# Patient Record
Sex: MALE | Race: WHITE | NOT HISPANIC OR LATINO | Employment: OTHER | URBAN - METROPOLITAN AREA
[De-identification: names, ages, dates, MRNs, and addresses within clinical notes are randomized per-mention and may not be internally consistent; named-entity substitution may affect disease eponyms.]

---

## 2017-01-19 ENCOUNTER — HISTORICAL (OUTPATIENT)
Dept: RADIOLOGY | Facility: HOSPITAL | Age: 72
End: 2017-01-19

## 2017-01-25 ENCOUNTER — HISTORICAL (OUTPATIENT)
Dept: RADIOLOGY | Facility: HOSPITAL | Age: 72
End: 2017-01-25

## 2017-02-06 ENCOUNTER — HISTORICAL (OUTPATIENT)
Dept: RADIOLOGY | Facility: HOSPITAL | Age: 72
End: 2017-02-06

## 2017-04-19 ENCOUNTER — HISTORICAL (OUTPATIENT)
Dept: LAB | Facility: HOSPITAL | Age: 72
End: 2017-04-19

## 2017-04-27 ENCOUNTER — HISTORICAL (OUTPATIENT)
Dept: LAB | Facility: HOSPITAL | Age: 72
End: 2017-04-27

## 2017-04-27 LAB
EST. AVERAGE GLUCOSE BLD GHB EST-MCNC: 134 MG/DL
HBA1C MFR BLD: 6.3 % (ref 4.5–6.2)
TSH SERPL-ACNC: 1.54 MIU/ML (ref 0.36–3.74)

## 2017-08-17 ENCOUNTER — HISTORICAL (OUTPATIENT)
Dept: LAB | Facility: HOSPITAL | Age: 72
End: 2017-08-17

## 2017-08-17 LAB
ALBUMIN SERPL-MCNC: 4.2 GM/DL (ref 3.4–5)
ALBUMIN/GLOB SERPL: 1.3 RATIO (ref 1.1–2)
ALP SERPL-CCNC: 100 UNIT/L (ref 46–116)
ALT SERPL-CCNC: 34 UNIT/L (ref 12–78)
APPEARANCE, UA: NORMAL
AST SERPL-CCNC: 19 UNIT/L (ref 15–37)
BACTERIA SPEC CULT: NORMAL
BILIRUB SERPL-MCNC: 0.7 MG/DL (ref 0.2–1)
BILIRUB UR QL STRIP: NEGATIVE
BILIRUBIN DIRECT+TOT PNL SERPL-MCNC: 0.18 MG/DL (ref 0–0.2)
BILIRUBIN DIRECT+TOT PNL SERPL-MCNC: 0.52 MG/DL (ref 0–0.8)
BUN SERPL-MCNC: 23.5 MG/DL (ref 7–18)
CALCIUM SERPL-MCNC: 9.7 MG/DL (ref 8.5–10.1)
CHLORIDE SERPL-SCNC: 103 MMOL/L (ref 98–107)
CHOLEST SERPL-MCNC: 118 MG/DL (ref 0–200)
CHOLEST/HDLC SERPL: 2.1 {RATIO} (ref 0–5)
CO2 SERPL-SCNC: 32.4 MMOL/L (ref 21–32)
COLOR UR: NORMAL
CREAT SERPL-MCNC: 0.98 MG/DL (ref 0.6–1.3)
EST. AVERAGE GLUCOSE BLD GHB EST-MCNC: 137 MG/DL
GLOBULIN SER-MCNC: 3.2 GM/DL (ref 2.4–3.5)
GLUCOSE (UA): NEGATIVE
GLUCOSE SERPL-MCNC: 102 MG/DL (ref 74–106)
HBA1C MFR BLD: 6.4 % (ref 4.5–6.2)
HDLC SERPL-MCNC: 55 MG/DL (ref 40–60)
HGB UR QL STRIP: NORMAL
KETONES UR QL STRIP: NEGATIVE
LDLC SERPL CALC-MCNC: 54 MG/DL (ref 0–129)
LEUKOCYTE ESTERASE UR QL STRIP: NEGATIVE
NITRITE UR QL STRIP: NEGATIVE
PH UR STRIP: 5.5 [PH] (ref 5–9)
POTASSIUM SERPL-SCNC: 4.6 MMOL/L (ref 3.5–5.1)
PROT SERPL-MCNC: 7.4 GM/DL (ref 6.4–8.2)
PROT UR QL STRIP: NEGATIVE
RBC #/AREA URNS HPF: NORMAL /HPF
SODIUM SERPL-SCNC: 141 MMOL/L (ref 136–145)
SP GR UR STRIP: 1.02 (ref 1–1.03)
SQUAMOUS EPITHELIAL, UA: NORMAL
TRIGL SERPL-MCNC: 44 MG/DL
URATE SERPL-MCNC: 5.1 MG/DL (ref 3.4–7)
UROBILINOGEN UR STRIP-ACNC: 0.2
VLDLC SERPL CALC-MCNC: 9 MG/DL
WBC #/AREA URNS HPF: NORMAL /HPF

## 2017-10-09 ENCOUNTER — HISTORICAL (OUTPATIENT)
Dept: RADIOLOGY | Facility: HOSPITAL | Age: 72
End: 2017-10-09

## 2017-10-09 LAB
ALBUMIN SERPL-MCNC: 4 GM/DL (ref 3.4–5)
ALP SERPL-CCNC: 109 UNIT/L (ref 46–116)
ALT SERPL-CCNC: 38 UNIT/L (ref 12–78)
AST SERPL-CCNC: 18 UNIT/L (ref 15–37)
BILIRUB SERPL-MCNC: 0.5 MG/DL (ref 0.2–1)
BILIRUBIN DIRECT+TOT PNL SERPL-MCNC: 0.13 MG/DL (ref 0–0.2)
BILIRUBIN DIRECT+TOT PNL SERPL-MCNC: 0.4 MG/DL (ref 0–0.8)
CHOLEST SERPL-MCNC: 108 MG/DL (ref 0–200)
CHOLEST/HDLC SERPL: 2 {RATIO} (ref 0–5)
HDLC SERPL-MCNC: 55 MG/DL (ref 40–60)
LDLC SERPL CALC-MCNC: 40 MG/DL (ref 0–129)
PROT SERPL-MCNC: 7.3 GM/DL (ref 6.4–8.2)
TRIGL SERPL-MCNC: 67 MG/DL
VLDLC SERPL CALC-MCNC: 13 MG/DL

## 2017-12-04 ENCOUNTER — HISTORICAL (OUTPATIENT)
Dept: LAB | Facility: HOSPITAL | Age: 72
End: 2017-12-04

## 2017-12-04 LAB
ABS NEUT (OLG): 3.5 X10(3)/MCL (ref 2.1–9.2)
ALBUMIN SERPL-MCNC: 3.8 GM/DL (ref 3.4–5)
ALBUMIN/GLOB SERPL: 1.2 RATIO (ref 1.1–2)
ALP SERPL-CCNC: 106 UNIT/L (ref 46–116)
ALT SERPL-CCNC: 41 UNIT/L (ref 12–78)
APPEARANCE, UA: CLEAR
AST SERPL-CCNC: 19 UNIT/L (ref 15–37)
BACTERIA #/AREA URNS AUTO: NORMAL /HPF
BASOPHILS # BLD AUTO: 0 X10(3)/MCL
BASOPHILS NFR BLD AUTO: 0 % (ref 0–2)
BILIRUB SERPL-MCNC: 0.6 MG/DL (ref 0.2–1)
BILIRUB UR QL STRIP: NEGATIVE
BILIRUBIN DIRECT+TOT PNL SERPL-MCNC: 0.17 MG/DL (ref 0–0.2)
BILIRUBIN DIRECT+TOT PNL SERPL-MCNC: 0.43 MG/DL (ref 0–0.8)
BUN SERPL-MCNC: 15.5 MG/DL (ref 7–18)
CALCIUM SERPL-MCNC: 9.5 MG/DL (ref 8.5–10.1)
CHLORIDE SERPL-SCNC: 101 MMOL/L (ref 98–107)
CO2 SERPL-SCNC: 31 MMOL/L (ref 21–32)
COLOR UR: YELLOW
CREAT SERPL-MCNC: 0.93 MG/DL (ref 0.6–1.3)
CREAT UR-MCNC: 136.5 MG/DL (ref 30–125)
EOSINOPHIL # BLD AUTO: 0.3 X10(3)/MCL
EOSINOPHIL NFR BLD AUTO: 5 %
ERYTHROCYTE [DISTWIDTH] IN BLOOD BY AUTOMATED COUNT: 13.2 % (ref 11.5–17)
GLOBULIN SER-MCNC: 3.2 GM/DL (ref 2.4–3.5)
GLUCOSE (UA): NEGATIVE
GLUCOSE SERPL-MCNC: 115 MG/DL (ref 74–106)
HCT VFR BLD AUTO: 42.2 % (ref 42–52)
HGB BLD-MCNC: 14.2 GM/DL (ref 14–18)
HGB UR QL STRIP: NORMAL
KETONES UR QL STRIP: NEGATIVE
LEUKOCYTE ESTERASE UR QL STRIP: NEGATIVE
LYMPHOCYTES # BLD AUTO: 2 X10(3)/MCL
LYMPHOCYTES NFR BLD AUTO: 31 % (ref 13–40)
MCH RBC QN AUTO: 31.7 PG (ref 27–31)
MCHC RBC AUTO-ENTMCNC: 33.7 GM/DL (ref 33–36)
MCV RBC AUTO: 94.2 FL (ref 80–94)
MONOCYTES # BLD AUTO: 0.7 X10(3)/MCL
MONOCYTES NFR BLD AUTO: 10 % (ref 2–11)
NEUTROPHILS # BLD AUTO: 3.5 X10(3)/MCL (ref 2.1–9.2)
NEUTROPHILS NFR BLD AUTO: 54 % (ref 47–80)
NITRITE UR QL STRIP.AUTO: NEGATIVE
PH UR STRIP: 6 [PH] (ref 5–9)
PLATELET # BLD AUTO: 222 X10(3)/MCL (ref 130–400)
PMV BLD AUTO: 6.6 FL (ref 7.4–10.4)
POTASSIUM SERPL-SCNC: 4.3 MMOL/L (ref 3.5–5.1)
PROT SERPL-MCNC: 7 GM/DL (ref 6.4–8.2)
PROT UR QL STRIP: NEGATIVE
PROT UR STRIP-MCNC: 14.5 MG/DL
PTH-INTACT SERPL-MCNC: 51.9 PG/DL (ref 14–72)
RBC # BLD AUTO: 4.48 X10(6)/MCL (ref 4.7–6.1)
RBC #/AREA URNS HPF: NORMAL /HPF
SODIUM SERPL-SCNC: 138 MMOL/L (ref 136–145)
SP GR UR STRIP: 1.02 (ref 1–1.03)
SQUAMOUS EPITHELIAL, UA: NORMAL
UROBILINOGEN UR STRIP-ACNC: 0.2
WBC # SPEC AUTO: 6.5 X10(3)/MCL (ref 4.5–11.5)
WBC #/AREA URNS AUTO: NORMAL /HPF

## 2018-02-21 ENCOUNTER — HISTORICAL (OUTPATIENT)
Dept: LAB | Facility: HOSPITAL | Age: 73
End: 2018-02-21

## 2018-02-21 LAB
ALBUMIN SERPL-MCNC: 4.1 GM/DL (ref 3.4–5)
ALBUMIN/GLOB SERPL: 1.2 RATIO (ref 1.1–2)
ALP SERPL-CCNC: 119 UNIT/L (ref 46–116)
ALT SERPL-CCNC: 43 UNIT/L (ref 12–78)
AST SERPL-CCNC: 22 UNIT/L (ref 15–37)
BILIRUB SERPL-MCNC: 0.7 MG/DL (ref 0.2–1)
BILIRUBIN DIRECT+TOT PNL SERPL-MCNC: 0.19 MG/DL (ref 0–0.2)
BILIRUBIN DIRECT+TOT PNL SERPL-MCNC: 0.47 MG/DL (ref 0–0.8)
BUN SERPL-MCNC: 21.8 MG/DL (ref 7–18)
CALCIUM SERPL-MCNC: 9.7 MG/DL (ref 8.5–10.1)
CHLORIDE SERPL-SCNC: 104 MMOL/L (ref 98–107)
CHOLEST SERPL-MCNC: 125 MG/DL (ref 0–200)
CHOLEST/HDLC SERPL: 1.9 {RATIO} (ref 0–5)
CO2 SERPL-SCNC: 31 MMOL/L (ref 21–32)
CREAT SERPL-MCNC: 1.01 MG/DL (ref 0.6–1.3)
EST. AVERAGE GLUCOSE BLD GHB EST-MCNC: 134 MG/DL
GLOBULIN SER-MCNC: 3.5 GM/DL (ref 2.4–3.5)
GLUCOSE SERPL-MCNC: 117 MG/DL (ref 74–106)
HBA1C MFR BLD: 6.3 % (ref 4.5–6.2)
HDLC SERPL-MCNC: 65 MG/DL (ref 40–60)
LDLC SERPL CALC-MCNC: 50 MG/DL (ref 0–129)
POTASSIUM SERPL-SCNC: 4.6 MMOL/L (ref 3.5–5.1)
PROT SERPL-MCNC: 7.6 GM/DL (ref 6.4–8.2)
SODIUM SERPL-SCNC: 141 MMOL/L (ref 136–145)
TRIGL SERPL-MCNC: 50 MG/DL
URATE SERPL-MCNC: 4.9 MG/DL (ref 3.4–7)
VLDLC SERPL CALC-MCNC: 10 MG/DL

## 2018-03-19 ENCOUNTER — HISTORICAL (OUTPATIENT)
Dept: LAB | Facility: HOSPITAL | Age: 73
End: 2018-03-19

## 2018-03-19 LAB — ALP SERPL-CCNC: 115 UNIT/L (ref 46–116)

## 2018-06-19 ENCOUNTER — HISTORICAL (OUTPATIENT)
Dept: LAB | Facility: HOSPITAL | Age: 73
End: 2018-06-19

## 2018-06-19 LAB
ABS NEUT (OLG): 3.5 X10(3)/MCL (ref 2.1–9.2)
ALBUMIN SERPL-MCNC: 3.8 GM/DL (ref 3.4–5)
ALBUMIN/GLOB SERPL: 1.1 RATIO (ref 1.1–2)
ALP SERPL-CCNC: 122 UNIT/L (ref 46–116)
ALT SERPL-CCNC: 45 UNIT/L (ref 12–78)
APPEARANCE, UA: CLEAR
AST SERPL-CCNC: 18 UNIT/L (ref 15–37)
BACTERIA SPEC CULT: NORMAL
BASOPHILS NFR BLD AUTO: 1 % (ref 0–2)
BILIRUB SERPL-MCNC: 0.6 MG/DL (ref 0.2–1)
BILIRUB UR QL STRIP: NORMAL
BILIRUBIN DIRECT+TOT PNL SERPL-MCNC: 0.19 MG/DL (ref 0–0.2)
BILIRUBIN DIRECT+TOT PNL SERPL-MCNC: 0.41 MG/DL (ref 0–0.8)
BUN SERPL-MCNC: 18.7 MG/DL (ref 7–18)
CALCIUM SERPL-MCNC: 9.4 MG/DL (ref 8.5–10.1)
CHLORIDE SERPL-SCNC: 102 MMOL/L (ref 98–107)
CO2 SERPL-SCNC: 31.5 MMOL/L (ref 21–32)
COLOR UR: YELLOW
CREAT SERPL-MCNC: 1.03 MG/DL (ref 0.6–1.3)
CREAT UR-MCNC: 399 MG/DL (ref 30–125)
EOSINOPHIL # BLD AUTO: 0.2 X10(3)/MCL
EOSINOPHIL NFR BLD AUTO: 4 %
ERYTHROCYTE [DISTWIDTH] IN BLOOD BY AUTOMATED COUNT: 13.2 % (ref 11.5–17)
GLOBULIN SER-MCNC: 3.4 GM/DL (ref 2.4–3.5)
GLUCOSE (UA): NEGATIVE
GLUCOSE SERPL-MCNC: 122 MG/DL (ref 74–106)
HCT VFR BLD AUTO: 43.5 % (ref 42–52)
HGB BLD-MCNC: 14.6 GM/DL (ref 14–18)
HGB UR QL STRIP: NORMAL
KETONES UR QL STRIP: NEGATIVE
LEUKOCYTE ESTERASE UR QL STRIP: NEGATIVE
LYMPHOCYTES # BLD AUTO: 2.2 X10(3)/MCL
LYMPHOCYTES NFR BLD AUTO: 33 % (ref 13–40)
MCH RBC QN AUTO: 31.3 PG (ref 27–31)
MCHC RBC AUTO-ENTMCNC: 33.6 GM/DL (ref 33–36)
MCV RBC AUTO: 93.4 FL (ref 80–94)
MONOCYTES # BLD AUTO: 0.7 X10(3)/MCL
MONOCYTES NFR BLD AUTO: 10 % (ref 2–11)
NEUTROPHILS # BLD AUTO: 3.5 X10(3)/MCL (ref 2.1–9.2)
NEUTROPHILS NFR BLD AUTO: 52 % (ref 47–80)
NITRITE UR QL STRIP: NEGATIVE
PH UR STRIP: 5.5 [PH] (ref 5–9)
PLATELET # BLD AUTO: 243 X10(3)/MCL (ref 130–400)
PMV BLD AUTO: 6.9 FL (ref 7.4–10.4)
POTASSIUM SERPL-SCNC: 4.3 MMOL/L (ref 3.5–5.1)
PROT SERPL-MCNC: 7.2 GM/DL (ref 6.4–8.2)
PROT UR QL STRIP: NEGATIVE
PROT UR STRIP-MCNC: 33.8 MG/DL
PTH-INTACT SERPL-MCNC: 52.4 PG/ML (ref 18.4–80.1)
RBC # BLD AUTO: 4.66 X10(6)/MCL (ref 4.7–6.1)
RBC #/AREA URNS HPF: NORMAL /[HPF]
SODIUM SERPL-SCNC: 140 MMOL/L (ref 136–145)
SP GR UR STRIP: >=1.03 (ref 1–1.03)
SQUAMOUS EPITHELIAL, UA: NORMAL
UROBILINOGEN UR STRIP-ACNC: 0.2
WBC # SPEC AUTO: 6.7 X10(3)/MCL (ref 4.5–11.5)
WBC #/AREA URNS HPF: NORMAL /[HPF]

## 2018-08-22 ENCOUNTER — HISTORICAL (OUTPATIENT)
Dept: LAB | Facility: HOSPITAL | Age: 73
End: 2018-08-22

## 2018-08-22 LAB
BUN SERPL-MCNC: 19.9 MG/DL (ref 7–18)
CALCIUM SERPL-MCNC: 9.5 MG/DL (ref 8.5–10.1)
CHLORIDE SERPL-SCNC: 103 MMOL/L (ref 98–107)
CHOLEST SERPL-MCNC: 136 MG/DL (ref 0–200)
CHOLEST/HDLC SERPL: 2.2 {RATIO} (ref 0–5)
CO2 SERPL-SCNC: 33.8 MMOL/L (ref 21–32)
CREAT SERPL-MCNC: 1.08 MG/DL (ref 0.6–1.3)
CREAT/UREA NIT SERPL: 18
DEPRECATED CALCIDIOL+CALCIFEROL SERPL-MC: 22.71 NG/ML (ref 30–80)
EST. AVERAGE GLUCOSE BLD GHB EST-MCNC: 143 MG/DL
GLUCOSE SERPL-MCNC: 128 MG/DL (ref 74–106)
HBA1C MFR BLD: 6.6 % (ref 4.5–6.2)
HDLC SERPL-MCNC: 63 MG/DL (ref 40–60)
LDLC SERPL CALC-MCNC: 58 MG/DL (ref 0–129)
POTASSIUM SERPL-SCNC: 5 MMOL/L (ref 3.5–5.1)
SODIUM SERPL-SCNC: 141 MMOL/L (ref 136–145)
TRIGL SERPL-MCNC: 74 MG/DL
TSH SERPL-ACNC: 2.11 MIU/ML (ref 0.36–3.74)
URATE SERPL-MCNC: 4.8 MG/DL (ref 3.4–7)
VLDLC SERPL CALC-MCNC: 15 MG/DL

## 2018-10-08 ENCOUNTER — HISTORICAL (OUTPATIENT)
Dept: LAB | Facility: HOSPITAL | Age: 73
End: 2018-10-08

## 2018-10-08 LAB
ABS NEUT (OLG): 3.23 X10(3)/MCL (ref 2.1–9.2)
ALBUMIN SERPL-MCNC: 3.9 GM/DL (ref 3.4–5)
ALBUMIN/GLOB SERPL: 1.1 RATIO (ref 1.1–2)
ALP SERPL-CCNC: 122 UNIT/L (ref 46–116)
ALT SERPL-CCNC: 45 UNIT/L (ref 12–78)
APPEARANCE, UA: CLEAR
AST SERPL-CCNC: 18 UNIT/L (ref 15–37)
BACTERIA SPEC CULT: ABNORMAL
BASOPHILS # BLD AUTO: 0 X10(3)/MCL (ref 0–0.2)
BASOPHILS NFR BLD AUTO: 0 %
BILIRUB SERPL-MCNC: 0.5 MG/DL (ref 0.2–1)
BILIRUB UR QL STRIP: NEGATIVE
BILIRUBIN DIRECT+TOT PNL SERPL-MCNC: 0.15 MG/DL (ref 0–0.2)
BILIRUBIN DIRECT+TOT PNL SERPL-MCNC: 0.35 MG/DL (ref 0–0.8)
BUN SERPL-MCNC: 14.8 MG/DL (ref 7–18)
CALCIUM SERPL-MCNC: 9.5 MG/DL (ref 8.5–10.1)
CHLORIDE SERPL-SCNC: 103 MMOL/L (ref 98–107)
CO2 SERPL-SCNC: 32.3 MMOL/L (ref 21–32)
COLOR UR: YELLOW
CREAT SERPL-MCNC: 1.11 MG/DL (ref 0.6–1.3)
CREAT UR-MCNC: 110.5 MG/DL (ref 30–125)
EOSINOPHIL # BLD AUTO: 0.4 X10(3)/MCL (ref 0–0.9)
EOSINOPHIL NFR BLD AUTO: 6 %
ERYTHROCYTE [DISTWIDTH] IN BLOOD BY AUTOMATED COUNT: 12.7 % (ref 11.5–17)
GLOBULIN SER-MCNC: 3.4 GM/DL (ref 2.4–3.5)
GLUCOSE (UA): NEGATIVE
GLUCOSE SERPL-MCNC: 124 MG/DL (ref 74–106)
HCT VFR BLD AUTO: 43.4 % (ref 42–52)
HGB BLD-MCNC: 14.3 GM/DL (ref 14–18)
HGB UR QL STRIP: ABNORMAL
IMM GRANULOCYTES # BLD AUTO: 0.01 % (ref 0–0.02)
IMM GRANULOCYTES NFR BLD AUTO: 0.1 % (ref 0–0.43)
KETONES UR QL STRIP: NEGATIVE
LEUKOCYTE ESTERASE UR QL STRIP: NEGATIVE
LYMPHOCYTES # BLD AUTO: 2.6 X10(3)/MCL (ref 0.6–4.6)
LYMPHOCYTES NFR BLD AUTO: 38 %
MCH RBC QN AUTO: 30.4 PG (ref 27–31)
MCHC RBC AUTO-ENTMCNC: 32.9 GM/DL (ref 33–36)
MCV RBC AUTO: 92.3 FL (ref 80–94)
MONOCYTES # BLD AUTO: 0.7 X10(3)/MCL (ref 0.1–1.3)
MONOCYTES NFR BLD AUTO: 10 %
MUCOUS THREADS URNS QL MICRO: SLIGHT
NEUTROPHILS # BLD AUTO: 3.23 X10(3)/MCL (ref 1.4–7.9)
NEUTROPHILS NFR BLD AUTO: 47 %
NITRITE UR QL STRIP: NEGATIVE
PH UR STRIP: 5.5 [PH] (ref 5–9)
PLATELET # BLD AUTO: 256 X10(3)/MCL (ref 130–400)
PMV BLD AUTO: 8.3 FL (ref 9.4–12.4)
POTASSIUM SERPL-SCNC: 4.6 MMOL/L (ref 3.5–5.1)
PROT SERPL-MCNC: 7.3 GM/DL (ref 6.4–8.2)
PROT UR QL STRIP: NEGATIVE
PROT UR STRIP-MCNC: 7.4 MG/DL
PTH-INTACT SERPL-MCNC: 53.6 PG/ML (ref 18.4–80.1)
RBC # BLD AUTO: 4.7 X10(6)/MCL (ref 4.7–6.1)
RBC #/AREA URNS HPF: ABNORMAL /HPF
SODIUM SERPL-SCNC: 141 MMOL/L (ref 136–145)
SP GR UR STRIP: 1.02 (ref 1–1.03)
SQUAMOUS EPITHELIAL, UA: ABNORMAL
UROBILINOGEN UR STRIP-ACNC: 0.2
WBC # SPEC AUTO: 6.9 X10(3)/MCL (ref 4.5–11.5)
WBC #/AREA URNS HPF: ABNORMAL /HPF

## 2019-01-25 ENCOUNTER — HISTORICAL (OUTPATIENT)
Dept: LAB | Facility: HOSPITAL | Age: 74
End: 2019-01-25

## 2019-01-25 LAB
BUN SERPL-MCNC: 18.7 MG/DL (ref 7–18)
CALCIUM SERPL-MCNC: 9.1 MG/DL (ref 8.5–10.1)
CHLORIDE SERPL-SCNC: 99 MMOL/L (ref 98–107)
CO2 SERPL-SCNC: 32.5 MMOL/L (ref 21–32)
CREAT SERPL-MCNC: 1.16 MG/DL (ref 0.6–1.3)
CREAT/UREA NIT SERPL: 16
GLUCOSE SERPL-MCNC: 194 MG/DL (ref 74–106)
POTASSIUM SERPL-SCNC: 4 MMOL/L (ref 3.5–5.1)
SODIUM SERPL-SCNC: 139 MMOL/L (ref 136–145)

## 2019-02-27 ENCOUNTER — HISTORICAL (OUTPATIENT)
Dept: LAB | Facility: HOSPITAL | Age: 74
End: 2019-02-27

## 2019-02-27 LAB
ALBUMIN SERPL-MCNC: 4.3 GM/DL (ref 3.4–5)
ALBUMIN/GLOB SERPL: 1.3 RATIO (ref 1.1–2)
ALP SERPL-CCNC: 125 UNIT/L (ref 46–116)
ALT SERPL-CCNC: 43 UNIT/L (ref 12–78)
APPEARANCE, UA: CLEAR
AST SERPL-CCNC: 19 UNIT/L (ref 15–37)
BACTERIA SPEC CULT: NORMAL
BILIRUB SERPL-MCNC: 0.4 MG/DL (ref 0.2–1)
BILIRUB UR QL STRIP: NEGATIVE
BILIRUBIN DIRECT+TOT PNL SERPL-MCNC: 0.13 MG/DL (ref 0–0.2)
BILIRUBIN DIRECT+TOT PNL SERPL-MCNC: 0.27 MG/DL (ref 0–0.8)
BUN SERPL-MCNC: 17.5 MG/DL (ref 7–18)
CALCIUM SERPL-MCNC: 9.5 MG/DL (ref 8.5–10.1)
CHLORIDE SERPL-SCNC: 102 MMOL/L (ref 98–107)
CO2 SERPL-SCNC: 31.9 MMOL/L (ref 21–32)
COLOR UR: YELLOW
CREAT SERPL-MCNC: 0.94 MG/DL (ref 0.6–1.3)
EST. AVERAGE GLUCOSE BLD GHB EST-MCNC: 160 MG/DL
GLOBULIN SER-MCNC: 3.3 GM/DL (ref 2.4–3.5)
GLUCOSE (UA): NEGATIVE
GLUCOSE SERPL-MCNC: 119 MG/DL (ref 74–106)
HBA1C MFR BLD: 7.2 % (ref 4.5–6.2)
HGB UR QL STRIP: NEGATIVE
KETONES UR QL STRIP: NEGATIVE
LEUKOCYTE ESTERASE UR QL STRIP: NEGATIVE
NITRITE UR QL STRIP: NEGATIVE
PH UR STRIP: 6 [PH] (ref 5–9)
POTASSIUM SERPL-SCNC: 4.6 MMOL/L (ref 3.5–5.1)
PROT SERPL-MCNC: 7.6 GM/DL (ref 6.4–8.2)
PROT UR QL STRIP: NEGATIVE
RBC #/AREA URNS HPF: NORMAL /[HPF]
SODIUM SERPL-SCNC: 142 MMOL/L (ref 136–145)
SP GR UR STRIP: 1.02 (ref 1–1.03)
SQUAMOUS EPITHELIAL, UA: NORMAL
UROBILINOGEN UR STRIP-ACNC: 0.2
WBC #/AREA URNS HPF: NORMAL /[HPF]

## 2019-04-04 ENCOUNTER — HISTORICAL (OUTPATIENT)
Dept: LAB | Facility: HOSPITAL | Age: 74
End: 2019-04-04

## 2019-04-04 LAB
ABS NEUT (OLG): 2.65 X10(3)/MCL (ref 2.1–9.2)
ALBUMIN SERPL-MCNC: 3.9 GM/DL (ref 3.4–5)
ALBUMIN/GLOB SERPL: 1.2 RATIO (ref 1.1–2)
ALP SERPL-CCNC: 115 UNIT/L (ref 46–116)
ALT SERPL-CCNC: 33 UNIT/L (ref 12–78)
APPEARANCE, UA: CLEAR
AST SERPL-CCNC: 23 UNIT/L (ref 15–37)
BACTERIA SPEC CULT: NORMAL
BASOPHILS # BLD AUTO: 0 X10(3)/MCL (ref 0–0.2)
BASOPHILS NFR BLD AUTO: 0 %
BILIRUB SERPL-MCNC: 0.5 MG/DL (ref 0.2–1)
BILIRUB UR QL STRIP: NEGATIVE
BILIRUBIN DIRECT+TOT PNL SERPL-MCNC: 0.15 MG/DL (ref 0–0.2)
BILIRUBIN DIRECT+TOT PNL SERPL-MCNC: 0.35 MG/DL (ref 0–0.8)
BUN SERPL-MCNC: 21 MG/DL (ref 7–18)
CALCIUM SERPL-MCNC: 9.7 MG/DL (ref 8.5–10.1)
CHLORIDE SERPL-SCNC: 101 MMOL/L (ref 98–107)
CO2 SERPL-SCNC: 31.1 MMOL/L (ref 21–32)
COLOR UR: YELLOW
CREAT SERPL-MCNC: 1.09 MG/DL (ref 0.6–1.3)
CREAT UR-MCNC: 238.5 MG/DL (ref 30–125)
EOSINOPHIL # BLD AUTO: 0.2 X10(3)/MCL (ref 0–0.9)
EOSINOPHIL NFR BLD AUTO: 4 %
ERYTHROCYTE [DISTWIDTH] IN BLOOD BY AUTOMATED COUNT: 12.4 % (ref 11.5–17)
GLOBULIN SER-MCNC: 3.2 GM/DL (ref 2.4–3.5)
GLUCOSE (UA): NEGATIVE
GLUCOSE SERPL-MCNC: 144 MG/DL (ref 74–106)
HCT VFR BLD AUTO: 41.1 % (ref 42–52)
HGB BLD-MCNC: 14.1 GM/DL (ref 14–18)
HGB UR QL STRIP: NORMAL
KETONES UR QL STRIP: NEGATIVE
LEUKOCYTE ESTERASE UR QL STRIP: NEGATIVE
LYMPHOCYTES # BLD AUTO: 1.7 X10(3)/MCL (ref 0.6–4.6)
LYMPHOCYTES NFR BLD AUTO: 34 %
MCH RBC QN AUTO: 31.1 PG (ref 27–31)
MCHC RBC AUTO-ENTMCNC: 34.3 GM/DL (ref 33–36)
MCV RBC AUTO: 90.7 FL (ref 80–94)
MONOCYTES # BLD AUTO: 0.6 X10(3)/MCL (ref 0.1–1.3)
MONOCYTES NFR BLD AUTO: 11 %
NEUTROPHILS # BLD AUTO: 2.65 X10(3)/MCL (ref 1.4–7.9)
NEUTROPHILS NFR BLD AUTO: 51 %
NITRITE UR QL STRIP: NEGATIVE
PH UR STRIP: 5 [PH] (ref 5–9)
PLATELET # BLD AUTO: 246 X10(3)/MCL (ref 130–400)
PMV BLD AUTO: 8.4 FL (ref 9.4–12.4)
POTASSIUM SERPL-SCNC: 4.9 MMOL/L (ref 3.5–5.1)
PROT SERPL-MCNC: 7.1 GM/DL (ref 6.4–8.2)
PROT UR QL STRIP: NEGATIVE
PROT UR STRIP-MCNC: 17.6 MG/DL
PROT/CREAT UR-RTO: 0.1 MG/DL
PTH-INTACT SERPL-MCNC: 60.3 PG/ML (ref 18.4–80.1)
RBC # BLD AUTO: 4.53 X10(6)/MCL (ref 4.7–6.1)
RBC #/AREA URNS HPF: NORMAL /HPF
SODIUM SERPL-SCNC: 139 MMOL/L (ref 136–145)
SP GR UR STRIP: 1.02 (ref 1–1.03)
SQUAMOUS EPITHELIAL, UA: NORMAL
UROBILINOGEN UR STRIP-ACNC: 0.2
WBC # SPEC AUTO: 5.2 X10(3)/MCL (ref 4.5–11.5)
WBC #/AREA URNS HPF: NORMAL /[HPF]

## 2019-09-03 ENCOUNTER — HISTORICAL (OUTPATIENT)
Dept: LAB | Facility: HOSPITAL | Age: 74
End: 2019-09-03

## 2019-09-03 LAB
ABS NEUT (OLG): 4.04 X10(3)/MCL (ref 2.1–9.2)
ALBUMIN SERPL-MCNC: 4.1 GM/DL (ref 3.4–5)
ALBUMIN/GLOB SERPL: 1.3 RATIO (ref 1.1–2)
ALP SERPL-CCNC: 111 UNIT/L (ref 50–136)
ALT SERPL-CCNC: 32 UNIT/L (ref 12–78)
AST SERPL-CCNC: 15 UNIT/L (ref 15–37)
BASOPHILS # BLD AUTO: 0 X10(3)/MCL (ref 0–0.2)
BASOPHILS NFR BLD AUTO: 0 %
BILIRUB SERPL-MCNC: 0.6 MG/DL (ref 0.2–1)
BILIRUBIN DIRECT+TOT PNL SERPL-MCNC: 0.2 MG/DL (ref 0–0.5)
BILIRUBIN DIRECT+TOT PNL SERPL-MCNC: 0.4 MG/DL (ref 0–0.8)
BUN SERPL-MCNC: 18 MG/DL (ref 7–18)
CALCIUM SERPL-MCNC: 9.6 MG/DL (ref 8.5–10.1)
CHLORIDE SERPL-SCNC: 104 MMOL/L (ref 98–107)
CHOLEST SERPL-MCNC: 104 MG/DL (ref 0–200)
CHOLEST/HDLC SERPL: 2 {RATIO} (ref 0–5)
CO2 SERPL-SCNC: 32 MMOL/L (ref 21–32)
CREAT SERPL-MCNC: 1.05 MG/DL (ref 0.7–1.3)
EOSINOPHIL # BLD AUTO: 0.2 X10(3)/MCL (ref 0–0.9)
EOSINOPHIL NFR BLD AUTO: 3 %
ERYTHROCYTE [DISTWIDTH] IN BLOOD BY AUTOMATED COUNT: 12.9 % (ref 11.5–17)
EST. AVERAGE GLUCOSE BLD GHB EST-MCNC: 123 MG/DL
GLOBULIN SER-MCNC: 3.1 GM/DL (ref 2.4–3.5)
GLUCOSE SERPL-MCNC: 101 MG/DL (ref 74–106)
HBA1C MFR BLD: 5.9 % (ref 4.2–6.3)
HCT VFR BLD AUTO: 43.2 % (ref 42–52)
HDLC SERPL-MCNC: 53 MG/DL (ref 35–60)
HGB BLD-MCNC: 14.1 GM/DL (ref 14–18)
IMM GRANULOCYTES # BLD AUTO: 0.02 % (ref 0–0.02)
IMM GRANULOCYTES NFR BLD AUTO: 0.3 % (ref 0–0.43)
LDLC SERPL CALC-MCNC: 35 MG/DL (ref 0–129)
LYMPHOCYTES # BLD AUTO: 1.9 X10(3)/MCL (ref 0.6–4.6)
LYMPHOCYTES NFR BLD AUTO: 28 %
MCH RBC QN AUTO: 31.1 PG (ref 27–31)
MCHC RBC AUTO-ENTMCNC: 32.6 GM/DL (ref 33–36)
MCV RBC AUTO: 95.2 FL (ref 80–94)
MONOCYTES # BLD AUTO: 0.7 X10(3)/MCL (ref 0.1–1.3)
MONOCYTES NFR BLD AUTO: 10 %
NEUTROPHILS # BLD AUTO: 4.04 X10(3)/MCL (ref 1.4–7.9)
NEUTROPHILS NFR BLD AUTO: 59 %
PLATELET # BLD AUTO: 264 X10(3)/MCL (ref 130–400)
PMV BLD AUTO: 9.5 FL (ref 9.4–12.4)
POTASSIUM SERPL-SCNC: 5.4 MMOL/L (ref 3.5–5.1)
PROT SERPL-MCNC: 7.2 GM/DL (ref 6.4–8.2)
RBC # BLD AUTO: 4.54 X10(6)/MCL (ref 4.7–6.1)
SODIUM SERPL-SCNC: 142 MMOL/L (ref 136–145)
TRIGL SERPL-MCNC: 79 MG/DL (ref 30–150)
VLDLC SERPL CALC-MCNC: 16 MG/DL
WBC # SPEC AUTO: 6.8 X10(3)/MCL (ref 4.5–11.5)

## 2019-12-06 ENCOUNTER — HISTORICAL (OUTPATIENT)
Dept: LAB | Facility: HOSPITAL | Age: 74
End: 2019-12-06

## 2019-12-06 LAB
ALBUMIN SERPL-MCNC: 4 GM/DL (ref 3.4–5)
ALBUMIN/GLOB SERPL: 1.1 RATIO (ref 1.1–2)
ALP SERPL-CCNC: 100 UNIT/L (ref 46–116)
ALT SERPL-CCNC: 48 UNIT/L (ref 12–78)
APPEARANCE, UA: CLEAR
AST SERPL-CCNC: 22 UNIT/L (ref 15–37)
BACTERIA SPEC CULT: ABNORMAL
BILIRUB SERPL-MCNC: 0.7 MG/DL (ref 0.2–1)
BILIRUB UR QL STRIP: NEGATIVE
BILIRUBIN DIRECT+TOT PNL SERPL-MCNC: 0.23 MG/DL (ref 0–0.2)
BILIRUBIN DIRECT+TOT PNL SERPL-MCNC: 0.47 MG/DL (ref 0–0.8)
BUN SERPL-MCNC: 23.6 MG/DL (ref 7–18)
CALCIUM SERPL-MCNC: 9.6 MG/DL (ref 8.5–10.1)
CHLORIDE SERPL-SCNC: 103 MMOL/L (ref 98–107)
CO2 SERPL-SCNC: 32.5 MMOL/L (ref 21–32)
COLOR UR: YELLOW
CREAT SERPL-MCNC: 1.01 MG/DL (ref 0.6–1.3)
CREAT UR-MCNC: 243.9 MG/DL (ref 30–125)
ERYTHROCYTE [DISTWIDTH] IN BLOOD BY AUTOMATED COUNT: 12.7 % (ref 11.5–17)
GLOBULIN SER-MCNC: 3.8 GM/DL (ref 2.4–3.5)
GLUCOSE (UA): NEGATIVE
GLUCOSE SERPL-MCNC: 120 MG/DL (ref 74–106)
HCT VFR BLD AUTO: 43.2 % (ref 42–52)
HGB BLD-MCNC: 14.1 GM/DL (ref 14–18)
HGB UR QL STRIP: ABNORMAL
KETONES UR QL STRIP: NEGATIVE
LEUKOCYTE ESTERASE UR QL STRIP: NEGATIVE
MCH RBC QN AUTO: 31.3 PG (ref 27–31)
MCHC RBC AUTO-ENTMCNC: 32.6 GM/DL (ref 33–36)
MCV RBC AUTO: 95.8 FL (ref 80–94)
MUCOUS THREADS URNS QL MICRO: ABNORMAL
NITRITE UR QL STRIP: NEGATIVE
PH UR STRIP: 5.5 [PH] (ref 5–9)
PLATELET # BLD AUTO: 244 X10(3)/MCL (ref 130–400)
PMV BLD AUTO: 8.6 FL (ref 9.4–12.4)
POTASSIUM SERPL-SCNC: 4.9 MMOL/L (ref 3.5–5.1)
PROT SERPL-MCNC: 7.8 GM/DL (ref 6.4–8.2)
PROT UR QL STRIP: NEGATIVE
PROT UR STRIP-MCNC: 14.2 MG/DL
PTH-INTACT SERPL-MCNC: 55.5 PG/ML (ref 18.4–80.1)
RBC # BLD AUTO: 4.51 X10(6)/MCL (ref 4.7–6.1)
RBC #/AREA URNS HPF: ABNORMAL /HPF
SODIUM SERPL-SCNC: 140 MMOL/L (ref 136–145)
SP GR UR STRIP: 1.02 (ref 1–1.03)
SQUAMOUS EPITHELIAL, UA: ABNORMAL
UROBILINOGEN UR STRIP-ACNC: 0.2
WBC # SPEC AUTO: 6.8 X10(3)/MCL (ref 4.5–11.5)
WBC #/AREA URNS HPF: ABNORMAL /[HPF]

## 2020-01-15 ENCOUNTER — HISTORICAL (OUTPATIENT)
Dept: LAB | Facility: HOSPITAL | Age: 75
End: 2020-01-15

## 2020-01-15 LAB
ALBUMIN SERPL-MCNC: 3.9 GM/DL (ref 3.4–5)
ALP SERPL-CCNC: 100 UNIT/L (ref 46–116)
ALT SERPL-CCNC: 43 UNIT/L (ref 12–78)
AST SERPL-CCNC: 21 UNIT/L (ref 15–37)
BILIRUB SERPL-MCNC: 0.5 MG/DL (ref 0.2–1)
BILIRUBIN DIRECT+TOT PNL SERPL-MCNC: 0.18 MG/DL (ref 0–0.2)
BILIRUBIN DIRECT+TOT PNL SERPL-MCNC: 0.32 MG/DL (ref 0–0.8)
CHOLEST SERPL-MCNC: 121 MG/DL (ref 0–200)
CHOLEST/HDLC SERPL: 2.1 {RATIO} (ref 0–5)
HDLC SERPL-MCNC: 58 MG/DL (ref 40–60)
LDLC SERPL CALC-MCNC: 49 MG/DL (ref 0–129)
PROT SERPL-MCNC: 7.4 GM/DL (ref 6.4–8.2)
TRIGL SERPL-MCNC: 71 MG/DL
VLDLC SERPL CALC-MCNC: 14 MG/DL

## 2020-01-24 ENCOUNTER — HISTORICAL (OUTPATIENT)
Dept: LAB | Facility: HOSPITAL | Age: 75
End: 2020-01-24

## 2020-01-24 LAB
BUN SERPL-MCNC: 25 MG/DL (ref 7–18)
CALCIUM SERPL-MCNC: 9.7 MG/DL (ref 8.5–10.1)
CHLORIDE SERPL-SCNC: 100 MMOL/L (ref 98–107)
CO2 SERPL-SCNC: 32.3 MMOL/L (ref 21–32)
CREAT SERPL-MCNC: 0.96 MG/DL (ref 0.6–1.3)
CREAT/UREA NIT SERPL: 26
GLUCOSE SERPL-MCNC: 178 MG/DL (ref 74–106)
POTASSIUM SERPL-SCNC: 4.5 MMOL/L (ref 3.5–5.1)
SODIUM SERPL-SCNC: 139 MMOL/L (ref 136–145)

## 2020-03-05 ENCOUNTER — HISTORICAL (OUTPATIENT)
Dept: LAB | Facility: HOSPITAL | Age: 75
End: 2020-03-05

## 2020-03-05 LAB
EST. AVERAGE GLUCOSE BLD GHB EST-MCNC: 146 MG/DL
HBA1C MFR BLD: 6.7 % (ref 4.5–6.2)

## 2020-10-01 ENCOUNTER — HISTORICAL (OUTPATIENT)
Dept: ADMINISTRATIVE | Facility: HOSPITAL | Age: 75
End: 2020-10-01

## 2020-10-01 LAB
ALBUMIN SERPL-MCNC: 4.6 G/DL (ref 3.7–4.7)
ALBUMIN/GLOB SERPL: 1.8 {RATIO} (ref 1.2–2.2)
ALP SERPL-CCNC: 109 IU/L (ref 39–117)
ALT SERPL-CCNC: 26 IU/L (ref 0–44)
AST SERPL-CCNC: 20 IU/L (ref 0–40)
BASOPHILS # BLD AUTO: 0 X10E3/UL (ref 0–0.2)
BASOPHILS NFR BLD AUTO: 1 %
BILIRUB SERPL-MCNC: 0.7 MG/DL (ref 0–1.2)
BUN SERPL-MCNC: 22 MG/DL (ref 8–27)
CALCIUM SERPL-MCNC: 9.9 MG/DL (ref 8.6–10.2)
CHLORIDE SERPL-SCNC: 104 MMOL/L (ref 96–106)
CO2 SERPL-SCNC: 28 MMOL/L (ref 20–29)
CREAT SERPL-MCNC: 0.98 MG/DL (ref 0.76–1.27)
CREAT/UREA NIT SERPL: 22 (ref 10–24)
EOSINOPHIL # BLD AUTO: 0.2 X10E3/UL (ref 0–0.4)
EOSINOPHIL NFR BLD AUTO: 4 %
ERYTHROCYTE [DISTWIDTH] IN BLOOD BY AUTOMATED COUNT: 12.7 % (ref 11.6–15.4)
GLOBULIN SER-MCNC: 2.5 G/DL (ref 1.5–4.5)
GLUCOSE SERPL-MCNC: 118 MG/DL (ref 65–99)
HBA1C MFR BLD: 6.5 % (ref 4.8–5.6)
HCT VFR BLD AUTO: 41.9 % (ref 37.5–51)
HGB BLD-MCNC: 13.7 G/DL (ref 13–17.7)
LYMPHOCYTES # BLD AUTO: 1.7 X10E3/UL (ref 0.7–3.1)
LYMPHOCYTES NFR BLD AUTO: 32 %
MCH RBC QN AUTO: 31 PG (ref 26.6–33)
MCHC RBC AUTO-ENTMCNC: 32.7 G/DL (ref 31.5–35.7)
MCV RBC AUTO: 95 FL (ref 79–97)
MONOCYTES # BLD AUTO: 0.5 X10E3/UL (ref 0.1–0.9)
MONOCYTES NFR BLD AUTO: 10 %
NEUTROPHILS # BLD AUTO: 2.7 X10E3/UL (ref 1.4–7)
NEUTROPHILS NFR BLD AUTO: 53 %
PLATELET # BLD AUTO: 261 X10E3/UL (ref 150–450)
POTASSIUM SERPL-SCNC: 4.8 MMOL/L (ref 3.5–5.2)
PROT SERPL-MCNC: 7.1 G/DL (ref 6–8.5)
RBC # BLD AUTO: 4.42 X10(6)/MCL (ref 4.14–5.8)
SODIUM SERPL-SCNC: 143 MMOL/L (ref 134–144)
TSH SERPL-ACNC: 1.86 MIU/ML (ref 0.45–4.5)
URATE SERPL-MCNC: 5.1 MG/DL (ref 3.7–8.6)
WBC # SPEC AUTO: 5.2 X10E3/UL (ref 3.4–10.8)

## 2020-11-09 ENCOUNTER — HISTORICAL (OUTPATIENT)
Dept: LAB | Facility: HOSPITAL | Age: 75
End: 2020-11-09

## 2020-11-09 LAB
ABS NEUT (OLG): 3.74 X10(3)/MCL (ref 2.1–9.2)
ALBUMIN SERPL-MCNC: 3.13 GM/DL (ref 3.4–5)
ALBUMIN/GLOB SERPL: 1 RATIO (ref 1.1–2)
ALP SERPL-CCNC: 58 UNIT/L (ref 46–116)
ALT SERPL-CCNC: 17 UNIT/L (ref 12–78)
APPEARANCE, UA: CLEAR
AST SERPL-CCNC: 12 UNIT/L (ref 15–37)
BACTERIA SPEC CULT: ABNORMAL
BASOPHILS # BLD AUTO: 0 X10(3)/MCL (ref 0–0.2)
BASOPHILS NFR BLD AUTO: 0 %
BILIRUB SERPL-MCNC: 0.4 MG/DL (ref 0.2–1)
BILIRUB UR QL STRIP: NEGATIVE
BILIRUBIN DIRECT+TOT PNL SERPL-MCNC: 0.14 MG/DL (ref 0–0.2)
BILIRUBIN DIRECT+TOT PNL SERPL-MCNC: 0.26 MG/DL (ref 0–0.8)
BUN SERPL-MCNC: 35 MG/DL (ref 7–18)
CALCIUM SERPL-MCNC: 8.5 MG/DL (ref 8.5–10.1)
CHLORIDE SERPL-SCNC: 106 MMOL/L (ref 98–107)
CO2 SERPL-SCNC: 28.2 MMOL/L (ref 21–32)
COLOR UR: ABNORMAL
CREAT SERPL-MCNC: 1.68 MG/DL (ref 0.6–1.3)
CREAT UR-MCNC: 294.2 MG/DL (ref 30–125)
EOSINOPHIL # BLD AUTO: 0.2 X10(3)/MCL (ref 0–0.9)
EOSINOPHIL NFR BLD AUTO: 3 %
ERYTHROCYTE [DISTWIDTH] IN BLOOD BY AUTOMATED COUNT: 12.3 % (ref 11.5–17)
GLOBULIN SER-MCNC: 3.17 GM/DL (ref 2.4–3.5)
GLUCOSE (UA): NEGATIVE
GLUCOSE SERPL-MCNC: 120 MG/DL (ref 74–106)
HCT VFR BLD AUTO: 44.2 % (ref 42–52)
HGB BLD-MCNC: 14.5 GM/DL (ref 14–18)
HGB UR QL STRIP: ABNORMAL
IMM GRANULOCYTES # BLD AUTO: 0.01 % (ref 0–0.02)
IMM GRANULOCYTES NFR BLD AUTO: 0.1 % (ref 0–0.43)
KETONES UR QL STRIP: NEGATIVE
LEUKOCYTE ESTERASE UR QL STRIP: NEGATIVE
LYMPHOCYTES # BLD AUTO: 2.2 X10(3)/MCL (ref 0.6–4.6)
LYMPHOCYTES NFR BLD AUTO: 33 %
MCH RBC QN AUTO: 31.3 PG (ref 27–31)
MCHC RBC AUTO-ENTMCNC: 32.8 GM/DL (ref 33–36)
MCV RBC AUTO: 95.5 FL (ref 80–94)
MONOCYTES # BLD AUTO: 0.6 X10(3)/MCL (ref 0.1–1.3)
MONOCYTES NFR BLD AUTO: 9 %
NEUTROPHILS # BLD AUTO: 3.74 X10(3)/MCL (ref 1.4–7.9)
NEUTROPHILS NFR BLD AUTO: 55 %
NITRITE UR QL STRIP: NEGATIVE
PH UR STRIP: 5.5 [PH] (ref 5–9)
PLATELET # BLD AUTO: 240 X10(3)/MCL (ref 130–400)
PMV BLD AUTO: 8.8 FL (ref 9.4–12.4)
POTASSIUM SERPL-SCNC: 4.2 MMOL/L (ref 3.5–5.1)
PROT SERPL-MCNC: 6.3 GM/DL (ref 6.4–8.2)
PROT UR QL STRIP: NEGATIVE
PROT UR STRIP-MCNC: 14.7 MG/DL
PTH-INTACT SERPL-MCNC: 63.4 PG/ML (ref 8.7–77.1)
RBC # BLD AUTO: 4.63 X10(6)/MCL (ref 4.7–6.1)
RBC #/AREA URNS HPF: ABNORMAL /[HPF]
SODIUM SERPL-SCNC: 142 MMOL/L (ref 136–145)
SP GR UR STRIP: 1.02 (ref 1–1.03)
SQUAMOUS EPITHELIAL, UA: ABNORMAL
UROBILINOGEN UR STRIP-ACNC: 0.2
WBC # SPEC AUTO: 6.8 X10(3)/MCL (ref 4.5–11.5)
WBC #/AREA URNS HPF: ABNORMAL /[HPF]

## 2020-11-20 ENCOUNTER — HISTORICAL (OUTPATIENT)
Dept: LAB | Facility: HOSPITAL | Age: 75
End: 2020-11-20

## 2020-11-20 LAB — PSA SERPL-MCNC: 0.51 NG/ML

## 2020-11-25 ENCOUNTER — HISTORICAL (OUTPATIENT)
Dept: SURGERY | Facility: HOSPITAL | Age: 75
End: 2020-11-25

## 2021-01-04 ENCOUNTER — HISTORICAL (OUTPATIENT)
Dept: RADIOLOGY | Facility: HOSPITAL | Age: 76
End: 2021-01-04

## 2021-01-19 ENCOUNTER — HISTORICAL (OUTPATIENT)
Dept: LAB | Facility: HOSPITAL | Age: 76
End: 2021-01-19

## 2021-01-19 LAB
ALBUMIN SERPL-MCNC: 4.2 GM/DL (ref 3.4–4.8)
ALP SERPL-CCNC: 97 UNIT/L (ref 40–150)
ALT SERPL-CCNC: 21 UNIT/L (ref 0–55)
AST SERPL-CCNC: 17 UNIT/L (ref 5–34)
BILIRUB SERPL-MCNC: 0.8 MG/DL
BILIRUBIN DIRECT+TOT PNL SERPL-MCNC: 0.3 MG/DL (ref 0–0.5)
BILIRUBIN DIRECT+TOT PNL SERPL-MCNC: 0.5 MG/DL (ref 0–0.8)
CHOLEST SERPL-MCNC: 111 MG/DL
CHOLEST/HDLC SERPL: 2 {RATIO} (ref 0–5)
HDLC SERPL-MCNC: 52 MG/DL (ref 35–60)
LDLC SERPL CALC-MCNC: 44 MG/DL (ref 50–140)
PROT SERPL-MCNC: 7.5 GM/DL (ref 5.8–7.6)
TRIGL SERPL-MCNC: 76 MG/DL (ref 34–140)
VLDLC SERPL CALC-MCNC: 15 MG/DL

## 2021-02-04 ENCOUNTER — HISTORICAL (OUTPATIENT)
Dept: LAB | Facility: HOSPITAL | Age: 76
End: 2021-02-04

## 2021-02-04 LAB
ABS NEUT (OLG): 2.71 X10(3)/MCL (ref 2.1–9.2)
ALBUMIN SERPL-MCNC: 4.1 GM/DL (ref 3.4–4.8)
ALBUMIN/GLOB SERPL: 1.4 RATIO (ref 1.1–2)
ALP SERPL-CCNC: 108 UNIT/L (ref 40–150)
ALT SERPL-CCNC: 26 UNIT/L (ref 0–55)
APPEARANCE, UA: CLEAR
AST SERPL-CCNC: 20 UNIT/L (ref 5–34)
BACTERIA SPEC CULT: ABNORMAL
BASOPHILS # BLD AUTO: 0 X10(3)/MCL (ref 0–0.2)
BASOPHILS NFR BLD AUTO: 0 %
BILIRUB SERPL-MCNC: 0.7 MG/DL
BILIRUB UR QL STRIP: NEGATIVE
BILIRUBIN DIRECT+TOT PNL SERPL-MCNC: 0.3 MG/DL (ref 0–0.5)
BILIRUBIN DIRECT+TOT PNL SERPL-MCNC: 0.4 MG/DL (ref 0–0.8)
BUN SERPL-MCNC: 18.7 MG/DL (ref 8.4–25.7)
CALCIUM SERPL-MCNC: 9.8 MG/DL (ref 8.8–10)
CHLORIDE SERPL-SCNC: 103 MMOL/L (ref 98–107)
CO2 SERPL-SCNC: 31 MMOL/L (ref 23–31)
COLOR UR: YELLOW
CREAT SERPL-MCNC: 0.97 MG/DL (ref 0.73–1.18)
CREAT UR-MCNC: 224.3 MG/DL (ref 58–161)
EOSINOPHIL # BLD AUTO: 0.2 X10(3)/MCL (ref 0–0.9)
EOSINOPHIL NFR BLD AUTO: 4 %
ERYTHROCYTE [DISTWIDTH] IN BLOOD BY AUTOMATED COUNT: 12.6 % (ref 11.5–17)
GLOBULIN SER-MCNC: 3 GM/DL (ref 2.4–3.5)
GLUCOSE (UA): NEGATIVE
GLUCOSE SERPL-MCNC: 134 MG/DL (ref 82–115)
HCT VFR BLD AUTO: 41.2 % (ref 42–52)
HGB BLD-MCNC: 13.9 GM/DL (ref 14–18)
HGB UR QL STRIP: ABNORMAL
IMM GRANULOCYTES # BLD AUTO: 0.01 % (ref 0–0.02)
IMM GRANULOCYTES NFR BLD AUTO: 0.2 % (ref 0–0.43)
KETONES UR QL STRIP: NEGATIVE
LEUKOCYTE ESTERASE UR QL STRIP: NEGATIVE
LYMPHOCYTES # BLD AUTO: 2.1 X10(3)/MCL (ref 0.6–4.6)
LYMPHOCYTES NFR BLD AUTO: 36 %
MCH RBC QN AUTO: 32.4 PG (ref 27–31)
MCHC RBC AUTO-ENTMCNC: 33.7 GM/DL (ref 33–36)
MCV RBC AUTO: 96 FL (ref 80–94)
MONOCYTES # BLD AUTO: 0.8 X10(3)/MCL (ref 0.1–1.3)
MONOCYTES NFR BLD AUTO: 14 %
MUCOUS THREADS URNS QL MICRO: ABNORMAL
NEUTROPHILS # BLD AUTO: 2.71 X10(3)/MCL (ref 1.4–7.9)
NEUTROPHILS NFR BLD AUTO: 46 %
NITRITE UR QL STRIP: NEGATIVE
PH UR STRIP: 5.5 [PH] (ref 5–9)
PLATELET # BLD AUTO: 259 X10(3)/MCL (ref 130–400)
PMV BLD AUTO: 8.7 FL (ref 9.4–12.4)
POTASSIUM SERPL-SCNC: 4.8 MMOL/L (ref 3.5–5.1)
PROT SERPL-MCNC: 7.1 GM/DL (ref 5.8–7.6)
PROT UR QL STRIP: NEGATIVE
PROT UR STRIP-MCNC: 11.7 MG/DL
RBC # BLD AUTO: 4.29 X10(6)/MCL (ref 4.7–6.1)
RBC #/AREA URNS HPF: ABNORMAL /[HPF]
SODIUM SERPL-SCNC: 142 MMOL/L (ref 136–145)
SP GR UR STRIP: >=1.03 (ref 1–1.03)
SQUAMOUS EPITHELIAL, UA: ABNORMAL
UROBILINOGEN UR STRIP-ACNC: 0.2
WBC # SPEC AUTO: 5.9 X10(3)/MCL (ref 4.5–11.5)
WBC #/AREA URNS HPF: ABNORMAL /HPF

## 2021-04-20 ENCOUNTER — HISTORICAL (OUTPATIENT)
Dept: ADMINISTRATIVE | Facility: HOSPITAL | Age: 76
End: 2021-04-20

## 2021-04-20 LAB
BUN SERPL-MCNC: 17.6 MG/DL (ref 8.4–25.7)
CALCIUM SERPL-MCNC: 9.5 MG/DL (ref 8.8–10)
CHLORIDE SERPL-SCNC: 101 MMOL/L (ref 98–107)
CO2 SERPL-SCNC: 29 MMOL/L (ref 23–31)
CREAT SERPL-MCNC: 0.85 MG/DL (ref 0.73–1.18)
CREAT/UREA NIT SERPL: 21
EST. AVERAGE GLUCOSE BLD GHB EST-MCNC: 142.7 MG/DL
GLUCOSE SERPL-MCNC: 122 MG/DL (ref 82–115)
HBA1C MFR BLD: 6.6 %
POTASSIUM SERPL-SCNC: 4.7 MMOL/L (ref 3.5–5.1)
SODIUM SERPL-SCNC: 141 MMOL/L (ref 136–145)

## 2021-05-20 ENCOUNTER — HISTORICAL (OUTPATIENT)
Dept: LAB | Facility: HOSPITAL | Age: 76
End: 2021-05-20

## 2021-05-20 LAB
ABS NEUT (OLG): 3.75 X10(3)/MCL (ref 2.1–9.2)
ALBUMIN SERPL-MCNC: 3.9 GM/DL (ref 3.4–4.8)
ALBUMIN/GLOB SERPL: 1.4 RATIO (ref 1.1–2)
ALP SERPL-CCNC: 111 UNIT/L (ref 40–150)
ALT SERPL-CCNC: 20 UNIT/L (ref 0–55)
APPEARANCE, UA: CLEAR
AST SERPL-CCNC: 19 UNIT/L (ref 5–34)
BACTERIA SPEC CULT: NORMAL
BASOPHILS # BLD AUTO: 0 X10(3)/MCL (ref 0–0.2)
BASOPHILS NFR BLD AUTO: 0 %
BILIRUB SERPL-MCNC: 0.7 MG/DL
BILIRUB UR QL STRIP: NEGATIVE
BILIRUBIN DIRECT+TOT PNL SERPL-MCNC: 0.3 MG/DL (ref 0–0.5)
BILIRUBIN DIRECT+TOT PNL SERPL-MCNC: 0.4 MG/DL (ref 0–0.8)
BUN SERPL-MCNC: 16.8 MG/DL (ref 8.4–25.7)
CALCIUM SERPL-MCNC: 9.3 MG/DL (ref 8.8–10)
CHLORIDE SERPL-SCNC: 103 MMOL/L (ref 98–107)
CO2 SERPL-SCNC: 31 MMOL/L (ref 23–31)
COLOR UR: YELLOW
CREAT SERPL-MCNC: 0.8 MG/DL (ref 0.73–1.18)
CREAT UR-MCNC: 232.4 MG/DL (ref 58–161)
EOSINOPHIL # BLD AUTO: 0.3 X10(3)/MCL (ref 0–0.9)
EOSINOPHIL NFR BLD AUTO: 4 %
ERYTHROCYTE [DISTWIDTH] IN BLOOD BY AUTOMATED COUNT: 12.9 % (ref 11.5–17)
GLOBULIN SER-MCNC: 2.7 GM/DL (ref 2.4–3.5)
GLUCOSE (UA): NEGATIVE
GLUCOSE SERPL-MCNC: 121 MG/DL (ref 82–115)
HCT VFR BLD AUTO: 42.3 % (ref 42–52)
HGB BLD-MCNC: 13.7 GM/DL (ref 14–18)
HGB UR QL STRIP: NORMAL
IMM GRANULOCYTES # BLD AUTO: 0.01 % (ref 0–0.02)
IMM GRANULOCYTES NFR BLD AUTO: 0.2 % (ref 0–0.43)
KETONES UR QL STRIP: NEGATIVE
LEUKOCYTE ESTERASE UR QL STRIP: NEGATIVE
LYMPHOCYTES # BLD AUTO: 1.9 X10(3)/MCL (ref 0.6–4.6)
LYMPHOCYTES NFR BLD AUTO: 29 %
MCH RBC QN AUTO: 30.4 PG (ref 27–31)
MCHC RBC AUTO-ENTMCNC: 32.4 GM/DL (ref 33–36)
MCV RBC AUTO: 93.8 FL (ref 80–94)
MONOCYTES # BLD AUTO: 0.6 X10(3)/MCL (ref 0.1–1.3)
MONOCYTES NFR BLD AUTO: 10 %
NEUTROPHILS # BLD AUTO: 3.75 X10(3)/MCL (ref 1.4–7.9)
NEUTROPHILS NFR BLD AUTO: 57 %
NITRITE UR QL STRIP: NEGATIVE
PH UR STRIP: 6 [PH] (ref 5–9)
PLATELET # BLD AUTO: 231 X10(3)/MCL (ref 130–400)
PMV BLD AUTO: 8.4 FL (ref 9.4–12.4)
POTASSIUM SERPL-SCNC: 4.9 MMOL/L (ref 3.5–5.1)
PROT SERPL-MCNC: 6.6 GM/DL (ref 5.8–7.6)
PROT UR QL STRIP: NEGATIVE
PROT UR STRIP-MCNC: 11.6 MG/DL
PTH-INTACT SERPL-MCNC: 85.8 PG/ML (ref 8.7–77.1)
RBC # BLD AUTO: 4.51 X10(6)/MCL (ref 4.7–6.1)
RBC #/AREA URNS HPF: NORMAL /[HPF]
SODIUM SERPL-SCNC: 140 MMOL/L (ref 136–145)
SP GR UR STRIP: 1.02 (ref 1–1.03)
SQUAMOUS EPITHELIAL, UA: NORMAL
UROBILINOGEN UR STRIP-ACNC: 0.2
WBC # SPEC AUTO: 6.6 X10(3)/MCL (ref 4.5–11.5)
WBC #/AREA URNS HPF: NORMAL /[HPF]

## 2021-08-19 ENCOUNTER — HISTORICAL (OUTPATIENT)
Dept: LAB | Facility: HOSPITAL | Age: 76
End: 2021-08-19

## 2021-08-19 LAB
ABS NEUT (OLG): 5.17 X10(3)/MCL (ref 2.1–9.2)
ALBUMIN SERPL-MCNC: 4 GM/DL (ref 3.4–4.8)
ALBUMIN/GLOB SERPL: 1.5 RATIO (ref 1.1–2)
ALP SERPL-CCNC: 108 UNIT/L (ref 40–150)
ALT SERPL-CCNC: 19 UNIT/L (ref 0–55)
APPEARANCE, UA: CLEAR
AST SERPL-CCNC: 16 UNIT/L (ref 5–34)
BACTERIA SPEC CULT: ABNORMAL
BASOPHILS # BLD AUTO: 0 X10(3)/MCL (ref 0–0.2)
BASOPHILS NFR BLD AUTO: 0 %
BILIRUB SERPL-MCNC: 0.6 MG/DL
BILIRUB UR QL STRIP: ABNORMAL
BILIRUBIN DIRECT+TOT PNL SERPL-MCNC: 0.3 MG/DL (ref 0–0.5)
BILIRUBIN DIRECT+TOT PNL SERPL-MCNC: 0.3 MG/DL (ref 0–0.8)
BUN SERPL-MCNC: 18.4 MG/DL (ref 8.4–25.7)
CALCIUM SERPL-MCNC: 9.5 MG/DL (ref 8.8–10)
CHLORIDE SERPL-SCNC: 103 MMOL/L (ref 98–107)
CO2 SERPL-SCNC: 28 MMOL/L (ref 23–31)
COLOR UR: YELLOW
CREAT SERPL-MCNC: 0.91 MG/DL (ref 0.73–1.18)
CREAT UR-MCNC: >400 MG/DL (ref 58–161)
EOSINOPHIL # BLD AUTO: 0.1 X10(3)/MCL (ref 0–0.9)
EOSINOPHIL NFR BLD AUTO: 2 %
ERYTHROCYTE [DISTWIDTH] IN BLOOD BY AUTOMATED COUNT: 12.7 % (ref 11.5–17)
GLOBULIN SER-MCNC: 2.6 GM/DL (ref 2.4–3.5)
GLUCOSE (UA): 100
GLUCOSE SERPL-MCNC: 114 MG/DL (ref 82–115)
HCT VFR BLD AUTO: 40 % (ref 42–52)
HGB BLD-MCNC: 13.6 GM/DL (ref 14–18)
HGB UR QL STRIP: ABNORMAL
IMM GRANULOCYTES # BLD AUTO: 0.02 % (ref 0–0.02)
IMM GRANULOCYTES NFR BLD AUTO: 0.3 % (ref 0–0.43)
KETONES UR QL STRIP: NEGATIVE
LEUKOCYTE ESTERASE UR QL STRIP: NEGATIVE
LYMPHOCYTES # BLD AUTO: 1.9 X10(3)/MCL (ref 0.6–4.6)
LYMPHOCYTES NFR BLD AUTO: 24 %
MCH RBC QN AUTO: 31.6 PG (ref 27–31)
MCHC RBC AUTO-ENTMCNC: 34 GM/DL (ref 33–36)
MCV RBC AUTO: 92.8 FL (ref 80–94)
MONOCYTES # BLD AUTO: 0.8 X10(3)/MCL (ref 0.1–1.3)
MONOCYTES NFR BLD AUTO: 9 %
NEUTROPHILS # BLD AUTO: 5.17 X10(3)/MCL (ref 1.4–7.9)
NEUTROPHILS NFR BLD AUTO: 65 %
NITRITE UR QL STRIP: NEGATIVE
PH UR STRIP: 5.5 [PH] (ref 5–9)
PLATELET # BLD AUTO: 243 X10(3)/MCL (ref 130–400)
PMV BLD AUTO: 8.8 FL (ref 9.4–12.4)
POTASSIUM SERPL-SCNC: 4.4 MMOL/L (ref 3.5–5.1)
PROT SERPL-MCNC: 6.6 GM/DL (ref 5.8–7.6)
PROT UR QL STRIP: 30
PROT UR STRIP-MCNC: 36.9 MG/DL
PTH-INTACT SERPL-MCNC: 95.6 PG/ML (ref 8.7–77.1)
RBC # BLD AUTO: 4.31 X10(6)/MCL (ref 4.7–6.1)
RBC #/AREA URNS HPF: ABNORMAL /HPF
SODIUM SERPL-SCNC: 140 MMOL/L (ref 136–145)
SP GR UR STRIP: >=1.03 (ref 1–1.03)
SQUAMOUS EPITHELIAL, UA: ABNORMAL
UROBILINOGEN UR STRIP-ACNC: 0.2
WBC # SPEC AUTO: 8 X10(3)/MCL (ref 4.5–11.5)
WBC #/AREA URNS HPF: ABNORMAL /[HPF]

## 2021-09-20 LAB — CRC RECOMMENDATION EXT: NORMAL

## 2021-10-12 ENCOUNTER — HISTORICAL (OUTPATIENT)
Dept: LAB | Facility: HOSPITAL | Age: 76
End: 2021-10-12

## 2021-10-12 LAB
ALBUMIN SERPL-MCNC: 4.2 GM/DL (ref 3.4–4.8)
ALP SERPL-CCNC: 117 UNIT/L (ref 40–150)
ALT SERPL-CCNC: 31 UNIT/L (ref 0–55)
AST SERPL-CCNC: 21 UNIT/L (ref 5–34)
BILIRUB SERPL-MCNC: 0.9 MG/DL
BILIRUBIN DIRECT+TOT PNL SERPL-MCNC: 0.4 MG/DL (ref 0–0.5)
BILIRUBIN DIRECT+TOT PNL SERPL-MCNC: 0.5 MG/DL (ref 0–0.8)
CHOLEST SERPL-MCNC: 104 MG/DL
CHOLEST/HDLC SERPL: 3 {RATIO} (ref 0–5)
HDLC SERPL-MCNC: 38 MG/DL (ref 35–60)
LDLC SERPL CALC-MCNC: 49 MG/DL (ref 50–140)
PROT SERPL-MCNC: 7.2 GM/DL (ref 5.8–7.6)
TRIGL SERPL-MCNC: 87 MG/DL (ref 34–140)
VLDLC SERPL CALC-MCNC: 17 MG/DL

## 2021-10-18 ENCOUNTER — HISTORICAL (OUTPATIENT)
Dept: ADMINISTRATIVE | Facility: HOSPITAL | Age: 76
End: 2021-10-18

## 2021-10-18 LAB
BASOPHILS # BLD AUTO: 0.1 X10E3/UL (ref 0–0.2)
BASOPHILS NFR BLD AUTO: 1 %
EOSINOPHIL # BLD AUTO: 0.2 X10E3/UL (ref 0–0.4)
EOSINOPHIL NFR BLD AUTO: 2 %
ERYTHROCYTE [DISTWIDTH] IN BLOOD BY AUTOMATED COUNT: 13.5 % (ref 11.6–15.4)
HBA1C MFR BLD: 6.6 % (ref 4.8–5.6)
HCT VFR BLD AUTO: 39.7 % (ref 37.5–51)
HGB BLD-MCNC: 13.5 G/DL (ref 13–17.7)
LYMPHOCYTES # BLD AUTO: 2.3 X10E3/UL (ref 0.7–3.1)
LYMPHOCYTES NFR BLD AUTO: 30 %
MCH RBC QN AUTO: 31.5 PG (ref 26.6–33)
MCHC RBC AUTO-ENTMCNC: 34 G/DL (ref 31.5–35.7)
MCV RBC AUTO: 93 FL (ref 79–97)
MONOCYTES # BLD AUTO: 0.9 X10E3/UL (ref 0.1–0.9)
MONOCYTES NFR BLD AUTO: 12 %
NEUTROPHILS # BLD AUTO: 4.1 X10E3/UL (ref 1.4–7)
NEUTROPHILS NFR BLD AUTO: 55 %
PLATELET # BLD AUTO: 265 X10E3/UL (ref 150–450)
RBC # BLD AUTO: 4.29 X10(6)/MCL (ref 4.14–5.8)
URATE SERPL-MCNC: 5.7 MG/DL (ref 3.8–8.4)
WBC # SPEC AUTO: 7.6 X10E3/UL (ref 3.4–10.8)

## 2022-02-11 ENCOUNTER — HISTORICAL (OUTPATIENT)
Dept: LAB | Facility: HOSPITAL | Age: 77
End: 2022-02-11

## 2022-02-11 LAB
ABS NEUT (OLG): 2.16 (ref 2.1–9.2)
ALBUMIN SERPL-MCNC: 4.1 G/DL (ref 3.4–4.8)
ALBUMIN/GLOB SERPL: 1.4 {RATIO} (ref 1.1–2)
ALP SERPL-CCNC: 123 U/L (ref 40–150)
ALT SERPL-CCNC: 22 U/L (ref 0–55)
APPEARANCE, UA: CLEAR
AST SERPL-CCNC: 22 U/L (ref 5–34)
BACTERIA SPEC CULT: NORMAL
BASOPHILS # BLD AUTO: 0 10*3/UL (ref 0–0.2)
BASOPHILS NFR BLD AUTO: 0 %
BILIRUB SERPL-MCNC: 0.7 MG/DL
BILIRUB UR QL STRIP: NEGATIVE
BILIRUBIN DIRECT+TOT PNL SERPL-MCNC: 0.3 (ref 0–0.5)
BILIRUBIN DIRECT+TOT PNL SERPL-MCNC: 0.4 (ref 0–0.8)
BUN SERPL-MCNC: 19.1 MG/DL (ref 8.4–25.7)
CALCIUM SERPL-MCNC: 10.4 MG/DL (ref 8.7–10.5)
CHLORIDE SERPL-SCNC: 100 MMOL/L (ref 98–107)
CO2 SERPL-SCNC: 32 MMOL/L (ref 23–31)
COLOR UR: YELLOW
CREAT SERPL-MCNC: 0.97 MG/DL (ref 0.73–1.18)
CREAT UR-MCNC: 150.4 MG/DL (ref 58–161)
EOSINOPHIL # BLD AUTO: 0.2 10*3/UL (ref 0–0.9)
EOSINOPHIL NFR BLD AUTO: 5 %
ERYTHROCYTE [DISTWIDTH] IN BLOOD BY AUTOMATED COUNT: 12.7 % (ref 11.5–17)
GLOBULIN SER-MCNC: 2.9 G/DL (ref 2.4–3.5)
GLUCOSE (UA): NEGATIVE
GLUCOSE SERPL-MCNC: 132 MG/DL (ref 82–115)
HCT VFR BLD AUTO: 49.5 % (ref 42–52)
HEMOLYSIS INTERF INDEX SERPL-ACNC: 34
HGB BLD-MCNC: 16.5 G/DL (ref 14–18)
HGB UR QL STRIP: NORMAL
ICTERIC INTERF INDEX SERPL-ACNC: 1
KETONES UR QL STRIP: NEGATIVE
LEUKOCYTE ESTERASE UR QL STRIP: NEGATIVE
LIPEMIC INTERF INDEX SERPL-ACNC: 3
LYMPHOCYTES # BLD AUTO: 2.3 10*3/UL (ref 0.6–4.6)
LYMPHOCYTES NFR BLD AUTO: 44 %
MANUAL DIFF? (OHS): NO
MCH RBC QN AUTO: 30.4 PG (ref 27–31)
MCHC RBC AUTO-ENTMCNC: 33.3 G/DL (ref 33–36)
MCV RBC AUTO: 91.3 FL (ref 80–94)
MONOCYTES # BLD AUTO: 0.5 10*3/UL (ref 0.1–1.3)
MONOCYTES NFR BLD AUTO: 10 %
NEUTROPHILS # BLD AUTO: 2.16 10*3/UL (ref 1.4–7.9)
NEUTROPHILS NFR BLD AUTO: 42 %
NITRITE UR QL STRIP: NEGATIVE
PH UR STRIP: 6 [PH] (ref 5–9)
PLATELET # BLD AUTO: 205 10*3/UL (ref 130–400)
PMV BLD AUTO: 9.1 FL (ref 9.4–12.4)
POTASSIUM SERPL-SCNC: 4.5 MMOL/L (ref 3.5–5.1)
PROT SERPL-MCNC: 7 G/DL (ref 5.8–7.6)
PROT UR QL STRIP: NEGATIVE
PROT UR STRIP-MCNC: 10.6 MG/DL
PTH-INTACT SERPL-MCNC: 66.3 PG/ML (ref 8.7–77.1)
RBC # BLD AUTO: 5.42 10*6/UL (ref 4.7–6.1)
RBC #/AREA URNS HPF: NORMAL /[HPF] (ref 0–2)
SODIUM SERPL-SCNC: 139 MMOL/L (ref 136–145)
SP GR UR STRIP: 1.02 (ref 1–1.03)
SQUAMOUS EPITHELIAL, UA: NORMAL
UROBILINOGEN UR STRIP-ACNC: 0.2
WBC # SPEC AUTO: 5.2 10*3/UL (ref 4.5–11.5)
WBC #/AREA URNS HPF: NORMAL /[HPF] (ref 0–2)

## 2022-04-11 ENCOUNTER — HISTORICAL (OUTPATIENT)
Dept: ADMINISTRATIVE | Facility: HOSPITAL | Age: 77
End: 2022-04-11
Payer: MEDICARE

## 2022-04-26 VITALS
SYSTOLIC BLOOD PRESSURE: 122 MMHG | WEIGHT: 194 LBS | OXYGEN SATURATION: 93 % | HEIGHT: 69 IN | DIASTOLIC BLOOD PRESSURE: 65 MMHG | BODY MASS INDEX: 28.73 KG/M2

## 2022-04-30 NOTE — OP NOTE
DATE OF SURGERY:    11/25/2020    SURGEON:  Jesus Hunter MD  ASSISTANT:  None    PREOPERATIVE DIAGNOSIS:  Right ureteral calculus.    POSTOPERATIVE DIAGNOSIS:  Right ureteral calculus.    PROCEDURES:    1. Cystoscopy with right double-J stent placement.  2. Right extracorporeal shock wave lithotripsy.    ANESTHESIA:  General endotracheal.    ESTIMATED BLOOD LOSS:  None.    SPECIMENS REMOVED:  None.    IMPLANTS:  6-Mexican x 24 cm right double-J stent.    HISTORY OF CLINICAL ILLNESS:  Patient is a very pleasant 75-year-old man.  Presented for a routine examination.  However, several days before, he began with significant right flank discomfort.  He was noted to have hydronephrosis and a right mid ureteral stone.  We discussed management options.  He was consented for operative intervention.    PROCEDURE IN DETAIL:  After informed consent was obtained, including the risks and benefits of the procedure, the patient was transported to the operating theater and placed in the supine position on the operating table.  Once general endotracheal anesthesia was initiated, the patient was put in the supine position on the lithotripsy table.  He was then put in the dorsal lithotomy position with all bony surfaces appropriately padded and positioned.  The patient did receive preoperative antibiotics, and a time-out was undertaken to assure the proper patient and procedure.     Fluoroscopy was used to image the retroperitoneum.  Calcification was seen overlying the cranial-most aspect of the right hemipelvis.  We introduced a 22-Mexican rigid cystoscope per the urethra into the bladder.  Bladder was drained and filled.  Full cystoscopic examination was performed.  There were no bladder masses, lesions, or foreign bodies.  The right ureteral orifice was cannulated with an open-ended catheter which was slowly advanced up the distal ureter to the uppermost part of the mid ureter at which point there was obstruction by the  calcification where the stone was.  I was able to pass a wire past the stone into the upper tract.  Passed my open-ended catheter over the wire.  Eventually that stone was seen to move back from the upper ureter into the renal pelvis.  Injected just enough contrast to confirm we were in the renal pelvis.  The patient had a dilated renal pelvis and upper tract.  There was some dark urine in the renal pelvis, but no evidence of purulence or infection.  I was able to measure the length of the ureter.  Advanced and deployed a 6-Spanish x 24 cm double-J stent in standard fashion.  Fluoroscopy showed a good coil in the renal pelvis.  Direct vision showed a good coil in the bladder.  Immediately I could see urine and contrast emanating from the holes of the stent, suggesting relief of obstruction.  At this point, we had Anesthesia administer 20 mg of Lasix to try to flush all that contrast out of the upper tract.     The patient was then put in the supine position.  We used fluoroscopy to carefully perform imaging alongside the stent of the upper ureter.  No calcifications were seen along the stent.  However, we could clearly see the stone had moved into the renal pelvis.  We were able to target that stone in the X, Y, and Z axes.  Once adequately tolerated, we commenced with shock wave lithotripsy.  We started at a power of 1 and increased to a power of 7.  Started at a rate of 60 shocks per minute and increased to a rate of 90 shocks per minute.  Intermittent fluoroscopy was used to assure adequate targeting of the stone.  A total of 2500 shocks was administered to the stone.  By the end of the procedure, stone appeared to have broken up very well with no significant residual fragments.  At this point, patient was awoken from anesthesia and transported to the recovery room.    COMPLICATIONS:  None.    PLAN:  Transfer to recovery room and home when criteria are met.    DISCHARGE INSTRUCTIONS:  Increase water intake.   Continue prescribed antibiotics.    DISCHARGE MEDICATIONS:    1. Flomax.  2. Levsin.   3. Zofran.  4. Norco.      FOLLOWUP PLAN:  Two weeks:  KUB, renal ultrasound, cysto, stent removal.        ______________________________  MD MARYSE Lewis/  DD:  11/25/2020  Time:  12:52PM  DT:  11/25/2020  Time:  01:04PM  Job #:  [id number]

## 2022-06-09 ENCOUNTER — LAB VISIT (OUTPATIENT)
Dept: LAB | Facility: HOSPITAL | Age: 77
End: 2022-06-09
Attending: INTERNAL MEDICINE
Payer: MEDICARE

## 2022-06-09 DIAGNOSIS — E87.5 HYPERKALEMIA: ICD-10-CM

## 2022-06-09 DIAGNOSIS — N18.2 CHRONIC KIDNEY DISEASE, STAGE II (MILD): ICD-10-CM

## 2022-06-09 DIAGNOSIS — I10 ESSENTIAL HYPERTENSION, BENIGN: ICD-10-CM

## 2022-06-09 DIAGNOSIS — E78.5 HYPERLIPIDEMIA, UNSPECIFIED HYPERLIPIDEMIA TYPE: Primary | ICD-10-CM

## 2022-06-09 LAB
ALBUMIN SERPL-MCNC: 4.1 GM/DL (ref 3.4–4.8)
ALBUMIN/GLOB SERPL: 1.2 RATIO (ref 1.1–2)
ALP SERPL-CCNC: 110 UNIT/L (ref 40–150)
ALT SERPL-CCNC: 18 UNIT/L (ref 0–55)
APPEARANCE UR: CLEAR
AST SERPL-CCNC: 17 UNIT/L (ref 5–34)
BACTERIA #/AREA URNS AUTO: NORMAL /HPF
BASOPHILS # BLD AUTO: 0.02 X10(3)/MCL (ref 0–0.2)
BASOPHILS NFR BLD AUTO: 0.3 %
BILIRUB UR QL STRIP.AUTO: NEGATIVE MG/DL
BILIRUBIN DIRECT+TOT PNL SERPL-MCNC: 0.3 MG/DL (ref 0–0.5)
BILIRUBIN DIRECT+TOT PNL SERPL-MCNC: 0.7 MG/DL
BUN SERPL-MCNC: 18.5 MG/DL (ref 8.4–25.7)
CALCIUM SERPL-MCNC: 10.2 MG/DL (ref 8.8–10)
CHLORIDE SERPL-SCNC: 99 MMOL/L (ref 98–107)
CHOLEST SERPL-MCNC: 117 MG/DL
CHOLEST/HDLC SERPL: 3 {RATIO} (ref 0–5)
CO2 SERPL-SCNC: 31 MMOL/L (ref 23–31)
COLOR UR AUTO: YELLOW
CREAT SERPL-MCNC: 0.88 MG/DL (ref 0.73–1.18)
CREAT UR-MCNC: 267 MG/DL (ref 63–166)
EOSINOPHIL # BLD AUTO: 0.23 X10(3)/MCL (ref 0–0.9)
EOSINOPHIL NFR BLD AUTO: 3.5 %
ERYTHROCYTE [DISTWIDTH] IN BLOOD BY AUTOMATED COUNT: 12.7 % (ref 11.5–17)
GLOBULIN SER-MCNC: 3.3 GM/DL (ref 2.4–3.5)
GLUCOSE SERPL-MCNC: 136 MG/DL (ref 82–115)
GLUCOSE UR QL STRIP.AUTO: NEGATIVE MG/DL
HCT VFR BLD AUTO: 43 % (ref 42–52)
HDLC SERPL-MCNC: 46 MG/DL (ref 35–60)
HGB BLD-MCNC: 14 GM/DL (ref 14–18)
IMM GRANULOCYTES # BLD AUTO: 0.01 X10(3)/MCL (ref 0–0.02)
IMM GRANULOCYTES NFR BLD AUTO: 0.2 % (ref 0–0.43)
KETONES UR QL STRIP.AUTO: NEGATIVE MG/DL
LDLC SERPL CALC-MCNC: 57 MG/DL (ref 50–140)
LEUKOCYTE ESTERASE UR QL STRIP.AUTO: NEGATIVE UNIT/L
LYMPHOCYTES # BLD AUTO: 1.81 X10(3)/MCL (ref 0.6–4.6)
LYMPHOCYTES NFR BLD AUTO: 27.8 %
MCH RBC QN AUTO: 30.2 PG (ref 27–31)
MCHC RBC AUTO-ENTMCNC: 32.6 MG/DL (ref 33–36)
MCV RBC AUTO: 92.7 FL (ref 80–94)
MONOCYTES # BLD AUTO: 0.65 X10(3)/MCL (ref 0.1–1.3)
MONOCYTES NFR BLD AUTO: 10 %
NEUTROPHILS # BLD AUTO: 3.8 X10(3)/MCL (ref 2.1–9.2)
NEUTROPHILS NFR BLD AUTO: 58.2 %
NITRITE UR QL STRIP.AUTO: NEGATIVE
PH UR STRIP.AUTO: 5 [PH]
PLATELET # BLD AUTO: 270 X10(3)/MCL (ref 130–400)
PMV BLD AUTO: 8.6 FL (ref 9.4–12.4)
POTASSIUM SERPL-SCNC: 4.5 MMOL/L (ref 3.5–5.1)
PROT SERPL-MCNC: 7.4 GM/DL (ref 5.8–7.6)
PROT UR QL STRIP.AUTO: NEGATIVE MG/DL
PROT UR STRIP-MCNC: 16.1 MG/DL
PTH-INTACT SERPL-MCNC: 72.1 PG/ML (ref 8.7–77)
RBC # BLD AUTO: 4.64 X10(6)/MCL (ref 4.7–6.1)
RBC #/AREA URNS AUTO: NORMAL /HPF
RBC UR QL AUTO: ABNORMAL UNIT/L
SODIUM SERPL-SCNC: 137 MMOL/L (ref 136–145)
SP GR UR STRIP.AUTO: >=1.03
SQUAMOUS #/AREA URNS AUTO: NORMAL /LPF
TRIGL SERPL-MCNC: 68 MG/DL (ref 34–140)
UROBILINOGEN UR STRIP-ACNC: 0.2 MG/DL
VLDLC SERPL CALC-MCNC: 14 MG/DL
WBC # SPEC AUTO: 6.5 X10(3)/MCL (ref 4.5–11.5)
WBC #/AREA URNS AUTO: NORMAL /HPF

## 2022-06-09 PROCEDURE — 80061 LIPID PANEL: CPT

## 2022-06-09 PROCEDURE — 85025 COMPLETE CBC W/AUTO DIFF WBC: CPT

## 2022-06-09 PROCEDURE — 82248 BILIRUBIN DIRECT: CPT

## 2022-06-09 PROCEDURE — 82570 ASSAY OF URINE CREATININE: CPT

## 2022-06-09 PROCEDURE — 80053 COMPREHEN METABOLIC PANEL: CPT

## 2022-06-09 PROCEDURE — 36415 COLL VENOUS BLD VENIPUNCTURE: CPT

## 2022-06-09 PROCEDURE — 81001 URINALYSIS AUTO W/SCOPE: CPT

## 2022-06-09 PROCEDURE — 82042 OTHER SOURCE ALBUMIN QUAN EA: CPT

## 2022-06-09 PROCEDURE — 83970 ASSAY OF PARATHORMONE: CPT

## 2022-06-29 RX ORDER — ALLOPURINOL 100 MG/1
100 TABLET ORAL DAILY
COMMUNITY
Start: 2021-10-12 | End: 2022-10-13

## 2022-06-29 RX ORDER — CITALOPRAM 20 MG/1
20 TABLET, FILM COATED ORAL DAILY
COMMUNITY
Start: 2022-02-03 | End: 2022-07-25

## 2022-06-29 RX ORDER — BUSPIRONE HYDROCHLORIDE 10 MG/1
10 TABLET ORAL 2 TIMES DAILY
COMMUNITY
Start: 2022-01-27 | End: 2022-11-07

## 2022-06-29 RX ORDER — GABAPENTIN 100 MG/1
100 CAPSULE ORAL 3 TIMES DAILY
COMMUNITY
Start: 2022-04-06 | End: 2022-06-30

## 2022-06-29 RX ORDER — LOSARTAN POTASSIUM 50 MG/1
50 TABLET ORAL DAILY
COMMUNITY
Start: 2022-05-29

## 2022-06-29 RX ORDER — TAMSULOSIN HYDROCHLORIDE 0.4 MG/1
0.4 CAPSULE ORAL DAILY
COMMUNITY
Start: 2022-04-12

## 2022-06-29 RX ORDER — ATORVASTATIN CALCIUM 20 MG/1
20 TABLET, FILM COATED ORAL NIGHTLY
COMMUNITY
Start: 2021-10-12 | End: 2022-10-13

## 2022-06-29 RX ORDER — AMLODIPINE BESYLATE 10 MG/1
10 TABLET ORAL DAILY
COMMUNITY
Start: 2021-10-18

## 2022-06-30 ENCOUNTER — OFFICE VISIT (OUTPATIENT)
Dept: PRIMARY CARE CLINIC | Facility: CLINIC | Age: 77
End: 2022-06-30
Payer: MEDICARE

## 2022-06-30 ENCOUNTER — TELEPHONE (OUTPATIENT)
Dept: PRIMARY CARE CLINIC | Facility: CLINIC | Age: 77
End: 2022-06-30

## 2022-06-30 VITALS
SYSTOLIC BLOOD PRESSURE: 137 MMHG | HEART RATE: 63 BPM | HEIGHT: 69 IN | OXYGEN SATURATION: 95 % | BODY MASS INDEX: 27.4 KG/M2 | WEIGHT: 185 LBS | TEMPERATURE: 98 F | DIASTOLIC BLOOD PRESSURE: 80 MMHG | RESPIRATION RATE: 16 BRPM

## 2022-06-30 DIAGNOSIS — E78.00 HYPERCHOLESTEROLEMIA: ICD-10-CM

## 2022-06-30 DIAGNOSIS — M19.90 ARTHRITIS: ICD-10-CM

## 2022-06-30 DIAGNOSIS — I77.9 CAROTID ARTERY DISEASE, UNSPECIFIED LATERALITY, UNSPECIFIED TYPE: ICD-10-CM

## 2022-06-30 DIAGNOSIS — M10.09 IDIOPATHIC GOUT OF MULTIPLE SITES, UNSPECIFIED CHRONICITY: ICD-10-CM

## 2022-06-30 DIAGNOSIS — J45.909 CHILDHOOD ASTHMA, UNSPECIFIED ASTHMA SEVERITY, UNSPECIFIED WHETHER COMPLICATED, UNSPECIFIED WHETHER PERSISTENT: ICD-10-CM

## 2022-06-30 DIAGNOSIS — I10 PRIMARY HYPERTENSION: Primary | ICD-10-CM

## 2022-06-30 DIAGNOSIS — R09.82 POST-NASAL DRIP: ICD-10-CM

## 2022-06-30 DIAGNOSIS — N18.2 TYPE 2 DIABETES MELLITUS WITH STAGE 2 CHRONIC KIDNEY DISEASE, WITHOUT LONG-TERM CURRENT USE OF INSULIN: ICD-10-CM

## 2022-06-30 DIAGNOSIS — N18.2 STAGE 2 CHRONIC KIDNEY DISEASE: ICD-10-CM

## 2022-06-30 DIAGNOSIS — E11.22 TYPE 2 DIABETES MELLITUS WITH STAGE 2 CHRONIC KIDNEY DISEASE, WITHOUT LONG-TERM CURRENT USE OF INSULIN: ICD-10-CM

## 2022-06-30 DIAGNOSIS — M79.641 RIGHT HAND PAIN: ICD-10-CM

## 2022-06-30 PROBLEM — F32.A DEPRESSIVE DISORDER: Status: ACTIVE | Noted: 2022-06-30

## 2022-06-30 PROBLEM — E11.9 TYPE 2 DIABETES MELLITUS: Status: ACTIVE | Noted: 2022-06-30

## 2022-06-30 PROBLEM — N20.0 CALCULUS OF KIDNEY: Status: ACTIVE | Noted: 2022-06-30

## 2022-06-30 PROBLEM — K21.9 GASTROESOPHAGEAL REFLUX DISEASE: Status: ACTIVE | Noted: 2022-06-30

## 2022-06-30 PROBLEM — M10.9 GOUT: Status: ACTIVE | Noted: 2022-06-30

## 2022-06-30 PROBLEM — H91.90 HEARING LOSS: Status: ACTIVE | Noted: 2022-06-30

## 2022-06-30 PROBLEM — R80.1 PERSISTENT PROTEINURIA: Status: ACTIVE | Noted: 2022-06-30

## 2022-06-30 PROBLEM — M81.0 OSTEOPOROSIS: Status: ACTIVE | Noted: 2022-06-30

## 2022-06-30 PROBLEM — D53.9 MACROCYTIC ANEMIA: Status: ACTIVE | Noted: 2022-06-30

## 2022-06-30 PROCEDURE — 99213 OFFICE O/P EST LOW 20 MIN: CPT | Mod: ,,, | Performed by: INTERNAL MEDICINE

## 2022-06-30 PROCEDURE — 99213 PR OFFICE/OUTPT VISIT, EST, LEVL III, 20-29 MIN: ICD-10-PCS | Mod: ,,, | Performed by: INTERNAL MEDICINE

## 2022-06-30 RX ORDER — ASPIRIN 81 MG/1
81 TABLET ORAL DAILY
COMMUNITY

## 2022-06-30 RX ORDER — INSULIN PUMP SYRINGE, 3 ML
EACH MISCELLANEOUS
Qty: 1 EACH | Refills: 0 | Status: SHIPPED | OUTPATIENT
Start: 2022-06-30 | End: 2023-06-30

## 2022-06-30 RX ORDER — MONTELUKAST SODIUM 10 MG/1
10 TABLET ORAL NIGHTLY
Qty: 90 TABLET | Refills: 1 | Status: SHIPPED | OUTPATIENT
Start: 2022-06-30 | End: 2022-11-14

## 2022-06-30 RX ORDER — ACETAMINOPHEN 500 MG
2000 TABLET ORAL DAILY
COMMUNITY

## 2022-06-30 RX ORDER — MULTIVIT WITH MINERALS/HERBS
1 TABLET ORAL DAILY
COMMUNITY

## 2022-06-30 RX ORDER — ALBUTEROL SULFATE 90 UG/1
2 AEROSOL, METERED RESPIRATORY (INHALATION) EVERY 6 HOURS PRN
Qty: 8 G | Refills: 2 | Status: SHIPPED | OUTPATIENT
Start: 2022-06-30 | End: 2022-11-10 | Stop reason: ALTCHOICE

## 2022-06-30 RX ORDER — INSULIN PUMP SYRINGE, 3 ML
EACH MISCELLANEOUS
Qty: 1 EACH | Refills: 0 | Status: SHIPPED | OUTPATIENT
Start: 2022-06-30 | End: 2022-06-30 | Stop reason: SDUPTHER

## 2022-06-30 NOTE — PROGRESS NOTES
Chio Villela MD   [address] [place] [address]     PATIENT NAME: River Sheehan Jr.  : 1945  DATE: 22  MRN: [id number]      Billing Provider: Chio Villela MD  Level of Service:   Patient PCP Information     Provider PCP Type    Chio Villela MD General          Reason for Visit / Chief Complaint: 6 months follow up (C/O BLACKING OUT ON LAST WEEK.)       Update PCP  Update Chief Complaint         History of Present Illness / Problem Focused Workflow     River Sheehan Jr. presents to the clinic with 6 months follow up (C/O BLACKING OUT ON LAST WEEK.)     Here for follow up of DM, HTN, Gout. He saw his eye doctor, Dr Botello and is . He saw Dr Figueroa last week. His carotid was actually better. He says they still eat fried almost every day. They do use Can[employer]. His hands/wrists have been bothering her. He gets his shoulders but has exercises a nephew showed him that help it. His wife says he passed out last week, she thinks it was heat stroke.   Had asthma as a child and now getting dry cough, he has an old inhaler and it did help when it got bad.       Review of Systems     Review of Systems   Constitutional:        Does drip sweat when he's out.    HENT: Negative.    Eyes: Negative.    Respiratory: Positive for cough.         Dry mostly in morning, thinks allergies, he did Medipot and helped but still a little dry cough   Cardiovascular: Negative.    Gastrointestinal: Negative.    Endocrine: Negative.         Has never gotten his strips, they tried through [facility]   Genitourinary: Negative.         Dr Johnston said kidney is holding, Sees [facility]  and will do PSA   Musculoskeletal: Positive for arthralgias.   Skin: Negative.    Psychiatric/Behavioral: Negative.    All other systems reviewed and are negative.      Medical / Social / Family History     Past Medical History:   Diagnosis Date    Acid reflux     Adenomatous polyp of ascending colon 2021    Arthritis     CAD  (coronary artery disease)     Depression     Diabetes mellitus     Gout     Hearing loss     High cholesterol     HTN (hypertension)     Kidney stone     Macrocytic anemia     Osteoporosis        Past Surgical History:   Procedure Laterality Date    COLONOSCOPY W/ BIOPSIES  09/20/2021    CYSTOSCOPY      EXTRACAPSULAR EXTRACTION OF CATARACT      HERNIA REPAIR      LITHOTRIPSY      RETROGRADE PYELOGRAPHY         Social History  Mr. Sheehan      reports that he has quit smoking. He has never used smokeless tobacco.    Family History  Mr.'s Sheehan   family history includes Asthma in his sister; Atrial fibrillation in his brother; Bladder Cancer in his mother; Colon polyps in his brother; Coronary artery disease in his brother, brother, and brother; Diabetes in his brother, brother, and sister; Esophageal cancer in his brother; Heart murmur in his brother; Hypertension in his brother, brother, and sister; Kidney cancer in his brother; Lung cancer in his sister; Pancreatic cancer in his brother; Progressive Supranuclear Palsy in his brother.    Medications and Allergies     Medications  Outpatient Medications Marked as Taking for the 6/30/22 encounter (Office Visit) with Chio Villela MD   Medication Sig Dispense Refill    allopurinoL (ZYLOPRIM) 100 MG tablet Take 100 mg by mouth once daily.      amLODIPine (NORVASC) 10 MG tablet Take 10 mg by mouth once daily.      aspirin (ECOTRIN) 81 MG EC tablet Take 81 mg by mouth once daily.      atorvastatin (LIPITOR) 20 MG tablet Take 20 mg by mouth every evening.      b complex vitamins tablet Take 1 tablet by mouth once daily.      busPIRone (BUSPAR) 10 MG tablet Take 10 mg by mouth 2 (two) times daily.      cholecalciferol, vitamin D3, (VITAMIN D3) 50 mcg (2,000 unit) Cap capsule Take 2,000 Units by mouth once daily.      citalopram (CELEXA) 20 MG tablet Take 20 mg by mouth once daily.      gabapentin (NEURONTIN) 100 MG capsule Take 100 mg by  mouth 3 (three) times daily. TAKING 5 PILLS A DAY      KRILL OIL ORAL Take by mouth once daily at 6am.      losartan (COZAAR) 50 MG tablet Take 50 mg by mouth once daily.      tamsulosin (FLOMAX) 0.4 mg Cap Take 0.4 mg by mouth once daily.         Allergies  Review of patient's allergies indicates:  No Known Allergies    Physical Examination     Vitals:    06/30/22 0906   BP: 137/80   Pulse: 63   Resp: 16   Temp: 97.8 °F (36.6 °C)     Physical Exam  Vitals reviewed.   Constitutional:       Appearance: Normal appearance.   HENT:      Head: Normocephalic.   Cardiovascular:      Rate and Rhythm: Normal rate and regular rhythm.      Heart sounds: Normal heart sounds.   Pulmonary:      Effort: Pulmonary effort is normal.      Breath sounds: Normal breath sounds.   Abdominal:      General: Abdomen is flat.      Palpations: Abdomen is soft.      Tenderness: There is no abdominal tenderness. There is no guarding.   Musculoskeletal:         General: Tenderness present. No signs of injury.      Comments: Tender lateral right hand   Skin:     General: Skin is warm and dry.   Neurological:      General: No focal deficit present.      Mental Status: He is alert and oriented to person, place, and time.   Psychiatric:         Mood and Affect: Mood normal.         Behavior: Behavior normal.         Thought Content: Thought content normal.         Judgment: Judgment normal.           Assessment and Plan (including Health Maintenance)      Problem List  Smart Sets  Document Outside HM   :    Lab Visit on 06/09/2022   Component Date Value    Color, UA 06/09/2022 Yellow     Appearance, UA 06/09/2022 Clear     Specific Gravity, UA 06/09/2022 >=1.030     pH, UA 06/09/2022 5.0     Protein, UA 06/09/2022 Negative     Glucose, UA 06/09/2022 Negative     Ketones, UA 06/09/2022 Negative     Blood, UA 06/09/2022 Trace-Lysed (A)    Bilirubin, UA 06/09/2022 Negative     Urobilinogen, UA 06/09/2022 0.2     Nitrites, UA 06/09/2022  Negative     Leukocyte Esterase, UA 06/09/2022 Negative     Urine Creatinine 06/09/2022 267.0 (A)    Urine Protein Level 06/09/2022 16.1     Sodium Level 06/09/2022 137     Potassium Level 06/09/2022 4.5     Chloride 06/09/2022 99     Carbon Dioxide 06/09/2022 31     Glucose Level 06/09/2022 136 (A)    Blood Urea Nitrogen 06/09/2022 18.5     Creatinine 06/09/2022 0.88     Calcium Level Total 06/09/2022 10.2 (A)    Protein Total 06/09/2022 7.4     Albumin Level 06/09/2022 4.1     Globulin 06/09/2022 3.3     Albumin/Globulin Ratio 06/09/2022 1.2     Bilirubin Total 06/09/2022 0.7     Alkaline Phosphatase 06/09/2022 110     Alanine Aminotransferase 06/09/2022 18     Aspartate Aminotransfera* 06/09/2022 17     Estimated GFR-Non Winnie* 06/09/2022 >60     Cholesterol Total 06/09/2022 117     HDL Cholesterol 06/09/2022 46     Triglyceride 06/09/2022 68     Cholesterol/HDL Ratio 06/09/2022 3     Very Low Density Lipopro* 06/09/2022 14     LDL Cholesterol 06/09/2022 57.00     Parathyroid Hormone Inta* 06/09/2022 72.1     WBC 06/09/2022 6.5     RBC 06/09/2022 4.64 (A)    Hgb 06/09/2022 14.0     Hct 06/09/2022 43.0     MCV 06/09/2022 92.7     MCH 06/09/2022 30.2     MCHC 06/09/2022 32.6 (A)    RDW 06/09/2022 12.7     Platelet 06/09/2022 270     MPV 06/09/2022 8.6 (A)    Neut % 06/09/2022 58.2     Lymph % 06/09/2022 27.8     Mono % 06/09/2022 10.0     Eos % 06/09/2022 3.5     Basophil % 06/09/2022 0.3     Lymph # 06/09/2022 1.81     Neut # 06/09/2022 3.8     Mono # 06/09/2022 0.65     Eos # 06/09/2022 0.23     Baso # 06/09/2022 0.02     IG# 06/09/2022 0.01     IG% 06/09/2022 0.2     Bilirubin Direct 06/09/2022 0.3     Bacteria, UA 06/09/2022 None Seen     RBC, UA 06/09/2022 None Seen     WBC, UA 06/09/2022 None Seen     Squamous Epithelial Cell* 06/09/2022 None Seen          Plan:   Diabetes with mild CKD will be due foot exam in October.       Health Maintenance Due    Topic Date Due    Hepatitis C Screening  Never done    Diabetes Urine Screening  Never done    Foot Exam  Never done    Hemoglobin A1c  04/18/2022       Problem List Items Addressed This Visit        Cardiac/Vascular    Hypercholesterolemia    Hypertension - Primary    Overview     last visit with EKG 08/12              Renal/    Stage 2 chronic kidney disease       Endocrine    Type 2 diabetes mellitus with stage 2 chronic kidney disease, without long-term current use of insulin    Relevant Medications    blood-glucose meter (TRUE METRIX AIR GLUCOSE METER) kit    blood sugar diagnostic (TRUE METRIX GLUCOSE TEST STRIP) Strp       Orthopedic    Arthritis    Overview     shoulders           Relevant Orders    X-Ray Hand Complete Right    Gout    Relevant Orders    X-Ray Hand Complete Right      Other Visit Diagnoses     Carotid artery disease, unspecified laterality, unspecified type        Right hand pain        Relevant Orders    X-Ray Hand Complete Right    Post-nasal drip        Relevant Medications    montelukast (SINGULAIR) 10 mg tablet    Childhood asthma, unspecified asthma severity, unspecified whether complicated, unspecified whether persistent              Health Maintenance Topics with due status: Not Due       Topic Last Completion Date    TETANUS VACCINE 08/17/2017    Colorectal Cancer Screening 09/20/2021    Eye Exam 10/19/2021    Lipid Panel 06/09/2022    Low Dose Statin 06/30/2022       Future Appointments   Date Time Provider Department Center   10/19/2022 10:40 AM Chio Villela MD [facility] [place] [facility]            Signature:  Chio Villela MD  [facility]  [address] [place] [address]    Date of encounter: 6/30/22

## 2022-06-30 NOTE — TELEPHONE ENCOUNTER
----- Message from Natalie Morataya sent at 6/30/2022 11:34 AM CDT -----  Regarding: Refill  Type:  RX Refill Request    Who Called: pt's wife  Refill or New Rx:new  RX Name and Strength:glucometer & strips  How is the patient currently taking it? (ex. 1XDay):  Is this a 30 day or 90 day RX:  Preferred Pharmacy with phone number:[facility] in [place] [phone]  Local or Mail Order:local  Ordering Provider:elsy guzmán  Would the patient rather a call back or a response via [facility]? C/b  Best Call Back Number:[phone]  Additional Information: pt's wife called and stated insurance won't cover the glucometer & supplies because the patient does not have insurance to go through mail order (needs advantage plan) must be a local pharmacy  ##pt is also not picking up the albuterol it is too expensive

## 2022-07-01 ENCOUNTER — PATIENT OUTREACH (OUTPATIENT)
Dept: ADMINISTRATIVE | Facility: HOSPITAL | Age: 77
End: 2022-07-01
Payer: MEDICARE

## 2022-07-01 NOTE — PROGRESS NOTES
Population Health Outreach.Records Received, hyper-linked into chart at this time. The following record(s)  below were uploaded for Health Maintenance .             9/20/21 COLONOSCOPY

## 2022-07-12 ENCOUNTER — HOSPITAL ENCOUNTER (OUTPATIENT)
Dept: RADIOLOGY | Facility: HOSPITAL | Age: 77
Discharge: HOME | End: 2022-07-12
Attending: INTERNAL MEDICINE
Payer: MEDICARE

## 2022-07-12 ENCOUNTER — TELEPHONE (OUTPATIENT)
Dept: PRIMARY CARE CLINIC | Facility: CLINIC | Age: 77
End: 2022-07-12
Payer: MEDICARE

## 2022-07-12 DIAGNOSIS — M79.641 RIGHT HAND PAIN: ICD-10-CM

## 2022-07-12 DIAGNOSIS — M10.09 IDIOPATHIC GOUT OF MULTIPLE SITES, UNSPECIFIED CHRONICITY: ICD-10-CM

## 2022-07-12 DIAGNOSIS — M19.90 ARTHRITIS: ICD-10-CM

## 2022-07-12 PROCEDURE — 73130 X-RAY EXAM OF HAND: CPT | Mod: TC,RT

## 2022-07-12 NOTE — TELEPHONE ENCOUNTER
----- Message from Chio Villela MD sent at 7/12/2022  1:06 PM CDT -----  Xray shows a lot of arthritis and the index finger is slipping a little at the distal bend. It didn't look inflammatory like the gout.

## 2022-07-18 ENCOUNTER — TELEPHONE (OUTPATIENT)
Dept: PRIMARY CARE CLINIC | Facility: CLINIC | Age: 77
End: 2022-07-18
Payer: MEDICARE

## 2022-07-18 RX ORDER — ALBUTEROL SULFATE 90 UG/1
2 AEROSOL, METERED RESPIRATORY (INHALATION) EVERY 6 HOURS PRN
Qty: 8 G | Refills: 0 | Status: SHIPPED | OUTPATIENT
Start: 2022-07-18

## 2022-07-18 RX ORDER — ALBUTEROL SULFATE 90 UG/1
2 AEROSOL, METERED RESPIRATORY (INHALATION) EVERY 6 HOURS PRN
Qty: 8 G | Refills: 0 | Status: SHIPPED | OUTPATIENT
Start: 2022-07-18 | End: 2022-07-18 | Stop reason: SDUPTHER

## 2022-07-18 NOTE — TELEPHONE ENCOUNTER
Wife called states he was dx with covid was put cough medication and instructed to take coricidin.He is still coughing rib area and stomach is hurting from the coughing.

## 2022-07-18 NOTE — TELEPHONE ENCOUNTER
I had sent Proair to [facility], his was really old. I'm sending a new one today to [facility] to be sure he has one at home. What did they give for cough?

## 2022-07-19 NOTE — TELEPHONE ENCOUNTER
He was given benzonate 200mg. Ms. Prakash states he will not want the Pro-Air because insurance does not cover it- it was like $45. He had to pay for the benzonate also. Fortunately, he had a much better day yesterday- he did not cough as much. Even this morning he is not coughing as much. If he needs the pump or he starts to get back to the way he was they will  from pharmacy. Also they are asking how many days do you recommend him wearing the mask in home- today should be the 6th day?     [facility] also called them and told them he does not qualify for the diabetic supplies because he is not on medication for diabetes. Any advice?      [phone]- please call this # if unable to reach by the other number

## 2022-11-09 PROBLEM — M1A.09X0 IDIOPATHIC CHRONIC GOUT OF MULTIPLE SITES WITHOUT TOPHUS: Status: ACTIVE | Noted: 2022-06-30

## 2022-11-10 ENCOUNTER — OFFICE VISIT (OUTPATIENT)
Dept: PRIMARY CARE CLINIC | Facility: CLINIC | Age: 77
End: 2022-11-10
Payer: MEDICARE

## 2022-11-10 VITALS
DIASTOLIC BLOOD PRESSURE: 73 MMHG | WEIGHT: 192 LBS | HEART RATE: 59 BPM | RESPIRATION RATE: 16 BRPM | SYSTOLIC BLOOD PRESSURE: 116 MMHG | OXYGEN SATURATION: 96 % | TEMPERATURE: 98 F | HEIGHT: 68 IN | BODY MASS INDEX: 29.1 KG/M2

## 2022-11-10 DIAGNOSIS — N18.2 STAGE 2 CHRONIC KIDNEY DISEASE: ICD-10-CM

## 2022-11-10 DIAGNOSIS — Z00.00 MEDICARE ANNUAL WELLNESS VISIT, SUBSEQUENT: ICD-10-CM

## 2022-11-10 DIAGNOSIS — R53.83 OTHER FATIGUE: ICD-10-CM

## 2022-11-10 DIAGNOSIS — M81.8 OTHER OSTEOPOROSIS WITHOUT CURRENT PATHOLOGICAL FRACTURE: ICD-10-CM

## 2022-11-10 DIAGNOSIS — I10 PRIMARY HYPERTENSION: ICD-10-CM

## 2022-11-10 DIAGNOSIS — E11.22 TYPE 2 DIABETES MELLITUS WITH STAGE 2 CHRONIC KIDNEY DISEASE, WITHOUT LONG-TERM CURRENT USE OF INSULIN: ICD-10-CM

## 2022-11-10 DIAGNOSIS — E78.00 HYPERCHOLESTEROLEMIA: ICD-10-CM

## 2022-11-10 DIAGNOSIS — D50.9 MICROCYTIC ANEMIA: Primary | ICD-10-CM

## 2022-11-10 DIAGNOSIS — M1A.09X0 IDIOPATHIC CHRONIC GOUT OF MULTIPLE SITES WITHOUT TOPHUS: ICD-10-CM

## 2022-11-10 DIAGNOSIS — N18.2 TYPE 2 DIABETES MELLITUS WITH STAGE 2 CHRONIC KIDNEY DISEASE, WITHOUT LONG-TERM CURRENT USE OF INSULIN: ICD-10-CM

## 2022-11-10 LAB
CREAT UR-MCNC: 165 MG/DL (ref 63–166)
MICROALBUMIN UR-MCNC: 7.9 UG/ML
MICROALBUMIN/CREAT RATIO PNL UR: 4.8 MG/GM CR (ref 0–30)

## 2022-11-10 PROCEDURE — 84550 ASSAY OF BLOOD/URIC ACID: CPT | Performed by: INTERNAL MEDICINE

## 2022-11-10 PROCEDURE — G0439 PPPS, SUBSEQ VISIT: HCPCS | Mod: ,,, | Performed by: INTERNAL MEDICINE

## 2022-11-10 PROCEDURE — 83036 HEMOGLOBIN GLYCOSYLATED A1C: CPT | Performed by: INTERNAL MEDICINE

## 2022-11-10 PROCEDURE — 84443 ASSAY THYROID STIM HORMONE: CPT | Performed by: INTERNAL MEDICINE

## 2022-11-10 PROCEDURE — 83540 ASSAY OF IRON: CPT | Performed by: INTERNAL MEDICINE

## 2022-11-10 PROCEDURE — G0439 PR MEDICARE ANNUAL WELLNESS SUBSEQUENT VISIT: ICD-10-PCS | Mod: ,,, | Performed by: INTERNAL MEDICINE

## 2022-11-10 NOTE — PROGRESS NOTES
Patient ID: [id number]     Chief Complaint: Medicare AWV      HPI:     River Sheehan Jr. is a 77 y.o. male here today for a Medicare Wellness.   Since here he has seen Dr Figueroa in June and did lab and carotid. He has seen Dr Hunter in August and Dr Johnston last week. He did lab for her at [facility]. Eye exam in June with Dr Botello.     Opioid Screening: Patient medication list reviewed, patient is not taking prescription opioids. Patient is not using additional opioids than prescribed. Patient is at low risk of substance abuse based on this opioid use history.       ----------------------------  Acid reflux  Adenomatous polyp of ascending colon  Arthritis  CAD (coronary artery disease)  COVID-19  Depression  Diabetes mellitus  Gout  Hearing loss  High cholesterol  HTN (hypertension)  Kidney stone  Macrocytic anemia  Osteoporosis  Personal history of colonic polyps      Comment:  Dr. Chun Smith     Past Surgical History:   Procedure Laterality Date    COLONOSCOPY W/ BIOPSIES  09/20/2021    Dr. Chun Smith    CYSTOSCOPY      EXTRACAPSULAR EXTRACTION OF CATARACT      HERNIA REPAIR      LITHOTRIPSY      RETROGRADE PYELOGRAPHY         Review of patient's allergies indicates:  No Known Allergies    Outpatient Medications Marked as Taking for the 11/10/22 encounter (Office Visit) with Chio Villela MD   Medication Sig Dispense Refill    albuterol (PROAIR HFA) 90 mcg/actuation inhaler Inhale 2 puffs into the lungs every 6 (six) hours as needed for Wheezing. Rescue 8 g 0    allopurinoL (ZYLOPRIM) 100 MG tablet TAKE 1 TABLET EVERY DAY 90 tablet 1    amLODIPine (NORVASC) 10 MG tablet Take 10 mg by mouth once daily.      aspirin (ECOTRIN) 81 MG EC tablet Take 81 mg by mouth once daily.      atorvastatin (LIPITOR) 20 MG tablet TAKE 1 TABLET AT BEDTIME 90 tablet 1    b complex vitamins tablet Take 1 tablet by mouth once daily.      blood sugar diagnostic (TRUE METRIX GLUCOSE TEST STRIP) Strp 1 strip by  Misc.(Non-Drug; Combo Route) route once daily. 150 strip 3    blood-glucose meter (TRUE METRIX AIR GLUCOSE METER) kit Test daily for DM II E11.9 1 each 0    busPIRone (BUSPAR) 10 MG tablet TAKE 1/2 TO 1 TABLET THREE TIMES DAILY 270 tablet 1    cholecalciferol, vitamin D3, (VITAMIN D3) 50 mcg (2,000 unit) Cap capsule Take 2,000 Units by mouth once daily.      citalopram (CELEXA) 20 MG tablet Take 1 tablet (20 mg total) by mouth once daily. 90 tablet 3    gabapentin (NEURONTIN) 100 MG capsule Take 2 capsules (200 mg total) by mouth 3 (three) times daily. 540 capsule 1    KRILL OIL ORAL Take by mouth once daily at 6am.      losartan (COZAAR) 50 MG tablet Take 50 mg by mouth once daily.      montelukast (SINGULAIR) 10 mg tablet Take 1 tablet (10 mg total) by mouth every evening. 90 tablet 1    tamsulosin (FLOMAX) 0.4 mg Cap Take 0.4 mg by mouth once daily.         Social History     Socioeconomic History    Marital status:    Tobacco Use    Smoking status: Never    Smokeless tobacco: Never   Substance and Sexual Activity    Alcohol use: Yes     Alcohol/week: 1.0 standard drink     Types: 1 Cans of beer per week    Drug use: Never    Sexual activity: Yes        Family History   Problem Relation Age of Onset    Bladder Cancer Mother     Hypertension Sister     Asthma Sister     Diabetes Sister     Lung cancer Sister     Hypertension Brother     Diabetes Brother     Coronary artery disease Brother     Atrial fibrillation Brother     Esophageal cancer Brother     Kidney cancer Brother     Hypertension Brother     Coronary artery disease Brother     Diabetes Brother     Progressive Supranuclear Palsy Brother     Coronary artery disease Brother     Pancreatic cancer Brother     Colon polyps Brother     Heart murmur Brother         Patient Care Team:  Chio Villela MD as PCP - General (Internal Medicine)  Jesus Hunter MD as Consulting Physician (Urology)  Geena Johnston MD as Consulting Physician  (Nephrology)  Marlene Figueroa MD as Consulting Physician (Cardiovascular Disease)       Subjective:     Review of Systems   Constitutional:         Not as much energy as once had but still fair. They are sleeping better with Demetrice sleeping better.    HENT:  Positive for congestion and sinus pain.         Nothing major   Eyes:         Saw Dr Botello in June.    Respiratory: Negative.     Cardiovascular:         Did carotid with Dr Figueroa in June and lab.    Gastrointestinal: Negative.         He's not sure if he has another colon due. He thinks it's two years.    Genitourinary:         Saw Dale, bladder is doing ok, has one stone   Musculoskeletal:  Positive for joint pain.        Not that bad right nowl    Neurological:  Negative for focal weakness.        Memory is good for long term but short term is not good. It's not bad.    Endo/Heme/Allergies:         Checks sugar with us.    Psychiatric/Behavioral:          Mood is doing ok. His daughter has been doing better. They have a worker for [employer] that is really doing good, it took a lot of stress off them.        Patient Reported Health Risk Assessment  What [age] 70-79  Are you male or female?: Male  During the past four weeks, how much have you been bothered by emotional problems such as feeling anxious, depressed, irritable, sad, or downhearted and blue?: Not at all  During the past five weeks, has your physical and/or emotional health limited your social activities with family, friends, neighbors, or groups?: Not at all  During the past four weeks, how much bodily pain have you generally had?: No pain  During the past four weeks, was someone available to help if you needed and wanted help?: No, not at all  During the past four weeks, what was the hardest physical activity you could do for at least two minutes?: Moderate  Can you get to places out of walking distance without help?  (For example, can you travel alone on buses or taxis, or drive your  own car?): Yes  Can you go shopping for groceries or clothes without someone's help?: Yes  Can you prepare your own meals?: Yes  Can you do your own housework without help?: Yes  Because of any health problems, do you need the help of another person with your personal care needs such as eating, bathing, dressing, or getting around the house?: No  Can you handle your own money without help?: Yes  During the past four weeks, how would you rate your health in general?: Good  How have things been going for you during the past four weeks?: Pretty well  Are you having difficulties driving your car?: No  Do you always fasten your seat belt when you are in a car?: Yes, usually  How often in the past four weeks have you been bothered by falling or dizzy when standing up?: Never  How often in the past four weeks have you been bothered by sexual problems?: Never  How often in the past four weeks have you been bothered by trouble eating well?: Never  How often in the past four weeks have you been bothered by teeth or denture problems?: Never  How often in the past four weeks have you been bothered with problems using the telephone?: Never  How often in the past four weeks have you been bothered by tiredness or fatigue?: Sometimes  Have you fallen two or more times in the past year?: No  Are you afraid of falling?: No  Are you a smoker?: No  During the past four weeks, how many drinks of wine, beer, or other alcoholic beverages did you have?: One drink or less per week  Do you exercise for about 20 minutes three or more days a week?: Yes, some of the time  Have you been given any information to help you with hazards in your house that might hurt you?: Yes  Have you been given any information to help you with keeping track of your medications?: Yes  How often do you have trouble taking medicines the way you've been told to take them?: I always take them as prescribed  How confident are you that you can control and manage most of  "your health problems?: Very confident    Objective:     /73 (BP Location: Left arm, Patient Position: Sitting)   Pulse (!) 59   Temp 97.6 °F (36.4 °C)   Resp 16   Ht 5' 8" (1.727 m)   Wt 87.1 kg (192 lb)   SpO2 96%   BMI 29.19 kg/m²     Physical Exam  Vitals reviewed.   Constitutional:       Appearance: Normal appearance.   HENT:      Head: Normocephalic and atraumatic.      Mouth/Throat:      Mouth: Mucous membranes are moist.   Eyes:      Extraocular Movements: Extraocular movements intact.      Conjunctiva/sclera: Conjunctivae normal.      Pupils: Pupils are equal, round, and reactive to light.   Cardiovascular:      Rate and Rhythm: Normal rate and regular rhythm.      Pulses:           Dorsalis pedis pulses are 3+ on the right side and 3+ on the left side.        Posterior tibial pulses are 3+ on the right side and 3+ on the left side.      Heart sounds: Normal heart sounds.      Comments: 2+ edema to ankle  Pulmonary:      Effort: Pulmonary effort is normal.      Breath sounds: Normal breath sounds. No rhonchi.   Abdominal:      General: Abdomen is flat.      Palpations: Abdomen is soft.      Tenderness: There is no abdominal tenderness. There is no guarding.   Musculoskeletal:         General: No deformity. Normal range of motion.      Cervical back: Neck supple. No tenderness.      Right foot: No deformity.      Left foot: No deformity.   Feet:      Right foot:      Protective Sensation: 10 sites tested.  10 sites sensed.      Skin integrity: Skin integrity normal.      Toenail Condition: Right toenails are normal.      Left foot:      Protective Sensation: 10 sites tested.  10 sites sensed.      Skin integrity: Skin integrity normal.      Toenail Condition: Left toenails are normal.      Comments: 2+ edema  Skin:     General: Skin is warm and dry.      Findings: No rash.   Neurological:      General: No focal deficit present.      Mental Status: He is alert and oriented to person, place, and " time.   Psychiatric:         Mood and Affect: Mood normal.         Behavior: Behavior normal.         Thought Content: Thought content normal.         Judgment: Judgment normal.         No flowsheet data found.  Fall Risk Assessment - Outpatient 11/10/2022 6/30/2022   Mobility Status Ambulatory -   Number of falls 0 1   Identified as fall risk 0 1           Depression Screening  Over the past two weeks, has the patient felt down, depressed, or hopeless?: No  Over the past two weeks, has the patient felt little interest or pleasure in doing things?: No  Functional Ability/Safety Screening  Was the patient's timed Up & Go test unsteady or longer than 30 seconds?: No  Does the patient need help with phone, transportation, shopping, preparing meals, housework, laundry, meds, or managing money?: No  Does the patient's home have rugs in the hallway, lack grab bars in the bathroom, lack handrails on the stairs or have poor lighting?: No  Have you noticed any hearing difficulties?: Yes  Cognitive Function (Assessed through direct observation with due consideration of information obtained by way of patient reports and/or concerns raised by family, friends, caretakers, or others)    Does the patient repeat questions/statements in the same day?: No  Does the patient have trouble remembering the date, year, and time?: No  Does the patient have difficulty managing finances?: No  Does the patient have a decreased sense of direction?: No  Lab Visit on 09/02/2022   Component Date Value    Color, UA 09/02/2022 Yellow     Appearance, UA 09/02/2022 Clear     Specific Gravity, UA 09/02/2022 1.025     pH, UA 09/02/2022 6.0     Protein, UA 09/02/2022 Negative     Glucose, UA 09/02/2022 Negative     Ketones, UA 09/02/2022 Negative     Blood, UA 09/02/2022 Negative     Bilirubin, UA 09/02/2022 Negative     Urobilinogen, UA 09/02/2022 0.2     Nitrites, UA 09/02/2022 Negative     Leukocyte Esterase, UA 09/02/2022 Negative     Urine Protein  Level 09/02/2022 13.5     Urine Creatinine 09/02/2022 210.5 (H)     Urine Protein/Creatinine* 09/02/2022 64.1     Sodium Level 09/02/2022 138     Potassium Level 09/02/2022 4.1     Chloride 09/02/2022 101     Carbon Dioxide 09/02/2022 28     Glucose Level 09/02/2022 231 (H)     Blood Urea Nitrogen 09/02/2022 20.1     Creatinine 09/02/2022 0.95     Calcium Level Total 09/02/2022 9.6     Protein Total 09/02/2022 7.1     Albumin Level 09/02/2022 3.8     Globulin 09/02/2022 3.3     Albumin/Globulin Ratio 09/02/2022 1.2     Bilirubin Total 09/02/2022 0.7     Alkaline Phosphatase 09/02/2022 128     Alanine Aminotransferase 09/02/2022 17     Aspartate Aminotransfera* 09/02/2022 15     eGFR 09/02/2022 >60     Parathyroid Hormone Inta* 09/02/2022 65.7     WBC 09/02/2022 6.8     RBC 09/02/2022 4.43 (L)     Hgb 09/02/2022 13.2 (L)     Hct 09/02/2022 40.1 (L)     MCV 09/02/2022 90.5     MCH 09/02/2022 29.8     MCHC 09/02/2022 32.9 (L)     RDW 09/02/2022 13.0     Platelet 09/02/2022 272     MPV 09/02/2022 8.5     Neut % 09/02/2022 60.7     Lymph % 09/02/2022 28.5     Mono % 09/02/2022 7.5     Eos % 09/02/2022 3.1     Basophil % 09/02/2022 0.1     Lymph # 09/02/2022 1.93     Neut # 09/02/2022 4.1     Mono # 09/02/2022 0.51     Eos # 09/02/2022 0.21     Baso # 09/02/2022 0.01     IG# 09/02/2022 0.01     IG% 09/02/2022 0.1     Bacteria, UA 09/02/2022 None Seen     RBC, UA 09/02/2022 None Seen     WBC, UA 09/02/2022 None Seen     Squamous Epithelial Cell* 09/02/2022 Rare        Assessment/Plan:     1. Microcytic anemia  -     Iron and TIBC; Future; Expected date: 11/10/2022    2. Medicare annual wellness visit, subsequent  -     Hemoglobin A1C; Future; Expected date: 11/10/2022  -     Microalbumin/Creatinine Ratio, Urine  -     Iron and TIBC; Future; Expected date: 11/10/2022  -     TSH; Future; Expected date: 11/10/2022  -     Uric Acid; Future; Expected date: 11/10/2022    3. Primary hypertension  Comments:  Has smartphone but  limited in use, not interested in digital medication.   Overview:  last visit with EKG 08/12      4. Hypercholesterolemia  Comments:  Levels good in June    5. Stage 2 chronic kidney disease  Comments:  Keeping follow up with Dr Johnston  Orders:  -     Uric Acid; Future; Expected date: 11/10/2022    6. Type 2 diabetes mellitus with stage 2 chronic kidney disease, without long-term current use of insulin  -     Hemoglobin A1C; Future; Expected date: 11/10/2022  -     Microalbumin/Creatinine Ratio, Urine    7. Other osteoporosis without current pathological fracture  Comments:  Will need to check [employer] for dx and last DEXA    8. Idiopathic chronic gout of multiple sites without tophus  Comments:  No level since last year.  Orders:  -     Uric Acid; Future; Expected date: 11/10/2022    9. Other fatigue  -     TSH; Future; Expected date: 11/10/2022         Medicare Annual Wellness and Personalized Prevention Plan:   Fall Risk + Home Safety + Hearing Impairment + Depression Screen + Opioid and Substance Abuse Screening + Cognitive Impairment Screen + Health Risk Assessment all reviewed.     Health Maintenance Topics with due status: Not Due       Topic Last Completion Date    TETANUS VACCINE 08/17/2017    Eye Exam 06/02/2022    Lipid Panel 06/09/2022      The patient's Health Maintenance was reviewed and the following appears to be due at this time:   Health Maintenance Due   Topic Date Due    Hepatitis C Screening  Never done    Diabetes Urine Screening  Never done    Hemoglobin A1c  04/18/2022    COVID-19 Vaccine (5 - Booster for Moderna series) 12/08/2022       Advance Care Planning   I attest to discussing Advance Care Planning with patient and/or family member.  Education was provided including the importance of the Health Care Power of , Advance Directives, and/or [place] documentation.  The patient expressed understanding to the importance of this information and discussion.   Advance Care Planning      Date: 11/10/2022  No living will, has talked about it with wife, less with his daughter, she is [place] of [place] but comes down often. Discussion 5min.                   Follow up in about 6 months (around 5/10/2023) for Medication Managment. In addition to their scheduled follow up, the patient has also been instructed to follow up on as needed basis.

## 2022-11-14 DIAGNOSIS — E11.65 UNCONTROLLED TYPE 2 DIABETES MELLITUS WITH HYPERGLYCEMIA: Primary | ICD-10-CM

## 2022-11-16 ENCOUNTER — TELEPHONE (OUTPATIENT)
Dept: PRIMARY CARE CLINIC | Facility: CLINIC | Age: 77
End: 2022-11-16
Payer: MEDICARE

## 2022-11-16 RX ORDER — METFORMIN HYDROCHLORIDE 500 MG/1
500 TABLET ORAL 2 TIMES DAILY WITH MEALS
Qty: 180 TABLET | Refills: 0 | Status: SHIPPED | OUTPATIENT
Start: 2022-11-16 | End: 2023-01-06

## 2022-11-16 NOTE — TELEPHONE ENCOUNTER
I'm sending metformin, it can be hard on the stomach and if his kidney were to get bad again we would have to stop it but we'll see how he does.

## 2022-11-16 NOTE — TELEPHONE ENCOUNTER
----- Message from Silvia King sent at 11/16/2022 10:44 AM CST -----  Regarding: Return Call  .Type:  Patient Returning Call    Who Called:pt's wife  Who Left Message for Patient:Esther   Does the patient know what this is regarding?:medication   Would the patient rather a call back or a response via [facility]? Call Back   Best Call Back Number:[phone]  Additional Information: pt's stated the new medication that was give to her  will cost him $600 to fill the medication. She doesn't know the name of the medication, please give her a call

## 2022-11-16 NOTE — TELEPHONE ENCOUNTER
Looks like they are inquiring about the Jardiance.   We do have samples if needed.    Please advise

## 2022-11-28 ENCOUNTER — TELEPHONE (OUTPATIENT)
Dept: PRIMARY CARE CLINIC | Facility: CLINIC | Age: 77
End: 2022-11-28
Payer: MEDICARE

## 2022-11-28 DIAGNOSIS — E11.65 UNCONTROLLED TYPE 2 DIABETES MELLITUS WITH HYPERGLYCEMIA: Primary | ICD-10-CM

## 2022-11-28 NOTE — TELEPHONE ENCOUNTER
----- Message from Idalia Evans LPN sent at 11/28/2022  8:05 AM CST -----  Regarding: FW: new rx    ----- Message -----  From: Soni White  Sent: 11/25/2022  11:35 AM CST  To: Manohar Barroso Staff  Subject: new rx                                           Type:  RX Refill Request    Who Called: Olinda, patient's wife  Refill or New Rx:new rx  RX Name and Strength:Glucose monitor   How is the patient currently taking it? (ex. 1XDay):  Is this a 30 day or 90 day RX:  Preferred Pharmacy with phone number:[facility]  Local or Mail Order:local  Ordering Provider:Manohar  Would the patient rather a call back or a response via [facility]?   Best Call Back Number:[phone]  Additional Information: Olinda is requesting a glucose monitor from [facility] now that he is on medication for his diabetes.

## 2022-12-01 ENCOUNTER — TELEPHONE (OUTPATIENT)
Dept: PRIMARY CARE CLINIC | Facility: CLINIC | Age: 77
End: 2022-12-01
Payer: MEDICARE

## 2022-12-01 NOTE — TELEPHONE ENCOUNTER
Patient states he taking metformin 1 qd having a lot of GI issues with it can't take bid.  and 135.

## 2022-12-12 ENCOUNTER — TELEPHONE (OUTPATIENT)
Dept: PRIMARY CARE CLINIC | Facility: CLINIC | Age: 77
End: 2022-12-12
Payer: MEDICARE

## 2022-12-12 RX ORDER — PANTOPRAZOLE SODIUM 40 MG/1
40 TABLET, DELAYED RELEASE ORAL DAILY
Qty: 90 TABLET | Refills: 0 | Status: SHIPPED | OUTPATIENT
Start: 2022-12-12 | End: 2023-03-14

## 2022-12-12 RX ORDER — ESOMEPRAZOLE MAGNESIUM 40 MG/1
40 CAPSULE, DELAYED RELEASE ORAL
Qty: 90 CAPSULE | Refills: 0 | Status: SHIPPED | OUTPATIENT
Start: 2022-12-12 | End: 2022-12-12 | Stop reason: CLARIF

## 2022-12-12 NOTE — TELEPHONE ENCOUNTER
----- Message from Silvia King sent at 12/12/2022  2:57 PM CST -----  Regarding: Refill Request  .Type:  RX Refill Request    Who Called: pt   Refill or New Rx:new rx  RX Name and Strength:pantoprazole (PROTONIX) 40 MG tablet  How is the patient currently taking it? (ex. 1XDay)PRN  Is this a 30 day or 90 day RX:30  Preferred Pharmacy with phone number:[facility] [address]  Local or Mail Order:mail  Ordering Provider:Chio Villela  Would the patient rather a call back or a response via [facility]? Call Back   Best Call Back Number:[phone]  Additional Information: pt states the esomeprazole (NEXIUM) 40 MG capsule, isn't covered by his insurance and would like this medication called in because he's wife take this medication and it is covered by their insurance, also please cancel the Nexium

## 2022-12-12 NOTE — TELEPHONE ENCOUNTER
Noting more GI issues, would like to use Nexium again. Explained I'll send but if the symptoms persist we may need to change the metformin. His sugars are better, he had one drop to 69 and he felt bad. He brought his log. He has lost some weight and is watching his diet better.

## 2023-01-04 ENCOUNTER — TELEPHONE (OUTPATIENT)
Dept: PRIMARY CARE CLINIC | Facility: CLINIC | Age: 78
End: 2023-01-04
Payer: MEDICARE

## 2023-01-04 RX ORDER — CITALOPRAM 20 MG/1
20 TABLET, FILM COATED ORAL DAILY
Qty: 14 TABLET | Refills: 0 | Status: SHIPPED | OUTPATIENT
Start: 2023-01-04 | End: 2023-01-18

## 2023-01-05 NOTE — TELEPHONE ENCOUNTER
As per phone request I sent 2 weeks Citalopram to [facility]. It wanted to attach to a refill so I created a new message so it didn't offer to bring in the message.

## 2023-02-06 ENCOUNTER — LAB VISIT (OUTPATIENT)
Dept: LAB | Facility: HOSPITAL | Age: 78
End: 2023-02-06
Attending: INTERNAL MEDICINE
Payer: MEDICARE

## 2023-02-06 DIAGNOSIS — E11.65 UNCONTROLLED TYPE 2 DIABETES MELLITUS WITH HYPERGLYCEMIA: ICD-10-CM

## 2023-02-06 DIAGNOSIS — N18.2 CHRONIC KIDNEY DISEASE, STAGE II (MILD): Primary | ICD-10-CM

## 2023-02-06 DIAGNOSIS — I10 ESSENTIAL HYPERTENSION, MALIGNANT: ICD-10-CM

## 2023-02-06 LAB
ALBUMIN SERPL-MCNC: 4.2 G/DL (ref 3.4–4.8)
ALBUMIN/GLOB SERPL: 1.3 RATIO (ref 1.1–2)
ALP SERPL-CCNC: 103 UNIT/L (ref 40–150)
ALT SERPL-CCNC: 19 UNIT/L (ref 0–55)
APPEARANCE UR: CLEAR
AST SERPL-CCNC: 19 UNIT/L (ref 5–34)
BACTERIA #/AREA URNS AUTO: NORMAL /HPF
BASOPHILS # BLD AUTO: 0.04 X10(3)/MCL (ref 0–0.2)
BASOPHILS NFR BLD AUTO: 0.5 %
BILIRUB UR QL STRIP.AUTO: NEGATIVE MG/DL
BILIRUBIN DIRECT+TOT PNL SERPL-MCNC: 0.7 MG/DL
BUN SERPL-MCNC: 19.4 MG/DL (ref 8.4–25.7)
CALCIUM SERPL-MCNC: 10.6 MG/DL (ref 8.8–10)
CHLORIDE SERPL-SCNC: 100 MMOL/L (ref 98–107)
CO2 SERPL-SCNC: 30 MMOL/L (ref 23–31)
COLOR UR AUTO: YELLOW
CREAT SERPL-MCNC: 0.84 MG/DL (ref 0.73–1.18)
CREAT UR-MCNC: 196.8 MG/DL (ref 63–166)
EOSINOPHIL # BLD AUTO: 0.17 X10(3)/MCL (ref 0–0.9)
EOSINOPHIL NFR BLD AUTO: 2.2 %
ERYTHROCYTE [DISTWIDTH] IN BLOOD BY AUTOMATED COUNT: 12.7 % (ref 11.5–17)
EST. AVERAGE GLUCOSE BLD GHB EST-MCNC: 131.2 MG/DL
GFR SERPLBLD CREATININE-BSD FMLA CKD-EPI: >60 MLS/MIN/1.73/M2
GLOBULIN SER-MCNC: 3.3 GM/DL (ref 2.4–3.5)
GLUCOSE SERPL-MCNC: 119 MG/DL (ref 82–115)
GLUCOSE UR QL STRIP.AUTO: NEGATIVE MG/DL
HBA1C MFR BLD: 6.2 %
HCT VFR BLD AUTO: 43.2 % (ref 42–52)
HGB BLD-MCNC: 13.7 GM/DL (ref 14–18)
IMM GRANULOCYTES # BLD AUTO: 0.01 X10(3)/MCL (ref 0–0.04)
IMM GRANULOCYTES NFR BLD AUTO: 0.1 %
KETONES UR QL STRIP.AUTO: NEGATIVE MG/DL
LEUKOCYTE ESTERASE UR QL STRIP.AUTO: NEGATIVE UNIT/L
LYMPHOCYTES # BLD AUTO: 1.81 X10(3)/MCL (ref 0.6–4.6)
LYMPHOCYTES NFR BLD AUTO: 23.9 %
MCH RBC QN AUTO: 29.4 PG
MCHC RBC AUTO-ENTMCNC: 31.7 MG/DL (ref 33–36)
MCV RBC AUTO: 92.7 FL (ref 80–94)
MONOCYTES # BLD AUTO: 0.65 X10(3)/MCL (ref 0.1–1.3)
MONOCYTES NFR BLD AUTO: 8.6 %
NEUTROPHILS # BLD AUTO: 4.89 X10(3)/MCL (ref 2.1–9.2)
NEUTROPHILS NFR BLD AUTO: 64.7 %
NITRITE UR QL STRIP.AUTO: NEGATIVE
PH UR STRIP.AUTO: 6.5 [PH]
PLATELET # BLD AUTO: 290 X10(3)/MCL (ref 130–400)
PMV BLD AUTO: 8.3 FL (ref 7.4–10.4)
POTASSIUM SERPL-SCNC: 4.7 MMOL/L (ref 3.5–5.1)
PROT SERPL-MCNC: 7.5 GM/DL (ref 5.8–7.6)
PROT UR QL STRIP.AUTO: NEGATIVE MG/DL
PROT UR STRIP-MCNC: 17.6 MG/DL
PTH-INTACT SERPL-MCNC: 52.3 PG/ML (ref 8.7–77)
RBC # BLD AUTO: 4.66 X10(6)/MCL (ref 4.7–6.1)
RBC #/AREA URNS AUTO: NORMAL /HPF
RBC UR QL AUTO: NEGATIVE UNIT/L
SODIUM SERPL-SCNC: 139 MMOL/L (ref 136–145)
SP GR UR STRIP.AUTO: 1.02
SQUAMOUS #/AREA URNS AUTO: NORMAL /HPF
URINE PROTEIN/CREATININE RATIO (OHS): 0.1
UROBILINOGEN UR STRIP-ACNC: 0.2 MG/DL
WBC # SPEC AUTO: 7.6 X10(3)/MCL (ref 4.5–11.5)
WBC #/AREA URNS AUTO: NORMAL /HPF

## 2023-02-06 PROCEDURE — 83036 HEMOGLOBIN GLYCOSYLATED A1C: CPT

## 2023-02-06 PROCEDURE — 81001 URINALYSIS AUTO W/SCOPE: CPT

## 2023-02-06 PROCEDURE — 36415 COLL VENOUS BLD VENIPUNCTURE: CPT

## 2023-02-06 PROCEDURE — 83970 ASSAY OF PARATHORMONE: CPT

## 2023-02-06 PROCEDURE — 85025 COMPLETE CBC W/AUTO DIFF WBC: CPT

## 2023-02-06 PROCEDURE — 80053 COMPREHEN METABOLIC PANEL: CPT

## 2023-02-06 PROCEDURE — 82570 ASSAY OF URINE CREATININE: CPT

## 2023-02-07 ENCOUNTER — TELEPHONE (OUTPATIENT)
Dept: PRIMARY CARE CLINIC | Facility: CLINIC | Age: 78
End: 2023-02-07
Payer: MEDICARE

## 2023-02-07 NOTE — TELEPHONE ENCOUNTER
----- Message from Chio Villela MD sent at 2/7/2023  8:15 AM CST -----  Diabetes is much better, back under control. Keep it up!

## 2023-05-04 ENCOUNTER — TELEPHONE (OUTPATIENT)
Dept: PRIMARY CARE CLINIC | Facility: CLINIC | Age: 78
End: 2023-05-04
Payer: MEDICARE

## 2023-05-04 NOTE — TELEPHONE ENCOUNTER
Are there any outstanding task in patient chart?  N     2. Do we have outstanding/pending referrals?  N     3. Has the patient been seen in an ER, Urgent Care, or admitted since last visit?  N     4. Has patient seen any other healthcare providers since last visit?  N     5. Has patient had any blood work or xrays done since last visit?  Patient had labs for Dr. Vargas  6. Is the patient's pneumonia vaccine current?  Prevnar 13: 10/5/20  Pneumonia 20: n/a  Pneumonia 23: 11/3/14    7. When was the patient's colonoscopy?  N/a  8. When was the patient's last mamogram?  N/a  9. When was the patient's last cervical screening/PAP smear?  N/a  10. When was the patient's last bone scan?  N/a  11. When was the patient's last diabetic screening?  A1c: 2/6/23  Micro: 11/10/22  Eye Exam: 6/2/22

## 2023-05-09 ENCOUNTER — LAB VISIT (OUTPATIENT)
Dept: LAB | Facility: HOSPITAL | Age: 78
End: 2023-05-09
Attending: INTERNAL MEDICINE
Payer: MEDICARE

## 2023-05-09 DIAGNOSIS — E83.52 HYPERCALCEMIA: Primary | ICD-10-CM

## 2023-05-09 LAB
ALBUMIN SERPL-MCNC: 4 G/DL (ref 3.4–4.8)
ALBUMIN/GLOB SERPL: 1.3 RATIO (ref 1.1–2)
ALP SERPL-CCNC: 87 UNIT/L (ref 40–150)
ALT SERPL-CCNC: 18 UNIT/L (ref 0–55)
APPEARANCE UR: CLEAR
AST SERPL-CCNC: 18 UNIT/L (ref 5–34)
BACTERIA #/AREA URNS AUTO: NORMAL /HPF
BASOPHILS # BLD AUTO: 0.02 X10(3)/MCL
BASOPHILS NFR BLD AUTO: 0.3 %
BILIRUB UR QL STRIP.AUTO: NEGATIVE MG/DL
BILIRUBIN DIRECT+TOT PNL SERPL-MCNC: 0.6 MG/DL
BUN SERPL-MCNC: 29.6 MG/DL (ref 8.4–25.7)
CALCIUM SERPL-MCNC: 10.2 MG/DL (ref 8.8–10)
CHLORIDE SERPL-SCNC: 101 MMOL/L (ref 98–107)
CO2 SERPL-SCNC: 30 MMOL/L (ref 23–31)
COLOR UR AUTO: YELLOW
CREAT SERPL-MCNC: 0.84 MG/DL (ref 0.73–1.18)
CREAT UR-MCNC: 161.9 MG/DL (ref 63–166)
DEPRECATED CALCIDIOL+CALCIFEROL SERPL-MC: 70.6 NG/ML (ref 30–80)
EOSINOPHIL # BLD AUTO: 0.17 X10(3)/MCL (ref 0–0.9)
EOSINOPHIL NFR BLD AUTO: 2.2 %
ERYTHROCYTE [DISTWIDTH] IN BLOOD BY AUTOMATED COUNT: 13 % (ref 11.5–17)
GFR SERPLBLD CREATININE-BSD FMLA CKD-EPI: >60 MLS/MIN/1.73/M2
GLOBULIN SER-MCNC: 3.2 GM/DL (ref 2.4–3.5)
GLUCOSE SERPL-MCNC: 113 MG/DL (ref 82–115)
GLUCOSE UR QL STRIP.AUTO: NEGATIVE MG/DL
HCT VFR BLD AUTO: 40.8 % (ref 42–52)
HGB BLD-MCNC: 13.3 G/DL (ref 14–18)
IMM GRANULOCYTES # BLD AUTO: 0.01 X10(3)/MCL (ref 0–0.04)
IMM GRANULOCYTES NFR BLD AUTO: 0.1 %
KETONES UR QL STRIP.AUTO: NEGATIVE MG/DL
LEUKOCYTE ESTERASE UR QL STRIP.AUTO: NEGATIVE UNIT/L
LYMPHOCYTES # BLD AUTO: 1.89 X10(3)/MCL (ref 0.6–4.6)
LYMPHOCYTES NFR BLD AUTO: 24.6 %
MCH RBC QN AUTO: 30.6 PG (ref 27–31)
MCHC RBC AUTO-ENTMCNC: 32.6 G/DL (ref 33–36)
MCV RBC AUTO: 94 FL (ref 80–94)
MONOCYTES # BLD AUTO: 0.81 X10(3)/MCL (ref 0.1–1.3)
MONOCYTES NFR BLD AUTO: 10.6 %
NEUTROPHILS # BLD AUTO: 4.77 X10(3)/MCL (ref 2.1–9.2)
NEUTROPHILS NFR BLD AUTO: 62.2 %
NITRITE UR QL STRIP.AUTO: NEGATIVE
PH UR STRIP.AUTO: 5.5 [PH]
PLATELET # BLD AUTO: 312 X10(3)/MCL (ref 130–400)
PMV BLD AUTO: 8.8 FL (ref 7.4–10.4)
POTASSIUM SERPL-SCNC: 4.1 MMOL/L (ref 3.5–5.1)
PROT SERPL-MCNC: 7.2 GM/DL (ref 5.8–7.6)
PROT UR QL STRIP.AUTO: NEGATIVE MG/DL
PROT UR STRIP-MCNC: 17 MG/DL
PTH-INTACT SERPL-MCNC: 53.2 PG/ML (ref 8.7–77)
RBC # BLD AUTO: 4.34 X10(6)/MCL (ref 4.7–6.1)
RBC #/AREA URNS AUTO: NORMAL /HPF
RBC UR QL AUTO: ABNORMAL UNIT/L
SODIUM SERPL-SCNC: 138 MMOL/L (ref 136–145)
SP GR UR STRIP.AUTO: 1.02
SQUAMOUS #/AREA URNS AUTO: NORMAL /HPF
URINE PROTEIN/CREATININE RATIO (OHS): 0.1
UROBILINOGEN UR STRIP-ACNC: 0.2 MG/DL
WBC # SPEC AUTO: 7.67 X10(3)/MCL (ref 4.5–11.5)
WBC #/AREA URNS AUTO: NORMAL /HPF

## 2023-05-09 PROCEDURE — 84165 PROTEIN E-PHORESIS SERUM: CPT

## 2023-05-09 PROCEDURE — 81001 URINALYSIS AUTO W/SCOPE: CPT

## 2023-05-09 PROCEDURE — 83970 ASSAY OF PARATHORMONE: CPT

## 2023-05-09 PROCEDURE — 85025 COMPLETE CBC W/AUTO DIFF WBC: CPT

## 2023-05-09 PROCEDURE — 82306 VITAMIN D 25 HYDROXY: CPT

## 2023-05-09 PROCEDURE — 36415 COLL VENOUS BLD VENIPUNCTURE: CPT

## 2023-05-09 PROCEDURE — 80053 COMPREHEN METABOLIC PANEL: CPT

## 2023-05-09 PROCEDURE — 82570 ASSAY OF URINE CREATININE: CPT

## 2023-05-10 LAB — PATH REV: NORMAL

## 2023-05-11 ENCOUNTER — OFFICE VISIT (OUTPATIENT)
Dept: PRIMARY CARE CLINIC | Facility: CLINIC | Age: 78
End: 2023-05-11
Payer: MEDICARE

## 2023-05-11 VITALS
HEART RATE: 61 BPM | HEIGHT: 68 IN | OXYGEN SATURATION: 98 % | TEMPERATURE: 98 F | DIASTOLIC BLOOD PRESSURE: 69 MMHG | SYSTOLIC BLOOD PRESSURE: 124 MMHG | WEIGHT: 174 LBS | BODY MASS INDEX: 26.37 KG/M2 | RESPIRATION RATE: 16 BRPM

## 2023-05-11 DIAGNOSIS — E11.22 TYPE 2 DIABETES MELLITUS WITH STAGE 2 CHRONIC KIDNEY DISEASE, WITHOUT LONG-TERM CURRENT USE OF INSULIN: ICD-10-CM

## 2023-05-11 DIAGNOSIS — E78.00 HYPERCHOLESTEROLEMIA: ICD-10-CM

## 2023-05-11 DIAGNOSIS — N18.2 STAGE 2 CHRONIC KIDNEY DISEASE: ICD-10-CM

## 2023-05-11 DIAGNOSIS — N18.2 TYPE 2 DIABETES MELLITUS WITH STAGE 2 CHRONIC KIDNEY DISEASE, WITHOUT LONG-TERM CURRENT USE OF INSULIN: ICD-10-CM

## 2023-05-11 DIAGNOSIS — S46.211A BICEPS TENDON RUPTURE, RIGHT, INITIAL ENCOUNTER: ICD-10-CM

## 2023-05-11 DIAGNOSIS — H60.321 ACUTE HEMORRHAGIC OTITIS EXTERNA OF RIGHT EAR: ICD-10-CM

## 2023-05-11 DIAGNOSIS — I10 PRIMARY HYPERTENSION: Primary | ICD-10-CM

## 2023-05-11 LAB
CHOLEST SERPL-MCNC: 107 MG/DL
CHOLEST/HDLC SERPL: 2 {RATIO} (ref 0–5)
EST. AVERAGE GLUCOSE BLD GHB EST-MCNC: 119.8 MG/DL
HBA1C MFR BLD: 5.8 %
HDLC SERPL-MCNC: 46 MG/DL (ref 35–60)
LDLC SERPL CALC-MCNC: 50 MG/DL (ref 50–140)
TRIGL SERPL-MCNC: 54 MG/DL (ref 34–140)
VLDLC SERPL CALC-MCNC: 11 MG/DL

## 2023-05-11 PROCEDURE — 36415 COLL VENOUS BLD VENIPUNCTURE: CPT | Mod: ,,, | Performed by: INTERNAL MEDICINE

## 2023-05-11 PROCEDURE — 36415 PR COLLECTION VENOUS BLOOD,VENIPUNCTURE: ICD-10-PCS | Mod: ,,, | Performed by: INTERNAL MEDICINE

## 2023-05-11 PROCEDURE — 99214 OFFICE O/P EST MOD 30 MIN: CPT | Mod: ,,, | Performed by: INTERNAL MEDICINE

## 2023-05-11 PROCEDURE — 83036 HEMOGLOBIN GLYCOSYLATED A1C: CPT | Performed by: INTERNAL MEDICINE

## 2023-05-11 PROCEDURE — 99214 PR OFFICE/OUTPT VISIT, EST, LEVL IV, 30-39 MIN: ICD-10-PCS | Mod: ,,, | Performed by: INTERNAL MEDICINE

## 2023-05-11 PROCEDURE — 80061 LIPID PANEL: CPT | Performed by: INTERNAL MEDICINE

## 2023-05-11 PROCEDURE — 36415 COLL VENOUS BLD VENIPUNCTURE: CPT | Performed by: INTERNAL MEDICINE

## 2023-05-11 RX ORDER — NEOMYCIN SULFATE, POLYMYXIN B SULFATE AND HYDROCORTISONE 10; 3.5; 1 MG/ML; MG/ML; [USP'U]/ML
3 SUSPENSION/ DROPS AURICULAR (OTIC) 4 TIMES DAILY
Qty: 10 ML | Refills: 2 | Status: SHIPPED | OUTPATIENT
Start: 2023-05-11 | End: 2023-11-16 | Stop reason: ALTCHOICE

## 2023-05-11 NOTE — PROGRESS NOTES
Chio Villela MD   [id number] [id number]     Patient ID: [id number]     Chief Complaint: Diabetes and Hypertension        HPI:     River Sheehan Jr. is a 77 y.o. male here today for a follow up of HTN, CKD, DM, Depression, Gout and DJD. 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 He hasn't been to any other doctors. He did pop the bicep tendon on the right doing the work.       Subjective:     Review of Systems   HENT:          Blood came from right ear, he used qtip last night.    Respiratory: Negative.     Cardiovascular: Negative.    Gastrointestinal: Negative.    Musculoskeletal:         Didn't go to ER when the bicep tendon popped, it matches the left arm now so he knew they would not do anything.    Skin:         Plenty of abrasions with his construction and bruised the right 2nd nail.   Neurological:  Positive for dizziness and tingling.        Tingling in fingers. He gets woken from it at night. It feels funny on steering wheel. He notes it's on both sides.    Psychiatric/Behavioral:  Negative for depression.         Staying busy he's been doing good.      Past Medical History:   Diagnosis Date    Acid reflux     Adenomatous polyp of ascending colon 09/20/2021    Arthritis     CAD (coronary artery disease)     COVID-19 07/06/2022    Depression     Diabetes mellitus     Gout     Hearing loss     High cholesterol     HTN (hypertension)     Kidney stone     Macrocytic anemia     Osteoporosis     Personal history of colonic polyps 09/20/2021    Dr. Chun Smith        Past Surgical History:   Procedure Laterality Date    COLONOSCOPY W/ BIOPSIES  09/20/2021    Dr. Chun Smith    CYSTOSCOPY      EXTRACAPSULAR EXTRACTION OF CATARACT      HERNIA REPAIR      LITHOTRIPSY      RETROGRADE PYELOGRAPHY         Family History   Problem Relation Age of Onset    Bladder Cancer Mother     Hypertension Sister     Asthma Sister     Diabetes Sister     Lung cancer Sister     Hypertension Brother   patient/staff    Diabetes Brother     Coronary artery disease Brother     Atrial fibrillation Brother     Esophageal cancer Brother     Kidney cancer Brother     Hypertension Brother     Coronary artery disease Brother     Diabetes Brother     Progressive Supranuclear Palsy Brother     Coronary artery disease Brother     Pancreatic cancer Brother     Colon polyps Brother     Heart murmur Brother         Social History     Socioeconomic History    Marital status:    Tobacco Use    Smoking status: Never    Smokeless tobacco: Never   Substance and Sexual Activity    Alcohol use: Yes     Alcohol/week: 1.0 standard drink     Types: 1 Cans of beer per week    Drug use: Never    Sexual activity: Yes     Social Determinants of Health     Financial Resource Strain: Low Risk     Difficulty of Paying Living Expenses: Not hard at all   Food Insecurity: No Food Insecurity    Worried About Running Out of Food in the Last Year: Never true    Ran Out of Food in the Last Year: Never true   Transportation Needs: No Transportation Needs    Lack of Transportation (Medical): No    Lack of Transportation (Non-Medical): No   Physical Activity: Sufficiently Active    Days of Exercise per Week: 5 days    Minutes of Exercise per Session: 30 min   Stress: No Stress Concern Present    Feeling of Stress : Not at all   Social Connections: Moderately Integrated    Frequency of Communication with Friends and Family: Three times a week    Frequency of Social Gatherings with Friends and Family: Twice a week    Attends Holiness Services: More than 4 times per year    Active Member of Clubs or Organizations: No    Attends Club or Organization Meetings: Never    Marital Status:    Housing Stability: Low Risk     Unable to Pay for Housing in the Last Year: No    Number of Places Lived in the Last Year: 1    Unstable Housing in the Last Year: No       Review of patient's allergies indicates:  No Known Allergies    Outpatient Medications Marked as Taking  for the 5/11/23 encounter (Office Visit) with Chio Villela MD   Medication Sig Dispense Refill    albuterol (PROAIR HFA) 90 mcg/actuation inhaler Inhale 2 puffs into the lungs every 6 (six) hours as needed for Wheezing. Rescue 8 g 0    allopurinoL (ZYLOPRIM) 100 MG tablet TAKE 1 TABLET EVERY DAY 90 tablet 1    amLODIPine (NORVASC) 10 MG tablet Take 10 mg by mouth once daily.      aspirin (ECOTRIN) 81 MG EC tablet Take 81 mg by mouth once daily.      atorvastatin (LIPITOR) 20 MG tablet TAKE 1 TABLET AT BEDTIME 90 tablet 1    b complex vitamins tablet Take 1 tablet by mouth once daily.      blood sugar diagnostic (TRUE METRIX GLUCOSE TEST STRIP) Strp 1 strip by Misc.(Non-Drug; Combo Route) route once daily. 150 strip 3    blood-glucose meter (TRUE METRIX AIR GLUCOSE METER) kit Test daily for DM II E11.9 1 each 0    busPIRone (BUSPAR) 10 MG tablet TAKE 1/2 TO 1 TABLET THREE TIMES DAILY 270 tablet 1    cholecalciferol, vitamin D3, (VITAMIN D3) 50 mcg (2,000 unit) Cap capsule Take 2,000 Units by mouth once daily.      citalopram (CELEXA) 20 MG tablet Take 1 tablet (20 mg total) by mouth once daily. 90 tablet 3    gabapentin (NEURONTIN) 100 MG capsule TAKE 2 CAPSULES (200 MG TOTAL) THREE TIMES DAILY 540 capsule 1    KRILL OIL ORAL Take by mouth once daily at 6am.      losartan (COZAAR) 50 MG tablet Take 50 mg by mouth once daily.      metFORMIN (GLUCOPHAGE) 500 MG tablet TAKE 1 TABLET (500 MG TOTAL) BY MOUTH 2 (TWO) TIMES DAILY WITH MEALS. 180 tablet 0    montelukast (SINGULAIR) 10 mg tablet TAKE 1 TABLET (10 MG TOTAL) BY MOUTH EVERY EVENING. 90 tablet 3    pantoprazole (PROTONIX) 40 MG tablet TAKE 1 TABLET EVERY DAY 90 tablet 0    tamsulosin (FLOMAX) 0.4 mg Cap Take 0.4 mg by mouth once daily.         Patient Care Team:  Chio Villela MD as PCP - General (Internal Medicine)  Jesus Hunter MD as Consulting Physician (Urology)  Geena Johnston MD as Consulting Physician (Nephrology)  Marlene Figueroa MD as  "Consulting Physician (Cardiovascular Disease)       Objective:     /69 (BP Location: Left arm, Patient Position: Sitting, BP Method: Large (Automatic))   Pulse 61   Temp 98.2 °F (36.8 °C) (Oral)   Resp 16   Ht 5' 8" (1.727 m)   Wt 78.9 kg (174 lb)   SpO2 98%   BMI 26.46 kg/m²     Physical Exam  Vitals reviewed.   Constitutional:       Appearance: He is not ill-appearing.   HENT:      Right Ear: Tympanic membrane and external ear normal.      Left Ear: Tympanic membrane, ear canal and external ear normal.      Ears:      Comments: Abrasion on the inferior right canal  Cardiovascular:      Rate and Rhythm: Normal rate and regular rhythm.      Heart sounds:     No gallop.   Pulmonary:      Effort: Pulmonary effort is normal.      Breath sounds: Normal breath sounds. No wheezing or rales.   Abdominal:      General: Bowel sounds are normal.      Palpations: Abdomen is soft.      Tenderness: There is no abdominal tenderness. There is no guarding.   Musculoskeletal:         General: Swelling and deformity present.      Comments: Right bicep is displaced.    Neurological:      Mental Status: He is alert and oriented to person, place, and time. Mental status is at baseline.   Psychiatric:         Mood and Affect: Mood normal.         Behavior: Behavior normal.         Thought Content: Thought content normal.         Judgment: Judgment normal.         Assessment/Problems:       ICD-10-CM ICD-9-CM   1. Primary hypertension  I10 401.9   2. Hypercholesterolemia  E78.00 272.0   3. Stage 2 chronic kidney disease  N18.2 585.2   4. Type 2 diabetes mellitus with stage 2 chronic kidney disease, without long-term current use of insulin  E11.22 250.40    N18.2 585.2   5. Acute hemorrhagic otitis externa of right ear  H60.321 380.10   6. Biceps tendon rupture, right, initial encounter  S46.211A 840.8        Plan:     1. Primary hypertension  Comments:  Good control  Overview:  last visit with EKG 08/12      2. " Hypercholesterolemia  Comments:  No food or cream today, will do  Orders:  -     Lipid Panel    3. Stage 2 chronic kidney disease  Comments:  Did lab yesterday for Dr Johnston    4. Type 2 diabetes mellitus with stage 2 chronic kidney disease, without long-term current use of insulin  Comments:  Good control recheck today  Orders:  -     Hemoglobin A1C    5. Acute hemorrhagic otitis externa of right ear  Comments:  Cortisporin, stop doing the Q-tips    6. Biceps tendon rupture, right, initial encounter  Comments:  Agree with his assesment they will not repair, work to keep strenght with exdercises    Other orders  -     neomycin-polymyxin-hydrocortisone (CORTISPORIN) 3.5-10,000-1 mg/mL-unit/mL-% otic suspension; Place 3 drops into the right ear 4 (four) times daily.  Dispense: 10 mL; Refill: 2             Follow up in about 6 months (around 11/11/2023) for Wellness. In addition to their scheduled follow up, the patient has also been instructed to follow up on as needed basis.     Signature:  Chio Villela MD  [facility]  [id number] [id number]

## 2023-05-15 LAB
ALBUMIN % SPEP (OHS): 49.86 (ref 48.1–59.5)
ALBUMIN SERPL BCP-MCNC: 3.6 G/DL
ALBUMIN/GLOB SERPL: 1 RATIO
ALPHA 1 GLOB (OHS): 0.27 GM/DL (ref 0–0.4)
ALPHA 1 GLOB% (OHS): 3.76 (ref 2.3–4.9)
ALPHA 2 GLOB % (OHS): 12.02 (ref 6.9–13)
ALPHA 2 GLOB (OHS): 0.87 GM/DL (ref 0.4–1)
BETA GLOB (OHS): 1.21 GM/DL (ref 0.7–1.3)
BETA GLOB% (OHS): 16.8 (ref 13.8–19.7)
GAMMA GLOBULIN % (OHS): 17.56 (ref 10.1–21.9)
GAMMA GLOBULIN (OHS): 1.26 GM/DL (ref 0.4–1.8)
GLOBULIN SER-MCNC: 3.6 GM/DL
M SPIKE % (OHS): NORMAL
M SPIKE (OHS): NORMAL
PROT SERPL-MCNC: 7.2 GM/DL (ref 5.8–7.6)

## 2023-05-25 RX ORDER — ATORVASTATIN CALCIUM 20 MG/1
TABLET, FILM COATED ORAL
Qty: 90 TABLET | Refills: 1 | Status: SHIPPED | OUTPATIENT
Start: 2023-05-25 | End: 2024-01-02 | Stop reason: SDUPTHER

## 2023-05-25 RX ORDER — ALLOPURINOL 100 MG/1
TABLET ORAL
Qty: 90 TABLET | Refills: 1 | Status: SHIPPED | OUTPATIENT
Start: 2023-05-25 | End: 2024-01-02 | Stop reason: SDUPTHER

## 2023-06-19 RX ORDER — METFORMIN HYDROCHLORIDE 500 MG/1
500 TABLET ORAL 2 TIMES DAILY WITH MEALS
Qty: 180 TABLET | Refills: 1 | Status: SHIPPED | OUTPATIENT
Start: 2023-06-19

## 2023-06-19 RX ORDER — BUSPIRONE HYDROCHLORIDE 10 MG/1
TABLET ORAL
Qty: 270 TABLET | Refills: 1 | Status: SHIPPED | OUTPATIENT
Start: 2023-06-19

## 2023-07-13 ENCOUNTER — TELEPHONE (OUTPATIENT)
Dept: PRIMARY CARE CLINIC | Facility: CLINIC | Age: 78
End: 2023-07-13
Payer: MEDICARE

## 2023-07-13 DIAGNOSIS — M25.519 SHOULDER PAIN, UNSPECIFIED CHRONICITY, UNSPECIFIED LATERALITY: Primary | ICD-10-CM

## 2023-07-13 NOTE — TELEPHONE ENCOUNTER
I put for Dr Fritz in [place], if they don't want to drive they can ask for [place] when they call to schedule.

## 2023-07-13 NOTE — TELEPHONE ENCOUNTER
----- Message from Karissa Baker sent at 7/13/2023  9:04 AM CDT -----  Regarding: call back  .Type:  Needs Medical Advice    Who Called: ermias - wife  Would the patient rather a call back or a response via [facility]? dalia  Best Call Back Number: [phone]  Additional Information: pt wife is requesting a call back she states her  needs a referral for his shoulder  and hands because of the pain pls call back

## 2023-07-13 NOTE — TELEPHONE ENCOUNTER
Spoke to Olinda, asked if patient can get referral for shoulder pain. States it has been going on and wants to look into injections

## 2023-07-26 ENCOUNTER — HOSPITAL ENCOUNTER (OUTPATIENT)
Dept: RADIOLOGY | Facility: HOSPITAL | Age: 78
Discharge: HOME | End: 2023-07-26
Attending: ORTHOPAEDIC SURGERY
Payer: MEDICARE

## 2023-07-26 ENCOUNTER — OFFICE VISIT (OUTPATIENT)
Dept: ORTHOPEDICS | Facility: CLINIC | Age: 78
End: 2023-07-26
Payer: MEDICARE

## 2023-07-26 VITALS — WEIGHT: 174 LBS | BODY MASS INDEX: 26.37 KG/M2 | HEIGHT: 68 IN

## 2023-07-26 DIAGNOSIS — M54.12 CERVICAL RADICULOPATHY: Primary | ICD-10-CM

## 2023-07-26 DIAGNOSIS — M75.42 SHOULDER IMPINGEMENT SYNDROME, LEFT: ICD-10-CM

## 2023-07-26 DIAGNOSIS — M25.519 SHOULDER PAIN, UNSPECIFIED CHRONICITY, UNSPECIFIED LATERALITY: ICD-10-CM

## 2023-07-26 DIAGNOSIS — M75.120 COMPLETE TEAR OF ROTATOR CUFF, UNSPECIFIED LATERALITY, UNSPECIFIED WHETHER TRAUMATIC: ICD-10-CM

## 2023-07-26 DIAGNOSIS — M25.512 BILATERAL SHOULDER PAIN, UNSPECIFIED CHRONICITY: ICD-10-CM

## 2023-07-26 DIAGNOSIS — M75.41 SHOULDER IMPINGEMENT SYNDROME, RIGHT: ICD-10-CM

## 2023-07-26 DIAGNOSIS — M25.511 BILATERAL SHOULDER PAIN, UNSPECIFIED CHRONICITY: ICD-10-CM

## 2023-07-26 PROCEDURE — 99204 PR OFFICE/OUTPT VISIT, NEW, LEVL IV, 45-59 MIN: ICD-10-PCS | Mod: ,,, | Performed by: ORTHOPAEDIC SURGERY

## 2023-07-26 PROCEDURE — 73030 X-RAY EXAM OF SHOULDER: CPT | Mod: TC,LT

## 2023-07-26 PROCEDURE — 73030 X-RAY EXAM OF SHOULDER: CPT | Mod: TC,RT

## 2023-07-26 PROCEDURE — 99204 OFFICE O/P NEW MOD 45 MIN: CPT | Mod: ,,, | Performed by: ORTHOPAEDIC SURGERY

## 2023-07-26 NOTE — PROGRESS NOTES
"Subjective:    CC: Pain of the Right Shoulder, Pain of the Left Shoulder, and Pain (bilateral shoulder pain - left is worse - pt states that his arms tingle and his fingers start hurting. he states that he has been doing heavy work and his pain has gotten worse. takes gabapentin with dosage increase with some relief. )       HPI:  Patient comes in today for his 1st visit.  Patient complains of bilateral shoulder pain mainly left shoulder pain.  His main complaint is the numbness and tingling shooting down from his shoulder into his fingers.  States he does have a history of injuries, multiple injections in his shoulders back in the 80s.  Denies any specific neck pain, though he states occasionally it will become stiff.  He has tried conservative treatment without relief.    ROS: Refer to HPI for pertinent ROS. All other 12 point systems negative.    Objective:  Vitals:    07/26/23 0943   Weight: 78.9 kg (174 lb)   Height: 5' 8" (1.727 m)        Physical Exam:  Patient is well-nourished and well-developed, in no apparent distress, pleasant and cooperative. Examination of the left upper extremity compartments are soft and warm.  Skin is intact. There are no signs or symptoms of DVT or infection.   Patient is tender to palpation along the  lateral aspect .  Patient is able to forward flex and abduct to 150.  Positive Rodrigues and Neers, mildly positive empty can, negative drop arm test. Negative sulcus sign. Stable to stressing. Neurovascularly intact distally.   Patient is tender to palpation along the  lateral aspect .  Patient is able to forward flex and abduct to 160.  Mildly positive Rodrigues and Neers, negative empty can, negative drop arm test. Negative sulcus sign. Stable to stressing. Neurovascularly intact distally.  Images:  X-rays three views of the left shoulder and right shoulder demonstrates elevated humeral head, underlying subacromial impingement, suspected chronic rotator cuff tear on the right, as well " as left. Images Reviewed and discussed with patient.    Assessment:  1. Bilateral shoulder pain, unspecified chronicity  - X-Ray Shoulder 2 or More Views Left; Future  - X-ray Shoulder 2 or More Views Right; Future    2. Shoulder pain, unspecified chronicity, unspecified laterality  - Ambulatory referral/consult to Orthopedics    3. Complete tear of rotator cuff, unspecified laterality, unspecified whether traumatic    4. Shoulder impingement syndrome, left    5. Shoulder impingement syndrome, right    6. Cervical radiculopathy  - Ambulatory referral/consult to Neurology; Future        Plan:  At this time we discussed his physical exam and x-ray findings.  Patient does have arthritic changes, severe subacromial impingement, longstanding rotator cuff tears.  He has appropriate motion otherwise, I believe he has compensated over the years very well.  His main complaint is the shooting pains mainly in the left upper extremity down into his fingers.  I would likely suspect this is coming from his cervical spine.  We have also discussed other contributions.  We will proceed with a nerve conduction study of the bilateral upper extremities.  I would like see him back in a couple of weeks with his results.    Follow UP: No follow-ups on file.

## 2023-08-06 RX ORDER — CITALOPRAM 20 MG/1
20 TABLET, FILM COATED ORAL
Qty: 90 TABLET | Refills: 3 | Status: SHIPPED | OUTPATIENT
Start: 2023-08-06

## 2023-08-06 RX ORDER — PANTOPRAZOLE SODIUM 40 MG/1
TABLET, DELAYED RELEASE ORAL
Qty: 90 TABLET | Refills: 1 | Status: SHIPPED | OUTPATIENT
Start: 2023-08-06 | End: 2024-03-17 | Stop reason: SDUPTHER

## 2023-08-09 ENCOUNTER — LAB VISIT (OUTPATIENT)
Dept: LAB | Facility: HOSPITAL | Age: 78
End: 2023-08-09
Attending: INTERNAL MEDICINE
Payer: MEDICARE

## 2023-08-09 ENCOUNTER — OFFICE VISIT (OUTPATIENT)
Dept: ORTHOPEDICS | Facility: CLINIC | Age: 78
End: 2023-08-09
Payer: MEDICARE

## 2023-08-09 VITALS — HEIGHT: 68 IN | WEIGHT: 174 LBS | BODY MASS INDEX: 26.37 KG/M2

## 2023-08-09 DIAGNOSIS — N18.2 CHRONIC KIDNEY DISEASE, STAGE II (MILD): Primary | ICD-10-CM

## 2023-08-09 DIAGNOSIS — E83.52 HYPERCALCEMIA: ICD-10-CM

## 2023-08-09 DIAGNOSIS — G56.03 CARPAL TUNNEL SYNDROME, BILATERAL: Primary | ICD-10-CM

## 2023-08-09 LAB
ALBUMIN SERPL-MCNC: 4.1 G/DL (ref 3.4–4.8)
ALBUMIN/GLOB SERPL: 1.6 RATIO (ref 1.1–2)
ALP SERPL-CCNC: 84 UNIT/L (ref 40–150)
ALT SERPL-CCNC: 19 UNIT/L (ref 0–55)
APPEARANCE UR: CLEAR
AST SERPL-CCNC: 16 UNIT/L (ref 5–34)
BACTERIA #/AREA URNS AUTO: NORMAL /HPF
BASOPHILS # BLD AUTO: 0.01 X10(3)/MCL
BASOPHILS NFR BLD AUTO: 0.1 %
BILIRUB SERPL-MCNC: 0.5 MG/DL
BILIRUB UR QL STRIP.AUTO: NEGATIVE
BUN SERPL-MCNC: 17.7 MG/DL (ref 8.4–25.7)
CALCIUM SERPL-MCNC: 9.8 MG/DL (ref 8.8–10)
CHLORIDE SERPL-SCNC: 102 MMOL/L (ref 98–107)
CO2 SERPL-SCNC: 30 MMOL/L (ref 23–31)
COLOR UR: YELLOW
CREAT SERPL-MCNC: 0.81 MG/DL (ref 0.73–1.18)
CREAT UR-MCNC: 164.4 MG/DL (ref 63–166)
EOSINOPHIL # BLD AUTO: 0.21 X10(3)/MCL (ref 0–0.9)
EOSINOPHIL NFR BLD AUTO: 2.9 %
ERYTHROCYTE [DISTWIDTH] IN BLOOD BY AUTOMATED COUNT: 12.6 % (ref 11.5–17)
GFR SERPLBLD CREATININE-BSD FMLA CKD-EPI: >60 MLS/MIN/1.73/M2
GLOBULIN SER-MCNC: 2.5 GM/DL (ref 2.4–3.5)
GLUCOSE SERPL-MCNC: 113 MG/DL (ref 82–115)
GLUCOSE UR QL STRIP.AUTO: NEGATIVE
HCT VFR BLD AUTO: 41.4 % (ref 42–52)
HGB BLD-MCNC: 13.2 G/DL (ref 14–18)
IMM GRANULOCYTES # BLD AUTO: 0 X10(3)/MCL (ref 0–0.04)
IMM GRANULOCYTES NFR BLD AUTO: 0 %
KETONES UR QL STRIP.AUTO: NEGATIVE
LEUKOCYTE ESTERASE UR QL STRIP.AUTO: NEGATIVE
LYMPHOCYTES # BLD AUTO: 2.49 X10(3)/MCL (ref 0.6–4.6)
LYMPHOCYTES NFR BLD AUTO: 34.9 %
MCH RBC QN AUTO: 30.1 PG (ref 27–31)
MCHC RBC AUTO-ENTMCNC: 31.9 G/DL (ref 33–36)
MCV RBC AUTO: 94.5 FL (ref 80–94)
MONOCYTES # BLD AUTO: 0.72 X10(3)/MCL (ref 0.1–1.3)
MONOCYTES NFR BLD AUTO: 10.1 %
NEUTROPHILS # BLD AUTO: 3.7 X10(3)/MCL (ref 2.1–9.2)
NEUTROPHILS NFR BLD AUTO: 52 %
NITRITE UR QL STRIP.AUTO: NEGATIVE
PH UR STRIP.AUTO: 7 [PH]
PLATELET # BLD AUTO: 249 X10(3)/MCL (ref 130–400)
PMV BLD AUTO: 8.2 FL (ref 7.4–10.4)
POTASSIUM SERPL-SCNC: 4.6 MMOL/L (ref 3.5–5.1)
PROT SERPL-MCNC: 6.6 GM/DL (ref 5.8–7.6)
PROT UR QL STRIP.AUTO: NEGATIVE
PROT UR STRIP-MCNC: 12.2 MG/DL
PTH-INTACT SERPL-MCNC: 65.3 PG/ML (ref 8.7–77)
RBC # BLD AUTO: 4.38 X10(6)/MCL (ref 4.7–6.1)
RBC #/AREA URNS AUTO: NORMAL /HPF
RBC UR QL AUTO: ABNORMAL
SODIUM SERPL-SCNC: 139 MMOL/L (ref 136–145)
SP GR UR STRIP.AUTO: 1.02
SQUAMOUS #/AREA URNS AUTO: NORMAL /HPF
URINE PROTEIN/CREATININE RATIO (OHS): 0.1
UROBILINOGEN UR STRIP-ACNC: 1
WBC # SPEC AUTO: 7.13 X10(3)/MCL (ref 4.5–11.5)
WBC #/AREA URNS AUTO: NORMAL /HPF

## 2023-08-09 PROCEDURE — 81001 URINALYSIS AUTO W/SCOPE: CPT

## 2023-08-09 PROCEDURE — 36415 COLL VENOUS BLD VENIPUNCTURE: CPT

## 2023-08-09 PROCEDURE — 83970 ASSAY OF PARATHORMONE: CPT

## 2023-08-09 PROCEDURE — 85025 COMPLETE CBC W/AUTO DIFF WBC: CPT

## 2023-08-09 PROCEDURE — 80053 COMPREHEN METABOLIC PANEL: CPT

## 2023-08-09 PROCEDURE — 99213 PR OFFICE/OUTPT VISIT, EST, LEVL III, 20-29 MIN: ICD-10-PCS | Mod: ,,, | Performed by: ORTHOPAEDIC SURGERY

## 2023-08-09 PROCEDURE — 99213 OFFICE O/P EST LOW 20 MIN: CPT | Mod: ,,, | Performed by: ORTHOPAEDIC SURGERY

## 2023-08-09 PROCEDURE — 82570 ASSAY OF URINE CREATININE: CPT

## 2023-08-09 NOTE — PROGRESS NOTES
"Subjective:    CC: Results of the Right Hand, Results of the Left Hand, and Results (bilateral upper ext ncs - pt states this right hand is not as painful since not doing strenous things, but his left hand has a consitant pain)       HPI:  Patient returns today for repeat exam.  Patient had a nerve conduction study of the bilateral upper extremities.  He states his left is worse than the right, he continues to have numbness and tingling.  Been very active lately.  He did have a nerve conduction study, we have discussed his results.    ROS: Refer to HPI for pertinent ROS. All other 12 point systems negative.    Objective:  Vitals:    08/09/23 1004   Weight: 78.9 kg (174 lb)   Height: 5' 8" (1.727 m)        Physical Exam:  Bilateral upper extremities compartment soft and warm.  Skin is intact.  There is no signs symptoms of DVT or infection.  Does have numbness and tingling throughout the left and right hand positive Tinel and Phalen's on the left questionable on the right.  He is able to make a full fist has full extension, neurovascular intact distally.    Images: . Images Reviewed and discussed with patient.    Assessment:  1. Carpal tunnel syndrome, bilateral        Plan:  At this time we discussed his physical exam and nerve conduction findings.  We have discussed at length the pros and cons to additional conservative treatments well surgical intervention.  We have discussed his severe carpal tunnel.  He would like to hold off on surgery, we have discussed some night splints, hand exercises.  Patient states he will call or return sooner if there is any new problems or difficulties or we would like to proceed with surgical intervention.    Follow UP: No follow-ups on file.              "

## 2023-08-14 ENCOUNTER — TELEPHONE (OUTPATIENT)
Dept: PRIMARY CARE CLINIC | Facility: CLINIC | Age: 78
End: 2023-08-14
Payer: MEDICARE

## 2023-08-14 NOTE — TELEPHONE ENCOUNTER
Yes, he'd have to hold his atorvastatin while he's on it. I sent the renal dose, it says [facility] in [place] has it, but call before to be sure that's accurate, they had not reported regular doses in last 2 weeks.

## 2023-08-14 NOTE — TELEPHONE ENCOUNTER
----- Message from April Stewart sent at 8/14/2023  9:17 AM CDT -----  Regarding: med advice  .Type:  Needs Medical Advice    Who Called: patient's wife  Symptoms (please be specific):    How long has patient had these symptoms:    Pharmacy name and phone #:    Would the patient rather a call back or a response via [facility]? Call back  Best Call Back Number:  [phone]  Additional Information: patient's wife would like to know if patient is a good candidate for paxlovid due to testing positive with covid. Please advise.

## 2023-09-04 RX ORDER — MONTELUKAST SODIUM 10 MG/1
10 TABLET ORAL NIGHTLY
Qty: 90 TABLET | Refills: 3 | Status: SHIPPED | OUTPATIENT
Start: 2023-09-04

## 2023-09-11 RX ORDER — GABAPENTIN 100 MG/1
CAPSULE ORAL
Qty: 540 CAPSULE | Refills: 1 | Status: SHIPPED | OUTPATIENT
Start: 2023-09-11

## 2023-11-08 ENCOUNTER — LAB VISIT (OUTPATIENT)
Dept: LAB | Facility: HOSPITAL | Age: 78
End: 2023-11-08
Attending: INTERNAL MEDICINE
Payer: MEDICARE

## 2023-11-08 DIAGNOSIS — N18.2 CHRONIC KIDNEY DISEASE, STAGE II (MILD): Primary | ICD-10-CM

## 2023-11-08 LAB
ALBUMIN SERPL-MCNC: 3.9 G/DL (ref 3.4–4.8)
ALBUMIN/GLOB SERPL: 1.4 RATIO (ref 1.1–2)
ALP SERPL-CCNC: 80 UNIT/L (ref 40–150)
ALT SERPL-CCNC: 19 UNIT/L (ref 0–55)
APPEARANCE UR: CLEAR
AST SERPL-CCNC: 20 UNIT/L (ref 5–34)
BACTERIA #/AREA URNS AUTO: NORMAL /HPF
BASOPHILS # BLD AUTO: 0.02 X10(3)/MCL
BASOPHILS NFR BLD AUTO: 0.3 %
BILIRUB SERPL-MCNC: 0.7 MG/DL
BILIRUB UR QL STRIP.AUTO: NEGATIVE
BUN SERPL-MCNC: 19.5 MG/DL (ref 8.4–25.7)
CALCIUM SERPL-MCNC: 9.8 MG/DL (ref 8.8–10)
CHLORIDE SERPL-SCNC: 102 MMOL/L (ref 98–107)
CO2 SERPL-SCNC: 29 MMOL/L (ref 23–31)
COLOR UR AUTO: YELLOW
CREAT SERPL-MCNC: 0.78 MG/DL (ref 0.73–1.18)
CREAT UR-MCNC: 157.7 MG/DL (ref 63–166)
EOSINOPHIL # BLD AUTO: 0.22 X10(3)/MCL (ref 0–0.9)
EOSINOPHIL NFR BLD AUTO: 3.3 %
ERYTHROCYTE [DISTWIDTH] IN BLOOD BY AUTOMATED COUNT: 13.2 % (ref 11.5–17)
GFR SERPLBLD CREATININE-BSD FMLA CKD-EPI: >60 MLS/MIN/1.73/M2
GLOBULIN SER-MCNC: 2.7 GM/DL (ref 2.4–3.5)
GLUCOSE SERPL-MCNC: 115 MG/DL (ref 82–115)
GLUCOSE UR QL STRIP.AUTO: NEGATIVE
HCT VFR BLD AUTO: 40.6 % (ref 42–52)
HGB BLD-MCNC: 12.9 G/DL (ref 14–18)
IMM GRANULOCYTES # BLD AUTO: 0.01 X10(3)/MCL (ref 0–0.04)
IMM GRANULOCYTES NFR BLD AUTO: 0.2 %
KETONES UR QL STRIP.AUTO: NEGATIVE
LEUKOCYTE ESTERASE UR QL STRIP.AUTO: NEGATIVE
LYMPHOCYTES # BLD AUTO: 2.11 X10(3)/MCL (ref 0.6–4.6)
LYMPHOCYTES NFR BLD AUTO: 32.1 %
MCH RBC QN AUTO: 30.3 PG (ref 27–31)
MCHC RBC AUTO-ENTMCNC: 31.8 G/DL (ref 33–36)
MCV RBC AUTO: 95.3 FL (ref 80–94)
MONOCYTES # BLD AUTO: 0.61 X10(3)/MCL (ref 0.1–1.3)
MONOCYTES NFR BLD AUTO: 9.3 %
NEUTROPHILS # BLD AUTO: 3.61 X10(3)/MCL (ref 2.1–9.2)
NEUTROPHILS NFR BLD AUTO: 54.8 %
NITRITE UR QL STRIP.AUTO: NEGATIVE
PH UR STRIP.AUTO: 6.5 [PH]
PLATELET # BLD AUTO: 249 X10(3)/MCL (ref 130–400)
PMV BLD AUTO: 8.1 FL (ref 7.4–10.4)
POTASSIUM SERPL-SCNC: 4.4 MMOL/L (ref 3.5–5.1)
PROT SERPL-MCNC: 6.6 GM/DL (ref 5.8–7.6)
PROT UR QL STRIP.AUTO: NEGATIVE
PROT UR STRIP-MCNC: 11.6 MG/DL
PTH-INTACT SERPL-MCNC: 54.1 PG/ML (ref 8.7–77)
RBC # BLD AUTO: 4.26 X10(6)/MCL (ref 4.7–6.1)
RBC #/AREA URNS AUTO: NORMAL /HPF
RBC UR QL AUTO: NEGATIVE
SODIUM SERPL-SCNC: 138 MMOL/L (ref 136–145)
SP GR UR STRIP.AUTO: 1.02 (ref 1–1.03)
SQUAMOUS #/AREA URNS AUTO: NORMAL /HPF
URINE PROTEIN/CREATININE RATIO (OHS): 0.1
UROBILINOGEN UR STRIP-ACNC: 1
WBC # SPEC AUTO: 6.58 X10(3)/MCL (ref 4.5–11.5)
WBC #/AREA URNS AUTO: NORMAL /HPF

## 2023-11-08 PROCEDURE — 83970 ASSAY OF PARATHORMONE: CPT

## 2023-11-08 PROCEDURE — 85025 COMPLETE CBC W/AUTO DIFF WBC: CPT

## 2023-11-08 PROCEDURE — 82570 ASSAY OF URINE CREATININE: CPT

## 2023-11-08 PROCEDURE — 36415 COLL VENOUS BLD VENIPUNCTURE: CPT

## 2023-11-08 PROCEDURE — 80053 COMPREHEN METABOLIC PANEL: CPT

## 2023-11-08 PROCEDURE — 81001 URINALYSIS AUTO W/SCOPE: CPT

## 2023-11-09 ENCOUNTER — TELEPHONE (OUTPATIENT)
Dept: PRIMARY CARE CLINIC | Facility: CLINIC | Age: 78
End: 2023-11-09
Payer: MEDICARE

## 2023-11-09 NOTE — TELEPHONE ENCOUNTER
Tried to confirm appt   NA, VM to return call to clinic if appt time no longer works.    QUALITY-DO NOT ASK PATIENT  -PNEUMONIA  PREVANR 13:10/5/2  PREVNAR 20:-  PNEUMOVAX 23:11/3/14    -COLONOSCOPY:9/20/21    -MAMMOGRAM:-    -CERVICAL SCREEN/PAP SMEAR:-    -BONE DENSITY:-    -DIABETES SCREEN  A1C:5/11/23  MICROALBUMIN UA:11/10/22  EYE EXAM:6/2/22  FOOT EXAM:-

## 2023-11-15 PROBLEM — M15.0 PRIMARY OSTEOARTHRITIS INVOLVING MULTIPLE JOINTS: Status: ACTIVE | Noted: 2022-06-30

## 2023-11-15 PROBLEM — M15.9 PRIMARY OSTEOARTHRITIS INVOLVING MULTIPLE JOINTS: Status: ACTIVE | Noted: 2022-06-30

## 2023-11-15 PROBLEM — F33.41 RECURRENT MAJOR DEPRESSIVE DISORDER, IN PARTIAL REMISSION: Status: ACTIVE | Noted: 2022-06-30

## 2023-11-15 RX ORDER — OXYBUTYNIN CHLORIDE 5 MG/1
1 TABLET, EXTENDED RELEASE ORAL EVERY MORNING
COMMUNITY
Start: 2023-09-28 | End: 2024-09-27

## 2023-11-15 NOTE — PROGRESS NOTES
Patient ID: [id number]     Chief Complaint: Medicare AWV      HPI:     River Sheehan Jr. is a 78 y.o. male here today for a Medicare Wellness. Since last here he had a stone in September and did cystoscopy with Dr Berrios. He also saw Dr Fritz for carpal tunnel disease. He did lab for Dr Johnston but no A1C was drawn.      Opioid Screening: Patient medication list reviewed, patient is not taking prescription opioids. Patient is not using additional opioids than prescribed. Patient is at low risk of substance abuse based on this opioid use history.       Past Medical History:   Diagnosis Date    Acid reflux     Adenomatous polyp of ascending colon 09/20/2021    Arthritis     CAD (coronary artery disease)     COVID-19 07/06/2022    Depression     Diabetes mellitus     Gout     Hearing loss     High cholesterol     HTN (hypertension)     Kidney stone     Macrocytic anemia     Osteoporosis     Personal history of colonic polyps 09/20/2021    Dr. Chun Smith        Past Surgical History:   Procedure Laterality Date    COLONOSCOPY W/ BIOPSIES  09/20/2021    Dr. Chun Smith    CYSTOSCOPY      EXTRACAPSULAR EXTRACTION OF CATARACT      HERNIA REPAIR      LITHOTRIPSY      RETROGRADE PYELOGRAPHY         Review of patient's allergies indicates:  No Known Allergies    Outpatient Medications Marked as Taking for the 11/16/23 encounter (Office Visit) with Chio Villela MD   Medication Sig Dispense Refill    albuterol (PROAIR HFA) 90 mcg/actuation inhaler Inhale 2 puffs into the lungs every 6 (six) hours as needed for Wheezing. Rescue 8 g 0    allopurinoL (ZYLOPRIM) 100 MG tablet TAKE 1 TABLET EVERY DAY 90 tablet 1    amLODIPine (NORVASC) 10 MG tablet Take 10 mg by mouth once daily.      aspirin (ECOTRIN) 81 MG EC tablet Take 81 mg by mouth once daily.      atorvastatin (LIPITOR) 20 MG tablet TAKE 1 TABLET AT BEDTIME 90 tablet 1    b complex vitamins tablet Take 1 tablet by mouth once daily.      blood sugar  diagnostic (TRUE METRIX GLUCOSE TEST STRIP) Strp 1 strip by Misc.(Non-Drug; Combo Route) route once daily. 150 strip 3    busPIRone (BUSPAR) 10 MG tablet TAKE 1/2 TO 1 TABLET THREE TIMES DAILY 270 tablet 1    cholecalciferol, vitamin D3, (VITAMIN D3) 50 mcg (2,000 unit) Cap capsule Take 2,000 Units by mouth once daily.      citalopram (CELEXA) 20 MG tablet TAKE 1 TABLET EVERY DAY 90 tablet 3    gabapentin (NEURONTIN) 100 MG capsule TAKE 2 CAPSULES (200 MG TOTAL) THREE TIMES DAILY 540 capsule 1    KRILL OIL ORAL Take by mouth once daily at 6am.      losartan (COZAAR) 50 MG tablet Take 50 mg by mouth once daily.      metFORMIN (GLUCOPHAGE) 500 MG tablet Take 1 tablet (500 mg total) by mouth 2 (two) times daily with meals. 180 tablet 1    montelukast (SINGULAIR) 10 mg tablet TAKE 1 TABLET EVERY EVENING 90 tablet 3    oxybutynin (DITROPAN-XL) 5 MG TR24 Take 1 tablet by mouth every morning.      pantoprazole (PROTONIX) 40 MG tablet TAKE 1 TABLET EVERY DAY 90 tablet 1    tamsulosin (FLOMAX) 0.4 mg Cap Take 0.4 mg by mouth once daily.         Social History     Socioeconomic History    Marital status:    Tobacco Use    Smoking status: Never    Smokeless tobacco: Never   Substance and Sexual Activity    Alcohol use: Yes     Alcohol/week: 1.0 standard drink of alcohol     Types: 1 Cans of beer per week    Drug use: Never    Sexual activity: Yes     Social Determinants of Health     Financial Resource Strain: Low Risk  (5/11/2023)    Overall Financial Resource Strain (CARDIA)     Difficulty of Paying Living Expenses: Not hard at all   Food Insecurity: No Food Insecurity (5/11/2023)    Hunger Vital Sign     Worried About Running Out of Food in the Last Year: Never true     Ran Out of Food in the Last Year: Never true   Transportation Needs: No Transportation Needs (5/11/2023)    PRAPARE - Transportation     Lack of Transportation (Medical): No     Lack of Transportation (Non-Medical): No   Physical Activity:  Sufficiently Active (2023)    Exercise Vital Sign     Days of Exercise per Week: 5 days     Minutes of Exercise per Session: 30 min   Stress: No Stress Concern Present (2023)    [employer] - Occupational Stress Questionnaire     Feeling of Stress : Not at all   Social Connections: Moderately Integrated (2023)    Social Connection and Isolation Panel [NHANES]     Frequency of Communication with Friends and Family: Three times a week     Frequency of Social Gatherings with Friends and Family: Twice a week     Attends Yazidism Services: More than 4 times per year     Active Member of Clubs or Organizations: No     Attends Club or Organization Meetings: Never     Marital Status:    Housing Stability: Low Risk  (2023)    Housing Stability Vital Sign     Unable to Pay for Housing in the Last Year: No     Number of Places Lived in the Last Year: 1     Unstable Housing in the Last Year: No        Family History   Problem Relation Age of Onset    Bladder Cancer Mother     Hypertension Sister     Asthma Sister     Diabetes Sister     Lung cancer Sister     Hypertension Brother     Diabetes Brother     Coronary artery disease Brother     Atrial fibrillation Brother     Esophageal cancer Brother     Kidney cancer Brother     Hypertension Brother     Coronary artery disease Brother     Diabetes Brother     Progressive Supranuclear Palsy Brother     Coronary artery disease Brother     Pancreatic cancer Brother     Colon polyps Brother     Heart murmur Brother         Patient Care Team:  Chio Villela MD as PCP - General (Internal Medicine)  Geena Johnston MD as Consulting Physician (Nephrology)  Marlene Figueroa MD as Consulting Physician (Cardiovascular Disease)  Jamin Berrios MD as Consulting Physician (Urology)       Subjective:     Review of Systems   Constitutional:         He had COVID two weeks before Gemma . Not using the treadmill as he  moved it into the garage and ended up building a cat cage for his daughter's cats. She came to help and hasn't left yet.    HENT: Negative.     Eyes:  Positive for blurred vision.        Goes once a year   Respiratory: Negative.     Cardiovascular: Negative.         He missed his heart doctor,    Gastrointestinal: Negative.    Genitourinary:         Stone had to be broken up. He did 24 hour urine for Agustina and was told to limit salt to 2000mg   Musculoskeletal:         His wrist and hand pain is doing better. He was diagnosed with CTS but he just watches what he does and it's ok. He has been aching   Skin:  Positive for rash.        Rash on the nose and eyebrows   Endo/Heme/Allergies:  Does not bruise/bleed easily.   Psychiatric/Behavioral:          Mood has been doing fair after loss of daughter, still concerned with wife's handling it.         Patient Reported Health Risk Assessment  What [age] 70-79  Are you male or female?: Male  During the past four weeks, how much have you been bothered by emotional problems such as feeling anxious, depressed, irritable, sad, or downhearted and blue?: Slightly  During the past five weeks, has your physical and/or emotional health limited your social activities with family, friends, neighbors, or groups?: Not at all  During the past four weeks, how much bodily pain have you generally had?: No pain  During the past four weeks, was someone available to help if you needed and wanted help?: No, not at all  During the past four weeks, what was the hardest physical activity you could do for at least two minutes?: Light  Can you get to places out of walking distance without help?  (For example, can you travel alone on buses or taxis, or drive your own car?): Yes  Can you go shopping for groceries or clothes without someone's help?: Yes  Can you prepare your own meals?: Yes  Can you do your own housework without help?: Yes  Because of any health problems, do you need the  "help of another person with your personal care needs such as eating, bathing, dressing, or getting around the house?: No  Can you handle your own money without help?: Yes  During the past four weeks, how would you rate your health in general?: Good  How have things been going for you during the past four weeks?: Pretty well  Are you having difficulties driving your car?: No  Do you always fasten your seat belt when you are in a car?: Yes, usually  How often in the past four weeks have you been bothered by falling or dizzy when standing up?: Never  How often in the past four weeks have you been bothered by sexual problems?: Never  How often in the past four weeks have you been bothered by trouble eating well?: Never  How often in the past four weeks have you been bothered by teeth or denture problems?: Never  How often in the past four weeks have you been bothered with problems using the telephone?: Never  How often in the past four weeks have you been bothered by tiredness or fatigue?: Sometimes  Have you fallen two or more times in the past year?: No  Are you afraid of falling?: No  Are you a smoker?: No  During the past four weeks, how many drinks of wine, beer, or other alcoholic beverages did you have?: One drink or less per week  Do you exercise for about 20 minutes three or more days a week?: Yes, some of the time  Have you been given any information to help you with hazards in your house that might hurt you?: No  Have you been given any information to help you with keeping track of your medications?: No  How often do you have trouble taking medicines the way you've been told to take them?: I always take them as prescribed  How confident are you that you can control and manage most of your health problems?: Very confident  What is your race? (Check all that apply.):     Objective:     /81   Pulse 60   Temp 97.1 °F (36.2 °C) (Oral)   Resp 16   Ht 5' 8" (1.727 m)   Wt 81.6 kg (180 lb)   SpO2 " 96%   BMI 27.37 kg/m²     Physical Exam  Vitals reviewed.   HENT:      Right Ear: Tympanic membrane, ear canal and external ear normal.      Left Ear: Tympanic membrane, ear canal and external ear normal.      Mouth/Throat:      Mouth: Mucous membranes are moist.      Pharynx: No oropharyngeal exudate or posterior oropharyngeal erythema.   Eyes:      General: No scleral icterus.     Extraocular Movements: Extraocular movements intact.      Conjunctiva/sclera: Conjunctivae normal.      Pupils: Pupils are equal, round, and reactive to light.   Cardiovascular:      Rate and Rhythm: Normal rate and regular rhythm.      Pulses:           Dorsalis pedis pulses are 3+ on the right side and 3+ on the left side.        Posterior tibial pulses are 2+ on the right side and 2+ on the left side.      Heart sounds: No murmur heard.  Pulmonary:      Effort: Pulmonary effort is normal.      Breath sounds: Normal breath sounds.   Abdominal:      General: Bowel sounds are normal.      Palpations: Abdomen is soft.      Tenderness: There is no abdominal tenderness. There is no guarding.   Musculoskeletal:      Right foot: Deformity present.      Left foot: Deformity present.   Feet:      Right foot:      Protective Sensation: 10 sites tested.  10 sites sensed.      Skin integrity: No callus.      Toenail Condition: Right toenails are normal.      Left foot:      Protective Sensation: 10 sites tested.  10 sites sensed.      Skin integrity: No callus.      Toenail Condition: Left toenails are normal.   Skin:     General: Skin is warm and dry.      Findings: Rash present.      Comments: Mild redness at brows   Neurological:      Mental Status: He is alert and oriented to person, place, and time. Mental status is at baseline.   Psychiatric:         Mood and Affect: Mood normal.         Behavior: Behavior normal.         Thought Content: Thought content normal.         Judgment: Judgment normal.                No data to display                   11/16/2023     9:00 AM 8/9/2023     9:45 AM 7/26/2023     9:30 AM 5/11/2023     9:00 AM 11/10/2022    10:00 AM 6/30/2022     9:00 AM   Fall Risk Assessment - Outpatient   Mobility Status Ambulatory Ambulatory Ambulatory Ambulatory Ambulatory    Number of falls 0 0 0 0 0 1   Identified as fall risk False False False False False True           Depression Screening  Over the past two weeks, has the patient felt down, depressed, or hopeless?: Yes  Over the past two weeks, has the patient felt little interest or pleasure in doing things?: No  Functional Ability/Safety Screening  Was the patient's timed Up & Go test unsteady or longer than 30 seconds?: No  Does the patient need help with phone, transportation, shopping, preparing meals, housework, laundry, meds, or managing money?: No  Does the patient's home have rugs in the hallway, lack grab bars in the bathroom, lack handrails on the stairs or have poor lighting?: No  Have you noticed any hearing difficulties?: Yes  Cognitive Function (Assessed through direct observation with due consideration of information obtained by way of patient reports and/or concerns raised by family, friends, caretakers, or others)    Does the patient repeat questions/statements in the same day?: No  Does the patient have trouble remembering the date, year, and time?: No  Does the patient have difficulty managing finances?: No  Does the patient have a decreased sense of direction?: No  Office Visit on 11/16/2023   Component Date Value    Hemoglobin A1C, POC 11/16/2023 6.4    Lab Visit on 11/08/2023   Component Date Value    Urine Protein Level 11/08/2023 11.6     Urine Creatinine 11/08/2023 157.7     Urine Protein/Creatinine* 11/08/2023 0.1     Color, UA 11/08/2023 Yellow     Appearance, UA 11/08/2023 Clear     Specific Gravity, UA 11/08/2023 1.020     pH, UA 11/08/2023 6.5     Protein, UA 11/08/2023 Negative     Glucose, UA 11/08/2023 Negative     Ketones, UA 11/08/2023 Negative      Blood, UA 11/08/2023 Negative     Bilirubin, UA 11/08/2023 Negative     Urobilinogen, UA 11/08/2023 1.0     Nitrites, UA 11/08/2023 Negative     Leukocyte Esterase, UA 11/08/2023 Negative     Sodium Level 11/08/2023 138     Potassium Level 11/08/2023 4.4     Chloride 11/08/2023 102     Carbon Dioxide 11/08/2023 29     Glucose Level 11/08/2023 115     Blood Urea Nitrogen 11/08/2023 19.5     Creatinine 11/08/2023 0.78     Calcium Level Total 11/08/2023 9.8     Protein Total 11/08/2023 6.6     Albumin Level 11/08/2023 3.9     Globulin 11/08/2023 2.7     Albumin/Globulin Ratio 11/08/2023 1.4     Bilirubin Total 11/08/2023 0.7     Alkaline Phosphatase 11/08/2023 80     Alanine Aminotransferase 11/08/2023 19     Aspartate Aminotransfera* 11/08/2023 20     eGFR 11/08/2023 >60     Parathyroid Hormone Inta* 11/08/2023 54.1     WBC 11/08/2023 6.58     RBC 11/08/2023 4.26 (L)     Hgb 11/08/2023 12.9 (L)     Hct 11/08/2023 40.6 (L)     MCV 11/08/2023 95.3 (H)     MCH 11/08/2023 30.3     MCHC 11/08/2023 31.8 (L)     RDW 11/08/2023 13.2     Platelet 11/08/2023 249     MPV 11/08/2023 8.1     Neut % 11/08/2023 54.8     Lymph % 11/08/2023 32.1     Mono % 11/08/2023 9.3     Eos % 11/08/2023 3.3     Basophil % 11/08/2023 0.3     Lymph # 11/08/2023 2.11     Neut # 11/08/2023 3.61     Mono # 11/08/2023 0.61     Eos # 11/08/2023 0.22     Baso # 11/08/2023 0.02     IG# 11/08/2023 0.01     IG% 11/08/2023 0.2     Bacteria, UA 11/08/2023 None Seen     RBC, UA 11/08/2023 None Seen     WBC, UA 11/08/2023 None Seen     Squamous Epithelial Cell* 11/08/2023 Rare        Assessment/Plan:     1. Medicare annual wellness visit, subsequent  -     Hemoglobin A1C, POCT    2. Type 2 diabetes mellitus with stage 2 chronic kidney disease, without long-term current use of insulin  Comments:  A1C today  Orders:  -     Hemoglobin A1C, POCT    3. Primary hypertension  Comments:  Doing good, contineu Norvasc 10mg and Losartan 50mg  Overview:  last visit with  EKG 08/12      4. Disorder of carotid artery  Comments:  He had his lipid 5/11/23 and remains on atorvastatin, did US will request    5. Recurrent major depressive disorder, in partial remission  Comments:  Stable with grief added, ok to limit use of Buspar if urology wants.    6. Idiopathic chronic gout of multiple sites without tophus  Comments:  Did lab for Johnston but not drawn will do when he comes next time    7. Primary osteoarthritis involving multiple joints  Overview:  shoulders      8. Hypochromic anemia  Comments:  Just did lab for Johnston, keep some iron in diet.    9. Eczema, unspecified type  Comments:  Let me know when need refill on ketoconazole. It's ok to mix it with hydrocortisone           Medicare Annual Wellness and Personalized Prevention Plan:   Fall Risk + Home Safety + Hearing Impairment + Depression Screen + Opioid and Substance Abuse Screening + Cognitive Impairment Screen + Health Risk Assessment all reviewed.     Health Maintenance Topics with due status: Not Due       Topic Last Completion Date    TETANUS VACCINE 08/17/2017    Lipid Panel 05/11/2023    Foot Exam 11/16/2023    Hemoglobin A1c 11/16/2023      The patient's Health Maintenance was reviewed and the following appears to be due at this time:   Health Maintenance Due   Topic Date Due    Hepatitis C Screening  Never done    RSV Vaccine (Age 60+) (1 - 1-dose 60+ series) Never done    Eye Exam  06/02/2023    Influenza Vaccine (1) 09/01/2023    COVID-19 Vaccine (6 - 2023-24 season) 09/01/2023    Diabetes Urine Screening  11/10/2023       Advance Care Planning   I attest to discussing Advance Care Planning with patient and/or family member.  Education was provided including the importance of the Health Care Power of , Advance Directives, and/or [place] documentation.  The patient expressed understanding to the importance of this information and discussion.   Advance Care Planning     Date: 11/15/2023    Living  Will  During this visit, I engaged the patient and family  in the voluntary advance care planning process.  The patient and I reviewed the role for advance directives and their purpose in directing future healthcare if the patient's unable to speak for him/herself.  At this point in time, the patient does have full decision-making capacity.  We discussed different extreme health states that he could experience, and reviewed what kind of medical care he would want in those situations.  The patient and family communicated that if he were comatose and had little chance of a meaningful recovery, he would want machines/life-sustaining treatments used. In addition to the above preference, other important end-of-life issues for the patient include  recent loss of daughter he doesn't really want to think about this at this tme .  I spent a total of 5 minutes engaging the patient in this advance care planning discussion.                 Follow up in about 6 months (around 5/16/2024) for Medication Managment. In addition to their scheduled follow up, the patient has also been instructed to follow up on as needed basis.

## 2023-11-16 ENCOUNTER — OFFICE VISIT (OUTPATIENT)
Dept: PRIMARY CARE CLINIC | Facility: CLINIC | Age: 78
End: 2023-11-16
Payer: MEDICARE

## 2023-11-16 VITALS
WEIGHT: 180 LBS | SYSTOLIC BLOOD PRESSURE: 135 MMHG | TEMPERATURE: 97 F | OXYGEN SATURATION: 96 % | HEIGHT: 68 IN | RESPIRATION RATE: 16 BRPM | HEART RATE: 60 BPM | DIASTOLIC BLOOD PRESSURE: 81 MMHG | BODY MASS INDEX: 27.28 KG/M2

## 2023-11-16 DIAGNOSIS — E11.22 TYPE 2 DIABETES MELLITUS WITH STAGE 2 CHRONIC KIDNEY DISEASE, WITHOUT LONG-TERM CURRENT USE OF INSULIN: ICD-10-CM

## 2023-11-16 DIAGNOSIS — M1A.09X0 IDIOPATHIC CHRONIC GOUT OF MULTIPLE SITES WITHOUT TOPHUS: ICD-10-CM

## 2023-11-16 DIAGNOSIS — I10 PRIMARY HYPERTENSION: ICD-10-CM

## 2023-11-16 DIAGNOSIS — Z00.00 MEDICARE ANNUAL WELLNESS VISIT, SUBSEQUENT: Primary | ICD-10-CM

## 2023-11-16 DIAGNOSIS — I77.9 DISORDER OF CAROTID ARTERY: ICD-10-CM

## 2023-11-16 DIAGNOSIS — F33.41 RECURRENT MAJOR DEPRESSIVE DISORDER, IN PARTIAL REMISSION: ICD-10-CM

## 2023-11-16 DIAGNOSIS — N18.2 TYPE 2 DIABETES MELLITUS WITH STAGE 2 CHRONIC KIDNEY DISEASE, WITHOUT LONG-TERM CURRENT USE OF INSULIN: ICD-10-CM

## 2023-11-16 DIAGNOSIS — D50.9 HYPOCHROMIC ANEMIA: ICD-10-CM

## 2023-11-16 DIAGNOSIS — M15.9 PRIMARY OSTEOARTHRITIS INVOLVING MULTIPLE JOINTS: ICD-10-CM

## 2023-11-16 DIAGNOSIS — L30.9 ECZEMA, UNSPECIFIED TYPE: ICD-10-CM

## 2023-11-16 PROBLEM — M81.0 OSTEOPOROSIS: Status: RESOLVED | Noted: 2022-06-30 | Resolved: 2023-11-16

## 2023-11-16 PROBLEM — E11.65 UNCONTROLLED TYPE 2 DIABETES MELLITUS WITH HYPERGLYCEMIA: Status: RESOLVED | Noted: 2022-11-14 | Resolved: 2023-11-16

## 2023-11-16 LAB — HBA1C MFR BLD: 6.4 %

## 2023-11-16 PROCEDURE — G0439 PR MEDICARE ANNUAL WELLNESS SUBSEQUENT VISIT: ICD-10-PCS | Mod: ,,, | Performed by: INTERNAL MEDICINE

## 2023-11-16 PROCEDURE — G0439 PPPS, SUBSEQ VISIT: HCPCS | Mod: ,,, | Performed by: INTERNAL MEDICINE

## 2023-11-16 PROCEDURE — 83036 HEMOGLOBIN GLYCOSYLATED A1C: CPT | Mod: QW,,, | Performed by: INTERNAL MEDICINE

## 2023-11-16 PROCEDURE — 83036 POCT HEMOGLOBIN A1C: ICD-10-PCS | Mod: QW,,, | Performed by: INTERNAL MEDICINE

## 2023-11-16 NOTE — PATIENT INSTRUCTIONS
Hi Alpha,     If you are due for any health screening(s) below please notify me so we can arrange them to be ordered and scheduled. Most healthy patients at your age complete them, but you are free to accept or refuse.     If you can't do it, I'll definitely understand. If you can, I'd certainly appreciate it!    Tests to Keep You Healthy    Eye Exam: DUE  Last Blood Pressure <= 139/89 (11/16/2023): Yes      Your diabetic retinal eye exam is due     Diabetes is the #1 cause of blindness in the [place] - early detection before signs or symptoms develop can prevent debilitating blindness.     Our records indicate that you may be overdue for your annual diabetic eye exam. Eye screening can help identify patients at risk for developing vision loss which is common in diabetes. This simple screening is an important step to keeping you healthy and preventing complications from diabetes.     This recommended diabetic eye exam should take place once per year and can prevent and treat diabetes complications in the eye before developing symptoms. This can be done with a special camera is used to take photographs of the back of your eye without having to dilate them, or you can see an eye doctor for a full dilated exam.     If you recently had your yearly diabetic eye exam performed outside of [facility], please let your Health care team know so that they can update your health record.         5-10 year preventative plan reviewed with patient.

## 2024-01-02 RX ORDER — ATORVASTATIN CALCIUM 20 MG/1
TABLET, FILM COATED ORAL
Qty: 90 TABLET | Refills: 3 | Status: SHIPPED | OUTPATIENT
Start: 2024-01-02

## 2024-01-02 RX ORDER — ALLOPURINOL 100 MG/1
TABLET ORAL
Qty: 90 TABLET | Refills: 3 | Status: SHIPPED | OUTPATIENT
Start: 2024-01-02

## 2024-03-17 RX ORDER — PANTOPRAZOLE SODIUM 40 MG/1
TABLET, DELAYED RELEASE ORAL
Qty: 90 TABLET | Refills: 3 | Status: SHIPPED | OUTPATIENT
Start: 2024-03-17

## 2024-05-15 RX ORDER — METFORMIN HYDROCHLORIDE 500 MG/1
500 TABLET ORAL 2 TIMES DAILY WITH MEALS
Qty: 180 TABLET | Refills: 3 | Status: SHIPPED | OUTPATIENT
Start: 2024-05-15

## 2024-05-15 RX ORDER — BUSPIRONE HYDROCHLORIDE 10 MG/1
TABLET ORAL
Qty: 270 TABLET | Refills: 3 | Status: SHIPPED | OUTPATIENT
Start: 2024-05-15

## 2024-05-16 ENCOUNTER — TELEPHONE (OUTPATIENT)
Dept: PRIMARY CARE CLINIC | Facility: CLINIC | Age: 79
End: 2024-05-16
Payer: MEDICARE

## 2024-05-21 ENCOUNTER — OFFICE VISIT (OUTPATIENT)
Dept: PRIMARY CARE CLINIC | Facility: CLINIC | Age: 79
End: 2024-05-21
Payer: MEDICARE

## 2024-05-21 VITALS
HEART RATE: 59 BPM | HEIGHT: 68 IN | RESPIRATION RATE: 15 BRPM | BODY MASS INDEX: 28.85 KG/M2 | DIASTOLIC BLOOD PRESSURE: 74 MMHG | OXYGEN SATURATION: 95 % | WEIGHT: 190.38 LBS | SYSTOLIC BLOOD PRESSURE: 145 MMHG | TEMPERATURE: 98 F

## 2024-05-21 DIAGNOSIS — G47.30 OBSERVED SLEEP APNEA: ICD-10-CM

## 2024-05-21 DIAGNOSIS — I10 PRIMARY HYPERTENSION: ICD-10-CM

## 2024-05-21 DIAGNOSIS — D50.9 HYPOCHROMIC ANEMIA: ICD-10-CM

## 2024-05-21 DIAGNOSIS — E11.22 TYPE 2 DIABETES MELLITUS WITH STAGE 2 CHRONIC KIDNEY DISEASE, WITHOUT LONG-TERM CURRENT USE OF INSULIN: Primary | ICD-10-CM

## 2024-05-21 DIAGNOSIS — F33.41 RECURRENT MAJOR DEPRESSIVE DISORDER, IN PARTIAL REMISSION: ICD-10-CM

## 2024-05-21 DIAGNOSIS — N18.2 STAGE 2 CHRONIC KIDNEY DISEASE: ICD-10-CM

## 2024-05-21 DIAGNOSIS — M1A.09X0 IDIOPATHIC CHRONIC GOUT OF MULTIPLE SITES WITHOUT TOPHUS: ICD-10-CM

## 2024-05-21 DIAGNOSIS — E78.00 HYPERCHOLESTEROLEMIA: ICD-10-CM

## 2024-05-21 DIAGNOSIS — N18.2 TYPE 2 DIABETES MELLITUS WITH STAGE 2 CHRONIC KIDNEY DISEASE, WITHOUT LONG-TERM CURRENT USE OF INSULIN: Primary | ICD-10-CM

## 2024-05-21 DIAGNOSIS — H91.13 PRESBYCUSIS OF BOTH EARS: ICD-10-CM

## 2024-05-21 DIAGNOSIS — M15.9 PRIMARY OSTEOARTHRITIS INVOLVING MULTIPLE JOINTS: ICD-10-CM

## 2024-05-21 LAB
ALBUMIN SERPL-MCNC: 3.8 G/DL (ref 3.4–4.8)
ALBUMIN/GLOB SERPL: 1.2 RATIO (ref 1.1–2)
ALP SERPL-CCNC: 98 UNIT/L (ref 40–150)
ALT SERPL-CCNC: 22 UNIT/L (ref 0–55)
ANION GAP SERPL CALC-SCNC: 4 MEQ/L
AST SERPL-CCNC: 20 UNIT/L (ref 5–34)
BASOPHILS # BLD AUTO: 0.03 X10(3)/MCL
BASOPHILS NFR BLD AUTO: 0.4 %
BILIRUB SERPL-MCNC: 0.5 MG/DL
BUN SERPL-MCNC: 16.2 MG/DL (ref 8.4–25.7)
CALCIUM SERPL-MCNC: 10.3 MG/DL (ref 8.8–10)
CHLORIDE SERPL-SCNC: 103 MMOL/L (ref 98–107)
CHOLEST SERPL-MCNC: 114 MG/DL
CHOLEST/HDLC SERPL: 2 {RATIO} (ref 0–5)
CO2 SERPL-SCNC: 31 MMOL/L (ref 23–31)
CREAT SERPL-MCNC: 0.96 MG/DL (ref 0.73–1.18)
CREAT/UREA NIT SERPL: 17
EOSINOPHIL # BLD AUTO: 0.23 X10(3)/MCL (ref 0–0.9)
EOSINOPHIL NFR BLD AUTO: 3.3 %
ERYTHROCYTE [DISTWIDTH] IN BLOOD BY AUTOMATED COUNT: 13.3 % (ref 11.5–17)
EST. AVERAGE GLUCOSE BLD GHB EST-MCNC: 142.7 MG/DL
GFR SERPLBLD CREATININE-BSD FMLA CKD-EPI: >60 ML/MIN/1.73/M2
GLOBULIN SER-MCNC: 3.3 GM/DL (ref 2.4–3.5)
GLUCOSE SERPL-MCNC: 131 MG/DL (ref 82–115)
HBA1C MFR BLD: 6.6 %
HCT VFR BLD AUTO: 40.5 % (ref 42–52)
HDLC SERPL-MCNC: 52 MG/DL (ref 35–60)
HGB BLD-MCNC: 13.4 G/DL (ref 14–18)
IMM GRANULOCYTES # BLD AUTO: 0.01 X10(3)/MCL (ref 0–0.04)
IMM GRANULOCYTES NFR BLD AUTO: 0.1 %
LDLC SERPL CALC-MCNC: 53 MG/DL (ref 50–140)
LYMPHOCYTES # BLD AUTO: 1.97 X10(3)/MCL (ref 0.6–4.6)
LYMPHOCYTES NFR BLD AUTO: 28.3 %
MCH RBC QN AUTO: 30.7 PG (ref 27–31)
MCHC RBC AUTO-ENTMCNC: 33.1 G/DL (ref 33–36)
MCV RBC AUTO: 92.9 FL (ref 80–94)
MONOCYTES # BLD AUTO: 0.85 X10(3)/MCL (ref 0.1–1.3)
MONOCYTES NFR BLD AUTO: 12.2 %
NEUTROPHILS # BLD AUTO: 3.88 X10(3)/MCL (ref 2.1–9.2)
NEUTROPHILS NFR BLD AUTO: 55.7 %
PLATELET # BLD AUTO: 292 X10(3)/MCL (ref 130–400)
PMV BLD AUTO: 9 FL (ref 7.4–10.4)
POTASSIUM SERPL-SCNC: 4.9 MMOL/L (ref 3.5–5.1)
PROT SERPL-MCNC: 7.1 GM/DL (ref 5.8–7.6)
RBC # BLD AUTO: 4.36 X10(6)/MCL (ref 4.7–6.1)
SODIUM SERPL-SCNC: 138 MMOL/L (ref 136–145)
TRIGL SERPL-MCNC: 44 MG/DL (ref 34–140)
TSH SERPL-ACNC: 0.96 UIU/ML (ref 0.35–4.94)
URATE SERPL-MCNC: 5.1 MG/DL (ref 3.5–7.2)
VLDLC SERPL CALC-MCNC: 9 MG/DL
WBC # SPEC AUTO: 6.97 X10(3)/MCL (ref 4.5–11.5)

## 2024-05-21 PROCEDURE — 85025 COMPLETE CBC W/AUTO DIFF WBC: CPT | Performed by: INTERNAL MEDICINE

## 2024-05-21 PROCEDURE — 83036 HEMOGLOBIN GLYCOSYLATED A1C: CPT | Performed by: INTERNAL MEDICINE

## 2024-05-21 PROCEDURE — 36415 COLL VENOUS BLD VENIPUNCTURE: CPT | Performed by: INTERNAL MEDICINE

## 2024-05-21 PROCEDURE — 84550 ASSAY OF BLOOD/URIC ACID: CPT | Performed by: INTERNAL MEDICINE

## 2024-05-21 PROCEDURE — 84443 ASSAY THYROID STIM HORMONE: CPT | Performed by: INTERNAL MEDICINE

## 2024-05-21 PROCEDURE — 80061 LIPID PANEL: CPT | Performed by: INTERNAL MEDICINE

## 2024-05-21 PROCEDURE — 80053 COMPREHEN METABOLIC PANEL: CPT | Performed by: INTERNAL MEDICINE

## 2024-05-21 PROCEDURE — 36415 COLL VENOUS BLD VENIPUNCTURE: CPT | Mod: ,,, | Performed by: INTERNAL MEDICINE

## 2024-05-21 PROCEDURE — 99214 OFFICE O/P EST MOD 30 MIN: CPT | Mod: ,,, | Performed by: INTERNAL MEDICINE

## 2024-05-21 RX ORDER — PHENYLEPHRINE HCL 10 MG
1000 TABLET ORAL DAILY
COMMUNITY

## 2024-05-21 NOTE — PROGRESS NOTES
Chio Villela MD   [id number] [id number]     Patient ID: [id number]     Chief Complaint: 6 Months follow up for medication management        HPI:     River Sheehan Jr. is a 78 y.o. male here today for a follow up of HTN, CKD, Gout, Hypercholesterolemia, DM, GERD and depression. He's building again for a friend. His joints are doing ok with it but he gets tired. No other complaints today.       Subjective:     Review of Systems   Constitutional:  Positive for malaise/fatigue.   HENT:  Positive for hearing loss.         Showed sleep apnea when he was asleep for his stone lithotripsy they said it was bad. He's not sure if he would do CPAP but he does know several who did get used to it. His wife says he snores loudly and then will stop and she will hear him gasp big. He notes he tried hearing aides, he can manage if they want him to understand    Respiratory: Negative.     Cardiovascular:  Positive for leg swelling.        Left ankle especially swelling   Gastrointestinal: Negative.         Colon due in Sept, had 2 polyps   Genitourinary:         Last stone was in Sept   Musculoskeletal:  Positive for back pain and joint pain.        Generally holding up with his abuse of it with working       Past Medical History:   Diagnosis Date    Acid reflux     Adenomatous polyp of ascending colon 09/20/2021    Arthritis     CAD (coronary artery disease)     COVID-19 07/06/2022    Depression     Diabetes mellitus     Gout     Hearing loss     High cholesterol     HTN (hypertension)     Kidney stone     Macrocytic anemia     Osteoporosis     Personal history of colonic polyps 09/20/2021    Dr. Chun Smith        Past Surgical History:   Procedure Laterality Date    COLONOSCOPY W/ BIOPSIES  09/20/2021    Dr. Chun Smith    CYSTOSCOPY      EXTRACAPSULAR EXTRACTION OF CATARACT      HERNIA REPAIR      LITHOTRIPSY      RETROGRADE PYELOGRAPHY         Family History   Problem Relation Name Age of Onset    Bladder  Cancer Mother      Hypertension Sister      Asthma Sister      Diabetes Sister      Lung cancer Sister      Hypertension Brother      Diabetes Brother      Coronary artery disease Brother      Atrial fibrillation Brother      Esophageal cancer Brother      Kidney cancer Brother      Hypertension Brother      Coronary artery disease Brother      Diabetes Brother      Progressive Supranuclear Palsy Brother      Coronary artery disease Brother      Pancreatic cancer Brother      Colon polyps Brother      Heart murmur Brother          Social History     Socioeconomic History    Marital status:    Tobacco Use    Smoking status: Never    Smokeless tobacco: Never   Substance and Sexual Activity    Alcohol use: Yes     Alcohol/week: 1.0 standard drink of alcohol     Types: 1 Cans of beer per week    Drug use: Never    Sexual activity: Yes     Social Determinants of Health     Financial Resource Strain: Low Risk  (5/11/2023)    Overall Financial Resource Strain (CARDIA)     Difficulty of Paying Living Expenses: Not hard at all   Food Insecurity: No Food Insecurity (5/11/2023)    Hunger Vital Sign     Worried About Running Out of Food in the Last Year: Never true     Ran Out of Food in the Last Year: Never true   Transportation Needs: No Transportation Needs (5/11/2023)    PRAPARE - Transportation     Lack of Transportation (Medical): No     Lack of Transportation (Non-Medical): No   Physical Activity: Sufficiently Active (5/11/2023)    Exercise Vital Sign     Days of Exercise per Week: 5 days     Minutes of Exercise per Session: 30 min   Stress: No Stress Concern Present (5/11/2023)    [employer] [place] [employer] [employer] - Occupational Stress Questionnaire     Feeling of Stress : Not at all   Housing Stability: Low Risk  (5/11/2023)    Housing Stability Vital Sign     Unable to Pay for Housing in the Last Year: No     Number of Places Lived in the Last Year: 1     Unstable Housing in the Last Year: No        Review of patient's allergies indicates:  No Known Allergies    Outpatient Medications Marked as Taking for the 5/21/24 encounter (Office Visit) with Chio Villela MD   Medication Sig Dispense Refill    allopurinoL (ZYLOPRIM) 100 MG tablet TAKE 1 TABLET EVERY DAY 90 tablet 3    amLODIPine (NORVASC) 10 MG tablet Take 10 mg by mouth once daily.      aspirin (ECOTRIN) 81 MG EC tablet Take 81 mg by mouth once daily.      atorvastatin (LIPITOR) 20 MG tablet TAKE 1 TABLET AT BEDTIME 90 tablet 3    b complex vitamins tablet Take 1 tablet by mouth once daily.      blood sugar diagnostic (TRUE METRIX GLUCOSE TEST STRIP) Strp 1 strip by Misc.(Non-Drug; Combo Route) route once daily. 150 strip 3    blood-glucose meter (TRUE METRIX AIR GLUCOSE METER) kit Test daily for DM II E11.9 1 each 0    busPIRone (BUSPAR) 10 MG tablet TAKE 1/2 TO 1 TABLET THREE TIMES DAILY 270 tablet 3    cholecalciferol, vitamin D3, (VITAMIN D3) 50 mcg (2,000 unit) Cap capsule Take 2,000 Units by mouth once daily.      cinnamon bark 500 mg capsule Take 1,000 mg by mouth once daily.      citalopram (CELEXA) 20 MG tablet TAKE 1 TABLET EVERY DAY 90 tablet 3    gabapentin (NEURONTIN) 100 MG capsule TAKE 2 CAPSULES (200 MG TOTAL) THREE TIMES DAILY 540 capsule 1    glucosamine/chondr navarro A sod (OSTEO BI-FLEX ORAL) Take by mouth Daily. TAKING 2 QD      KRILL OIL ORAL Take by mouth once daily at 6am.      losartan (COZAAR) 50 MG tablet Take 50 mg by mouth once daily.      metFORMIN (GLUCOPHAGE) 500 MG tablet TAKE 1 TABLET TWICE DAILY WITH MEALS 180 tablet 3    montelukast (SINGULAIR) 10 mg tablet TAKE 1 TABLET EVERY EVENING 90 tablet 3    pantoprazole (PROTONIX) 40 MG tablet TAKE 1 TABLET EVERY DAY 90 tablet 3    tamsulosin (FLOMAX) 0.4 mg Cap Take 0.4 mg by mouth once daily.         Patient Care Team:  Chio Villela MD as PCP - General (Internal Medicine)  Geena Johnston MD as Consulting Physician (Nephrology)  Marlene Figueroa MD as  "Consulting Physician (Cardiovascular Disease)  Jamin Berrios MD as Consulting Physician (Urology)       Objective:     BP (!) 145/74   Pulse (!) 59   Temp 97.6 °F (36.4 °C) (Oral)   Resp 15   Ht 5' 8" (1.727 m)   Wt 86.4 kg (190 lb 6.4 oz)   SpO2 95%   BMI 28.95 kg/m²     Physical Exam  Vitals reviewed.   HENT:      Mouth/Throat:      Comments: Crowded posterior pharynx  Cardiovascular:      Rate and Rhythm: Normal rate and regular rhythm.   Pulmonary:      Effort: Pulmonary effort is normal.      Breath sounds: Normal breath sounds.   Abdominal:      General: Abdomen is flat.      Palpations: Abdomen is soft.   Musculoskeletal:         General: No tenderness.   Skin:     General: Skin is warm and dry.   Neurological:      Mental Status: He is alert.               Assessment/Plan:     1. Type 2 diabetes mellitus with stage 2 chronic kidney disease, without long-term current use of insulin  Comments:  He thinks his levels may be bad now, he doesn't need refill yet on metoformin  Orders:  -     Comprehensive Metabolic Panel  -     Hemoglobin A1C  -     Lipid Panel    2. Recurrent major depressive disorder, in partial remission  Comments:  Mood holding, doesn't want to stop citalopram    3. Idiopathic chronic gout of multiple sites without tophus  Comments:  Will get level, no recenty flares.  Orders:  -     Uric Acid    4. Primary osteoarthritis involving multiple joints  Comments:  Stable  Overview:  shoulders      5. Primary hypertension  Comments:  Elevated today, will check at home  Overview:  last visit with EKG 08/12      6. Hypercholesterolemia  Comments:  Lipid due, he hasn't eaten today, copy to [place], continue atorvastatin  Orders:  -     Comprehensive Metabolic Panel  -     Lipid Panel    7. Hypochromic anemia  Comments:  Recheck today  Orders:  -     CBC Auto Differential    8. Stage 2 chronic kidney disease  Comments:  Since stable wondering if he needs to keep follow up with renal    9. " Observed sleep apnea  Comments:  Agrees to do sleep study  Overview:  Agrees to do sleep study    Orders:  -     TSH  -     Ambulatory referral/consult to Sleep Disorders; Future; Expected date: 05/28/2024    10. Presbycusis of both ears  Comments:  Did not tolerate hearing aides             Follow up in about 26 weeks (around 11/19/2024) for Wellness. In addition to their scheduled follow up, the patient has also been instructed to follow up on as needed basis.     Signature:  Chio Villela MD  [facility]  [id number] [id number]

## 2024-05-22 ENCOUNTER — TELEPHONE (OUTPATIENT)
Dept: PRIMARY CARE CLINIC | Facility: CLINIC | Age: 79
End: 2024-05-22
Payer: MEDICARE

## 2024-05-22 DIAGNOSIS — N18.2 CKD (CHRONIC KIDNEY DISEASE) STAGE 2, GFR 60-89 ML/MIN: ICD-10-CM

## 2024-05-22 DIAGNOSIS — E83.52 HYPERCALCEMIA: Primary | ICD-10-CM

## 2024-05-22 NOTE — TELEPHONE ENCOUNTER
----- Message from Chio Villela MD sent at 5/22/2024 11:09 AM CDT -----  We got the CT and I expect that [facility] will call them as the carotids show enough blockage they may want a vascular doctor to check him.  ----- Message -----  From: Chio Villela MD  Sent: 5/22/2024   8:10 AM CDT  To: [facility] Clinical Support Staff    His anemia is better, the sugar is higher but it's still acceptable, just get back on diet. His gout test is a little higher too, that's probably diet as well. Cholesterol is great, all at target. His calcium is high, his albumin isn't so I want to see if it's real, go next week and repeat it with a PTH and phosphorus level. If supplementing calcium stop it. Copy to [place]

## 2024-05-27 ENCOUNTER — TELEPHONE (OUTPATIENT)
Dept: PRIMARY CARE CLINIC | Facility: CLINIC | Age: 79
End: 2024-05-27
Payer: MEDICARE

## 2024-05-27 ENCOUNTER — LAB VISIT (OUTPATIENT)
Dept: LAB | Facility: HOSPITAL | Age: 79
End: 2024-05-27
Attending: INTERNAL MEDICINE
Payer: MEDICARE

## 2024-05-27 DIAGNOSIS — E83.52 HYPERCALCEMIA: ICD-10-CM

## 2024-05-27 DIAGNOSIS — N18.2 CKD (CHRONIC KIDNEY DISEASE) STAGE 2, GFR 60-89 ML/MIN: ICD-10-CM

## 2024-05-27 LAB
CALCIUM SERPL-MCNC: 10 MG/DL (ref 8.8–10)
PHOSPHATE SERPL-MCNC: 2.2 MG/DL (ref 2.3–4.7)
PTH-INTACT SERPL-MCNC: 79.7 PG/ML (ref 8.7–77)

## 2024-05-27 PROCEDURE — 83970 ASSAY OF PARATHORMONE: CPT | Performed by: INTERNAL MEDICINE

## 2024-05-27 PROCEDURE — 82310 ASSAY OF CALCIUM: CPT | Performed by: INTERNAL MEDICINE

## 2024-05-27 PROCEDURE — 36415 COLL VENOUS BLD VENIPUNCTURE: CPT

## 2024-05-27 PROCEDURE — 84100 ASSAY OF PHOSPHORUS: CPT | Performed by: INTERNAL MEDICINE

## 2024-05-27 NOTE — TELEPHONE ENCOUNTER
----- Message from Chio Villela MD sent at 5/27/2024  2:54 PM CDT -----  Calcium looks better but his phosphorus is low, we get that from dairy, how is he with milk?

## 2024-05-28 ENCOUNTER — TELEPHONE (OUTPATIENT)
Dept: PRIMARY CARE CLINIC | Facility: CLINIC | Age: 79
End: 2024-05-28
Payer: MEDICARE

## 2024-05-28 NOTE — TELEPHONE ENCOUNTER
----- Message from Chio Villela MD sent at 5/27/2024  4:53 PM CDT -----  PTH was elevated, it's often seen with the chronic kidney issues.We'll have to watch this to help protect his bones. We'll recheck in Nov

## 2024-05-30 ENCOUNTER — TELEPHONE (OUTPATIENT)
Dept: PRIMARY CARE CLINIC | Facility: CLINIC | Age: 79
End: 2024-05-30
Payer: MEDICARE

## 2024-05-30 DIAGNOSIS — M1A.09X0 IDIOPATHIC CHRONIC GOUT OF MULTIPLE SITES WITHOUT TOPHUS: ICD-10-CM

## 2024-05-30 DIAGNOSIS — I10 PRIMARY HYPERTENSION: ICD-10-CM

## 2024-05-30 DIAGNOSIS — N18.2 STAGE 2 CHRONIC KIDNEY DISEASE: Primary | ICD-10-CM

## 2024-05-30 NOTE — TELEPHONE ENCOUNTER
----- Message from Natalie Morataya sent at 5/30/2024 11:16 AM CDT -----  Regarding: referral  Type:  Patient Requesting Referral    Who Called:pt's wife    Referral to What Specialty:nephrologist  Reason for Referral:  Does the patient want the referral with a specific physician?:Dr PELON Dominguez    Would the patient rather a call back or a response via [facility]? C/b  Best Call Back Number:[phone]    Additional Information: please send referral to dr dominguez it is closer to home

## 2024-05-30 NOTE — TELEPHONE ENCOUNTER
I spoke with the patients wife and let her know that we did go ahead and sent her husbands referral as well.

## 2024-06-13 ENCOUNTER — LAB VISIT (OUTPATIENT)
Dept: LAB | Facility: HOSPITAL | Age: 79
End: 2024-06-13
Attending: INTERNAL MEDICINE
Payer: MEDICARE

## 2024-06-13 DIAGNOSIS — N18.2 STAGE 2 CHRONIC KIDNEY DISEASE: Primary | ICD-10-CM

## 2024-06-13 DIAGNOSIS — N18.2 STAGE 2 CHRONIC KIDNEY DISEASE: ICD-10-CM

## 2024-06-13 LAB
ALBUMIN SERPL-MCNC: 4 G/DL (ref 3.4–4.8)
ALBUMIN/GLOB SERPL: 1.3 RATIO (ref 1.1–2)
ALP SERPL-CCNC: 105 UNIT/L (ref 40–150)
ALT SERPL-CCNC: 21 UNIT/L (ref 0–55)
ANION GAP SERPL CALC-SCNC: 7 MEQ/L
AST SERPL-CCNC: 19 UNIT/L (ref 5–34)
BACTERIA #/AREA URNS AUTO: NORMAL /HPF
BASOPHILS # BLD AUTO: 0.02 X10(3)/MCL
BASOPHILS NFR BLD AUTO: 0.2 %
BILIRUB SERPL-MCNC: 0.6 MG/DL
BILIRUB UR QL STRIP.AUTO: NEGATIVE
BUN SERPL-MCNC: 18 MG/DL (ref 8.4–25.7)
CALCIUM SERPL-MCNC: 10.3 MG/DL (ref 8.8–10)
CHLORIDE SERPL-SCNC: 102 MMOL/L (ref 98–107)
CLARITY UR: CLEAR
CO2 SERPL-SCNC: 30 MMOL/L (ref 23–31)
COLOR UR AUTO: YELLOW
CREAT SERPL-MCNC: 0.89 MG/DL (ref 0.73–1.18)
CREAT UR-MCNC: 200.5 MG/DL (ref 63–166)
CREAT/UREA NIT SERPL: 20
EOSINOPHIL # BLD AUTO: 0.21 X10(3)/MCL (ref 0–0.9)
EOSINOPHIL NFR BLD AUTO: 2.5 %
ERYTHROCYTE [DISTWIDTH] IN BLOOD BY AUTOMATED COUNT: 13.1 % (ref 11.5–17)
GFR SERPLBLD CREATININE-BSD FMLA CKD-EPI: >60 ML/MIN/1.73/M2
GLOBULIN SER-MCNC: 3.1 GM/DL (ref 2.4–3.5)
GLUCOSE SERPL-MCNC: 130 MG/DL (ref 82–115)
GLUCOSE UR QL STRIP: NEGATIVE
HCT VFR BLD AUTO: 41.4 % (ref 42–52)
HGB BLD-MCNC: 13.6 G/DL (ref 14–18)
HGB UR QL STRIP: NEGATIVE
IMM GRANULOCYTES # BLD AUTO: 0.01 X10(3)/MCL (ref 0–0.04)
IMM GRANULOCYTES NFR BLD AUTO: 0.1 %
KETONES UR QL STRIP: NEGATIVE
LEUKOCYTE ESTERASE UR QL STRIP: NEGATIVE
LYMPHOCYTES # BLD AUTO: 2.13 X10(3)/MCL (ref 0.6–4.6)
LYMPHOCYTES NFR BLD AUTO: 25 %
MCH RBC QN AUTO: 30.6 PG (ref 27–31)
MCHC RBC AUTO-ENTMCNC: 32.9 G/DL (ref 33–36)
MCV RBC AUTO: 93.2 FL (ref 80–94)
MONOCYTES # BLD AUTO: 0.89 X10(3)/MCL (ref 0.1–1.3)
MONOCYTES NFR BLD AUTO: 10.4 %
NEUTROPHILS # BLD AUTO: 5.27 X10(3)/MCL (ref 2.1–9.2)
NEUTROPHILS NFR BLD AUTO: 61.8 %
NITRITE UR QL STRIP: NEGATIVE
PH UR STRIP: 5.5 [PH]
PHOSPHATE SERPL-MCNC: 2.8 MG/DL (ref 2.3–4.7)
PLATELET # BLD AUTO: 270 X10(3)/MCL (ref 130–400)
PMV BLD AUTO: 8.9 FL (ref 7.4–10.4)
POTASSIUM SERPL-SCNC: 4.7 MMOL/L (ref 3.5–5.1)
PROT SERPL-MCNC: 7.1 GM/DL (ref 5.8–7.6)
PROT UR QL STRIP: NEGATIVE
PROT UR STRIP-MCNC: 12.6 MG/DL
RBC # BLD AUTO: 4.44 X10(6)/MCL (ref 4.7–6.1)
RBC #/AREA URNS AUTO: NORMAL /HPF
SODIUM SERPL-SCNC: 139 MMOL/L (ref 136–145)
SP GR UR STRIP.AUTO: 1.02 (ref 1–1.03)
SQUAMOUS #/AREA URNS AUTO: NORMAL /HPF
URINE PROTEIN/CREATININE RATIO (OLG): 0.1
UROBILINOGEN UR STRIP-ACNC: 0.2
WBC # SPEC AUTO: 8.53 X10(3)/MCL (ref 4.5–11.5)
WBC #/AREA URNS AUTO: NORMAL /HPF

## 2024-06-13 PROCEDURE — 84156 ASSAY OF PROTEIN URINE: CPT

## 2024-06-13 PROCEDURE — 36415 COLL VENOUS BLD VENIPUNCTURE: CPT

## 2024-06-13 PROCEDURE — 81003 URINALYSIS AUTO W/O SCOPE: CPT

## 2024-06-13 PROCEDURE — 80053 COMPREHEN METABOLIC PANEL: CPT

## 2024-06-13 PROCEDURE — 85025 COMPLETE CBC W/AUTO DIFF WBC: CPT

## 2024-06-13 PROCEDURE — 84100 ASSAY OF PHOSPHORUS: CPT

## 2024-06-17 ENCOUNTER — OFFICE VISIT (OUTPATIENT)
Dept: PRIMARY CARE CLINIC | Facility: CLINIC | Age: 79
End: 2024-06-17
Payer: MEDICARE

## 2024-06-17 ENCOUNTER — TELEPHONE (OUTPATIENT)
Dept: PRIMARY CARE CLINIC | Facility: CLINIC | Age: 79
End: 2024-06-17

## 2024-06-17 VITALS
DIASTOLIC BLOOD PRESSURE: 74 MMHG | RESPIRATION RATE: 16 BRPM | OXYGEN SATURATION: 94 % | SYSTOLIC BLOOD PRESSURE: 130 MMHG | WEIGHT: 190 LBS | TEMPERATURE: 98 F | HEART RATE: 76 BPM | BODY MASS INDEX: 28.79 KG/M2 | HEIGHT: 68 IN

## 2024-06-17 DIAGNOSIS — J32.9 SINUSITIS, UNSPECIFIED CHRONICITY, UNSPECIFIED LOCATION: Primary | ICD-10-CM

## 2024-06-17 DIAGNOSIS — J98.01 BRONCHOSPASM, ACUTE: ICD-10-CM

## 2024-06-17 DIAGNOSIS — I65.23 BILATERAL CAROTID ARTERY STENOSIS: ICD-10-CM

## 2024-06-17 PROCEDURE — 99214 OFFICE O/P EST MOD 30 MIN: CPT | Mod: ,,, | Performed by: INTERNAL MEDICINE

## 2024-06-17 RX ORDER — CEFDINIR 300 MG/1
300 CAPSULE ORAL 2 TIMES DAILY
Qty: 20 CAPSULE | Refills: 0 | Status: SHIPPED | OUTPATIENT
Start: 2024-06-17 | End: 2024-06-27

## 2024-06-17 RX ORDER — PROMETHAZINE HYDROCHLORIDE AND DEXTROMETHORPHAN HYDROBROMIDE 6.25; 15 MG/5ML; MG/5ML
5 SYRUP ORAL EVERY 4 HOURS PRN
Qty: 120 ML | Refills: 1 | Status: SHIPPED | OUTPATIENT
Start: 2024-06-17 | End: 2024-06-27

## 2024-06-17 RX ORDER — PREDNISONE 20 MG/1
20 TABLET ORAL 2 TIMES DAILY
Qty: 10 TABLET | Refills: 0 | Status: SHIPPED | OUTPATIENT
Start: 2024-06-17 | End: 2024-06-21 | Stop reason: SDUPTHER

## 2024-06-17 NOTE — PROGRESS NOTES
Chio Villela MD   [id number] [id number]     Patient ID: [id number]     Chief Complaint: Sinus Problem and Cough (Wheezing)        HPI:     River Sheehan Jr. is a 78 y.o. male here today for sinus congestion. He started Friday and it's been getting worse. He coughs up a little phlegm that is yellow. He's been taking cough syrup. His voice is hoarse. He has pain in the upper chest when he coughs. His head hurts when he coughs too so he tries to swallow and not cough. No other complaints today.       Subjective:     Review of Systems   HENT:  Positive for sore throat. Negative for ear pain.         He used saline in his nose to wash out the sinus.    Respiratory:  Positive for cough, sputum production and wheezing. Negative for shortness of breath.    Cardiovascular:  Positive for chest pain. Negative for palpitations.        He notes it's in his upper airways he is having pain in.    Genitourinary:         Did lab for renal visit Thursday       Past Medical History:   Diagnosis Date    Acid reflux     Adenomatous polyp of ascending colon 09/20/2021    Arthritis     CAD (coronary artery disease)     COVID-19 07/06/2022    Depression     Diabetes mellitus     Gout     Hearing loss     High cholesterol     HTN (hypertension)     Kidney stone     Macrocytic anemia     Osteoporosis     Personal history of colonic polyps 09/20/2021    Dr. Chun Smith        Past Surgical History:   Procedure Laterality Date    COLONOSCOPY W/ BIOPSIES  09/20/2021    Dr. Chun Smith    CYSTOSCOPY      EXTRACAPSULAR EXTRACTION OF CATARACT      HERNIA REPAIR      LITHOTRIPSY      RETROGRADE PYELOGRAPHY         Family History   Problem Relation Name Age of Onset    Bladder Cancer Mother      Hypertension Sister      Asthma Sister      Diabetes Sister      Lung cancer Sister      Hypertension Brother      Diabetes Brother      Coronary artery disease Brother      Atrial fibrillation Brother      Esophageal cancer Brother       Kidney cancer Brother      Hypertension Brother      Coronary artery disease Brother      Diabetes Brother      Progressive Supranuclear Palsy Brother      Coronary artery disease Brother      Pancreatic cancer Brother      Colon polyps Brother      Heart murmur Brother          Social History     Socioeconomic History    Marital status:    Tobacco Use    Smoking status: Never    Smokeless tobacco: Never   Substance and Sexual Activity    Alcohol use: Yes     Alcohol/week: 1.0 standard drink of alcohol     Types: 1 Cans of beer per week    Drug use: Never    Sexual activity: Yes     Social Determinants of Health     Financial Resource Strain: Low Risk  (5/11/2023)    Overall Financial Resource Strain (CARDIA)     Difficulty of Paying Living Expenses: Not hard at all   Food Insecurity: No Food Insecurity (5/11/2023)    Hunger Vital Sign     Worried About Running Out of Food in the Last Year: Never true     Ran Out of Food in the Last Year: Never true   Transportation Needs: No Transportation Needs (5/11/2023)    PRAPARE - Transportation     Lack of Transportation (Medical): No     Lack of Transportation (Non-Medical): No   Physical Activity: Sufficiently Active (5/11/2023)    Exercise Vital Sign     Days of Exercise per Week: 5 days     Minutes of Exercise per Session: 30 min   Stress: No Stress Concern Present (5/11/2023)    [employer] [place] [employer] - Occupational Stress Questionnaire     Feeling of Stress : Not at all   Housing Stability: Low Risk  (5/11/2023)    Housing Stability Vital Sign     Unable to Pay for Housing in the Last Year: No     Number of Places Lived in the Last Year: 1     Unstable Housing in the Last Year: No       Review of patient's allergies indicates:  No Known Allergies    Outpatient Medications Marked as Taking for the 6/17/24 encounter (Office Visit) with Chio Villela MD   Medication Sig Dispense Refill    albuterol (PROAIR HFA) 90 mcg/actuation inhaler  "Inhale 2 puffs into the lungs every 6 (six) hours as needed for Wheezing. Rescue 8 g 0    allopurinoL (ZYLOPRIM) 100 MG tablet TAKE 1 TABLET EVERY DAY 90 tablet 3    amLODIPine (NORVASC) 10 MG tablet Take 10 mg by mouth once daily.      aspirin (ECOTRIN) 81 MG EC tablet Take 81 mg by mouth once daily.      atorvastatin (LIPITOR) 20 MG tablet TAKE 1 TABLET AT BEDTIME 90 tablet 3    b complex vitamins tablet Take 1 tablet by mouth once daily.      blood sugar diagnostic (TRUE METRIX GLUCOSE TEST STRIP) Strp 1 strip by Misc.(Non-Drug; Combo Route) route once daily. 150 strip 3    busPIRone (BUSPAR) 10 MG tablet TAKE 1/2 TO 1 TABLET THREE TIMES DAILY 270 tablet 3    cholecalciferol, vitamin D3, (VITAMIN D3) 50 mcg (2,000 unit) Cap capsule Take 2,000 Units by mouth once daily.      cinnamon bark 500 mg capsule Take 1,000 mg by mouth once daily.      citalopram (CELEXA) 20 MG tablet TAKE 1 TABLET EVERY DAY 90 tablet 3    gabapentin (NEURONTIN) 100 MG capsule TAKE 2 CAPSULES (200 MG TOTAL) THREE TIMES DAILY 540 capsule 1    glucosamine/chondr navarro A sod (OSTEO BI-FLEX ORAL) Take by mouth Daily. TAKING 2 QD      KRILL OIL ORAL Take by mouth once daily at 6am.      losartan (COZAAR) 50 MG tablet Take 50 mg by mouth once daily.      metFORMIN (GLUCOPHAGE) 500 MG tablet TAKE 1 TABLET TWICE DAILY WITH MEALS 180 tablet 3    montelukast (SINGULAIR) 10 mg tablet TAKE 1 TABLET EVERY EVENING 90 tablet 3    pantoprazole (PROTONIX) 40 MG tablet TAKE 1 TABLET EVERY DAY 90 tablet 3    tamsulosin (FLOMAX) 0.4 mg Cap Take 0.4 mg by mouth once daily.         Patient Care Team:  Chio Villela MD as PCP - General (Internal Medicine)  Geena Johnston MD as Consulting Physician (Nephrology)  Marlene Figueroa MD as Consulting Physician (Cardiovascular Disease)  Jamin Berrios MD as Consulting Physician (Urology)       Objective:     /74   Pulse 76   Temp 97.8 °F (36.6 °C) (Oral)   Resp 16   Ht 5' 8" (1.727 m)   Wt " 86.2 kg (190 lb)   SpO2 (!) 94%   BMI 28.89 kg/m²     Physical Exam  Vitals reviewed.   Constitutional:       Appearance: He is ill-appearing.   HENT:      Ears:      Comments: Bilateral effusion, scant wax     Mouth/Throat:      Comments: Voice is hoarse, no erythema or exudate  Cardiovascular:      Rate and Rhythm: Normal rate and regular rhythm.   Pulmonary:      Breath sounds: Rhonchi present. No wheezing.   Neurological:      Mental Status: He is alert.             Lab Results   Component Value Date    WBC 8.53 06/13/2024       Assessment/Plan:     1. Sinusitis, unspecified chronicity, unspecified location  Comments:  Will add steroid to open him faster, he needs to watch BP with it. Omnicef working with wife will add for him    2. Bronchospasm, acute  Comments:  xray if persists, may need inhaler if worsens. Watch with cough syrup no issue with urination stop if flow slows  Orders:  -     X-Ray Chest PA And Lateral; Future; Expected date: 06/17/2024    3. Bilateral carotid artery stenosis  Comments:  He had CT angiogram early May with R internal carotid showing 70-80%.he hasn't heard anything from Dr Figueroa yet    Other orders  -     cefdinir (OMNICEF) 300 MG capsule; Take 1 capsule (300 mg total) by mouth 2 (two) times daily. for 10 days  Dispense: 20 capsule; Refill: 0  -     promethazine-dextromethorphan (PROMETHAZINE-DM) 6.25-15 mg/5 mL Syrp; Take 5 mLs by mouth every 4 (four) hours as needed (cough).  Dispense: 120 mL; Refill: 1  -     predniSONE (DELTASONE) 20 MG tablet; Take 1 tablet (20 mg total) by mouth 2 (two) times daily. With food, watch blood pressure for 5 days  Dispense: 10 tablet; Refill: 0             Follow up for scheduled visit. In addition to their scheduled follow up, the patient has also been instructed to follow up on as needed basis.     Signature:  Chio Villela MD  [facility]  [id number] [id number]

## 2024-06-20 ENCOUNTER — HOSPITAL ENCOUNTER (OUTPATIENT)
Dept: RADIOLOGY | Facility: HOSPITAL | Age: 79
Discharge: HOME | End: 2024-06-20
Attending: INTERNAL MEDICINE
Payer: MEDICARE

## 2024-06-20 ENCOUNTER — OFFICE VISIT (OUTPATIENT)
Dept: NEPHROLOGY | Facility: CLINIC | Age: 79
End: 2024-06-20
Payer: MEDICARE

## 2024-06-20 VITALS
HEART RATE: 67 BPM | TEMPERATURE: 98 F | BODY MASS INDEX: 28.67 KG/M2 | SYSTOLIC BLOOD PRESSURE: 150 MMHG | OXYGEN SATURATION: 94 % | WEIGHT: 189.19 LBS | DIASTOLIC BLOOD PRESSURE: 84 MMHG | HEIGHT: 68 IN | RESPIRATION RATE: 20 BRPM

## 2024-06-20 DIAGNOSIS — M1A.09X0 IDIOPATHIC CHRONIC GOUT OF MULTIPLE SITES WITHOUT TOPHUS: ICD-10-CM

## 2024-06-20 DIAGNOSIS — I10 PRIMARY HYPERTENSION: ICD-10-CM

## 2024-06-20 DIAGNOSIS — J98.01 BRONCHOSPASM, ACUTE: ICD-10-CM

## 2024-06-20 DIAGNOSIS — E21.0 PRIMARY HYPERPARATHYROIDISM: Primary | ICD-10-CM

## 2024-06-20 DIAGNOSIS — N18.2 STAGE 2 CHRONIC KIDNEY DISEASE: Primary | ICD-10-CM

## 2024-06-20 PROCEDURE — 99215 OFFICE O/P EST HI 40 MIN: CPT | Mod: PBBFAC,25 | Performed by: INTERNAL MEDICINE

## 2024-06-20 PROCEDURE — 71046 X-RAY EXAM CHEST 2 VIEWS: CPT | Mod: TC

## 2024-06-20 PROCEDURE — 99999 PR PBB SHADOW E&M-EST. PATIENT-LVL V: CPT | Mod: PBBFAC,,, | Performed by: INTERNAL MEDICINE

## 2024-06-20 NOTE — PROGRESS NOTES
[facility] New Referral Office Note    HPI  River Sheehan Jr., 78 y.o. male, presents to office as a new patient .  Referred for questionable CKD.  His GFR actually is stable he has only mild proteinuria around the 100 mg of protein per 24 hours based on the spot protein to creatinine ratio  Of note that he was told he had been diabetic for only 1 year but he may have been diabetic for longer  Patient's main complaint is recurrent nephrolithiasis he has had kidney stones for many many years necessitating interventions last of which was in September  Review of the labs show impressive hypercalcemia hypophosphatemia and inappropriately high PTH with a level of calcium            Medical Diagnoses:   Past Medical History:   Diagnosis Date    Acid reflux     Adenomatous polyp of ascending colon 09/20/2021    Arthritis     CAD (coronary artery disease)     COVID-19 07/06/2022    Depression     Diabetes mellitus     Gout     Hearing loss     High cholesterol     HTN (hypertension)     Kidney stone     Macrocytic anemia     Osteoporosis     Personal history of colonic polyps 09/20/2021    Dr. Chun Smith     Patient Active Problem List   Diagnosis    Primary osteoarthritis involving multiple joints    Calculus of kidney    Recurrent major depressive disorder, in partial remission    Bilateral carotid artery stenosis    Gastroesophageal reflux disease    Idiopathic chronic gout of multiple sites without tophus    Hearing loss    Hypercholesterolemia    Primary hypertension    Macrocytic anemia    Persistent proteinuria    Stage 2 chronic kidney disease    Type 2 diabetes mellitus with stage 2 chronic kidney disease, without long-term current use of insulin    Observed sleep apnea       Surgical History:   Past Surgical History:   Procedure Laterality Date    COLONOSCOPY W/ BIOPSIES  09/20/2021    Dr. Chun Smith    CYSTOSCOPY      EXTRACAPSULAR EXTRACTION OF CATARACT      HERNIA REPAIR      LITHOTRIPSY       RETROGRADE PYELOGRAPHY         Family History:   Family History   Problem Relation Name Age of Onset    Bladder Cancer Mother      Hypertension Sister      Asthma Sister      Diabetes Sister      Lung cancer Sister      Hypertension Brother      Diabetes Brother      Coronary artery disease Brother      Atrial fibrillation Brother      Esophageal cancer Brother      Kidney cancer Brother      Hypertension Brother      Coronary artery disease Brother      Diabetes Brother      Progressive Supranuclear Palsy Brother      Coronary artery disease Brother      Pancreatic cancer Brother      Colon polyps Brother      Heart murmur Brother         Social History:   Social History     Tobacco Use    Smoking status: Never     Passive exposure: Never    Smokeless tobacco: Never   Substance Use Topics    Alcohol use: Yes     Alcohol/week: 1.0 standard drink of alcohol     Types: 1 Cans of beer per week       Allergies:  Review of patient's allergies indicates:  No Known Allergies    Medications:    Current Outpatient Medications:     albuterol (PROAIR HFA) 90 mcg/actuation inhaler, Inhale 2 puffs into the lungs every 6 (six) hours as needed for Wheezing. Rescue, Disp: 8 g, Rfl: 0    allopurinoL (ZYLOPRIM) 100 MG tablet, TAKE 1 TABLET EVERY DAY, Disp: 90 tablet, Rfl: 3    amLODIPine (NORVASC) 10 MG tablet, Take 10 mg by mouth once daily., Disp: , Rfl:     aspirin (ECOTRIN) 81 MG EC tablet, Take 81 mg by mouth once daily., Disp: , Rfl:     atorvastatin (LIPITOR) 20 MG tablet, TAKE 1 TABLET AT BEDTIME, Disp: 90 tablet, Rfl: 3    b complex vitamins tablet, Take 1 tablet by mouth once daily., Disp: , Rfl:     blood sugar diagnostic (TRUE METRIX GLUCOSE TEST STRIP) Strp, 1 strip by Misc.(Non-Drug; Combo Route) route once daily., Disp: 150 strip, Rfl: 3    busPIRone (BUSPAR) 10 MG tablet, TAKE 1/2 TO 1 TABLET THREE TIMES DAILY, Disp: 270 tablet, Rfl: 3    cefdinir (OMNICEF) 300 MG capsule, Take 1 capsule (300 mg total) by mouth 2  (two) times daily. for 10 days, Disp: 20 capsule, Rfl: 0    cinnamon bark 500 mg capsule, Take 1,000 mg by mouth once daily., Disp: , Rfl:     citalopram (CELEXA) 20 MG tablet, TAKE 1 TABLET EVERY DAY, Disp: 90 tablet, Rfl: 3    gabapentin (NEURONTIN) 100 MG capsule, TAKE 2 CAPSULES (200 MG TOTAL) THREE TIMES DAILY, Disp: 540 capsule, Rfl: 1    glucosamine/chondr navarro A sod (OSTEO BI-FLEX ORAL), Take by mouth Daily. TAKING 2 QD, Disp: , Rfl:     KRILL OIL ORAL, Take by mouth once daily at 6am., Disp: , Rfl:     losartan (COZAAR) 50 MG tablet, Take 50 mg by mouth once daily., Disp: , Rfl:     metFORMIN (GLUCOPHAGE) 500 MG tablet, TAKE 1 TABLET TWICE DAILY WITH MEALS, Disp: 180 tablet, Rfl: 3    montelukast (SINGULAIR) 10 mg tablet, TAKE 1 TABLET EVERY EVENING, Disp: 90 tablet, Rfl: 3    pantoprazole (PROTONIX) 40 MG tablet, TAKE 1 TABLET EVERY DAY, Disp: 90 tablet, Rfl: 3    predniSONE (DELTASONE) 20 MG tablet, Take 1 tablet (20 mg total) by mouth 2 (two) times daily. With food, watch blood pressure for 5 days, Disp: 10 tablet, Rfl: 0    promethazine-dextromethorphan (PROMETHAZINE-DM) 6.25-15 mg/5 mL Syrp, Take 5 mLs by mouth every 4 (four) hours as needed (cough)., Disp: 120 mL, Rfl: 1    tamsulosin (FLOMAX) 0.4 mg Cap, Take 0.4 mg by mouth once daily., Disp: , Rfl:     blood-glucose meter (TRUE METRIX AIR GLUCOSE METER) kit, Test daily for DM II E11.9, Disp: 1 each, Rfl: 0       Review of Systems:    Constitutional: Denies fever, fatigue, generalized weakness  Skin: Denies wounds, no rashes, no itching, no new skin lesions  Respiratory:  Recently had an upper respiratory infection currently feeling better  Cardiovascular: Denies chest pain, palpitations, or swelling  Gastrointestional: Denies abdominal pain, nausea, vomiting, diarrhea, or constipation  Genitourinary: Denies dysuria, hematuria, foamy urine, or incontinence; reports able to empty bladder  Musculoskeletal: Denies back or flank pain  Neurological: Denies  "headaches, dizziness, paresthesias, tremors or focal weakness      Vital Signs:  BP (!) 150/84 (BP Location: Left arm, Patient Position: Sitting)   Pulse 67   Temp 97.5 °F (36.4 °C) (Temporal)   Resp 20   Ht 5' 8" (1.727 m)   Wt 85.8 kg (189 lb 3.2 oz)   SpO2 (!) 94%   BMI 28.77 kg/m²   Body mass index is 28.77 kg/m².      Physical Exam:    General: no acute distress, awake, alert  Eyes: conjunctiva clear, eyelids without swelling  HENT: atraumatic, oropharynx and nasal mucosa patent  Neck: supple, trache midline, full ROM, no JVD  Respiratory: equal, unlabored, clear to auscultation A/P  Cardiovascular: RRR without murmur or rub  Edema:  Trace  Gastrointestinal: soft, non-tender, non-distended; positive bowel sounds; no masses to palpation; no ascites  Genitourinary: no CVA tenderness upon palpation  Musculoskeletal: ROM without new limitation or discomfort  Integumentary: warm, dry; no rashes, wounds, or skin lesions  Neurological: oriented x4, appropriate, no acute deficits; no asterixis      Labs:        Component Value Date/Time     06/13/2024 1057     05/21/2024 1217    K 4.7 06/13/2024 1057    K 4.9 05/21/2024 1217     06/13/2024 1057     05/21/2024 1217    CO2 30 06/13/2024 1057    CO2 31 05/21/2024 1217    BUN 18.0 06/13/2024 1057    BUN 16.2 05/21/2024 1217    CREATININE 0.89 06/13/2024 1057    CREATININE 0.96 05/21/2024 1217    CREATININE 0.78 11/08/2023 1006    CREATININE 0.81 08/09/2023 0937    CALCIUM 10.3 (H) 06/13/2024 1057    CALCIUM 10.0 05/27/2024 1103    PHOS 2.8 06/13/2024 1057    PHOS 2.2 (L) 05/27/2024 1103    PTH 79.7 (H) 05/27/2024 1103    PTH 54.1 11/08/2023 1006    PTH 65.3 08/09/2023 0937           Component Value Date/Time    WBC 8.53 06/13/2024 1057    WBC 6.97 05/21/2024 1217    HGB 13.6 (L) 06/13/2024 1057    HGB 13.4 (L) 05/21/2024 1217    HCT 41.4 (L) 06/13/2024 1057    HCT 40.5 (L) 05/21/2024 1217     06/13/2024 1057     05/21/2024 1217 "         Imaging:  Retroperitoneal US:      Impression:    Hypertension  Recurrent nephrolithiasis  Hypercalcemia and hypophosphatemia with a inappropriately elevated PTH level for the level of hypercalcemia  I am very concerned for primary hyperparathyroidism causing the recurrent nephrolithiasis        Plan:      Low-sodium diet  Increase water intake  DC ergocalciferol  Referred to endocrine workup primary hyperparathyroidism  I will order repeat labs PTH level and immunoelectrophoresis in a month  If stable I will follow-up with labs in 6 months  Leonie Roca      This note was created with the assistance of [employer] voice recognition software or phone dictation. There may be transcription errors as a result of using this technology however minimal. Effort has been made to assure accuracy of transcription but any obvious errors or omissions should be clarified with the author of the document.

## 2024-06-21 RX ORDER — PREDNISONE 20 MG/1
20 TABLET ORAL 2 TIMES DAILY
Qty: 10 TABLET | Refills: 0 | Status: SHIPPED | OUTPATIENT
Start: 2024-06-21 | End: 2024-06-26

## 2024-06-21 RX ORDER — AZITHROMYCIN 250 MG/1
TABLET, FILM COATED ORAL
Qty: 6 TABLET | Refills: 0 | Status: SHIPPED | OUTPATIENT
Start: 2024-06-21 | End: 2024-06-21

## 2024-06-21 RX ORDER — DOXYCYCLINE 100 MG/1
100 CAPSULE ORAL EVERY 12 HOURS
Qty: 20 CAPSULE | Refills: 0 | Status: SHIPPED | OUTPATIENT
Start: 2024-06-21

## 2024-06-21 NOTE — PROGRESS NOTES
Called Olinda and let her know that I sent longer steroid and a second antibiotic. He finishes the first

## 2024-06-24 ENCOUNTER — TELEPHONE (OUTPATIENT)
Dept: PRIMARY CARE CLINIC | Facility: CLINIC | Age: 79
End: 2024-06-24
Payer: MEDICARE

## 2024-06-24 RX ORDER — DOXYCYCLINE 100 MG/1
100 CAPSULE ORAL EVERY 12 HOURS
Qty: 20 CAPSULE | Refills: 0 | Status: SHIPPED | OUTPATIENT
Start: 2024-06-24

## 2024-06-24 RX ORDER — PREDNISONE 20 MG/1
20 TABLET ORAL 2 TIMES DAILY
Qty: 10 TABLET | Refills: 0 | Status: SHIPPED | OUTPATIENT
Start: 2024-06-24 | End: 2024-06-29

## 2024-06-24 NOTE — TELEPHONE ENCOUNTER
----- Message from Stephani Mancini sent at 6/24/2024  9:47 AM CDT -----  Regarding: meds  .Who Called: River Sheehan Jr.    Caller is requesting assistance/information from provider's office.    Symptoms (please be specific):    How long has patient had these symptoms:    List of preferred pharmacies on file (remove unneeded): [unfilled]  If different, enter pharmacy into here including location and phone number: [address]       Preferred Method of Contact: Phone Call  Patient's Preferred Phone Number on File: [phone]   Best Call Back Number, if different:  Additional Information: Pt's doxycycline & predniSONE  was called into wrong pharmacy, correct pharmacy listed above.

## 2024-06-24 NOTE — TELEPHONE ENCOUNTER
Sent to [facility], looks like it went to [facility], if they filled it then insurance may give them trouble for [facility] filling it.

## 2024-06-25 RX ORDER — PROMETHAZINE HYDROCHLORIDE AND DEXTROMETHORPHAN HYDROBROMIDE 6.25; 15 MG/5ML; MG/5ML
SYRUP ORAL
Qty: 120 ML | Refills: 1 | Status: SHIPPED | OUTPATIENT
Start: 2024-06-25

## 2024-06-26 ENCOUNTER — HOSPITAL ENCOUNTER (OUTPATIENT)
Dept: RADIOLOGY | Facility: HOSPITAL | Age: 79
Discharge: HOME | End: 2024-06-26
Attending: INTERNAL MEDICINE
Payer: MEDICARE

## 2024-06-26 DIAGNOSIS — I10 PRIMARY HYPERTENSION: ICD-10-CM

## 2024-06-26 PROCEDURE — 76770 US EXAM ABDO BACK WALL COMP: CPT | Mod: TC

## 2024-06-28 LAB
LEFT EYE DM RETINOPATHY: NORMAL
RIGHT EYE DM RETINOPATHY: NORMAL

## 2024-07-02 ENCOUNTER — TELEPHONE (OUTPATIENT)
Dept: PRIMARY CARE CLINIC | Facility: CLINIC | Age: 79
End: 2024-07-02
Payer: MEDICARE

## 2024-07-02 NOTE — TELEPHONE ENCOUNTER
----- Message from Brittney Jones sent at 7/2/2024 10:02 AM CDT -----  Regarding: sooner appt  Type:  Sooner Apoointment Request    Caller is requesting a sooner appointment.  Caller declined first available appointment listed below.  Caller will not accept being placed on the waitlist and is requesting a message be sent to doctor.  Name of Caller:Alpha  When is the first available appointment? 7/10  Symptoms:coughing, light headed & dizzy  Would the patient rather a call back or a response via [facility]?   Best Call Back Number: [phone]  Additional Information:

## 2024-07-10 ENCOUNTER — OFFICE VISIT (OUTPATIENT)
Dept: PRIMARY CARE CLINIC | Facility: CLINIC | Age: 79
End: 2024-07-10
Payer: MEDICARE

## 2024-07-10 VITALS
BODY MASS INDEX: 28.49 KG/M2 | WEIGHT: 188 LBS | HEIGHT: 68 IN | OXYGEN SATURATION: 92 % | RESPIRATION RATE: 16 BRPM | TEMPERATURE: 98 F | DIASTOLIC BLOOD PRESSURE: 67 MMHG | HEART RATE: 84 BPM | SYSTOLIC BLOOD PRESSURE: 115 MMHG

## 2024-07-10 DIAGNOSIS — R05.9 COUGH, UNSPECIFIED TYPE: Primary | ICD-10-CM

## 2024-07-10 DIAGNOSIS — E11.65 UNCONTROLLED TYPE 2 DIABETES MELLITUS WITH HYPERGLYCEMIA: ICD-10-CM

## 2024-07-10 DIAGNOSIS — R42 DIZZINESS AND GIDDINESS: ICD-10-CM

## 2024-07-10 PROCEDURE — 99213 OFFICE O/P EST LOW 20 MIN: CPT | Mod: ,,, | Performed by: INTERNAL MEDICINE

## 2024-07-10 PROCEDURE — 36415 COLL VENOUS BLD VENIPUNCTURE: CPT | Mod: ,,, | Performed by: INTERNAL MEDICINE

## 2024-07-10 NOTE — PROGRESS NOTES
Chio Villela MD   [address] [place] [place] [address]     Patient ID: [id number]     Chief Complaint: Cough and Dizziness (Light headed and tongue redness)        HPI:     River Sheehan Jr. is a 78 y.o. male here today for continued cough. It's just a dry cough. It's not as bad. It just hasn't gone away. He has a light headed feeling. His BP was running a low 90's for a week. He had no energy then. He doesn't drink water, even the tea he used to drink he's not got a taste for. His wife tried punch and he drank some last week but hasn't touched it this week. His wife thinks it's the stress he's been under. 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 He ended up having to pack the truck and after trying to protect an antique she was given by a friend that he'd done some repair on and wrapping it her truck top leaked and the wrapping got wet and the veneer on top was ruined so she was upset about that.       Subjective:     Review of Systems   Constitutional:  Positive for malaise/fatigue.   HENT:          Sore throat and congestion seem to be improving some   Respiratory:  Positive for cough. Negative for sputum production, shortness of breath and wheezing.    Cardiovascular:  Negative for chest pain and palpitations.   Gastrointestinal:         Not nauseated just not much taste to things, it seems to be starting to come back   Genitourinary:         Renal told him to drink 6 bottles of water a day,    Musculoskeletal:  Positive for back pain and myalgias.   Neurological:  Positive for dizziness. Negative for speech change and focal weakness.   Endo/Heme/Allergies:         Sugars went high, today it was back down to 170       Past Medical History:   Diagnosis Date    Acid reflux     Adenomatous polyp  of ascending colon 09/20/2021    Arthritis     CAD (coronary artery disease)     COVID-19 07/06/2022    Depression     Diabetes mellitus     Gout     Hearing loss     High cholesterol     HTN (hypertension)     Kidney stone     Macrocytic anemia     Osteoporosis     Personal history of colonic polyps 09/20/2021    Dr. Chun Smith        Past Surgical History:   Procedure Laterality Date    COLONOSCOPY W/ BIOPSIES  09/20/2021    Dr. Chun Smith    CYSTOSCOPY      EXTRACAPSULAR EXTRACTION OF CATARACT      HERNIA REPAIR      LITHOTRIPSY      RETROGRADE PYELOGRAPHY         Family History   Problem Relation Name Age of Onset    Bladder Cancer Mother      Hypertension Sister      Asthma Sister      Diabetes Sister      Lung cancer Sister      Hypertension Brother      Diabetes Brother      Coronary artery disease Brother      Atrial fibrillation Brother      Esophageal cancer Brother      Kidney cancer Brother      Hypertension Brother      Coronary artery disease Brother      Diabetes Brother      Progressive Supranuclear Palsy Brother      Coronary artery disease Brother      Pancreatic cancer Brother      Colon polyps Brother      Heart murmur Brother          Social History     Socioeconomic History    Marital status:    Tobacco Use    Smoking status: Never     Passive exposure: Never    Smokeless tobacco: Never   Substance and Sexual Activity    Alcohol use: Yes     Alcohol/week: 1.0 standard drink of alcohol     Types: 1 Cans of beer per week    Drug use: Never    Sexual activity: Yes     Social Determinants of Health     Financial Resource Strain: Low Risk  (5/11/2023)    Overall Financial Resource Strain (CARDIA)     Difficulty of Paying Living Expenses: Not hard at all   Food Insecurity: No Food Insecurity (5/11/2023)    Hunger Vital Sign     Worried About Running Out of Food in the Last Year: Never true     Ran Out of Food in the Last Year: Never true   Transportation Needs: No Transportation  Needs (5/11/2023)    PRAPARE - Transportation     Lack of Transportation (Medical): No     Lack of Transportation (Non-Medical): No   Physical Activity: Sufficiently Active (5/11/2023)    Exercise Vital Sign     Days of Exercise per Week: 5 days     Minutes of Exercise per Session: 30 min   Stress: No Stress Concern Present (5/11/2023)    [employer] [place] [employer] - Occupational Stress Questionnaire     Feeling of Stress : Not at all   Housing Stability: Low Risk  (5/11/2023)    Housing Stability Vital Sign     Unable to Pay for Housing in the Last Year: No     Number of Places Lived in the Last Year: 1     Unstable Housing in the Last Year: No       Review of patient's allergies indicates:  No Known Allergies    Outpatient Medications Marked as Taking for the 7/10/24 encounter (Office Visit) with Chio Villela MD   Medication Sig Dispense Refill    albuterol (PROAIR HFA) 90 mcg/actuation inhaler Inhale 2 puffs into the lungs every 6 (six) hours as needed for Wheezing. Rescue 8 g 0    allopurinoL (ZYLOPRIM) 100 MG tablet TAKE 1 TABLET EVERY DAY 90 tablet 3    amLODIPine (NORVASC) 10 MG tablet Take 10 mg by mouth once daily.      aspirin (ECOTRIN) 81 MG EC tablet Take 81 mg by mouth once daily.      atorvastatin (LIPITOR) 20 MG tablet TAKE 1 TABLET AT BEDTIME 90 tablet 3    b complex vitamins tablet Take 1 tablet by mouth once daily.      blood sugar diagnostic (TRUE METRIX GLUCOSE TEST STRIP) Strp 1 strip by Misc.(Non-Drug; Combo Route) route once daily. 150 strip 3    busPIRone (BUSPAR) 10 MG tablet TAKE 1/2 TO 1 TABLET THREE TIMES DAILY (Patient taking differently: Taking 1 qd) 270 tablet 3    cinnamon bark 500 mg capsule Take 1,000 mg by mouth once daily.      citalopram (CELEXA) 20 MG tablet TAKE 1 TABLET EVERY DAY 90 tablet 3    gabapentin (NEURONTIN) 100 MG capsule TAKE 2 CAPSULES (200 MG TOTAL) THREE TIMES DAILY (Patient taking differently: Taking total of 7 a day) 540 capsule 1     "glucosamine/chondr navarro A sod (OSTEO BI-FLEX ORAL) Take by mouth Daily. TAKING 2 QD      KRILL OIL ORAL Take by mouth once daily at 6am.      losartan (COZAAR) 50 MG tablet Take 50 mg by mouth once daily.      metFORMIN (GLUCOPHAGE) 500 MG tablet TAKE 1 TABLET TWICE DAILY WITH MEALS 180 tablet 3    montelukast (SINGULAIR) 10 mg tablet TAKE 1 TABLET EVERY EVENING 90 tablet 3    pantoprazole (PROTONIX) 40 MG tablet TAKE 1 TABLET EVERY DAY 90 tablet 3    tamsulosin (FLOMAX) 0.4 mg Cap Take 0.4 mg by mouth once daily.         Patient Care Team:  Chio Villela MD as PCP - General (Internal Medicine)  Geena Johnston MD as Consulting Physician (Nephrology)  Marlene Figueroa MD as Consulting Physician (Cardiovascular Disease)  Jamin Berrios MD as Consulting Physician (Urology)       Objective:     /67   Pulse 84   Temp 97.5 °F (36.4 °C) (Oral)   Resp 16   Ht 5' 8" (1.727 m)   Wt 85.3 kg (188 lb)   SpO2 (!) 92%   BMI 28.59 kg/m²     Physical Exam  Vitals reviewed.   Constitutional:       Appearance: He is not ill-appearing.   HENT:      Right Ear: Tympanic membrane, ear canal and external ear normal.      Left Ear: Tympanic membrane, ear canal and external ear normal.      Mouth/Throat:      Mouth: Mucous membranes are moist.      Pharynx: No oropharyngeal exudate or posterior oropharyngeal erythema.   Eyes:      General: No scleral icterus.     Pupils: Pupils are equal, round, and reactive to light.   Cardiovascular:      Rate and Rhythm: Normal rate and regular rhythm.      Heart sounds:      No gallop.   Pulmonary:      Effort: Pulmonary effort is normal.      Breath sounds: No wheezing, rhonchi or rales.   Lymphadenopathy:      Cervical: No cervical adenopathy.   Skin:     General: Skin is warm and dry.      Findings: No bruising or rash.   Neurological:      Mental Status: He is alert and oriented to person, place, and time.      Motor: No weakness.      Coordination: Coordination normal. "      Gait: Gait normal.   Psychiatric:      Comments: He doesn't really feel stressed, Renal wanted him to get off Buspar               Assessment/Plan:     1. Cough, unspecified type  Comments:  Improving, will not repeat antibiotic    2. Dizziness and giddiness  Comments:  Persistent with improved BP, I still am concerned it is from dehydration but need to rule out central source.  Orders:  -     CT Head Without Contrast; Future; Expected date: 07/10/2024  -     Cancel: Basic Metabolic Panel  -     Basic Metabolic Panel    3. Uncontrolled type 2 diabetes mellitus with hyperglycemia  Comments:  Sugars went up, will recheck sugar today, he's been under good control but is less active.  Orders:  -     Cancel: Basic Metabolic Panel  -     Basic Metabolic Panel             Follow up for [facility] in November. In addition to their scheduled follow up, the patient has also been instructed to follow up on as needed basis.     Signature:  Chio Villela MD  [facility]  [id number] [id number]

## 2024-07-11 LAB
BUN SERPL-MCNC: 18 MG/DL (ref 8–27)
BUN/CREAT SERPL: 19 (ref 10–24)
CALCIUM SERPL-MCNC: 10 MG/DL (ref 8.6–10.2)
CHLORIDE SERPL-SCNC: 99 MMOL/L (ref 96–106)
CO2 SERPL-SCNC: 27 MMOL/L (ref 20–29)
CREAT SERPL-MCNC: 0.93 MG/DL (ref 0.76–1.27)
EST. GFR  (NO RACE VARIABLE): 85 ML/MIN/1.73
GLUCOSE SERPL-MCNC: 285 MG/DL (ref 70–99)
POTASSIUM SERPL-SCNC: 5 MMOL/L (ref 3.5–5.2)
SODIUM SERPL-SCNC: 139 MMOL/L (ref 134–144)

## 2024-07-15 ENCOUNTER — HOSPITAL ENCOUNTER (OUTPATIENT)
Dept: RADIOLOGY | Facility: HOSPITAL | Age: 79
Discharge: HOME | End: 2024-07-15
Attending: INTERNAL MEDICINE
Payer: MEDICARE

## 2024-07-15 DIAGNOSIS — R42 DIZZINESS AND GIDDINESS: ICD-10-CM

## 2024-07-15 PROCEDURE — 70450 CT HEAD/BRAIN W/O DYE: CPT | Mod: TC

## 2024-07-15 RX ORDER — CANAGLIFLOZIN 100 MG/1
100 TABLET, FILM COATED ORAL DAILY
Qty: 90 TABLET | Refills: 1 | Status: SHIPPED | OUTPATIENT
Start: 2024-07-15 | End: 2024-07-17

## 2024-07-17 ENCOUNTER — TELEPHONE (OUTPATIENT)
Dept: PRIMARY CARE CLINIC | Facility: CLINIC | Age: 79
End: 2024-07-17
Payer: MEDICARE

## 2024-07-17 RX ORDER — PIOGLITAZONEHYDROCHLORIDE 15 MG/1
15 TABLET ORAL DAILY
Qty: 90 TABLET | Refills: 3 | Status: SHIPPED | OUTPATIENT
Start: 2024-07-17

## 2024-07-17 NOTE — TELEPHONE ENCOUNTER
I don't really have anything similar that protects kidney that is affordable,they are all still under patent. I'll send low dose of pioglitazone, it's got some benefits for liver.

## 2024-07-17 NOTE — TELEPHONE ENCOUNTER
----- Message from Stephani Mancini sent at 7/17/2024  4:18 PM CDT -----  Regarding: meds  .Who Called: River Sheehan Jr.    Caller is requesting assistance/information from provider's office.    Symptoms (please be specific):    How long has patient had these symptoms:    List of preferred pharmacies on file (remove unneeded): [unfilled]  If different, enter pharmacy into here including location and phone number:[facility] - [facility] [address]       Preferred Method of Contact: Phone Call  Patient's Preferred Phone Number on File: [phone]   Best Call Back Number, if different:  Additional Information: Pt states that canagliflozin (INVOKANA) 100 mg Tab tablet is too expensive, states he wants something similar called in at a cheaper price.

## 2024-07-18 NOTE — TELEPHONE ENCOUNTER
----- Message from Myron Felipe sent at 7/18/2024  9:52 AM CDT -----  .Type:  Patient Returning Call    Who Called:pt's spouse Olinda   Who Left Message for Patient:suraj ?  Does the patient know what this is regarding?:returning a call   Would the patient rather a call back or a response via [facility]? Call back   Best Call Back Number:[phone]  Additional Information:

## 2024-07-23 ENCOUNTER — LAB VISIT (OUTPATIENT)
Dept: LAB | Facility: HOSPITAL | Age: 79
End: 2024-07-23
Attending: INTERNAL MEDICINE
Payer: MEDICARE

## 2024-07-23 DIAGNOSIS — I10 PRIMARY HYPERTENSION: ICD-10-CM

## 2024-07-23 LAB
ALBUMIN SERPL-MCNC: 3.6 G/DL (ref 3.4–4.8)
ALBUMIN/GLOB SERPL: 1.1 RATIO (ref 1.1–2)
ALP SERPL-CCNC: 102 UNIT/L (ref 40–150)
ALT SERPL-CCNC: 21 UNIT/L (ref 0–55)
ANION GAP SERPL CALC-SCNC: 10 MEQ/L
AST SERPL-CCNC: 16 UNIT/L (ref 5–34)
BACTERIA #/AREA URNS AUTO: ABNORMAL /HPF
BASOPHILS # BLD AUTO: 0.03 X10(3)/MCL
BASOPHILS NFR BLD AUTO: 0.4 %
BILIRUB SERPL-MCNC: 0.4 MG/DL
BILIRUB UR QL STRIP.AUTO: NEGATIVE
BUN SERPL-MCNC: 17.7 MG/DL (ref 8.4–25.7)
CALCIUM SERPL-MCNC: 10.3 MG/DL (ref 8.8–10)
CHLORIDE SERPL-SCNC: 101 MMOL/L (ref 98–107)
CLARITY UR: CLEAR
CO2 SERPL-SCNC: 28 MMOL/L (ref 23–31)
COLOR UR AUTO: YELLOW
CREAT SERPL-MCNC: 0.89 MG/DL (ref 0.73–1.18)
CREAT UR-MCNC: 204.4 MG/DL (ref 63–166)
CREAT/UREA NIT SERPL: 20
EOSINOPHIL # BLD AUTO: 0.28 X10(3)/MCL (ref 0–0.9)
EOSINOPHIL NFR BLD AUTO: 3.7 %
ERYTHROCYTE [DISTWIDTH] IN BLOOD BY AUTOMATED COUNT: 13.1 % (ref 11.5–17)
GFR SERPLBLD CREATININE-BSD FMLA CKD-EPI: >60 ML/MIN/1.73/M2
GLOBULIN SER-MCNC: 3.3 GM/DL (ref 2.4–3.5)
GLUCOSE SERPL-MCNC: 176 MG/DL (ref 82–115)
GLUCOSE UR QL STRIP: NEGATIVE
HCT VFR BLD AUTO: 38.9 % (ref 42–52)
HGB BLD-MCNC: 12.7 G/DL (ref 14–18)
HGB UR QL STRIP: NEGATIVE
IGA SERPL-MCNC: 330 MG/DL (ref 101–645)
IGG SERPL-MCNC: 945 MG/DL (ref 540–1822)
IGM SERPL-MCNC: 67 MG/DL (ref 22–240)
IMM GRANULOCYTES # BLD AUTO: 0.04 X10(3)/MCL (ref 0–0.04)
IMM GRANULOCYTES NFR BLD AUTO: 0.5 %
KETONES UR QL STRIP: NEGATIVE
LEUKOCYTE ESTERASE UR QL STRIP: NEGATIVE
LYMPHOCYTES # BLD AUTO: 2.18 X10(3)/MCL (ref 0.6–4.6)
LYMPHOCYTES NFR BLD AUTO: 28.6 %
MCH RBC QN AUTO: 30.3 PG (ref 27–31)
MCHC RBC AUTO-ENTMCNC: 32.6 G/DL (ref 33–36)
MCV RBC AUTO: 92.8 FL (ref 80–94)
MONOCYTES # BLD AUTO: 0.77 X10(3)/MCL (ref 0.1–1.3)
MONOCYTES NFR BLD AUTO: 10.1 %
MUCOUS THREADS URNS QL MICRO: ABNORMAL /LPF
NEUTROPHILS # BLD AUTO: 4.32 X10(3)/MCL (ref 2.1–9.2)
NEUTROPHILS NFR BLD AUTO: 56.7 %
NITRITE UR QL STRIP: NEGATIVE
PH UR STRIP: 5.5 [PH]
PHOSPHATE SERPL-MCNC: 2.5 MG/DL (ref 2.3–4.7)
PLATELET # BLD AUTO: 288 X10(3)/MCL (ref 130–400)
PMV BLD AUTO: 8.3 FL (ref 7.4–10.4)
POTASSIUM SERPL-SCNC: 4.6 MMOL/L (ref 3.5–5.1)
PROT SERPL-MCNC: 6.9 GM/DL (ref 5.8–7.6)
PROT UR QL STRIP: NEGATIVE
PROT UR STRIP-MCNC: 17.4 MG/DL
PTH-INTACT SERPL-MCNC: 53 PG/ML (ref 8.7–77)
RBC # BLD AUTO: 4.19 X10(6)/MCL (ref 4.7–6.1)
RBC #/AREA URNS AUTO: ABNORMAL /HPF
SODIUM SERPL-SCNC: 139 MMOL/L (ref 136–145)
SP GR UR STRIP.AUTO: 1.02 (ref 1–1.03)
SQUAMOUS #/AREA URNS AUTO: ABNORMAL /HPF
URINE PROTEIN/CREATININE RATIO (OLG): 0.1
UROBILINOGEN UR STRIP-ACNC: 0.2
WBC # BLD AUTO: 7.62 X10(3)/MCL (ref 4.5–11.5)
WBC #/AREA URNS AUTO: ABNORMAL /HPF

## 2024-07-23 PROCEDURE — 82784 ASSAY IGA/IGD/IGG/IGM EACH: CPT | Mod: 59

## 2024-07-23 PROCEDURE — 83521 IG LIGHT CHAINS FREE EACH: CPT

## 2024-07-23 PROCEDURE — 81003 URINALYSIS AUTO W/O SCOPE: CPT

## 2024-07-23 PROCEDURE — 36415 COLL VENOUS BLD VENIPUNCTURE: CPT

## 2024-07-23 PROCEDURE — 83970 ASSAY OF PARATHORMONE: CPT

## 2024-07-23 PROCEDURE — 80053 COMPREHEN METABOLIC PANEL: CPT

## 2024-07-23 PROCEDURE — 85025 COMPLETE CBC W/AUTO DIFF WBC: CPT

## 2024-07-23 PROCEDURE — 84165 PROTEIN E-PHORESIS SERUM: CPT

## 2024-07-23 PROCEDURE — 84156 ASSAY OF PROTEIN URINE: CPT

## 2024-07-23 PROCEDURE — 84100 ASSAY OF PHOSPHORUS: CPT

## 2024-07-24 LAB
ALBUMIN % SPEP (OHS): 49.73 (ref 48.1–59.5)
ALBUMIN SERPL-MCNC: 3.2 G/DL (ref 3.4–4.8)
ALBUMIN/GLOB SERPL: 1 RATIO (ref 1.1–2)
ALPHA 1 GLOB (OHS): 0.24 GM/DL (ref 0–0.4)
ALPHA 1 GLOB% (OHS): 3.69 (ref 2.3–4.9)
ALPHA 2 GLOB % (OHS): 12.04 (ref 6.9–13)
ALPHA 2 GLOB (OHS): 0.77 GM/DL (ref 0.4–1)
BETA GLOB (OHS): 1.15 GM/DL (ref 0.7–1.3)
BETA GLOB% (OHS): 17.92 (ref 13.8–19.7)
GAMMA GLOBULIN % (OHS): 16.61 (ref 10.1–21.9)
GAMMA GLOBULIN (OHS): 1.06 GM/DL (ref 0.4–1.8)
GLOBULIN SER-MCNC: 3.2 GM/DL (ref 2.4–3.5)
KAPPA LC FREE SER NEPH-MCNC: 2.19 MG/DL (ref 0.33–1.94)
KAPPA LC FREE/LAMBDA FREE SER NEPH: 1.51 {RATIO} (ref 0.26–1.65)
LAMBDA LC FREE SER NEPH-MCNC: 1.45 MG/DL (ref 0.57–2.63)
M SPIKE % (OHS): ABNORMAL
M SPIKE (OHS): ABNORMAL
PATH REV: NORMAL
PROT SERPL-MCNC: 6.4 GM/DL (ref 5.8–7.6)

## 2024-07-31 DIAGNOSIS — I65.29 CAROTID ARTERY STENOSIS: Primary | ICD-10-CM

## 2024-08-01 DIAGNOSIS — I65.23 BILATERAL CAROTID ARTERY STENOSIS: Primary | ICD-10-CM

## 2024-08-01 NOTE — PROGRESS NOTES
"    [facility] Vascular - Clinic Note  Hany Pendleton MD      Patient Name: River Sheehan Jr.                   : 1945      MRN: [id number]   Visit Date: 2024       History Present Illness     Reason for Visit: Carotid Artery Disease    Mr. Sheehan presents to the clinic for carotid artery disease.  He was a pleasant 78-year-old male referred over by Dr. Figueroa for carotid stenosis.  He has been followed for known left ICA stenosis, however it was most recent follow up this was noted to have progressed.  He underwent CTA which showed a 70-80% left ICA stenosis.  He denies any stroke or stroke-like symptoms including unilateral weakness, facial asymmetry, speech difficulties, or amaurosis symptoms.  He was a lifelong nonsmoker.  He was diabetic and well-controlled with an A1c of 6.6.        REVIEW OF SYSTEMS:  12 point review of systems conducted, negative except as stated in the history of present illness. See HPI for details.        Physical Exam      Vitals:    24 1056 24 1058   BP: 122/74 124/71   BP Location: Right arm Right arm   Pulse: 80 80   Weight: 84.4 kg (186 lb)    Height: 5' 8" (1.727 m)           General: well-nourished, no acute distress, and healthy appearing, alert, pleasant, conversant, and oriented  Neurologic: cranial nerves are grossly intact, no neurologic deficits, no motor deficits, and no sensory deficits  Neck/Chest: normal , soft without lymphadenopathy, and no carotid bruits noted  Respiratory: breathing easily, without respiratory distress, and normal breath sounds  Abdomen: normal and soft  Cardiology: regular rate and rhythm and no audible murmur    Upper Extremity Arterial Exam:   Right - radial is palpable and brachial is palpable  Left - radial is palpable and brachial is palpable      Musculoskeletal:   Upper Extremity: normal bilateral hand function and normal bilateral hand sensation,  5/5  and 5/5 strength  Lower Extremity: no edema present to " bilateral lower extremities 5/5 strength               Assessment and Plan     Mr. Sheehan is a 78 y.o. male with asymptomatic greater than 70% left ICA stenosis.  I had a discussion with him regarding the pathophysiology of carotid artery disease as it relates to stroke risk.  I did discuss left carotid endarterectomy with him for stroke risk reduction.  We also discussed TCAR, however I think anatomically he was a suboptimal candidate for this.  I explained the risks and benefits of left carotid endarterectomy with him including infection, bleeding, scar, stroke, cranial nerve injury.  He did want to go home and discuss the procedure with his wife before making a decision whether he wants to proceed.  He says he will call our office in the next day or 2.  We will reach out by the end of the week if he does not contact us to determine if he was any further questions or if he would like to proceed.          1. Stenosis of left carotid artery    2. Carotid artery stenosis  - Ambulatory referral/consult to Vascular Surgery          Imaging Obtained/Reviewed   Study: CTA neck  Date:   5/1/24  This shows a right ICA with less than 50% stenosis.  His left ICA shows about 75-80% stenosis at the bifurcation.      Medical History     Past Medical History:   Diagnosis Date    Acid reflux     Adenomatous polyp of ascending colon 09/20/2021    Arthritis     CAD (coronary artery disease)     COVID-19 07/06/2022    Depression     Diabetes mellitus     Gout     Hearing loss     High cholesterol     HTN (hypertension)     Kidney stone     Macrocytic anemia     Osteoporosis     Personal history of colonic polyps 09/20/2021    Dr. Chun Smith     Past Surgical History:   Procedure Laterality Date    COLONOSCOPY W/ BIOPSIES  09/20/2021    Dr. Chun Smith    CYSTOSCOPY      EXTRACAPSULAR EXTRACTION OF CATARACT      HERNIA REPAIR      LITHOTRIPSY  09/2023    RETROGRADE PYELOGRAPHY       Family History   Problem Relation  Name Age of Onset    Bladder Cancer Mother      Hypertension Sister      Asthma Sister      Diabetes Sister      Lung cancer Sister      Hypertension Brother      Diabetes Brother      Coronary artery disease Brother      Atrial fibrillation Brother      Esophageal cancer Brother      Kidney cancer Brother      Hypertension Brother      Coronary artery disease Brother      Diabetes Brother      Progressive Supranuclear Palsy Brother      Coronary artery disease Brother      Pancreatic cancer Brother      Colon polyps Brother      Heart murmur Brother       Social History     Socioeconomic History    Marital status:    Tobacco Use    Smoking status: Never     Passive exposure: Never    Smokeless tobacco: Never   Substance and Sexual Activity    Alcohol use: Yes     Alcohol/week: 1.0 standard drink of alcohol     Types: 1 Cans of beer per week    Drug use: Never    Sexual activity: Yes     Social Determinants of Health     Financial Resource Strain: Low Risk  (5/11/2023)    Overall Financial Resource Strain (CARDIA)     Difficulty of Paying Living Expenses: Not hard at all   Food Insecurity: No Food Insecurity (5/11/2023)    Hunger Vital Sign     Worried About Running Out of Food in the Last Year: Never true     Ran Out of Food in the Last Year: Never true   Transportation Needs: No Transportation Needs (5/11/2023)    PRAPARE - Transportation     Lack of Transportation (Medical): No     Lack of Transportation (Non-Medical): No   Physical Activity: Sufficiently Active (5/11/2023)    Exercise Vital Sign     Days of Exercise per Week: 5 days     Minutes of Exercise per Session: 30 min   Stress: No Stress Concern Present (5/11/2023)    [employer] - Occupational Stress Questionnaire     Feeling of Stress : Not at all   Housing Stability: Low Risk  (5/11/2023)    Housing Stability Vital Sign     Unable to Pay for Housing in the Last Year: No     Number of Places Lived in the Last Year: 1      Unstable Housing in the Last Year: No     Current Outpatient Medications   Medication Instructions    albuterol (PROAIR HFA) 90 mcg/actuation inhaler 2 puffs, Inhalation, Every 6 hours PRN, Rescue    allopurinoL (ZYLOPRIM) 100 MG tablet TAKE 1 TABLET EVERY DAY    amLODIPine (NORVASC) 10 mg, Oral, Daily    aspirin (ECOTRIN) 81 mg, Oral, Daily    atorvastatin (LIPITOR) 20 MG tablet TAKE 1 TABLET AT BEDTIME    b complex vitamins tablet 1 tablet, Oral, Daily    blood sugar diagnostic (TRUE METRIX GLUCOSE TEST STRIP) Strp 1 strip, Misc.(Non-Drug; Combo Route), Daily    blood-glucose meter (TRUE METRIX AIR GLUCOSE METER) kit Test daily for DM II E11.9    busPIRone (BUSPAR) 10 MG tablet TAKE 1/2 TO 1 TABLET THREE TIMES DAILY    cinnamon bark 1,000 mg, Oral, Daily    citalopram (CELEXA) 20 mg, Oral    gabapentin (NEURONTIN) 100 MG capsule TAKE 2 CAPSULES (200 MG TOTAL) THREE TIMES DAILY    glucosamine/chondr navarro A sod (OSTEO BI-FLEX ORAL) Oral, Daily, TAKING 2 QD    KRILL OIL ORAL Oral, Daily    losartan (COZAAR) 50 mg, Oral, Daily    metFORMIN (GLUCOPHAGE) 500 mg, Oral, 2 times daily with meals    montelukast (SINGULAIR) 10 mg, Oral, Nightly    pantoprazole (PROTONIX) 40 MG tablet TAKE 1 TABLET EVERY DAY    pioglitazone (ACTOS) 15 mg, Oral, Daily    tamsulosin (FLOMAX) 0.4 mg, Oral, Daily     Review of patient's allergies indicates:  No Known Allergies    Patient Care Team:  Chio Villela MD as PCP - General (Internal Medicine)  Marlene Figueroa MD as Consulting Physician (Cardiovascular Disease)  Jamin Berrios MD as Consulting Physician (Urology)  Leonie Roca MD as Consulting Physician (Nephrology)        No follow-ups on file. In addition to their scheduled follow up, the patient has also been instructed to follow up on as needed basis.     Future Appointments   Date Time Provider Department Center   9/10/2024  2:00 PM Sam Maher MD [address]   11/21/2024  9:20 AM Chio Villela,  MD [facility] Murillo PCP   12/19/2024  1:15 PM Leonie Roca MD [facility]illa

## 2024-08-07 RX ORDER — CITALOPRAM 20 MG/1
20 TABLET, FILM COATED ORAL
Qty: 90 TABLET | Refills: 3 | Status: SHIPPED | OUTPATIENT
Start: 2024-08-07

## 2024-08-12 ENCOUNTER — OFFICE VISIT (OUTPATIENT)
Dept: VASCULAR SURGERY | Facility: CLINIC | Age: 79
End: 2024-08-12
Payer: MEDICARE

## 2024-08-12 VITALS
DIASTOLIC BLOOD PRESSURE: 71 MMHG | HEART RATE: 80 BPM | SYSTOLIC BLOOD PRESSURE: 124 MMHG | BODY MASS INDEX: 28.19 KG/M2 | WEIGHT: 186 LBS | HEIGHT: 68 IN

## 2024-08-12 DIAGNOSIS — I65.22 ASYMPTOMATIC STENOSIS OF LEFT CAROTID ARTERY: Primary | ICD-10-CM

## 2024-08-12 DIAGNOSIS — I65.29 CAROTID ARTERY STENOSIS: ICD-10-CM

## 2024-08-12 PROCEDURE — 99204 OFFICE O/P NEW MOD 45 MIN: CPT | Mod: ,,, | Performed by: SURGERY

## 2024-08-13 DIAGNOSIS — I65.22 ASYMPTOMATIC STENOSIS OF LEFT CAROTID ARTERY: Primary | ICD-10-CM

## 2024-08-13 DIAGNOSIS — I65.22 OCCLUSION OF LEFT CAROTID ARTERY: ICD-10-CM

## 2024-08-13 RX ORDER — SODIUM CHLORIDE 9 MG/ML
INJECTION, SOLUTION INTRAVENOUS CONTINUOUS
OUTPATIENT
Start: 2024-08-13

## 2024-08-21 ENCOUNTER — ANESTHESIA EVENT (OUTPATIENT)
Dept: SURGERY | Facility: HOSPITAL | Age: 79
DRG: 039 | End: 2024-08-21
Payer: MEDICARE

## 2024-08-28 ENCOUNTER — HOSPITAL ENCOUNTER (OUTPATIENT)
Dept: RADIOLOGY | Facility: HOSPITAL | Age: 79
Discharge: HOME | End: 2024-08-28
Attending: SURGERY
Payer: MEDICARE

## 2024-08-28 ENCOUNTER — HOSPITAL ENCOUNTER (OUTPATIENT)
Dept: CARDIOLOGY | Facility: HOSPITAL | Age: 79
Discharge: HOME | End: 2024-08-28
Attending: SURGERY
Payer: MEDICARE

## 2024-08-28 DIAGNOSIS — I65.22 ASYMPTOMATIC STENOSIS OF LEFT CAROTID ARTERY: ICD-10-CM

## 2024-08-28 PROCEDURE — 93010 ELECTROCARDIOGRAM REPORT: CPT | Mod: ,,, | Performed by: INTERNAL MEDICINE

## 2024-08-28 PROCEDURE — 71046 X-RAY EXAM CHEST 2 VIEWS: CPT | Mod: TC

## 2024-08-28 PROCEDURE — 99900031 HC PATIENT EDUCATION (STAT)

## 2024-08-28 PROCEDURE — 93005 ELECTROCARDIOGRAM TRACING: CPT

## 2024-08-29 LAB
OHS QRS DURATION: 112 MS
OHS QTC CALCULATION: 408 MS

## 2024-09-03 ENCOUNTER — TELEPHONE (OUTPATIENT)
Dept: NEPHROLOGY | Facility: CLINIC | Age: 79
End: 2024-09-03
Payer: MEDICARE

## 2024-09-03 ENCOUNTER — ANESTHESIA (OUTPATIENT)
Dept: SURGERY | Facility: HOSPITAL | Age: 79
DRG: 039 | End: 2024-09-03
Payer: MEDICARE

## 2024-09-03 NOTE — TELEPHONE ENCOUNTER
----- Message from RAGHAV Fowler sent at 9/3/2024  8:40 AM CDT -----  Regarding: hyperkalemia, hypercalcemia.  Please non low-potassium diet including avoiding things like potatoes, tomatoes products, oranges, bananas, avocados, watermelon.    When is he having surgery?  Please add SPEP to be done that day as well.    Calcium elevated.  Stop any vitamin-D or calcium supplements if taken any.  ----- Message -----  From: Chiqui Chow RN  Sent: 8/30/2024  12:19 PM CDT  To: Leonie Roca MD      Spoke with wife Olinda regarding lab results, instructed on low Potassium diet, Surgery is scheduled for 9-12-24. Instructed on stopping Vitamin D and any calcium supplements. Verbalized understanding.

## 2024-09-04 DIAGNOSIS — I65.22 ASYMPTOMATIC STENOSIS OF LEFT CAROTID ARTERY: Primary | ICD-10-CM

## 2024-09-10 ENCOUNTER — TELEPHONE (OUTPATIENT)
Dept: VASCULAR SURGERY | Facility: CLINIC | Age: 79
End: 2024-09-10
Payer: MEDICARE

## 2024-09-10 NOTE — PRE-PROCEDURE INSTRUCTIONS
"[facility]: Outpatient Surgery  Preprocedure Check-In Instructions     Your arrival time for your surgery or procedure is ______.  We ask patients to arrive about 2 hours before surgery to allow for enough time to review your health history & medications, start your IV, complete any outstanding labwork or tests, and meet your Anesthesiologist.    Expectations: "Because of inconsistent procedure completion times, an unexpected wait may occur. The Physicians would like you to be here to prepare in the event they run ahead of time. We will make you as comfortable as possible and keep you informed. We apologize in advance if this happens."    You will arrive at [facility], [address], [place].  Enter through the [place] entrance next to the Emergency Room, and come to the 6th floor to the Outpatient Surgery Department.     Visitory Policy:  You are allowed 2 adult visitors to be with you in the hospital. All hospital visitors should be in good current health.  No small children.     What to Bring:  Please have your ID, insurance cards, and all home medication bottles with you at check in.  Bring your CPAP machine if one is used at home.     Fasting:  Nothing to eat or drink after midnight the night before your procedure. This includes no ice, gum, hard candies, and/or tobacco products.  Follow your doctor's instructions for taking any medications on the morning of your procedure.  If no instructions for taking medications were given, do not take any medications but bring your medications in their bottles to your procedure check in.     Follow your doctor's preoperative instructions regarding skin prep, bowel prep, bathing, or showering prior to your procedure.  If any special soaps were provided to you, please use according to your doctor's instructions. If no instructions were given from your doctor, take a good bath or shower with antibacterial soap the night before " and the morning of your procedure.  On the morning of procedure, wear loose, comfortable clothing.  No lotions, makeup, perfumes, colognes, deodorant, or jewelry to your procedure.  Removable items (glasses, contact lenses, dentures, retainers, hearing aids) need to be removed for your procedure.  Bring your storage containers for these items if you wear them.     Artificial nails, body jewelry, eyelash extensions, and/or hair extensions with metal clips are not allowed during your surgery.  If you currently wear any of these items, please arrange for them to be removed prior to your arrival to the hospital.     Outpatient or Same Day Surgeries:  Any patients receiving sedation/anesthesia are advised not to drive for 24 hours after their procedure.  We do not allow patients to drive themselves home after discharge.  If you are going home after your procedure, please have someone available to drive you home from the hospital.        You may call the Outpatient Surgery Department at [phone] with any questions or concerns.  We are looking forward to meeting you and taking great care of you for your procedure.  Thank you for choosing [facility] for your surgical needs.

## 2024-09-11 RX ORDER — GABAPENTIN 100 MG/1
CAPSULE ORAL
Qty: 540 CAPSULE | Refills: 3 | Status: SHIPPED | OUTPATIENT
Start: 2024-09-11

## 2024-09-12 ENCOUNTER — HOSPITAL ENCOUNTER (INPATIENT)
Facility: HOSPITAL | Age: 79
LOS: 1 days | Discharge: HOME | DRG: 039 | End: 2024-09-13
Attending: SURGERY | Admitting: SURGERY
Payer: MEDICARE

## 2024-09-12 DIAGNOSIS — I65.22 ASYMPTOMATIC STENOSIS OF LEFT CAROTID ARTERY: ICD-10-CM

## 2024-09-12 LAB
ABORH RETYPE: NORMAL
ANION GAP SERPL CALC-SCNC: 9 MEQ/L
BUN SERPL-MCNC: 19.3 MG/DL (ref 8.4–25.7)
CALCIUM SERPL-MCNC: 9.7 MG/DL (ref 8.8–10)
CHLORIDE SERPL-SCNC: 107 MMOL/L (ref 98–107)
CO2 SERPL-SCNC: 25 MMOL/L (ref 23–31)
CREAT SERPL-MCNC: 0.86 MG/DL (ref 0.73–1.18)
CREAT/UREA NIT SERPL: 22
EST. AVERAGE GLUCOSE BLD GHB EST-MCNC: 154.2 MG/DL
GFR SERPLBLD CREATININE-BSD FMLA CKD-EPI: >60 ML/MIN/1.73/M2
GLUCOSE SERPL-MCNC: 111 MG/DL (ref 82–115)
GROUP & RH: NORMAL
HBA1C MFR BLD: 7 %
INDIRECT COOMBS: NORMAL
POC ACTIVATED CLOTTING TIME K: 263 SEC (ref 74–137)
POCT GLUCOSE: 116 MG/DL (ref 70–110)
POCT GLUCOSE: 131 MG/DL (ref 70–110)
POTASSIUM SERPL-SCNC: 4.6 MMOL/L (ref 3.5–5.1)
SAMPLE: ABNORMAL
SODIUM SERPL-SCNC: 141 MMOL/L (ref 136–145)
SPECIMEN OUTDATE: NORMAL

## 2024-09-12 PROCEDURE — 82962 GLUCOSE BLOOD TEST: CPT | Performed by: SURGERY

## 2024-09-12 PROCEDURE — 25000003 PHARM REV CODE 250

## 2024-09-12 PROCEDURE — 83036 HEMOGLOBIN GLYCOSYLATED A1C: CPT | Performed by: SURGERY

## 2024-09-12 PROCEDURE — 71000033 HC RECOVERY, INTIAL HOUR: Performed by: SURGERY

## 2024-09-12 PROCEDURE — 11000001 HC ACUTE MED/SURG PRIVATE ROOM

## 2024-09-12 PROCEDURE — D9220A PRA ANESTHESIA: Mod: ANES,,, | Performed by: ANESTHESIOLOGY

## 2024-09-12 PROCEDURE — 36620 INSERTION CATHETER ARTERY: CPT | Performed by: ANESTHESIOLOGY

## 2024-09-12 PROCEDURE — 86850 RBC ANTIBODY SCREEN: CPT | Performed by: ANESTHESIOLOGY

## 2024-09-12 PROCEDURE — 36000707: Performed by: SURGERY

## 2024-09-12 PROCEDURE — 63600175 PHARM REV CODE 636 W HCPCS: Performed by: SURGERY

## 2024-09-12 PROCEDURE — 80048 BASIC METABOLIC PNL TOTAL CA: CPT | Performed by: SURGERY

## 2024-09-12 PROCEDURE — 88304 TISSUE EXAM BY PATHOLOGIST: CPT | Performed by: SURGERY

## 2024-09-12 PROCEDURE — 35301 RECHANNELING OF ARTERY: CPT | Mod: LT,,, | Performed by: SURGERY

## 2024-09-12 PROCEDURE — 03UL0KZ SUPPLEMENT LEFT INTERNAL CAROTID ARTERY WITH NONAUTOLOGOUS TISSUE SUBSTITUTE, OPEN APPROACH: ICD-10-PCS | Performed by: SURGERY

## 2024-09-12 PROCEDURE — 86901 BLOOD TYPING SEROLOGIC RH(D): CPT | Performed by: ANESTHESIOLOGY

## 2024-09-12 PROCEDURE — 63600175 PHARM REV CODE 636 W HCPCS

## 2024-09-12 PROCEDURE — 35301 RECHANNELING OF ARTERY: CPT | Mod: AS,LT,, | Performed by: PHYSICIAN ASSISTANT

## 2024-09-12 PROCEDURE — 21400001 HC TELEMETRY ROOM

## 2024-09-12 PROCEDURE — D9220A PRA ANESTHESIA: Mod: CRNA,,,

## 2024-09-12 PROCEDURE — 36000706: Performed by: SURGERY

## 2024-09-12 PROCEDURE — 37000009 HC ANESTHESIA EA ADD 15 MINS: Performed by: SURGERY

## 2024-09-12 PROCEDURE — 63600175 PHARM REV CODE 636 W HCPCS: Performed by: ANESTHESIOLOGY

## 2024-09-12 PROCEDURE — 37000008 HC ANESTHESIA 1ST 15 MINUTES: Performed by: SURGERY

## 2024-09-12 PROCEDURE — 71000015 HC POSTOP RECOV 1ST HR: Performed by: SURGERY

## 2024-09-12 PROCEDURE — C1768 GRAFT, VASCULAR: HCPCS | Performed by: SURGERY

## 2024-09-12 PROCEDURE — 25000003 PHARM REV CODE 250: Performed by: SURGERY

## 2024-09-12 PROCEDURE — 36415 COLL VENOUS BLD VENIPUNCTURE: CPT | Performed by: ANESTHESIOLOGY

## 2024-09-12 PROCEDURE — 88311 DECALCIFY TISSUE: CPT

## 2024-09-12 PROCEDURE — 86900 BLOOD TYPING SEROLOGIC ABO: CPT | Performed by: ANESTHESIOLOGY

## 2024-09-12 PROCEDURE — 71000016 HC POSTOP RECOV ADDL HR: Performed by: SURGERY

## 2024-09-12 PROCEDURE — 03CL0ZZ EXTIRPATION OF MATTER FROM LEFT INTERNAL CAROTID ARTERY, OPEN APPROACH: ICD-10-PCS | Performed by: SURGERY

## 2024-09-12 PROCEDURE — 27201423 OPTIME MED/SURG SUP & DEVICES STERILE SUPPLY: Performed by: SURGERY

## 2024-09-12 PROCEDURE — 36415 COLL VENOUS BLD VENIPUNCTURE: CPT | Performed by: SURGERY

## 2024-09-12 DEVICE — XENOSURE BIOLOGIC PATCH, 0.8CM X 8CM, EIFU
Type: IMPLANTABLE DEVICE | Site: CAROTID | Status: FUNCTIONAL
Brand: XENOSURE BIOLOGIC PATCH

## 2024-09-12 RX ORDER — CALCIUM CHLORIDE INJECTION 100 MG/ML
INJECTION, SOLUTION INTRAVENOUS
Status: DISCONTINUED | OUTPATIENT
Start: 2024-09-12 | End: 2024-09-12

## 2024-09-12 RX ORDER — PROCHLORPERAZINE EDISYLATE 5 MG/ML
5 INJECTION INTRAMUSCULAR; INTRAVENOUS EVERY 30 MIN PRN
Status: DISCONTINUED | OUTPATIENT
Start: 2024-09-12 | End: 2024-09-12 | Stop reason: HOSPADM

## 2024-09-12 RX ORDER — IBUPROFEN 200 MG
16 TABLET ORAL
Status: DISCONTINUED | OUTPATIENT
Start: 2024-09-12 | End: 2024-09-13 | Stop reason: HOSPADM

## 2024-09-12 RX ORDER — PROPOFOL 10 MG/ML
VIAL (ML) INTRAVENOUS
Status: DISCONTINUED | OUTPATIENT
Start: 2024-09-12 | End: 2024-09-12

## 2024-09-12 RX ORDER — ACETAMINOPHEN 10 MG/ML
INJECTION, SOLUTION INTRAVENOUS
Status: DISCONTINUED | OUTPATIENT
Start: 2024-09-12 | End: 2024-09-12

## 2024-09-12 RX ORDER — EPHEDRINE SULFATE 50 MG/ML
INJECTION, SOLUTION INTRAVENOUS
Status: DISCONTINUED | OUTPATIENT
Start: 2024-09-12 | End: 2024-09-12

## 2024-09-12 RX ORDER — INSULIN ASPART 100 [IU]/ML
0-10 INJECTION, SOLUTION INTRAVENOUS; SUBCUTANEOUS
Status: DISCONTINUED | OUTPATIENT
Start: 2024-09-12 | End: 2024-09-13 | Stop reason: HOSPADM

## 2024-09-12 RX ORDER — MUPIROCIN 20 MG/G
OINTMENT TOPICAL 2 TIMES DAILY
Status: CANCELLED | OUTPATIENT
Start: 2024-09-12 | End: 2024-09-15

## 2024-09-12 RX ORDER — HEPARIN SODIUM 5000 [USP'U]/ML
INJECTION, SOLUTION INTRAVENOUS; SUBCUTANEOUS
Status: DISCONTINUED | OUTPATIENT
Start: 2024-09-12 | End: 2024-09-12 | Stop reason: HOSPADM

## 2024-09-12 RX ORDER — HYDROCODONE BITARTRATE AND ACETAMINOPHEN 5; 325 MG/1; MG/1
1 TABLET ORAL EVERY 4 HOURS PRN
Status: CANCELLED | OUTPATIENT
Start: 2024-09-12

## 2024-09-12 RX ORDER — GLUCAGON 1 MG
1 KIT INJECTION
Status: DISCONTINUED | OUTPATIENT
Start: 2024-09-12 | End: 2024-09-12

## 2024-09-12 RX ORDER — PROTAMINE SULFATE 10 MG/ML
INJECTION, SOLUTION INTRAVENOUS
Status: DISCONTINUED | OUTPATIENT
Start: 2024-09-12 | End: 2024-09-12

## 2024-09-12 RX ORDER — ONDANSETRON HYDROCHLORIDE 2 MG/ML
INJECTION, SOLUTION INTRAVENOUS
Status: DISCONTINUED | OUTPATIENT
Start: 2024-09-12 | End: 2024-09-12

## 2024-09-12 RX ORDER — ASPIRIN 81 MG/1
81 TABLET ORAL DAILY
Status: DISCONTINUED | OUTPATIENT
Start: 2024-09-12 | End: 2024-09-13 | Stop reason: HOSPADM

## 2024-09-12 RX ORDER — CEFAZOLIN SODIUM 2 G/50ML
2 SOLUTION INTRAVENOUS
Status: CANCELLED | OUTPATIENT
Start: 2024-09-12 | End: 2024-09-13

## 2024-09-12 RX ORDER — GLYCOPYRROLATE 0.2 MG/ML
INJECTION INTRAMUSCULAR; INTRAVENOUS
Status: DISCONTINUED | OUTPATIENT
Start: 2024-09-12 | End: 2024-09-12

## 2024-09-12 RX ORDER — DEXAMETHASONE SODIUM PHOSPHATE 4 MG/ML
INJECTION, SOLUTION INTRA-ARTICULAR; INTRALESIONAL; INTRAMUSCULAR; INTRAVENOUS; SOFT TISSUE
Status: DISCONTINUED | OUTPATIENT
Start: 2024-09-12 | End: 2024-09-12

## 2024-09-12 RX ORDER — ATORVASTATIN CALCIUM 10 MG/1
20 TABLET, FILM COATED ORAL NIGHTLY
Status: CANCELLED | OUTPATIENT
Start: 2024-09-12

## 2024-09-12 RX ORDER — LIDOCAINE HYDROCHLORIDE 20 MG/ML
INJECTION, SOLUTION EPIDURAL; INFILTRATION; INTRACAUDAL; PERINEURAL
Status: DISCONTINUED | OUTPATIENT
Start: 2024-09-12 | End: 2024-09-12

## 2024-09-12 RX ORDER — CEFAZOLIN SODIUM 2 G/50ML
2 SOLUTION INTRAVENOUS
Status: COMPLETED | OUTPATIENT
Start: 2024-09-12 | End: 2024-09-12

## 2024-09-12 RX ORDER — TAMSULOSIN HYDROCHLORIDE 0.4 MG/1
0.4 CAPSULE ORAL DAILY
Status: CANCELLED | OUTPATIENT
Start: 2024-09-12

## 2024-09-12 RX ORDER — FENTANYL CITRATE 50 UG/ML
INJECTION, SOLUTION INTRAMUSCULAR; INTRAVENOUS
Status: COMPLETED
Start: 2024-09-12 | End: 2024-09-12

## 2024-09-12 RX ORDER — CEFAZOLIN SODIUM 1 G/3ML
INJECTION, POWDER, FOR SOLUTION INTRAMUSCULAR; INTRAVENOUS
Status: DISCONTINUED | OUTPATIENT
Start: 2024-09-12 | End: 2024-09-12 | Stop reason: HOSPADM

## 2024-09-12 RX ORDER — SODIUM CHLORIDE 9 MG/ML
INJECTION, SOLUTION INTRAVENOUS CONTINUOUS
Status: DISCONTINUED | OUTPATIENT
Start: 2024-09-12 | End: 2024-09-13 | Stop reason: HOSPADM

## 2024-09-12 RX ORDER — ALBUTEROL SULFATE 90 UG/1
2 INHALANT RESPIRATORY (INHALATION) EVERY 6 HOURS PRN
Status: CANCELLED | OUTPATIENT
Start: 2024-09-12

## 2024-09-12 RX ORDER — ROCURONIUM BROMIDE 10 MG/ML
INJECTION, SOLUTION INTRAVENOUS
Status: DISCONTINUED | OUTPATIENT
Start: 2024-09-12 | End: 2024-09-12

## 2024-09-12 RX ORDER — HYDROMORPHONE HYDROCHLORIDE 2 MG/ML
0.4 INJECTION, SOLUTION INTRAMUSCULAR; INTRAVENOUS; SUBCUTANEOUS EVERY 5 MIN PRN
Status: DISCONTINUED | OUTPATIENT
Start: 2024-09-12 | End: 2024-09-12 | Stop reason: HOSPADM

## 2024-09-12 RX ORDER — ONDANSETRON HYDROCHLORIDE 2 MG/ML
4 INJECTION, SOLUTION INTRAVENOUS DAILY PRN
Status: DISCONTINUED | OUTPATIENT
Start: 2024-09-12 | End: 2024-09-12 | Stop reason: HOSPADM

## 2024-09-12 RX ORDER — HEPARIN SODIUM 1000 [USP'U]/ML
INJECTION, SOLUTION INTRAVENOUS; SUBCUTANEOUS
Status: DISCONTINUED | OUTPATIENT
Start: 2024-09-12 | End: 2024-09-12

## 2024-09-12 RX ORDER — GLUCAGON 1 MG
1 KIT INJECTION
Status: DISCONTINUED | OUTPATIENT
Start: 2024-09-12 | End: 2024-09-13 | Stop reason: HOSPADM

## 2024-09-12 RX ORDER — FENTANYL CITRATE 50 UG/ML
INJECTION, SOLUTION INTRAMUSCULAR; INTRAVENOUS
Status: DISCONTINUED | OUTPATIENT
Start: 2024-09-12 | End: 2024-09-12

## 2024-09-12 RX ORDER — IBUPROFEN 200 MG
24 TABLET ORAL
Status: DISCONTINUED | OUTPATIENT
Start: 2024-09-12 | End: 2024-09-13 | Stop reason: HOSPADM

## 2024-09-12 RX ORDER — SODIUM CHLORIDE 0.9 % (FLUSH) 0.9 %
10 SYRINGE (ML) INJECTION
Status: DISCONTINUED | OUTPATIENT
Start: 2024-09-12 | End: 2024-09-12 | Stop reason: HOSPADM

## 2024-09-12 RX ORDER — PHENYLEPHRINE HYDROCHLORIDE 10 MG/ML
INJECTION INTRAVENOUS
Status: DISCONTINUED | OUTPATIENT
Start: 2024-09-12 | End: 2024-09-12

## 2024-09-12 RX ADMIN — EPHEDRINE SULFATE 10 MG: 50 INJECTION INTRAVENOUS at 11:09

## 2024-09-12 RX ADMIN — ROCURONIUM BROMIDE 30 MG: 10 SOLUTION INTRAVENOUS at 11:09

## 2024-09-12 RX ADMIN — ASPIRIN 81 MG: 81 TABLET, COATED ORAL at 01:09

## 2024-09-12 RX ADMIN — FENTANYL CITRATE 100 MCG: 50 INJECTION, SOLUTION INTRAMUSCULAR; INTRAVENOUS at 10:09

## 2024-09-12 RX ADMIN — CALCIUM CHLORIDE INJECTION 250 MG: 100 INJECTION, SOLUTION INTRAVENOUS at 11:09

## 2024-09-12 RX ADMIN — PHENYLEPHRINE HYDROCHLORIDE 100 MCG: 10 INJECTION INTRAVENOUS at 11:09

## 2024-09-12 RX ADMIN — EPHEDRINE SULFATE 5 MG: 50 INJECTION INTRAVENOUS at 11:09

## 2024-09-12 RX ADMIN — SUGAMMADEX 200 MG: 100 INJECTION, SOLUTION INTRAVENOUS at 12:09

## 2024-09-12 RX ADMIN — PROTAMINE SULFATE 50 MG: 10 INJECTION, SOLUTION INTRAVENOUS at 12:09

## 2024-09-12 RX ADMIN — GLYCOPYRROLATE 0.2 MG: 0.2 INJECTION INTRAMUSCULAR; INTRAVENOUS at 10:09

## 2024-09-12 RX ADMIN — FENTANYL CITRATE 50 MCG: 50 INJECTION, SOLUTION INTRAMUSCULAR; INTRAVENOUS at 10:09

## 2024-09-12 RX ADMIN — DEXAMETHASONE SODIUM PHOSPHATE 4 MG: 4 INJECTION, SOLUTION INTRA-ARTICULAR; INTRALESIONAL; INTRAMUSCULAR; INTRAVENOUS; SOFT TISSUE at 10:09

## 2024-09-12 RX ADMIN — HEPARIN SODIUM 8500 UNITS: 1000 INJECTION, SOLUTION INTRAVENOUS; SUBCUTANEOUS at 11:09

## 2024-09-12 RX ADMIN — LIDOCAINE HYDROCHLORIDE 80 MG: 20 INJECTION, SOLUTION INTRAVENOUS at 10:09

## 2024-09-12 RX ADMIN — ONDANSETRON 4 MG: 2 INJECTION INTRAMUSCULAR; INTRAVENOUS at 12:09

## 2024-09-12 RX ADMIN — LIDOCAINE HYDROCHLORIDE 80 MG: 20 INJECTION, SOLUTION INTRAVENOUS at 12:09

## 2024-09-12 RX ADMIN — FENTANYL CITRATE 50 MCG: 50 INJECTION, SOLUTION INTRAMUSCULAR; INTRAVENOUS at 11:09

## 2024-09-12 RX ADMIN — ACETAMINOPHEN 1000 MG: 10 INJECTION, SOLUTION INTRAVENOUS at 11:09

## 2024-09-12 RX ADMIN — PHENYLEPHRINE HYDROCHLORIDE 25 MCG/MIN: 10 INJECTION INTRAVENOUS at 11:09

## 2024-09-12 RX ADMIN — PROPOFOL 150 MG: 10 INJECTION, EMULSION INTRAVENOUS at 10:09

## 2024-09-12 RX ADMIN — ROCURONIUM BROMIDE 20 MG: 10 SOLUTION INTRAVENOUS at 11:09

## 2024-09-12 RX ADMIN — SODIUM CHLORIDE, SODIUM GLUCONATE, SODIUM ACETATE, POTASSIUM CHLORIDE AND MAGNESIUM CHLORIDE: 526; 502; 368; 37; 30 INJECTION, SOLUTION INTRAVENOUS at 10:09

## 2024-09-12 RX ADMIN — CEFAZOLIN SODIUM 2 G: 2 SOLUTION INTRAVENOUS at 10:09

## 2024-09-12 RX ADMIN — HYDROMORPHONE HYDROCHLORIDE 0.4 MG: 2 INJECTION, SOLUTION INTRAMUSCULAR; INTRAVENOUS; SUBCUTANEOUS at 01:09

## 2024-09-12 RX ADMIN — EPHEDRINE SULFATE 10 MG: 50 INJECTION INTRAVENOUS at 12:09

## 2024-09-12 RX ADMIN — ROCURONIUM BROMIDE 50 MG: 10 SOLUTION INTRAVENOUS at 10:09

## 2024-09-12 NOTE — NURSING
Nurses Note -- 4 Eyes      9/12/2024   3:13 PM      Skin assessed during: Admit      [x] No Altered Skin Integrity Present    [x]Prevention Measures Documented      [] Yes- Altered Skin Integrity Present or Discovered   [] LDA Added if Not in Epic (Describe Wound)   [] New Altered Skin Integrity was Present on Admit and Documented in LDA   [] Wound Image Taken    Wound Care Consulted? No    Attending Nurse:  ÁNGEL Carrasco     Second RN/Staff Member:   YAMILET Judge

## 2024-09-12 NOTE — H&P
[facility]  History & Physical  Vascular Surgery    SUBJECTIVE:     Chief Complaint/Reason for Visit: carotid stenosis     History of Present Illness:  River Sheehan Jr. is a 78 y.o. male who presents as a referral for asymptomatic left carotid stenosis.  Was seen last month in clinic.  No significant change from last month in his health.    Review of patient's allergies indicates:  No Known Allergies    Past Medical History:   Diagnosis Date    Acid reflux     Adenomatous polyp of ascending colon 09/20/2021    Arthritis     Asymptomatic stenosis of left carotid artery     CAD (coronary artery disease)     COVID-19 07/06/2022    Depression     Diabetes mellitus     Gout     Hearing loss     High cholesterol     HTN (hypertension)     Kidney stone     Macrocytic anemia     Osteoporosis     Personal history of colonic polyps 09/20/2021    Dr. Chun Smith    Seasonal allergies      Past Surgical History:   Procedure Laterality Date    COLONOSCOPY W/ BIOPSIES  09/20/2021    Dr. Chun Smith    CYSTOSCOPY      EXTRACAPSULAR EXTRACTION OF CATARACT      HERNIA REPAIR      LITHOTRIPSY  09/2023    RETROGRADE PYELOGRAPHY      WISDOM TOOTH EXTRACTION       Family History   Problem Relation Name Age of Onset    Bladder Cancer Mother      Hypertension Sister      Asthma Sister      Diabetes Sister      Lung cancer Sister      Hypertension Brother      Diabetes Brother      Coronary artery disease Brother      Atrial fibrillation Brother      Esophageal cancer Brother      Kidney cancer Brother      Hypertension Brother      Coronary artery disease Brother      Diabetes Brother      Progressive Supranuclear Palsy Brother      Coronary artery disease Brother      Pancreatic cancer Brother      Colon polyps Brother      Heart murmur Brother       Social History     Tobacco Use    Smoking status: Never     Passive exposure: Never    Smokeless tobacco: Never   Substance Use Topics     Alcohol use: Not Currently     Alcohol/week: 1.0 standard drink of alcohol     Types: 1 Cans of beer per week    Drug use: Never        Prior to Admission medications    Medication Sig Start Date End Date Taking? Authorizing Provider   albuterol (PROAIR HFA) 90 mcg/actuation inhaler Inhale 2 puffs into the lungs every 6 (six) hours as needed for Wheezing. Rescue 7/18/22  Yes Chio Villela MD   allopurinoL (ZYLOPRIM) 100 MG tablet TAKE 1 TABLET EVERY DAY 1/2/24  Yes Chio Villela MD   amLODIPine (NORVASC) 10 MG tablet Take 10 mg by mouth once daily. 10/18/21  Yes Provider, Historical   aspirin (ECOTRIN) 81 MG EC tablet Take 81 mg by mouth once daily.   Yes Provider, Historical   atorvastatin (LIPITOR) 20 MG tablet TAKE 1 TABLET AT BEDTIME 1/2/24  Yes Chio Villela MD   b complex vitamins tablet Take 1 tablet by mouth every other day.   Yes Provider, Historical   blood sugar diagnostic (TRUE METRIX GLUCOSE TEST STRIP) Strp 1 strip by Misc.(Non-Drug; Combo Route) route once daily. 6/30/22  Yes Chio Villela MD   busPIRone (BUSPAR) 10 MG tablet TAKE 1/2 TO 1 TABLET THREE TIMES DAILY  Patient taking differently: Take 10 mg by mouth once daily. Taking 1 qd 5/15/24  Yes Chio Villela MD   cinnamon bark 500 mg capsule Take 1,000 mg by mouth once daily.   Yes Provider, Historical   citalopram (CELEXA) 20 MG tablet TAKE 1 TABLET EVERY DAY 8/7/24  Yes Chio Villela MD   gabapentin (NEURONTIN) 100 MG capsule TAKE 2 CAPSULES (200 MG TOTAL) THREE TIMES DAILY 9/11/24  Yes Chio Villela MD   glucosamine/chondr navarro A sod (OSTEO BI-FLEX ORAL) Take by mouth Daily. TAKING 2 QD   Yes Provider, Historical   KRILL OIL ORAL Take 500 mg by mouth once daily at 6am.   Yes Provider, Historical   losartan (COZAAR) 50 MG tablet Take 50 mg by mouth once daily. 5/29/22  Yes Provider, Historical   metFORMIN (GLUCOPHAGE) 500 MG tablet TAKE 1 TABLET TWICE DAILY WITH MEALS 5/15/24  Yes Chio Villela MD   montelukast (SINGULAIR)  10 mg tablet TAKE 1 TABLET EVERY EVENING 9/4/23  Yes Chio Villela MD   pantoprazole (PROTONIX) 40 MG tablet TAKE 1 TABLET EVERY DAY 3/17/24  Yes Chio Villela MD   pioglitazone (ACTOS) 15 MG tablet Take 1 tablet (15 mg total) by mouth once daily. 7/17/24  Yes Chio Villela MD   tamsulosin (FLOMAX) 0.4 mg Cap Take 0.4 mg by mouth once daily. 4/12/22  Yes Provider, Historical   blood-glucose meter (TRUE METRIX AIR GLUCOSE METER) kit Test daily for DM II E11.9 6/30/22 5/21/24  Chio Villela MD       Review of Systems:  Review of Systems   Constitutional:  Negative for chills, fever and weight loss.   Eyes:  Negative for blurred vision and double vision.   Respiratory:  Negative for cough, shortness of breath and wheezing.    Cardiovascular:  Negative for chest pain and palpitations.   Gastrointestinal:  Negative for abdominal pain, nausea and vomiting.   Neurological:  Negative for dizziness, speech change, weakness and headaches.       OBJECTIVE:     Vital Signs (Most Recent):  Temp: 97.8 °F (36.6 °C) (09/12/24 0817)  Pulse: 64 (09/12/24 0823)  Resp: 18 (09/12/24 0817)  BP: (!) 163/80 (09/12/24 0823)  SpO2: (!) 94 % (09/12/24 0817)    Admission: Weight: 86.2 kg (190 lb) (08/20/24 1510)   Most Recent: Weight: 86.2 kg (190 lb) (08/20/24 1510)    Physical Exam:  Vascular Physical Exam  Vitals and nursing note reviewed.   Constitutional:       General: He is awake.   Cardiovascular:      Rate and Rhythm: Normal rate and regular rhythm.      Pulses:           Radial pulses are 2+ on the right side and 2+ on the left side.        Brachial pulses are 2+ on the right side and 2+ on the left side.  Pulmonary:      Effort: Pulmonary effort is normal.      Breath sounds: Normal breath sounds and air entry.   Musculoskeletal:      Neck: Neck supple.   Neurological:      Mental Status: He is alert and oriented to person, place, and time.      Cranial Nerves: No cranial nerve deficit.      Sensory: Sensation is intact.  No sensory deficit.      Motor: Motor function is intact. No weakness.      Upper extremity:  score is 5/5 on the right side and 5/5 on the left side.          Diagnostic Results:  CT: Reviewed      ASSESSMENT/PLAN:     78yM with left ICA asymptomatic stenosis  Plan  Left CEA today.  Procedure, risks, benefits discussed with patient as in previous visit.  Agrees to proceed.

## 2024-09-12 NOTE — ANESTHESIA PREPROCEDURE EVALUATION
09/12/2024  River Sheehan Jr. is a 78 y.o., male.  known left ICA stenosis, however it was most recent follow up this was noted to have progressed. He underwent CTA which showed a 70-80% left ICA stenosis. He denies any stroke or stroke-like symptoms including unilateral weakness, facial asymmetry, speech difficulties, or amaurosis symptoms. He was a lifelong nonsmoker. He was diabetic and well-controlled with an A1c of 6.6.   Stress test neg  Low card risk.  Pre-op Assessment    I have reviewed the Patient Summary Reports.     I have reviewed the Nursing Notes. I have reviewed the NPO Status.   I have reviewed the Medications.     Review of Systems  Cardiovascular:     Hypertension   CAD                  Coronary Artery Disease:                            Hypertension         Pulmonary:        Sleep Apnea     Obstructive Sleep Apnea (RERE).           Renal/:  Chronic Renal Disease        Kidney Function/Disease             Hepatic/GI:     GERD      Gerd          Musculoskeletal:  Arthritis        Arthritis          Neurological:      Arthritis                           Endocrine:  Diabetes    Diabetes                      Psych:  Psychiatric History                  Physical Exam  General: Well nourished, Cooperative, Alert and Oriented    Airway:  Mallampati: III / III  Mouth Opening: Normal  TM Distance: Normal  Tongue: Normal  Neck ROM: Extension Decreased        Anesthesia Plan  Type of Anesthesia, risks & benefits discussed:    Anesthesia Type: Gen ETT  Intra-op Monitoring Plan: Standard ASA Monitors and Art Line  Post Op Pain Control Plan: multimodal analgesia and IV/PO Opioids PRN  Airway Plan: Video, Post-Induction  Informed Consent: Informed consent signed with the Patient and all parties understand the risks and agree with anesthesia plan.  All questions answered. Patient consented to blood  products? Yes  ASA Score: 3  Day of Surgery Review of History & Physical: H&P Update referred to the surgeon/provider.    Ready For Surgery From Anesthesia Perspective.     .

## 2024-09-12 NOTE — OP NOTE
[facility]  Operative Note        Surgery Date:  09/12/2024     Pre-op Diagnosis:    Asymptomatic stenosis of left carotid artery [I65.22]     Post-op Diagnosis:  Same     Procedure(s): left carotid endarterectomy with bovine pericardial patch angioplasty    Indication for procedure: River Sheehan Jr. is a 78 y.o. male who with asymptomatic left ICA stenosis.  He was taken for carotid endarterectomy for stroke risk reduction    Surgeon:  Hany Pendleton MD    Assistant:  Circulator: Rody Dyer RN  Physician Assistant: Jon Felipe PA-C  Relief Scrub: Varsha Jose ST  Scrub Person: Karen Camacho ST     Anesthesia:  General     Findings:  Completion duplex showed peak systolic velocities of about 80 centimeters/second with good waveforms.  Proximal and distal endpoints were widely patent    Complications: None     Estimated Blood Loss: 25 mL         Specimens:  Plaque, left carotid    Implants:  Implant Name Type Inv. Item Serial No.  Lot No. LRB No. Used Action   PATCH XENOSURE TAPR .8X8CM - [id number]  PATCH XENOSURE TAPR .8X8CM  [facility] VASCULAR [id number] Left 1 Implanted       Drains: None    Procedure in detail: After informed consent was obtained, and all risk benefits were discussed with the patient, they were brought to the operating room and placed supine.  After adequate general endotracheal anesthesia was obtained, the patient was prepped and draped in a standard sterile fashion.  An oblique right neck incision was made and dissection was carried down through the platysma.  Dissection was then continued along the anterior border of the sternocleidomastoid until the jugular vein was identified.  This was retracted laterally and the facial vein was ligated between ties.  From here the common carotid artery was identified and dissected free from surrounding structures.  It was isolated with a vessel loop.  I then continued dissection superiorly until I was able to fully  dissect the internal and external carotid arteries as well.  These were each isolated with a vessel loop as well.  The patient was then systemically heparinized and the heparin was allowed to circulate for 3 minutes.  The internal carotid artery was clamped, followed by the common and external carotid sequentially.  An arteriotomy was made in the common carotid artery and carried up into the internal carotid artery.  From here an endarterectomy was performed.  Eversion endarterectomy was performed of the external carotid artery.  Once I felt there was an adequate distal endpoint, a bovine pericardial patch was sewn into place with a 6-0 Prolene suture.  At the completion of the anastomosis, the internal, external, and common carotid arteries were all backbled into the wound.  From here the external carotid and common carotid artery clamps were released.  This was allowed to flow for about 10 seconds before releasing the internal carotid artery clamp.  From here a completion ultrasound was performed.  This showed a good proximal and distal point.  From here hemostasis was obtained and the wound was closed in layers using a 3-0 Vicryl for the platysma and a 4-0 Monocryl subcuticular for the skin.  Dermabond was placed.  The patient was awakened and was moving all extremities and brought to recovery in stable condition.      Condition: Good

## 2024-09-12 NOTE — TRANSFER OF CARE
"Anesthesia Transfer of Care Note    Patient: River Sheehan Jr.    Procedure(s) Performed: Procedure(s) (LRB):  ENDARTERECTOMY-CAROTID (Left)    Patient location: PACU    Anesthesia Type: general    Transport from OR: Transported from OR on room air with adequate spontaneous ventilation    Post pain: adequate analgesia    Post assessment: no apparent anesthetic complications and tolerated procedure well    Post vital signs: stable    Level of consciousness: awake and alert    Nausea/Vomiting: no nausea/vomiting    Complications: none    Transfer of care protocol was followed      Last vitals: Visit Vitals  BP (!) 110/53 (BP Location: Right arm, Patient Position: Lying)   Pulse 73   Temp 36.8 °C (98.2 °F)   Resp 11   Ht 5' 9" (1.753 m)   Wt 86.2 kg (190 lb)   SpO2 96%   BMI 28.06 kg/m²     "

## 2024-09-12 NOTE — ANESTHESIA PROCEDURE NOTES
Arterial    Diagnosis: Carotid Stenosis    Patient location during procedure: holding area  Timeout: 9/12/2024 10:35 AM  Procedure end time: 9/12/2024 10:36 AM    Staffing  Authorizing Provider: Zaria Black MD  Performing Provider: Zaria Black MD    Staffing  Performed by: Zaria Black MD  Authorized by: Zaria Black MD    Anesthesiologist was present at the time of the procedure.    Preanesthetic Checklist  Completed: patient identified, IV checked, site marked, risks and benefits discussed, surgical consent, monitors and equipment checked, pre-op evaluation, timeout performed and anesthesia consent givenArterial  Skin Prep: chlorhexidine gluconate  Local Infiltration: lidocaine  Orientation: left  Location: radial    Catheter Size: 20 G  Catheter placement by Anatomical landmarks. Heme positive aspiration all ports. Insertion Attempts: 1  Assessment  Dressing: secured with tape and tegaderm  Patient: Tolerated well

## 2024-09-12 NOTE — ANESTHESIA PROCEDURE NOTES
Intubation    Date/Time: 9/12/2024 10:50 AM    Performed by: Lorraine Rivera CRNA  Authorized by: Zaria Black MD    Intubation:     Induction:  Intravenous    Intubated:  Postinduction    Mask Ventilation:  Easy mask    Attempts:  1    Attempted By:  CRNA    Method of Intubation:  Video laryngoscopy    Blade:  Braun 4    Laryngeal View Grade: Grade IIA - cords partially seen      Difficult Airway Encountered?: No      Complications:  None    Airway Device:  Oral endotracheal tube    Airway Device Size:  7.5    Style/Cuff Inflation:  Cuffed (inflated to minimal occlusive pressure)    Inflation Amount (mL):  6    Tube secured:  23    Secured at:  The lips    Placement Verified By:  Capnometry    Complicating Factors:  Obesity and poor neck/head extension    Findings Post-Intubation:  BS equal bilateral and atraumatic/condition of teeth unchanged

## 2024-09-13 VITALS
HEART RATE: 55 BPM | HEIGHT: 69 IN | WEIGHT: 190 LBS | BODY MASS INDEX: 28.14 KG/M2 | RESPIRATION RATE: 18 BRPM | SYSTOLIC BLOOD PRESSURE: 128 MMHG | OXYGEN SATURATION: 95 % | DIASTOLIC BLOOD PRESSURE: 63 MMHG | TEMPERATURE: 98 F

## 2024-09-13 LAB
POCT GLUCOSE: 128 MG/DL (ref 70–110)
PSYCHE PATHOLOGY RESULT: NORMAL

## 2024-09-13 PROCEDURE — 25000003 PHARM REV CODE 250: Performed by: SURGERY

## 2024-09-13 PROCEDURE — 94760 N-INVAS EAR/PLS OXIMETRY 1: CPT

## 2024-09-13 RX ORDER — HYDROCODONE BITARTRATE AND ACETAMINOPHEN 5; 325 MG/1; MG/1
1 TABLET ORAL EVERY 6 HOURS PRN
Qty: 20 TABLET | Refills: 0 | Status: SHIPPED | OUTPATIENT
Start: 2024-09-13

## 2024-09-13 RX ADMIN — ASPIRIN 81 MG: 81 TABLET, COATED ORAL at 08:09

## 2024-09-13 NOTE — DISCHARGE SUMMARY
Vascular Surgery - Discharge Note  Jon Felipe PA-C      Patient Name: River Sheehan Jr.                   : 1945     MRN: [id number]   Date of Admission: 2024  Date of Exam: 2024  Vascular Physician: Hany Pendleton MD    Outcome   Final Diagnosis:   Asymptomatic stenosis of left carotid artery    Post-Op Information:  ENDARTERECTOMY-CAROTID (Left)    Hospital Course:  Mr River Sheehan is a 77 y/o male with left carotid artery stenosis who is POD #1 from a left CEA. Patient states he did not sleep well however only endorses some soreness to the left neck, no new or worsening pain.  Patient states he has gotten up and walked to the restroom without issue.  Patient also had breakfast this morning and had no trouble with swallowing.  On exam, no swelling to the left neck noted.  Incision is clean, dry, and intact with overlying Dermabond.  Patient has good facial mobility with no palsy noted.  Good hypoglossal nerve function as well.  Speech is clear and intact.  No peripheral extremity weakness noted.  Patient was instructed on wound care.  All questions answered at this time.  Patient is stable for discharge from vascular standpoint.  Has follow-up appointment Dr. Pendleton in clinic on 2024.     Jon Felipe PA-C  Vascular Surgery  [facility]    Patient Discharge Instructions:     Disposition: Home or Self Care    Incision Instructions:   Cleanse wound area with antibacterial soap and pat dry.   Leave dermabond in place (will fall off when appropriate).   OK to shower, avoid tub bath for 10 days.    Notify your healthcare provider:  Fever greater than 100.6  Foul drainage from the incision  Breakdown or opening of the incision  Worsening pain or swelling at the surgical site  Numbness or loss of function to the operative extremity    Follow Up:     In clinic on 2024 with Dr Pendleton  Please contact the office with any concerns or issues.  [phone]    Future Appointments   Date Time Provider Department Center   9/25/2024  9:40 AM Hany Pendleton MD [facility]   10/22/2024  9:00 AM Sam Maher MD [facility] [address]   11/21/2024  9:20 AM Chio Villela MD [facility]   12/19/2024  1:15 PM Grady Bolaños, FNP [facility]        Discharge Instructions:        Discharge Procedure Orders   No driving until:     No dressing needed   Order Comments: Clean the incision with mild soap and water using your finger pads. Can shower and get the incision wet however do not submerge the incision. Dermabond (glue on top of incision) will fall off within the next 7-10 days; do NOT peel it off.     Notify your health care provider if you experience any of the following:  temperature >100.4     Notify your health care provider if you experience any of the following:  severe uncontrolled pain     Notify your health care provider if you experience any of the following:  redness, tenderness, or signs of infection (pain, swelling, redness, odor or green/yellow discharge around incision site)     Activity as tolerated   Order Comments: No strenuous activity for 2 weeks; okay to walk           Medication List        START taking these medications      HYDROcodone-acetaminophen 5-325 mg per tablet  Commonly known as: NORCO  Take 1 tablet by mouth every 6 (six) hours as needed for Pain.            CHANGE how you take these medications      busPIRone 10 MG tablet  Commonly known as: BUSPAR  TAKE 1/2 TO 1 TABLET THREE TIMES DAILY  What changed: See the new instructions.            CONTINUE taking these medications      albuterol 90 mcg/actuation inhaler  Commonly known as: PROAIR HFA  Inhale 2 puffs into the lungs every 6 (six) hours as needed for Wheezing. Rescue     allopurinoL 100 MG tablet  Commonly known as: ZYLOPRIM  TAKE 1 TABLET EVERY DAY     amLODIPine 10 MG tablet  Commonly known as: NORVASC     aspirin 81 MG EC  tablet  Commonly known as: ECOTRIN     atorvastatin 20 MG tablet  Commonly known as: LIPITOR  TAKE 1 TABLET AT BEDTIME     b complex vitamins tablet     blood sugar diagnostic Strp  Commonly known as: TRUE METRIX GLUCOSE TEST STRIP  1 strip by Misc.(Non-Drug; Combo Route) route once daily.     blood-glucose meter kit  Commonly known as: TRUE METRIX AIR GLUCOSE METER  Test daily for DM II E11.9     cinnamon bark 500 mg capsule     citalopram 20 MG tablet  Commonly known as: CeleXA  TAKE 1 TABLET EVERY DAY     gabapentin 100 MG capsule  Commonly known as: NEURONTIN  TAKE 2 CAPSULES (200 MG TOTAL) THREE TIMES DAILY     KRILL OIL ORAL     losartan 50 MG tablet  Commonly known as: COZAAR     metFORMIN 500 MG tablet  Commonly known as: GLUCOPHAGE  TAKE 1 TABLET TWICE DAILY WITH MEALS     montelukast 10 mg tablet  Commonly known as: SINGULAIR  TAKE 1 TABLET EVERY EVENING     OSTEO BI-FLEX ORAL     pantoprazole 40 MG tablet  Commonly known as: PROTONIX  TAKE 1 TABLET EVERY DAY     pioglitazone 15 MG tablet  Commonly known as: ACTOS  Take 1 tablet (15 mg total) by mouth once daily.     tamsulosin 0.4 mg Cap  Commonly known as: FLOMAX               Where to Get Your Medications        These medications were sent to [facility] - [place] - [address]  [address], [place] [address]      Phone: [phone]   HYDROcodone-acetaminophen 5-325 mg per tablet

## 2024-09-13 NOTE — PLAN OF CARE
09/13/24 1420   Final Note   Assessment Type Final Discharge Note   Anticipated Discharge Disposition Home   Post-Acute Status   Discharge Delays None known at this time     Pt will dc to home. No needs noted for CM.

## 2024-09-13 NOTE — PLAN OF CARE
09/13/24 0901   Discharge Assessment   Assessment Type Discharge Planning Assessment   Confirmed/corrected address, phone number and insurance Yes   Confirmed Demographics Correct on Facesheet   Source of Information patient   When was your last doctors appointment? 07/08/24   Does patient/caregiver understand observation status Yes   Communicated AGUSTIN with patient/caregiver Yes   Reason For Admission stenosis left carotid artery   People in Home spouse   Facility Arrived From: home   Do you expect to return to your current living situation? Yes   Do you have help at home or someone to help you manage your care at home? Yes   Who are your caregiver(s) and their phone number(s)? fmly   Prior to hospitilization cognitive status: Alert/Oriented   Current cognitive status: Alert/Oriented   Walking or Climbing Stairs Difficulty no   Dressing/Bathing Difficulty no   Home Layout Able to live on 1st floor   Equipment Currently Used at Home none   Readmission within 30 days? No   Patient currently being followed by outpatient case management? No   Do you currently have service(s) that help you manage your care at home? No   Do you take prescription medications? Yes   Do you have any problems affording any of your prescribed medications? No   Is the patient taking medications as prescribed? yes   Who is going to help you get home at discharge? fmly   How do you get to doctors appointments? car, drives self   Are you on dialysis? No   Do you take coumadin? No   Discharge Plan A Home   Discharge Plan B Home Health   DME Needed Upon Discharge  none   Discharge Plan discussed with: Patient;Spouse/sig other   Transition of Care Barriers None   Housing Stability   In the last 12 months, was there a time when you were not able to pay the mortgage or rent on time? N   At any time in the past 12 months, were you homeless or living in a shelter (including now)? N   Transportation Needs   Has the lack of transportation kept you from  medical appointments, meetings, work or from getting things needed for daily living? No   Food Insecurity   Within the past 12 months, you worried that your food would run out before you got the money to buy more. Never true   Within the past 12 months, the food you bought just didn't last and you didn't have money to get more. Never true   Alcohol Use   Q3: How often do you have six or more drinks on one occasion? Never   Utilities   In the past 12 months has the electric, gas, oil, or water company threatened to shut off services in your home? No     Pt and spouse live together.pt has not used HH ot any DME. He is independent, drives. Needs TBD.

## 2024-09-16 NOTE — ANESTHESIA POSTPROCEDURE EVALUATION
Anesthesia Post Evaluation    Patient: River Sheehan Jr.    Procedure(s) Performed: Procedure(s) (LRB):  ENDARTERECTOMY-CAROTID (Left)    Final Anesthesia Type: general      Patient location during evaluation: PACU  Patient participation: Yes- Able to Participate  Level of consciousness: awake and alert  Post-procedure vital signs: reviewed and stable  Pain management: adequate  Airway patency: patent      Anesthetic complications: no      Cardiovascular status: hemodynamically stable  Respiratory status: unassisted  Hydration status: euvolemic  Follow-up not needed.              Vitals Value Taken Time   BP 95/56 09/12/24 1451   Temp 36.8 °C (98.2 °F) 09/12/24 1254   Pulse 54 09/12/24 1458   Resp 14 09/12/24 1458   SpO2 99 % 09/12/24 1458   Vitals shown include unfiled device data.      Event Time   Out of Recovery 15:10:00         Pain/Georges Score: No data recorded

## 2024-09-17 ENCOUNTER — TELEPHONE (OUTPATIENT)
Dept: VASCULAR SURGERY | Facility: CLINIC | Age: 79
End: 2024-09-17
Payer: MEDICARE

## 2024-09-17 NOTE — TELEPHONE ENCOUNTER
River Sheehan Jr. is s/p left carotid endarterectomy on 9/12/2024. He was called regarding his post-operative status. He denied signs or symptoms of infection to left neck incision. Denies difficulty swallowing, or ability to move tongue or jaw. Denies recent symptoms of stroke or TIA since surgery. Also denies persistent headaches. His follow up information was provided 9/25/24 @ 9:40. He was advised to call with any changes or concerns. He verbalized understanding.

## 2024-09-18 ENCOUNTER — PATIENT OUTREACH (OUTPATIENT)
Dept: ADMINISTRATIVE | Facility: CLINIC | Age: 79
End: 2024-09-18
Payer: MEDICARE

## 2024-09-18 NOTE — PROGRESS NOTES
C3 nurse spoke with patient's wife for a TCC post hospital discharge follow up call. The patient has a scheduled HOSFU appointment with Dr. Hany Pendleton on 09/25/2024 @ 940 am.  The patient does not have a scheduled HOSFU appointment with Chio Villela MD  within 5-7 days post hospital discharge date 09/13/2024.     Message sent to PCP staff requesting they contact patient and schedule follow up appointment.

## 2024-09-18 NOTE — TELEPHONE ENCOUNTER
I spoke with the patients wife and she states he is doing fine. If they need anything they will give us a call to get him scheduled.

## 2024-09-25 ENCOUNTER — OFFICE VISIT (OUTPATIENT)
Dept: VASCULAR SURGERY | Facility: CLINIC | Age: 79
End: 2024-09-25
Payer: MEDICARE

## 2024-09-25 VITALS
SYSTOLIC BLOOD PRESSURE: 118 MMHG | BODY MASS INDEX: 27.4 KG/M2 | HEIGHT: 69 IN | DIASTOLIC BLOOD PRESSURE: 72 MMHG | WEIGHT: 185 LBS | HEART RATE: 71 BPM

## 2024-09-25 DIAGNOSIS — I65.22 ASYMPTOMATIC STENOSIS OF LEFT CAROTID ARTERY: Primary | ICD-10-CM

## 2024-09-25 PROCEDURE — 99024 POSTOP FOLLOW-UP VISIT: CPT | Mod: POP,,, | Performed by: SURGERY

## 2024-10-18 ENCOUNTER — PATIENT MESSAGE (OUTPATIENT)
Dept: NEUROLOGY | Facility: CLINIC | Age: 79
End: 2024-10-18
Payer: MEDICARE

## 2024-10-20 RX ORDER — ALLOPURINOL 100 MG/1
TABLET ORAL
Qty: 90 TABLET | Refills: 3 | Status: SHIPPED | OUTPATIENT
Start: 2024-10-20

## 2024-10-20 RX ORDER — ATORVASTATIN CALCIUM 20 MG/1
TABLET, FILM COATED ORAL
Qty: 90 TABLET | Refills: 3 | Status: SHIPPED | OUTPATIENT
Start: 2024-10-20

## 2024-10-22 ENCOUNTER — LAB VISIT (OUTPATIENT)
Dept: LAB | Facility: HOSPITAL | Age: 79
End: 2024-10-22
Attending: INTERNAL MEDICINE
Payer: MEDICARE

## 2024-10-22 ENCOUNTER — OFFICE VISIT (OUTPATIENT)
Dept: NEUROLOGY | Facility: CLINIC | Age: 79
End: 2024-10-22
Payer: MEDICARE

## 2024-10-22 VITALS
BODY MASS INDEX: 28.14 KG/M2 | SYSTOLIC BLOOD PRESSURE: 140 MMHG | WEIGHT: 190 LBS | DIASTOLIC BLOOD PRESSURE: 90 MMHG | HEIGHT: 69 IN

## 2024-10-22 DIAGNOSIS — G47.33 OBSTRUCTIVE SLEEP APNEA: Primary | ICD-10-CM

## 2024-10-22 DIAGNOSIS — E21.0 PRIMARY HYPERPARATHYROIDISM: Primary | ICD-10-CM

## 2024-10-22 DIAGNOSIS — G47.30 OBSERVED SLEEP APNEA: ICD-10-CM

## 2024-10-22 DIAGNOSIS — R06.83 SNORING: ICD-10-CM

## 2024-10-22 LAB
ALBUMIN SERPL-MCNC: 3.6 G/DL (ref 3.4–4.8)
BUN SERPL-MCNC: 20.1 MG/DL (ref 8.4–25.7)
CALCIUM SERPL-MCNC: 9.8 MG/DL (ref 8.8–10)
CHLORIDE SERPL-SCNC: 103 MMOL/L (ref 98–107)
CO2 SERPL-SCNC: 29 MMOL/L (ref 23–31)
CREAT SERPL-MCNC: 0.87 MG/DL (ref 0.72–1.25)
GFR SERPLBLD CREATININE-BSD FMLA CKD-EPI: >60 ML/MIN/1.73/M2
GLUCOSE SERPL-MCNC: 120 MG/DL (ref 82–115)
PHOSPHATE SERPL-MCNC: 2.6 MG/DL (ref 2.3–4.7)
POTASSIUM SERPL-SCNC: 4.3 MMOL/L (ref 3.5–5.1)
SODIUM SERPL-SCNC: 137 MMOL/L (ref 136–145)

## 2024-10-22 PROCEDURE — 36415 COLL VENOUS BLD VENIPUNCTURE: CPT

## 2024-10-22 PROCEDURE — 99999 PR PBB SHADOW E&M-EST. PATIENT-LVL IV: CPT | Mod: PBBFAC,,, | Performed by: SPECIALIST

## 2024-10-22 PROCEDURE — 99204 OFFICE O/P NEW MOD 45 MIN: CPT | Mod: S$PBB,,, | Performed by: SPECIALIST

## 2024-10-22 PROCEDURE — 82570 ASSAY OF URINE CREATININE: CPT

## 2024-10-22 PROCEDURE — 99214 OFFICE O/P EST MOD 30 MIN: CPT | Mod: PBBFAC | Performed by: SPECIALIST

## 2024-10-22 PROCEDURE — 80069 RENAL FUNCTION PANEL: CPT

## 2024-10-22 NOTE — PROGRESS NOTES
Neurology Clinic  New Sleep    SUBJECTIVE:  Patient ID: River Sheehan Jr. is a 79 y.o. male.    Chief Complaint: New patient referred by Dr. Chio Villela for RERE evaluation    History of Present Illness:     New patient referred by Dr. Chio Villela for RERE evaluation.  Pt here today with wife Olinda Schulz states he recently had surgery and was told by anesthesia that he has severe sleep apnea.  Occasionally wakes up gasping for air  Per wife, he snores loudly every night  + witnessed apnea  Sleeps well. Wakes up twice per night d/t nocturia, able to fall back to sleep quickly.  Denies refreshed awakenings.   Occasionally naps during the day, denies EDS/daytime fatigue.   Denies vivid dreams    No sleep study in the past         10/22/2024     9:08 AM   EPWORTH SLEEPINESS SCALE   Sitting and reading 2   Watching TV 2   Sitting, inactive in a public place (e.g. a theatre or a meeting) 0   As a passenger in a car for an hour without a break 0   Lying down to rest in the afternoon when circumstances permit 3   Sitting and talking to someone 0   Sitting quietly after a lunch without alcohol 2   In a car, while stopped for a few minutes in traffic 0   Total score 9     ROS: as per HPI, otherwise pertinent systems review is negative          Past Medical History:   Diagnosis Date    Acid reflux     Adenomatous polyp of ascending colon 09/20/2021    Arthritis     Asymptomatic stenosis of left carotid artery     CAD (coronary artery disease)     COVID-19 07/06/2022    Depression     Diabetes mellitus     Gout     Hearing loss     High cholesterol     HTN (hypertension)     Kidney stone     Macrocytic anemia     Osteoporosis     Personal history of colonic polyps 09/20/2021    Dr. Chun Smith    Seasonal allergies        Past Surgical History:   Procedure Laterality Date    CAROTID ENDARTERECTOMY Left 9/12/2024    Procedure: ENDARTERECTOMY-CAROTID;  Surgeon: Hany Pendleton MD;  Location: [facility];  Service:  Peripheral Vascular;  Laterality: Left;    COLONOSCOPY W/ BIOPSIES  09/20/2021    Dr. Chun Smith    CYSTOSCOPY      EXTRACAPSULAR EXTRACTION OF CATARACT      HERNIA REPAIR      LITHOTRIPSY  09/2023    RETROGRADE PYELOGRAPHY      WISDOM TOOTH EXTRACTION         Family History   Problem Relation Name Age of Onset    Bladder Cancer Mother      Hypertension Sister      Asthma Sister      Diabetes Sister      Lung cancer Sister      Hypertension Brother      Diabetes Brother      Coronary artery disease Brother      Atrial fibrillation Brother      Esophageal cancer Brother      Kidney cancer Brother      Hypertension Brother      Coronary artery disease Brother      Diabetes Brother      Progressive Supranuclear Palsy Brother      Coronary artery disease Brother      Pancreatic cancer Brother      Colon polyps Brother      Heart murmur Brother         Social History     Socioeconomic History    Marital status:    Tobacco Use    Smoking status: Never     Passive exposure: Never    Smokeless tobacco: Never   Substance and Sexual Activity    Alcohol use: Not Currently     Alcohol/week: 1.0 standard drink of alcohol     Types: 1 Cans of beer per week    Drug use: Never    Sexual activity: Yes       Review of patient's allergies indicates:  No Known Allergies    Current Outpatient Medications   Medication Instructions    albuterol (PROAIR HFA) 90 mcg/actuation inhaler 2 puffs, Inhalation, Every 6 hours PRN, Rescue    allopurinoL (ZYLOPRIM) 100 MG tablet TAKE 1 TABLET EVERY DAY    amLODIPine (NORVASC) 10 mg, Daily    aspirin (ECOTRIN) 81 mg, Daily    atorvastatin (LIPITOR) 20 MG tablet TAKE 1 TABLET AT BEDTIME    b complex vitamins tablet 1 tablet, Every other day    blood sugar diagnostic (TRUE METRIX GLUCOSE TEST STRIP) Strp 1 strip, Misc.(Non-Drug; Combo Route), Daily    blood-glucose meter (TRUE METRIX AIR GLUCOSE METER) kit Test daily for DM II E11.9    busPIRone (BUSPAR) 10 MG tablet TAKE 1/2 TO 1 TABLET  "THREE TIMES DAILY    cinnamon bark 1,000 mg, Daily    citalopram (CELEXA) 20 mg, Oral    gabapentin (NEURONTIN) 100 MG capsule TAKE 2 CAPSULES (200 MG TOTAL) THREE TIMES DAILY    glucosamine/chondr navarro A sod (OSTEO BI-FLEX ORAL) Daily    KRILL OIL ORAL 500 mg, Daily    losartan (COZAAR) 50 mg, Daily    metFORMIN (GLUCOPHAGE) 500 mg, Oral, 2 times daily with meals    montelukast (SINGULAIR) 10 mg, Oral, Nightly    pantoprazole (PROTONIX) 40 MG tablet TAKE 1 TABLET EVERY DAY    pioglitazone (ACTOS) 15 mg, Oral, Daily    tamsulosin (FLOMAX) 0.4 mg, Daily       OBJECTIVE:  Vitals:  BP (!) 140/90 (BP Location: Left arm, Patient Position: Sitting)   Ht 5' 9" (1.753 m)   Wt 86.2 kg (190 lb)   BMI 28.06 kg/m²     Neck Circumference: 21 in    Physical Exam   Constitutional: he appears well-developed and well-nourished.    Accompanied by - wife  appearance - well appearing, no apparent distress, unassisted  oropharynx - Mallampati score class 4, ok dentition, 3mm overjet  heart - RRR, no murmur  lungs - pulmonary effort is normal. clear breath sounds  skin- no obvious lesions noted  Peripheral extremities- 1+ pretibial edema    Neurologic Exam:  Cortical function - The patient is alert, attentive, and oriented; cooperative with exam, good eye contact  Speech - clear and fluent   cranial nerves:  CN 2 - visual fields are full to confrontation.  Pupils are equal and react to light  CN 3, 4, 6 EOMs - normal. No ptosis or lateral gaze deviation  CN 7 - no face asymmetry; normal eye closure and smile  CN 8 - hearing is grossly normal  CN 9, 10- palate elevates symmetrically.   CN 12 tongue - protrudes midline   Motor -  Muscle bulk and tone normal.  No lateralizing or focal deficits noted  Gait - unassisted, posture upright. gait is steady with normal steps, arm swing and turning.   Coordination - finger to nose intact. Romberg absent      ASSESSMENT/PLAN:  1. Obstructive sleep apnea  -     Home Sleep Study; Future    HST " ordered, and if PAP needed, patient unwilling to come into the lab for PAP night. Autopap can be ordered.     Sleep apnea and it's attendant risks discussed.  In lab PSG vs home sleep study option and insurance constraints discussed.  Potential need for treatment of RERE discussed including CPAP or Bilevel  PAP.   Alternatives to PAP discussed if PAP not ultimately tolerated.  Risks of excessive daytime sleepiness/drowsy driving discussed.  Weight loss discussed if patient is overweight.   Importance of healthy diet, regular exercise and sleep hygiene discussed    2. Snoring  Elimination of alcohol consumption, especially during the several hours prior to bedtime, is recommended for patients who snore    Sleeping in the lateral position is a low-risk intervention that warrants a trial in most patients who snore    Nasal congestion can impair airflow through the nose. When snoring arises in association with acute sinusitis or rhinosinusitis, symptomatic measures to reduce nasal congestion before bedtime, such as saline nasal irrigation or intranasal decongestants, may help alleviate snoring.    3. Observed sleep apnea    Follow-up TBD. In addition to their scheduled follow up, the patient has also been instructed to follow up on as needed basis.     Questions and concerns were sought and answered to the patient's stated verbal satisfaction.    The patient verbalizes understanding and agreement with the above stated treatment plan.   Items discussed include acute and/or chronic neurological, sleep, or other issues and their attendant differential diagnoses.  Potential for additional testing, treatment options, and prognosis also discussed.    Dr. Maher physically present in exam room during patient encounter.   IDomitila, DAVID acted solely as a scribe for and in the presence of Dr. Maher who performed the service.    Domitila Haro, MSN, APRN, FNP-C  [facility]  [phone]

## 2024-10-24 LAB
CALCIUM 24H UR-MCNC: 235 MG/DAY (ref 100–300)
CALCIUM UR-MCNC: 10 MG/DL
CREAT 24H UR-MCNC: 1381.8 MG/DAY (ref 710–1650)
CREAT UR-MCNC: 58.8 MG/DL (ref 63–166)
TOTAL VOLUME  (OHS): 2350 ML
TOTAL VOLUME  (OHS): 2350 ML

## 2024-10-31 ENCOUNTER — PROCEDURE VISIT (OUTPATIENT)
Dept: SLEEP MEDICINE | Facility: HOSPITAL | Age: 79
End: 2024-10-31
Attending: SPECIALIST
Payer: MEDICARE

## 2024-10-31 DIAGNOSIS — G47.33 OBSTRUCTIVE SLEEP APNEA: ICD-10-CM

## 2024-10-31 DIAGNOSIS — R06.83 SNORING: ICD-10-CM

## 2024-10-31 PROCEDURE — G0399 HOME SLEEP TEST/TYPE 3 PORTA: HCPCS

## 2024-11-04 NOTE — PROGRESS NOTES
"    [facility] Vascular - Clinic Note  Hany Pendleton MD      Patient Name: River Sheehan Jr.                   : 1945      MRN: [id number]   Visit Date: 2024       History Present Illness     Reason for Visit: Incision Check    Mr. Sheehan presents to the clinic for an incision check.  He is status post left CEA on .  He reports he had some swelling in the anterior portion of his incision.  Denies any neurological symptoms.  Denies any drainage from the area.  He denies any fevers chills or pain to the area        REVIEW OF SYSTEMS:  12 point review of systems conducted, negative except as stated in the history of present illness. See HPI for details.        Physical Exam      Vitals:    24 1024 24 1026   BP: 135/76 (!) 142/76   BP Location: Left arm Right arm   Patient Position: Sitting Sitting   Pulse: 60 63   Weight: 83.9 kg (185 lb)    Height: 5' 9" (1.753 m)           General: well-nourished, no acute distress, and healthy appearing, alert, pleasant, conversant, and oriented  Neck/Chest: normal  and left neck incision is present  Respiratory: breathing easily and without respiratory distress  Cardiology: regular rate and rhythm    Upper Extremity Arterial Exam:   Right - radial is palpable  Left - radial is palpable      Musculoskeletal:   Upper Extremity: normal bilateral hand function      Post Operative Incision:   Location: left neck  healing appropriately         The most anterior portion of his incision does have a little bit of a keloid formation with possibly a suture knot just beneath the skin.  There is no drainage or opening.  The remainder of the incision is well healing.      Assessment and Plan     Mr. Sheehan is a 79 y.o. status post left CEA.  He likely has some keloid formation in the most anterior portion of his incision.  I also discussed with him he may spit a suture knot at some point.  Overall I am not concerned with incision is closed and there " are no signs of infection.  Would like him to keep his regularly scheduled follow up with me in March.      1. Asymptomatic stenosis of left carotid artery          Imaging Obtained/Reviewed   Study: none  Date:           Medical History     Past Medical History:   Diagnosis Date    Acid reflux     Adenomatous polyp of ascending colon 09/20/2021    Arthritis     Asymptomatic stenosis of left carotid artery     CAD (coronary artery disease)     COVID-19 07/06/2022    Depression     Diabetes mellitus     Gout     Hearing loss     High cholesterol     HTN (hypertension)     Kidney stone     Macrocytic anemia     Osteoporosis     Personal history of colonic polyps 09/20/2021    Dr. Chun Smith    Seasonal allergies      Past Surgical History:   Procedure Laterality Date    CAROTID ENDARTERECTOMY Left 9/12/2024    Procedure: ENDARTERECTOMY-CAROTID;  Surgeon: Hany Pendleton MD;  Location: [facility] OR;  Service: Peripheral Vascular;  Laterality: Left;    COLONOSCOPY W/ BIOPSIES  09/20/2021    Dr. Chun Smith    CYSTOSCOPY      EXTRACAPSULAR EXTRACTION OF CATARACT      HERNIA REPAIR      LITHOTRIPSY  09/2023    RETROGRADE PYELOGRAPHY      WISDOM TOOTH EXTRACTION       Family History   Problem Relation Name Age of Onset    Bladder Cancer Mother      Hypertension Sister      Asthma Sister      Diabetes Sister      Lung cancer Sister      Hypertension Brother      Diabetes Brother      Coronary artery disease Brother      Atrial fibrillation Brother      Esophageal cancer Brother      Kidney cancer Brother      Hypertension Brother      Coronary artery disease Brother      Diabetes Brother      Progressive Supranuclear Palsy Brother      Coronary artery disease Brother      Pancreatic cancer Brother      Colon polyps Brother      Heart murmur Brother       Social History     Socioeconomic History    Marital status:    Tobacco Use    Smoking status: Never     Passive exposure: Never    Smokeless tobacco:  Never   Substance and Sexual Activity    Alcohol use: Not Currently     Alcohol/week: 1.0 standard drink of alcohol     Types: 1 Cans of beer per week    Drug use: Never    Sexual activity: Yes     Social Drivers of Health     Financial Resource Strain: Low Risk  (5/11/2023)    Overall Financial Resource Strain (CARDIA)     Difficulty of Paying Living Expenses: Not hard at all   Food Insecurity: No Food Insecurity (9/13/2024)    Hunger Vital Sign     Worried About Running Out of Food in the Last Year: Never true     Ran Out of Food in the Last Year: Never true   Transportation Needs: No Transportation Needs (9/13/2024)    TRANSPORTATION NEEDS     Transportation : No   Physical Activity: Sufficiently Active (5/11/2023)    Exercise Vital Sign     Days of Exercise per Week: 5 days     Minutes of Exercise per Session: 30 min   Stress: No Stress Concern Present (5/11/2023)    [employer] - Occupational Stress Questionnaire     Feeling of Stress : Not at all   Housing Stability: Low Risk  (9/13/2024)    Housing Stability Vital Sign     Unable to Pay for Housing in the Last Year: No     Homeless in the Last Year: No     Current Outpatient Medications   Medication Instructions    albuterol (PROAIR HFA) 90 mcg/actuation inhaler 2 puffs, Inhalation, Every 6 hours PRN, Rescue    allopurinoL (ZYLOPRIM) 100 MG tablet TAKE 1 TABLET EVERY DAY    amLODIPine (NORVASC) 10 mg, Daily    aspirin (ECOTRIN) 81 mg, Daily    atorvastatin (LIPITOR) 20 MG tablet TAKE 1 TABLET AT BEDTIME    b complex vitamins tablet 1 tablet, Every other day    blood sugar diagnostic (TRUE METRIX GLUCOSE TEST STRIP) Strp 1 strip, Misc.(Non-Drug; Combo Route), Daily    blood-glucose meter (TRUE METRIX AIR GLUCOSE METER) kit Test daily for DM II E11.9    busPIRone (BUSPAR) 10 MG tablet TAKE 1/2 TO 1 TABLET THREE TIMES DAILY    cinnamon bark 1,000 mg, Daily    citalopram (CELEXA) 20 mg, Oral    gabapentin (NEURONTIN) 100 MG capsule  TAKE 2 CAPSULES (200 MG TOTAL) THREE TIMES DAILY    glucosamine/chondr navarro A sod (OSTEO BI-FLEX ORAL) Daily    KRILL OIL ORAL 500 mg, Daily    losartan (COZAAR) 50 mg, Daily    metFORMIN (GLUCOPHAGE) 500 mg, Oral, 2 times daily with meals    montelukast (SINGULAIR) 10 mg, Oral, Nightly    pantoprazole (PROTONIX) 40 MG tablet TAKE 1 TABLET EVERY DAY    pioglitazone (ACTOS) 15 mg, Oral, Daily    tamsulosin (FLOMAX) 0.4 mg, Daily     Review of patient's allergies indicates:  No Known Allergies    Patient Care Team:  Chio Villela MD as PCP - General (Internal Medicine)  Marlene Figueroa MD as Consulting Physician (Cardiovascular Disease)  Jamin Berrios MD as Consulting Physician (Urology)  Leonie Roca MD as Consulting Physician (Nephrology)  Hany Pendleton MD as Consulting Physician (Vascular Surgery)  Dank Berrios MD as Consulting Physician (Endocrinology)        No follow-ups on file. In addition to their scheduled follow up, the patient has also been instructed to follow up on as needed basis.     Future Appointments   Date Time Provider Department Center   11/6/2024 10:40 AM Hany Pendleton MD [facility] LEXIS [facility]   11/21/2024  9:20 AM Chio Villela MD [facility] PCP   12/19/2024  1:15 PM Grady Bolaños FNP [facility] NEPH [facility]   3/26/2025 10:30 AM CV [facility] VASCULAR [facility] VASSUR [facility]   3/26/2025 11:00 AM Hany Pendleton MD [facility] VASR [facility]

## 2024-11-06 ENCOUNTER — OFFICE VISIT (OUTPATIENT)
Dept: VASCULAR SURGERY | Facility: CLINIC | Age: 79
End: 2024-11-06
Payer: MEDICARE

## 2024-11-06 VITALS
SYSTOLIC BLOOD PRESSURE: 142 MMHG | HEART RATE: 63 BPM | WEIGHT: 185 LBS | DIASTOLIC BLOOD PRESSURE: 76 MMHG | BODY MASS INDEX: 27.4 KG/M2 | HEIGHT: 69 IN

## 2024-11-06 DIAGNOSIS — I65.22 ASYMPTOMATIC STENOSIS OF LEFT CAROTID ARTERY: Primary | ICD-10-CM

## 2024-11-06 PROCEDURE — 99024 POSTOP FOLLOW-UP VISIT: CPT | Mod: POP,,, | Performed by: SURGERY

## 2024-11-09 RX ORDER — MONTELUKAST SODIUM 10 MG/1
10 TABLET ORAL NIGHTLY
Qty: 90 TABLET | Refills: 3 | Status: SHIPPED | OUTPATIENT
Start: 2024-11-09

## 2024-11-14 ENCOUNTER — HOSPITAL ENCOUNTER (OUTPATIENT)
Dept: RADIOLOGY | Facility: HOSPITAL | Age: 79
Discharge: HOME | End: 2024-11-14
Attending: INTERNAL MEDICINE
Payer: MEDICARE

## 2024-11-14 DIAGNOSIS — M81.0 OP (OSTEOPOROSIS): ICD-10-CM

## 2024-11-14 PROCEDURE — 72100 X-RAY EXAM L-S SPINE 2/3 VWS: CPT | Mod: TC

## 2024-11-20 ENCOUNTER — OFFICE VISIT (OUTPATIENT)
Dept: NEUROLOGY | Facility: CLINIC | Age: 79
End: 2024-11-20
Payer: MEDICARE

## 2024-11-20 VITALS
HEIGHT: 69 IN | WEIGHT: 185 LBS | SYSTOLIC BLOOD PRESSURE: 138 MMHG | DIASTOLIC BLOOD PRESSURE: 82 MMHG | BODY MASS INDEX: 27.4 KG/M2

## 2024-11-20 DIAGNOSIS — G47.33 OBSTRUCTIVE SLEEP APNEA: Primary | ICD-10-CM

## 2024-11-20 DIAGNOSIS — R06.83 SNORING: ICD-10-CM

## 2024-11-20 DIAGNOSIS — G47.30 OBSERVED SLEEP APNEA: ICD-10-CM

## 2024-11-20 PROCEDURE — 99213 OFFICE O/P EST LOW 20 MIN: CPT | Mod: PBBFAC

## 2024-11-20 PROCEDURE — 99999 PR PBB SHADOW E&M-EST. PATIENT-LVL III: CPT | Mod: PBBFAC,,,

## 2024-11-20 NOTE — PROGRESS NOTES
Neurology Clinic  New Sleep    SUBJECTIVE:  Patient ID: River Sheehan Jr. is a 79 y.o. male.    Chief Complaint: follow-up for HST results    History of Present Illness:     Pt presents to clinic with wife for HST results. Sleeps 8-9 hrs a night and awakens 2 times due to nocturia and is able to go right back to sleep. Awakens refreshed and occs has EDS. Occs naps 15min-1 hr. Witnessed snoring and witnessed apnea by wife        11/20/2024     1:30 PM   EPWORTH SLEEPINESS SCALE   Sitting and reading 0   Watching TV 0   Sitting, inactive in a public place (e.g. a theatre or a meeting) 1   As a passenger in a car for an hour without a break 0   Lying down to rest in the afternoon when circumstances permit 3   Sitting and talking to someone 0   Sitting quietly after a lunch without alcohol 1   In a car, while stopped for a few minutes in traffic 0   Total score 5       ROS: as per HPI, otherwise pertinent systems review is negative          Past Medical History:   Diagnosis Date    Acid reflux     Adenomatous polyp of ascending colon 09/20/2021    Arthritis     Asymptomatic stenosis of left carotid artery     CAD (coronary artery disease)     COVID-19 07/06/2022    Depression     Diabetes mellitus     Gout     Hearing loss     High cholesterol     HTN (hypertension)     Kidney stone     Macrocytic anemia     Osteoporosis     Personal history of colonic polyps 09/20/2021    Dr. Chun Smith    Seasonal allergies        Past Surgical History:   Procedure Laterality Date    CAROTID ENDARTERECTOMY Left 9/12/2024    Procedure: ENDARTERECTOMY-CAROTID;  Surgeon: Hany Pendleton MD;  Location: [facility];  Service: Peripheral Vascular;  Laterality: Left;    COLONOSCOPY W/ BIOPSIES  09/20/2021    Dr. Chun Smith    CYSTOSCOPY      EXTRACAPSULAR EXTRACTION OF CATARACT      HERNIA REPAIR      LITHOTRIPSY  09/2023    RETROGRADE PYELOGRAPHY      WISDOM TOOTH EXTRACTION         Family History   Problem Relation Name  Age of Onset    Bladder Cancer Mother      Hypertension Sister      Asthma Sister      Diabetes Sister      Lung cancer Sister      Hypertension Brother      Diabetes Brother      Coronary artery disease Brother      Atrial fibrillation Brother      Esophageal cancer Brother      Kidney cancer Brother      Hypertension Brother      Coronary artery disease Brother      Diabetes Brother      Progressive Supranuclear Palsy Brother      Coronary artery disease Brother      Pancreatic cancer Brother      Colon polyps Brother      Heart murmur Brother         Social History     Socioeconomic History    Marital status:    Tobacco Use    Smoking status: Never     Passive exposure: Never    Smokeless tobacco: Never   Substance and Sexual Activity    Alcohol use: Not Currently     Alcohol/week: 1.0 standard drink of alcohol     Types: 1 Cans of beer per week    Drug use: Never    Sexual activity: Yes     Social Drivers of Health     Financial Resource Strain: Low Risk  (5/11/2023)    Overall Financial Resource Strain (CARDIA)     Difficulty of Paying Living Expenses: Not hard at all   Food Insecurity: No Food Insecurity (9/13/2024)    Hunger Vital Sign     Worried About Running Out of Food in the Last Year: Never true     Ran Out of Food in the Last Year: Never true   Transportation Needs: No Transportation Needs (9/13/2024)    TRANSPORTATION NEEDS     Transportation : No   Physical Activity: Sufficiently Active (5/11/2023)    Exercise Vital Sign     Days of Exercise per Week: 5 days     Minutes of Exercise per Session: 30 min   Stress: No Stress Concern Present (5/11/2023)    [employer] - Occupational Stress Questionnaire     Feeling of Stress : Not at all   Housing Stability: Low Risk  (9/13/2024)    Housing Stability Vital Sign     Unable to Pay for Housing in the Last Year: No     Homeless in the Last Year: No       Review of patient's allergies indicates:  No Known  "Allergies    Current Outpatient Medications   Medication Instructions    albuterol (PROAIR HFA) 90 mcg/actuation inhaler 2 puffs, Inhalation, Every 6 hours PRN, Rescue    allopurinoL (ZYLOPRIM) 100 MG tablet TAKE 1 TABLET EVERY DAY    amLODIPine (NORVASC) 10 mg, Daily    aspirin (ECOTRIN) 81 mg, Daily    atorvastatin (LIPITOR) 20 MG tablet TAKE 1 TABLET AT BEDTIME    b complex vitamins tablet 1 tablet, Every other day    blood sugar diagnostic (TRUE METRIX GLUCOSE TEST STRIP) Strp 1 strip, Misc.(Non-Drug; Combo Route), Daily    blood-glucose meter (TRUE METRIX AIR GLUCOSE METER) kit Test daily for DM II E11.9    busPIRone (BUSPAR) 10 MG tablet TAKE 1/2 TO 1 TABLET THREE TIMES DAILY    cinnamon bark 1,000 mg, Daily    citalopram (CELEXA) 20 mg, Oral    gabapentin (NEURONTIN) 100 MG capsule TAKE 2 CAPSULES (200 MG TOTAL) THREE TIMES DAILY    glucosamine/chondr navarro A sod (OSTEO BI-FLEX ORAL) Daily    KRILL OIL ORAL 500 mg, Daily    losartan (COZAAR) 50 mg, Daily    metFORMIN (GLUCOPHAGE) 500 mg, Oral, 2 times daily with meals    montelukast (SINGULAIR) 10 mg, Oral, Nightly    pantoprazole (PROTONIX) 40 MG tablet TAKE 1 TABLET EVERY DAY    pioglitazone (ACTOS) 15 mg, Oral, Daily    tamsulosin (FLOMAX) 0.4 mg, Daily       OBJECTIVE:  Vitals:  /82 (BP Location: Left arm, Patient Position: Sitting)   Ht 5' 9" (1.753 m)   Wt 83.9 kg (185 lb)   BMI 27.32 kg/m²   Neck Circumference: 21 in     Physical Exam   Constitutional: he appears well-developed and well-nourished.    Accompanied by - wife  appearance - well appearing, no apparent distress, unassisted  oropharynx - Mallampati score class 4, ok dentition, 3mm overjet  heart - RRR, no murmur  lungs - pulmonary effort is normal. clear breath sounds  skin- no obvious lesions noted  Peripheral extremities- 1+ pretibial edema    Neurologic Exam:  Cortical function - The patient is alert, attentive, and oriented; cooperative with exam, good eye contact  Speech - clear " and fluent   cranial nerves:  CN 2 - visual fields are full to confrontation.    CN 3, 4, 6 EOMs - normal. No ptosis or lateral gaze deviation  CN 7 - no face asymmetry  CN 8 - hearing is grossly normal  CN 9, 10- palate elevates symmetrically.   CN 12 tongue - protrudes midline   Motor -  Muscle bulk and tone normal.  No lateralizing or focal deficits noted  Gait - unassisted, posture upright. gait is steady with normal steps, arm swing and turning.     Review of Data:   HST results:  KAYLEEN 37.4  Balta SaO2 61%  Time SaO2 spent <88%: 56 min  Apnea worse when supine  Severe RERE with prolonged episodes of deep hypoxemia    ASSESSMENT:    ICD-10-CM ICD-9-CM   1. Obstructive sleep apnea  G47.33 327.23   2. Snoring  R06.83 786.09   3. Observed sleep apnea  G47.30 780.57      PLAN:     Reviewed HST report with pt (see detailed results and interpretation scanned into chart). Suggest autoPAP with pressures from 5-20 cm.  Encouraged nightly use of PAP. Discussed minimum insurance guidelines for PAP use  Reminded pt that drowsy driving may still occur despite PAP use.  Sleep apnea and it's attendant risks discussed.  Weight loss discussed if patient is overweight.   Importance of healthy diet, regular exercise and sleep hygiene discussed    Orders Placed This Encounter   Procedures    CPAP FOR HOME USE     Follow-up 10 weeks.  In addition to their scheduled follow up, the patient has also been instructed to follow up on as needed basis.     Questions and concerns were sought and answered to the patient's stated verbal satisfaction.    The patient verbalizes understanding and agreement with the above stated treatment plan.   Items discussed include acute and/or chronic neurological, sleep, or other issues and their attendant differential diagnoses.  Potential for additional testing, treatment options, and prognosis also discussed.  Dr. Sam Maher available during encounter.    Domitila Haro, MSN, APRN, FNP-C  [facility]  [phone]

## 2024-11-21 ENCOUNTER — OFFICE VISIT (OUTPATIENT)
Dept: PRIMARY CARE CLINIC | Facility: CLINIC | Age: 79
End: 2024-11-21
Payer: MEDICARE

## 2024-11-21 VITALS
DIASTOLIC BLOOD PRESSURE: 79 MMHG | SYSTOLIC BLOOD PRESSURE: 131 MMHG | TEMPERATURE: 97 F | HEIGHT: 69 IN | OXYGEN SATURATION: 93 % | WEIGHT: 185 LBS | BODY MASS INDEX: 27.4 KG/M2 | HEART RATE: 59 BPM | RESPIRATION RATE: 16 BRPM

## 2024-11-21 DIAGNOSIS — I10 PRIMARY HYPERTENSION: ICD-10-CM

## 2024-11-21 DIAGNOSIS — N18.2 TYPE 2 DIABETES MELLITUS WITH STAGE 2 CHRONIC KIDNEY DISEASE, WITHOUT LONG-TERM CURRENT USE OF INSULIN: ICD-10-CM

## 2024-11-21 DIAGNOSIS — K86.89 PANCREATIC CALCIFICATION: ICD-10-CM

## 2024-11-21 DIAGNOSIS — M80.00XA AGE-RELATED OSTEOPOROSIS WITH CURRENT PATHOLOGICAL FRACTURE, INITIAL ENCOUNTER: ICD-10-CM

## 2024-11-21 DIAGNOSIS — N20.0 CALCULUS OF KIDNEY: ICD-10-CM

## 2024-11-21 DIAGNOSIS — G47.33 OBSTRUCTIVE SLEEP APNEA: ICD-10-CM

## 2024-11-21 DIAGNOSIS — E78.00 HYPERCHOLESTEROLEMIA: ICD-10-CM

## 2024-11-21 DIAGNOSIS — I65.22 ASYMPTOMATIC STENOSIS OF LEFT CAROTID ARTERY: ICD-10-CM

## 2024-11-21 DIAGNOSIS — E11.22 TYPE 2 DIABETES MELLITUS WITH STAGE 2 CHRONIC KIDNEY DISEASE, WITHOUT LONG-TERM CURRENT USE OF INSULIN: ICD-10-CM

## 2024-11-21 DIAGNOSIS — E21.0 PRIMARY HYPERPARATHYROIDISM: ICD-10-CM

## 2024-11-21 DIAGNOSIS — Z00.00 MEDICARE ANNUAL WELLNESS VISIT, SUBSEQUENT: Primary | ICD-10-CM

## 2024-11-21 DIAGNOSIS — N18.2 STAGE 2 CHRONIC KIDNEY DISEASE: ICD-10-CM

## 2024-11-21 DIAGNOSIS — M1A.09X0 IDIOPATHIC CHRONIC GOUT OF MULTIPLE SITES WITHOUT TOPHUS: ICD-10-CM

## 2024-11-21 DIAGNOSIS — M15.0 PRIMARY OSTEOARTHRITIS INVOLVING MULTIPLE JOINTS: ICD-10-CM

## 2024-11-21 DIAGNOSIS — F33.41 RECURRENT MAJOR DEPRESSIVE DISORDER, IN PARTIAL REMISSION: ICD-10-CM

## 2024-11-21 NOTE — PROGRESS NOTES
Patient ID: [id number]     Chief Complaint: Medicare AWV      HPI:     River Sheehan Jr. is a 79 y.o. male here today for a Medicare Wellness. He saw sleep medicine and is in process of getting a machine, he stopped 37 an hour. It was severe. He saw Dr Berrios and he didn't see any problem.       Opioid Screening: Patient medication list reviewed, patient is not taking prescription opioids. Patient is not using additional opioids than prescribed. Patient is at low risk of substance abuse based on this opioid use history.       Past Medical History:   Diagnosis Date    Acid reflux     Adenomatous polyp of ascending colon 09/20/2021    Arthritis     Asymptomatic stenosis of left carotid artery     CAD (coronary artery disease)     COVID-19 07/06/2022    Depression     Diabetes mellitus     Gout     Hearing loss     High cholesterol     HTN (hypertension)     Kidney stone     Macrocytic anemia     Osteoporosis     Personal history of colonic polyps 09/20/2021    Dr. Chun Smith    Seasonal allergies         Past Surgical History:   Procedure Laterality Date    CAROTID ENDARTERECTOMY Left 9/12/2024    Procedure: ENDARTERECTOMY-CAROTID;  Surgeon: Hany Pendleton MD;  Location: [facility];  Service: Peripheral Vascular;  Laterality: Left;    COLONOSCOPY W/ BIOPSIES  09/20/2021    Dr. Chun Smith    CYSTOSCOPY      EXTRACAPSULAR EXTRACTION OF CATARACT      HERNIA REPAIR      LITHOTRIPSY  09/2023    RETROGRADE PYELOGRAPHY      WISDOM TOOTH EXTRACTION         Review of patient's allergies indicates:  No Known Allergies    Outpatient Medications Marked as Taking for the 11/21/24 encounter (Office Visit) with Chio Villela MD   Medication Sig Dispense Refill    albuterol (PROAIR HFA) 90 mcg/actuation inhaler Inhale 2 puffs into the lungs every 6 (six) hours as needed for Wheezing. Rescue 8 g 0    allopurinoL (ZYLOPRIM) 100 MG tablet TAKE 1 TABLET EVERY DAY 90 tablet 3    aspirin (ECOTRIN) 81 MG EC tablet  Take 81 mg by mouth once daily.      atorvastatin (LIPITOR) 20 MG tablet TAKE 1 TABLET AT BEDTIME 90 tablet 3    b complex vitamins tablet Take 1 tablet by mouth every other day.      blood sugar diagnostic (TRUE METRIX GLUCOSE TEST STRIP) Strp 1 strip by Misc.(Non-Drug; Combo Route) route once daily. 150 strip 3    blood-glucose meter (TRUE METRIX AIR GLUCOSE METER) kit Test daily for DM II E11.9 1 each 0    busPIRone (BUSPAR) 10 MG tablet TAKE 1/2 TO 1 TABLET THREE TIMES DAILY (Patient taking differently: 10 mg Daily.) 270 tablet 3    cinnamon bark 500 mg capsule Take 1,000 mg by mouth once daily.      citalopram (CELEXA) 20 MG tablet TAKE 1 TABLET EVERY DAY 90 tablet 3    gabapentin (NEURONTIN) 100 MG capsule TAKE 2 CAPSULES (200 MG TOTAL) THREE TIMES DAILY 540 capsule 3    glucosamine/chondr navarro A sod (OSTEO BI-FLEX ORAL) Take by mouth Daily. TAKING 2 QD      KRILL OIL ORAL Take 500 mg by mouth once daily at 6am.      losartan (COZAAR) 50 MG tablet Take 50 mg by mouth once daily.      metFORMIN (GLUCOPHAGE) 500 MG tablet TAKE 1 TABLET TWICE DAILY WITH MEALS 180 tablet 3    montelukast (SINGULAIR) 10 mg tablet TAKE 1 TABLET EVERY EVENING 90 tablet 3    pantoprazole (PROTONIX) 40 MG tablet TAKE 1 TABLET EVERY DAY 90 tablet 3    pioglitazone (ACTOS) 15 MG tablet Take 1 tablet (15 mg total) by mouth once daily. 90 tablet 3    tamsulosin (FLOMAX) 0.4 mg Cap Take 0.4 mg by mouth once daily.      [DISCONTINUED] amLODIPine (NORVASC) 10 MG tablet Take 10 mg by mouth once daily.         Social History     Socioeconomic History    Marital status:    Tobacco Use    Smoking status: Never     Passive exposure: Never    Smokeless tobacco: Never   Substance and Sexual Activity    Alcohol use: Not Currently     Alcohol/week: 1.0 standard drink of alcohol     Types: 1 Cans of beer per week    Drug use: Never    Sexual activity: Yes     Social Drivers of Health     Financial Resource Strain: Low Risk  (5/11/2023)    Overall  Financial Resource Strain (CARDIA)     Difficulty of Paying Living Expenses: Not hard at all   Food Insecurity: No Food Insecurity (9/13/2024)    Hunger Vital Sign     Worried About Running Out of Food in the Last Year: Never true     Ran Out of Food in the Last Year: Never true   Transportation Needs: No Transportation Needs (9/13/2024)    TRANSPORTATION NEEDS     Transportation : No   Physical Activity: Sufficiently Active (5/11/2023)    Exercise Vital Sign     Days of Exercise per Week: 5 days     Minutes of Exercise per Session: 30 min   Stress: No Stress Concern Present (5/11/2023)    [employer] - Occupational Stress Questionnaire     Feeling of Stress : Not at all   Housing Stability: Low Risk  (9/13/2024)    Housing Stability Vital Sign     Unable to Pay for Housing in the Last Year: No     Homeless in the Last Year: No        Family History   Problem Relation Name Age of Onset    Bladder Cancer Mother      Hypertension Sister      Asthma Sister      Diabetes Sister      Lung cancer Sister      Hypertension Brother      Diabetes Brother      Coronary artery disease Brother      Atrial fibrillation Brother      Esophageal cancer Brother      Kidney cancer Brother      Hypertension Brother      Coronary artery disease Brother      Diabetes Brother      Progressive Supranuclear Palsy Brother      Coronary artery disease Brother      Pancreatic cancer Brother      Colon polyps Brother      Heart murmur Brother          Patient Care Team:  Chio Villela MD as PCP - General (Internal Medicine)  Marlene Figueroa MD as Consulting Physician (Cardiovascular Disease)  Jamin Berrios MD as Consulting Physician (Urology)  Leonie Roca MD as Consulting Physician (Nephrology)  Hany Pendleton MD as Consulting Physician (Vascular Surgery)  Dank Berrios MD as Consulting Physician (Endocrinology)  Sam Sinclair MD as Consulting Physician (Ophthalmology)  "      Subjective:     Review of Systems   Constitutional: Negative.    HENT: Negative.     Eyes: Negative.         He saw eye 3 mo, some blurring   Respiratory: Negative.     Cardiovascular:  Positive for leg swelling. Negative for chest pain and palpitations.        BP had been low, sugar was low at that time too, Dr Figueroa wants to stop amlodipine   Gastrointestinal: Negative.    Genitourinary:  Negative for urgency.   Musculoskeletal:  Positive for joint pain.        R shoulder, he's been picking pecans   Skin: Negative.    Neurological: Negative.    Endo/Heme/Allergies:         Sugar was 115   Psychiatric/Behavioral:          Mood holding           Objective:     /79   Pulse (!) 59   Temp 97 °F (36.1 °C) (Oral)   Resp 16   Ht 5' 9" (1.753 m)   Wt 83.9 kg (185 lb)   SpO2 (!) 93%   BMI 27.32 kg/m²     Physical Exam  Vitals reviewed.   Constitutional:       Appearance: He is not ill-appearing.   HENT:      Head: Normocephalic and atraumatic.      Right Ear: Tympanic membrane, ear canal and external ear normal.      Left Ear: Tympanic membrane, ear canal and external ear normal.      Mouth/Throat:      Mouth: Mucous membranes are moist.      Pharynx: No oropharyngeal exudate.   Eyes:      General: No scleral icterus.     Extraocular Movements: Extraocular movements intact.      Conjunctiva/sclera: Conjunctivae normal.      Pupils: Pupils are equal, round, and reactive to light.   Cardiovascular:      Rate and Rhythm: Normal rate and regular rhythm.      Pulses:           Dorsalis pedis pulses are 3+ on the right side and 3+ on the left side.        Posterior tibial pulses are 2+ on the right side and 2+ on the left side.   Pulmonary:      Effort: Pulmonary effort is normal.      Breath sounds: Normal breath sounds.   Musculoskeletal:         General: Swelling and tenderness present.      Right lower leg: Edema present.      Left lower leg: Edema present.      Right foot: Prominent metatarsal heads " present.      Left foot: Prominent metatarsal heads present.   Feet:      Right foot:      Protective Sensation: 10 sites tested.  10 sites sensed.      Skin integrity: Skin integrity normal.      Toenail Condition: Right toenails are normal.      Left foot:      Protective Sensation: 10 sites tested.  10 sites sensed.      Skin integrity: Skin integrity normal.      Toenail Condition: Left toenails are normal.   Skin:     General: Skin is warm and dry.      Coloration: Skin is pale.   Neurological:      Mental Status: He is alert and oriented to person, place, and time. Mental status is at baseline.   Psychiatric:      Comments: Mood better with wife doing better           A comprehensive HEALTH RISK ASSESSMENT was completed today. Results are summarized below:    There are NO EMOTIONAL/SOCIAL CONCERNS identified on today's screening for Social Isolation, Depression and Anxiety.    There are NO COGNITIVE FUNCTION CONCERNS identified on today's screening.  The following FUNCTIONAL AND/OR SAFETY CONCERNS were identified on today's screening for Physical Symptoms, Nutritional, Home Safety/Living Situation, Fall Risk, Activities of Daily Living, Independent Activities of Daily Living, Physical Activity,Timed Up and Go test and Whisper test::  *Patient reports recent HEARING/VISION DIFFICULTIES. (Have you noticed any hearing or vision difficulties?: (!) Yes)  *Patient reports NO ROUTINE EXERCISE. (On average, how many days per week do you engage in moderate to strenuous exercise (like a brisk walk)?: (!) 0)   *Patient reports NO PREVENTIVE HOME HAZARD EVALUATION OR MODIFICATION. (Have you or someone else evaluated or modified your home with additional safety features like handrails on all stairs, installed grab bars in the bathroom, secured loose rugs and ensured good lighting in all areas?: (!) No)  *Patient's WHISPER TEST was ABNORMAL. (Was the patient's Whisper test normal in both ears?: (!) No)    The patient  reports NO OPIOID PRESCRIPTIONS. This was confirmed through medication reconciliation and the [facility] website.    The patient is NOT A TOBACCO USER.  The patient reports NO SIGNIFICANT ALCOHOL USE.     All Questions regarding food, transportation or housing were not answered today.    Assessment/Plan:     1. Medicare annual wellness visit, subsequent  Comments:  Up to date on screening,    2. Primary hypertension  Comments:  Ok to stop amlodipine and monitor, we can increase Losartan or try lower dose if rises  Overview:  last visit with EKG 08/12      3. Stage 2 chronic kidney disease  Comments:  microalbumin due    4. Calculus of kidney  Overview:  passed      5. Hypercholesterolemia  Comments:  Continue atorvastatin    6. Asymptomatic stenosis of left carotid artery    7. Recurrent major depressive disorder, in partial remission    8. Type 2 diabetes mellitus with stage 2 chronic kidney disease, without long-term current use of insulin  Comments:  Good control, microalbumin today, eye was done  Orders:  -     Cancel: Microalbumin/Creatinine Ratio, Urine  -     Microalbumin/Creatinine Ratio, Urine    9. Idiopathic chronic gout of multiple sites without tophus  Comments:  No flare    10. Obstructive sleep apnea  Comments:  Awaiting start of therapy    11. Primary osteoarthritis involving multiple joints  Overview:  shoulders      12. Age-related osteoporosis with current pathological fracture, initial encounter  Comments:  Per Dr Berrios, he did do a bone density    13. Pancreatic calcification  Comments:  No history of pancreatitis, having workup with parathyroid issues    14. Primary hyperparathyroidism           Medicare Annual Wellness and Personalized Prevention Plan:   Fall Risk + Home Safety + Hearing Impairment + Depression Screen + Opioid and Substance Abuse Screening + Cognitive Impairment Screen + Health Risk Assessment all reviewed.     Health Maintenance Topics with due status: Not Due       Topic Last  Completion Date    TETANUS VACCINE 08/17/2017    Lipid Panel 05/21/2024    Hemoglobin A1c 09/12/2024      The patient's Health Maintenance was reviewed and the following appears to be due at this time:   Health Maintenance Due   Topic Date Due    Hepatitis C Screening  Never done    Eye Exam  06/02/2023    Diabetes Urine Screening  11/10/2023    Foot Exam  11/16/2024       Advance Care Planning   I attest to discussing Advance Care Planning with patient and/or family member.  Education was provided including the importance of the Health Care Power of , Advance Directives, and/or [place] documentation.  The patient expressed understanding to the importance of this information and discussion.   Advance Care Planning     Date: 11/21/2024  Patient did not wish or was not able to name a surrogate decision maker or provide an Advance Care Plan. Family knows what he would want and he's comfortable with them making the decisions.            Follow up in about 6 months (around 5/21/2025). In addition to their scheduled follow up, the patient has also been instructed to follow up on as needed basis.

## 2024-11-22 LAB
ALBUMIN/CREAT UR: NORMAL MG/G CREAT (ref 0–29)
CREAT UR-MCNC: 139.1 MG/DL
MICROALBUMIN UR-MCNC: <12 UG/ML

## 2024-11-25 ENCOUNTER — DOCUMENTATION ONLY (OUTPATIENT)
Dept: PRIMARY CARE CLINIC | Facility: CLINIC | Age: 79
End: 2024-11-25
Payer: MEDICARE

## 2024-11-25 ENCOUNTER — TELEPHONE (OUTPATIENT)
Dept: PRIMARY CARE CLINIC | Facility: CLINIC | Age: 79
End: 2024-11-25
Payer: MEDICARE

## 2024-11-25 NOTE — TELEPHONE ENCOUNTER
----- Message from Chio Villela MD sent at 11/22/2024  2:26 PM CST -----  Urine test was good, no sign of the damage diabetes causes

## 2024-11-27 ENCOUNTER — EDUCATION (OUTPATIENT)
Dept: SLEEP MEDICINE | Facility: CLINIC | Age: 79
End: 2024-11-27
Payer: MEDICARE

## 2024-11-27 NOTE — PROGRESS NOTES
Patient was setup with: Resmed AS 10 autoset at 5-20cm, slim tubing, HH    Patient was fitted with: Airfit F40- medium  Patient was instructed on the proper use and operation of equipment. Patient verbalized understanding of instructions given. We will follow as needed.

## 2024-12-02 ENCOUNTER — TELEPHONE (OUTPATIENT)
Dept: PRIMARY CARE CLINIC | Facility: CLINIC | Age: 79
End: 2024-12-02
Payer: MEDICARE

## 2024-12-03 NOTE — TELEPHONE ENCOUNTER
Off the amlodipine his BP is starting to rise. He can double the losartan to 100 mg and see if that will bring him back down.

## 2024-12-12 ENCOUNTER — LAB VISIT (OUTPATIENT)
Dept: LAB | Facility: HOSPITAL | Age: 79
End: 2024-12-12
Attending: INTERNAL MEDICINE
Payer: MEDICARE

## 2024-12-12 DIAGNOSIS — I10 PRIMARY HYPERTENSION: ICD-10-CM

## 2024-12-12 LAB
ALBUMIN SERPL-MCNC: 3.7 G/DL (ref 3.4–4.8)
ALBUMIN/GLOB SERPL: 1.1 RATIO (ref 1.1–2)
ALP SERPL-CCNC: 112 UNIT/L (ref 40–150)
ALT SERPL-CCNC: 16 UNIT/L (ref 0–55)
ANION GAP SERPL CALC-SCNC: 7 MEQ/L
AST SERPL-CCNC: 18 UNIT/L (ref 5–34)
BACTERIA #/AREA URNS AUTO: NORMAL /HPF
BILIRUB SERPL-MCNC: 0.4 MG/DL
BILIRUB UR QL STRIP.AUTO: NEGATIVE
BUN SERPL-MCNC: 17.4 MG/DL (ref 8.4–25.7)
CALCIUM SERPL-MCNC: 10.5 MG/DL (ref 8.8–10)
CHLORIDE SERPL-SCNC: 104 MMOL/L (ref 98–107)
CLARITY UR: CLEAR
CO2 SERPL-SCNC: 29 MMOL/L (ref 23–31)
COLOR UR AUTO: ABNORMAL
CREAT SERPL-MCNC: 0.83 MG/DL (ref 0.72–1.25)
CREAT UR-MCNC: 124.8 MG/DL (ref 63–166)
CREAT/UREA NIT SERPL: 21
GFR SERPLBLD CREATININE-BSD FMLA CKD-EPI: >60 ML/MIN/1.73/M2
GLOBULIN SER-MCNC: 3.4 GM/DL (ref 2.4–3.5)
GLUCOSE SERPL-MCNC: 77 MG/DL (ref 82–115)
GLUCOSE UR QL STRIP: NEGATIVE
HGB UR QL STRIP: NEGATIVE
KETONES UR QL STRIP: NEGATIVE
LEUKOCYTE ESTERASE UR QL STRIP: NEGATIVE
NITRITE UR QL STRIP: NEGATIVE
PH UR STRIP: 5.5 [PH]
PHOSPHATE SERPL-MCNC: 2.9 MG/DL (ref 2.3–4.7)
POTASSIUM SERPL-SCNC: 4.9 MMOL/L (ref 3.5–5.1)
PROT SERPL-MCNC: 7.1 GM/DL (ref 5.8–7.6)
PROT UR QL STRIP: NEGATIVE
PROT UR STRIP-MCNC: 8.1 MG/DL
PTH-INTACT SERPL-MCNC: 58.1 PG/ML (ref 8.7–77)
RBC #/AREA URNS AUTO: NORMAL /HPF
SODIUM SERPL-SCNC: 140 MMOL/L (ref 136–145)
SP GR UR STRIP.AUTO: 1.02 (ref 1–1.03)
SQUAMOUS #/AREA URNS AUTO: NORMAL /HPF
URINE PROTEIN/CREATININE RATIO (OLG): 0.1
UROBILINOGEN UR STRIP-ACNC: 0.2
WBC #/AREA URNS AUTO: NORMAL /HPF

## 2024-12-12 PROCEDURE — 80053 COMPREHEN METABOLIC PANEL: CPT

## 2024-12-12 PROCEDURE — 81003 URINALYSIS AUTO W/O SCOPE: CPT

## 2024-12-12 PROCEDURE — 82570 ASSAY OF URINE CREATININE: CPT

## 2024-12-12 PROCEDURE — 84100 ASSAY OF PHOSPHORUS: CPT

## 2024-12-12 PROCEDURE — 83970 ASSAY OF PARATHORMONE: CPT

## 2024-12-12 PROCEDURE — 36415 COLL VENOUS BLD VENIPUNCTURE: CPT

## 2024-12-14 PROCEDURE — 82340 ASSAY OF CALCIUM IN URINE: CPT | Performed by: INTERNAL MEDICINE

## 2024-12-15 LAB
CALCIUM 24H UR-MCNC: 190.8 MG/DAY (ref 100–300)
CALCIUM UR-MCNC: 10.6 MG/DL
TOTAL VOLUME  (OHS): 1800 ML

## 2024-12-19 ENCOUNTER — OFFICE VISIT (OUTPATIENT)
Dept: NEPHROLOGY | Facility: CLINIC | Age: 79
End: 2024-12-19
Payer: MEDICARE

## 2024-12-19 VITALS
RESPIRATION RATE: 20 BRPM | SYSTOLIC BLOOD PRESSURE: 126 MMHG | HEART RATE: 65 BPM | TEMPERATURE: 98 F | HEIGHT: 69 IN | WEIGHT: 192.38 LBS | OXYGEN SATURATION: 95 % | DIASTOLIC BLOOD PRESSURE: 72 MMHG | BODY MASS INDEX: 28.49 KG/M2

## 2024-12-19 DIAGNOSIS — N20.0 NEPHROLITHIASIS: ICD-10-CM

## 2024-12-19 DIAGNOSIS — E83.39 HYPOPHOSPHATEMIA: ICD-10-CM

## 2024-12-19 DIAGNOSIS — I10 PRIMARY HYPERTENSION: ICD-10-CM

## 2024-12-19 DIAGNOSIS — E83.52 HYPERCALCEMIA: ICD-10-CM

## 2024-12-19 DIAGNOSIS — E21.0 PRIMARY HYPERPARATHYROIDISM: ICD-10-CM

## 2024-12-19 DIAGNOSIS — N28.1 BILATERAL RENAL CYSTS: ICD-10-CM

## 2024-12-19 DIAGNOSIS — N18.2 CKD (CHRONIC KIDNEY DISEASE) STAGE 2, GFR 60-89 ML/MIN: Primary | ICD-10-CM

## 2024-12-19 PROCEDURE — 99215 OFFICE O/P EST HI 40 MIN: CPT | Mod: PBBFAC

## 2024-12-19 PROCEDURE — 99999 PR PBB SHADOW E&M-EST. PATIENT-LVL V: CPT | Mod: PBBFAC,,,

## 2024-12-19 NOTE — PATIENT INSTRUCTIONS
Call Dr. Dank Berrios's office today to find out what is going on with the injection you need for primary hyperparathyroidism      Blood pressure goal: consistently less than 130/85      Avoid NSAIDs and COX2 inhibitors: Advil (ibuprofen), Aleve (naproxen), Mobic (meloxicam), Celebrex (celecoxib), Toradol (ketorolac) and Diclofenac (voltaren), Indomethacin (indocin).    Only take tylenol (acetaminophen) occasionally if needed for aches/pains.    Recommend low sodium diet:  Less than 2,000 mg per day  Avoid high salt foods (olives, pickles, smoked meats, deli meats, salted potato chips, fast food, etc.).   Do not add salt to your food at the table.   Use only small amounts of salt when cooking.    Renal Diet:  Reduce intake of nuts, peanut butter, milk, cheese, dried beans, and peas     Recommend a healthy lifestyle for weight management:  Light to moderate exercise, increasing as tolerated  Low carbohydrate, low fat diet  Portion control    Avoid alcohol and soda. Limit tea and coffee.     Stay well hydrated with water

## 2024-12-19 NOTE — PROGRESS NOTES
[facility] ambulatory Office Note    HPI  River Sheehan Jr., 79 y.o. male, presents to office for his 2nd visit for CKD.  Appears baseline CKD stage 2.  Renal function very stable.  No significant proteinuria. Of note that he was told he had been diabetic for only 1 year but he may have been diabetic for longer.  History significant for recurrent nephrolithiasis, hypercalcemia.  Found to have inappropriately high PTH with elevated calcium and hypophosphatemia.  Last visit refer to endocrinology concern for primary hyperparathyroidism.  He did see Endocrine in November and reports she is going to put him on an injection for primary hyperparathyroidism but he has not heard back from him.      Medical Diagnoses:   Past Medical History:   Diagnosis Date    Acid reflux     Adenomatous polyp of ascending colon 09/20/2021    Arthritis     Asymptomatic stenosis of left carotid artery     CAD (coronary artery disease)     COVID-19 07/06/2022    Depression     Diabetes mellitus     Gout     Hearing loss     High cholesterol     HTN (hypertension)     Kidney stone     Macrocytic anemia     Osteoporosis     Personal history of colonic polyps 09/20/2021    Dr. Chun Smith    Seasonal allergies      Patient Active Problem List   Diagnosis    Primary osteoarthritis involving multiple joints    Calculus of kidney    Recurrent major depressive disorder, in partial remission    Asymptomatic stenosis of left carotid artery    Gastroesophageal reflux disease    Idiopathic chronic gout of multiple sites without tophus    Hearing loss    Hypercholesterolemia    Primary hypertension    Macrocytic anemia    Persistent proteinuria    CKD (chronic kidney disease) stage 2, GFR 60-89 ml/min    Type 2 diabetes mellitus with stage 2 chronic kidney disease, without long-term current use of insulin    Obstructive sleep apnea    Left bundle branch block (LBBB)    Primary hyperparathyroidism    Hypercalcemia    Hypophosphatemia     Nephrolithiasis       Surgical History:   Past Surgical History:   Procedure Laterality Date    CAROTID ENDARTERECTOMY Left 9/12/2024    Procedure: ENDARTERECTOMY-CAROTID;  Surgeon: Hany Pendleton MD;  Location: [facility];  Service: Peripheral Vascular;  Laterality: Left;    COLONOSCOPY W/ BIOPSIES  09/20/2021    Dr. Chun Smith    CYSTOSCOPY      EXTRACAPSULAR EXTRACTION OF CATARACT      HERNIA REPAIR      LITHOTRIPSY  09/2023    RETROGRADE PYELOGRAPHY      WISDOM TOOTH EXTRACTION         Family History:   Family History   Problem Relation Name Age of Onset    Bladder Cancer Mother      Hypertension Sister      Asthma Sister      Diabetes Sister      Lung cancer Sister      Hypertension Brother      Diabetes Brother      Coronary artery disease Brother      Atrial fibrillation Brother      Esophageal cancer Brother      Kidney cancer Brother      Hypertension Brother      Coronary artery disease Brother      Diabetes Brother      Progressive Supranuclear Palsy Brother      Coronary artery disease Brother      Pancreatic cancer Brother      Colon polyps Brother      Heart murmur Brother         Social History:   Social History     Tobacco Use    Smoking status: Never     Passive exposure: Never    Smokeless tobacco: Never   Substance Use Topics    Alcohol use: Not Currently     Alcohol/week: 1.0 standard drink of alcohol     Types: 1 Cans of beer per week       Allergies:  Review of patient's allergies indicates:  No Known Allergies    Medications:    Current Outpatient Medications:     albuterol (PROAIR HFA) 90 mcg/actuation inhaler, Inhale 2 puffs into the lungs every 6 (six) hours as needed for Wheezing. Rescue, Disp: 8 g, Rfl: 0    allopurinoL (ZYLOPRIM) 100 MG tablet, TAKE 1 TABLET EVERY DAY, Disp: 90 tablet, Rfl: 3    aspirin (ECOTRIN) 81 MG EC tablet, Take 81 mg by mouth once daily., Disp: , Rfl:     atorvastatin (LIPITOR) 20 MG tablet, TAKE 1 TABLET AT BEDTIME, Disp: 90 tablet, Rfl: 3    b complex  vitamins tablet, Take 1 tablet by mouth every other day., Disp: , Rfl:     blood sugar diagnostic (TRUE METRIX GLUCOSE TEST STRIP) Strp, 1 strip by Misc.(Non-Drug; Combo Route) route once daily., Disp: 150 strip, Rfl: 3    busPIRone (BUSPAR) 10 MG tablet, TAKE 1/2 TO 1 TABLET THREE TIMES DAILY, Disp: 270 tablet, Rfl: 3    cinnamon bark 500 mg capsule, Take 1,000 mg by mouth once daily., Disp: , Rfl:     citalopram (CELEXA) 20 MG tablet, TAKE 1 TABLET EVERY DAY, Disp: 90 tablet, Rfl: 3    gabapentin (NEURONTIN) 100 MG capsule, TAKE 2 CAPSULES (200 MG TOTAL) THREE TIMES DAILY, Disp: 540 capsule, Rfl: 3    glucosamine/chondr navarro A sod (OSTEO BI-FLEX ORAL), Take by mouth Daily. TAKING 2 QD, Disp: , Rfl:     KRILL OIL ORAL, Take 500 mg by mouth once daily at 6am., Disp: , Rfl:     losartan (COZAAR) 50 MG tablet, Take 100 mg by mouth once daily., Disp: , Rfl:     metFORMIN (GLUCOPHAGE) 500 MG tablet, TAKE 1 TABLET TWICE DAILY WITH MEALS, Disp: 180 tablet, Rfl: 3    montelukast (SINGULAIR) 10 mg tablet, TAKE 1 TABLET EVERY EVENING, Disp: 90 tablet, Rfl: 3    pantoprazole (PROTONIX) 40 MG tablet, TAKE 1 TABLET EVERY DAY, Disp: 90 tablet, Rfl: 3    pioglitazone (ACTOS) 15 MG tablet, Take 1 tablet (15 mg total) by mouth once daily., Disp: 90 tablet, Rfl: 3    tamsulosin (FLOMAX) 0.4 mg Cap, Take 0.4 mg by mouth once daily., Disp: , Rfl:     blood-glucose meter (TRUE METRIX AIR GLUCOSE METER) kit, Test daily for DM II E11.9, Disp: 1 each, Rfl: 0       Review of Systems:    Constitutional: Denies fever, fatigue, generalized weakness  Skin: Denies wounds, no rashes, no itching, no new skin lesions  Respiratory:  Recently had an upper respiratory infection currently feeling better  Cardiovascular: Denies chest pain, palpitations, or swelling  Gastrointestional: Denies abdominal pain, nausea, vomiting, diarrhea, or constipation  Genitourinary: Denies dysuria, hematuria, foamy urine, or incontinence; reports able to empty  "bladder  Musculoskeletal: Denies back or flank pain  Neurological: Denies headaches, dizziness, paresthesias, tremors or focal weakness      Vital Signs:  /72 (BP Location: Left arm, Patient Position: Sitting)   Pulse 65   Temp 97.9 °F (36.6 °C) (Temporal)   Resp 20   Ht 5' 9" (1.753 m)   Wt 87.3 kg (192 lb 6.4 oz)   SpO2 95%   BMI 28.41 kg/m²   Body mass index is 28.41 kg/m².      Physical Exam:    General: no acute distress, awake, alert  Eyes: conjunctiva clear, eyelids without swelling  HENT: atraumatic, oropharynx and nasal mucosa patent  Neck: supple, trache midline, full ROM, no JVD  Respiratory: equal, unlabored, clear to auscultation A/P  Cardiovascular: RRR without murmur or rub  Edema:  Trace  Gastrointestinal: soft, non-tender, non-distended; positive bowel sounds; no masses to palpation; no ascites  Genitourinary: no CVA tenderness upon palpation  Musculoskeletal: ROM without new limitation or discomfort  Integumentary: warm, dry; no rashes, wounds, or skin lesions  Neurological: oriented x4, appropriate, no acute deficits; no asterixis      Labs:        Component Value Date/Time     12/12/2024 1341     10/22/2024 1100     07/10/2024 0210    K 4.9 12/12/2024 1341    K 4.3 10/22/2024 1100    K 5.0 07/10/2024 0210     12/12/2024 1341     10/22/2024 1100    CL 99 07/10/2024 0210    CO2 29 12/12/2024 1341    CO2 29 10/22/2024 1100    CO2 27 07/10/2024 0210    BUN 17.4 12/12/2024 1341    BUN 20.1 10/22/2024 1100    BUN 18 07/10/2024 0210    CREATININE 0.83 12/12/2024 1341    CREATININE 0.87 10/22/2024 1100    CREATININE 0.86 09/12/2024 0804    CREATININE 1.05 08/28/2024 1020    CREATININE 0.93 07/10/2024 0210    CALCIUM 10.5 (H) 12/12/2024 1341    CALCIUM 9.8 10/22/2024 1100    CALCIUM 10.0 07/10/2024 0210    PHOS 2.9 12/12/2024 1341    PHOS 2.6 10/22/2024 1100    PTH 58.1 12/12/2024 1341    PTH 53.0 07/23/2024 1148    PTH 79.7 (H) 05/27/2024 1103         "   Component Value Date/Time    WBC 7.34 08/28/2024 1020    WBC 7.62 07/23/2024 1148    HGB 12.9 (L) 08/28/2024 1020    HGB 12.7 (L) 07/23/2024 1148    HCT 38.9 (L) 08/28/2024 1020    HCT 38.9 (L) 07/23/2024 1148     08/28/2024 1020     07/23/2024 1148         Imaging:  Retroperitoneal US:  Impression:     Nephrolithiasis of both kidneys.     Cyst of the left kidney as above        Electronically signed by: Jose Corbin  Date:                                            06/26/2024    Impression:  1. CKD (chronic kidney disease) stage 2, GFR 60-89 ml/min  US Retroperitoneal Complete    CBC Auto Differential    Comprehensive Metabolic Panel    Phosphorus    PTH, Intact    Protein/Creatinine Ratio, Urine      2. Primary hyperparathyroidism  US Retroperitoneal Complete    CBC Auto Differential    Comprehensive Metabolic Panel    Phosphorus    PTH, Intact    Protein/Creatinine Ratio, Urine      3. Primary hypertension  US Retroperitoneal Complete    CBC Auto Differential    Comprehensive Metabolic Panel    Phosphorus    PTH, Intact    Protein/Creatinine Ratio, Urine      4. Nephrolithiasis  US Retroperitoneal Complete    CBC Auto Differential    Comprehensive Metabolic Panel    Phosphorus    PTH, Intact    Protein/Creatinine Ratio, Urine      5. Hypercalcemia  US Retroperitoneal Complete    CBC Auto Differential    Comprehensive Metabolic Panel    Phosphorus    PTH, Intact    Protein/Creatinine Ratio, Urine      6. Hypophosphatemia  US Retroperitoneal Complete    CBC Auto Differential    Comprehensive Metabolic Panel    Phosphorus    PTH, Intact    Protein/Creatinine Ratio, Urine      7. Bilateral renal cysts  US Retroperitoneal Complete    CBC Auto Differential    Comprehensive Metabolic Panel    Phosphorus    PTH, Intact    Protein/Creatinine Ratio, Urine        CKD stage 2 stable  Hypertension  Recurrent nephrolithiasis likely related to primary hyperparathyroidism--> follow up with  Endocrine  Hypercalcemia likely related to primary hyperparathyroidism.  Vitamin D supplement already discontinued      Plan:  Low-sodium diet  Increase water intake   continue endocrinology workup for primary hyperparathyroidism---> patient advised to call them today and find out what is going on with the injection he was supposed to start  24 hour urine calcium and urine creatinine also completed with the endocrinology; results noted    Renal wise very stable follow up in 1 year; with repeat retroperitoneal ultrasound to monitor cysts    Grady PALMER        This note was created with the assistance of [employer] voice recognition software or phone dictation. There may be transcription errors as a result of using this technology however minimal. Effort has been made to assure accuracy of transcription but any obvious errors or omissions should be clarified with the author of the document.

## 2025-01-01 ENCOUNTER — ANESTHESIA (OUTPATIENT)
Dept: INTENSIVE CARE | Facility: HOSPITAL | Age: 80
End: 2025-01-01
Payer: MEDICARE

## 2025-01-01 ENCOUNTER — RESULTS FOLLOW-UP (OUTPATIENT)
Dept: PRIMARY CARE CLINIC | Facility: CLINIC | Age: 80
End: 2025-01-01

## 2025-01-01 ENCOUNTER — ANESTHESIA EVENT (OUTPATIENT)
Dept: INTENSIVE CARE | Facility: HOSPITAL | Age: 80
End: 2025-01-01
Payer: MEDICARE

## 2025-01-01 ENCOUNTER — HOSPITAL ENCOUNTER (INPATIENT)
Facility: HOSPITAL | Age: 80
LOS: 16 days | DRG: 097 | End: 2025-04-26
Attending: EMERGENCY MEDICINE | Admitting: STUDENT IN AN ORGANIZED HEALTH CARE EDUCATION/TRAINING PROGRAM
Payer: MEDICARE

## 2025-01-01 VITALS
HEIGHT: 65 IN | RESPIRATION RATE: 30 BRPM | SYSTOLIC BLOOD PRESSURE: 98 MMHG | HEART RATE: 85 BPM | DIASTOLIC BLOOD PRESSURE: 59 MMHG | TEMPERATURE: 99 F | WEIGHT: 188.06 LBS | BODY MASS INDEX: 31.33 KG/M2 | OXYGEN SATURATION: 97 %

## 2025-01-01 DIAGNOSIS — D64.9 CHRONIC ANEMIA: ICD-10-CM

## 2025-01-01 DIAGNOSIS — R55 SYNCOPE: ICD-10-CM

## 2025-01-01 DIAGNOSIS — R55 SYNCOPE, UNSPECIFIED SYNCOPE TYPE: ICD-10-CM

## 2025-01-01 DIAGNOSIS — R53.81 MALAISE: ICD-10-CM

## 2025-01-01 DIAGNOSIS — R50.9 FEVER, UNSPECIFIED FEVER CAUSE: ICD-10-CM

## 2025-01-01 DIAGNOSIS — Z00.00 ROUTINE CHECK-UP: ICD-10-CM

## 2025-01-01 DIAGNOSIS — B10.09 ENCEPHALITIS DUE TO HERPES SIMPLEX VIRUS TYPE 1 (HSV-1): ICD-10-CM

## 2025-01-01 DIAGNOSIS — I95.1 ORTHOSTATIC HYPOTENSION: Primary | ICD-10-CM

## 2025-01-01 DIAGNOSIS — R07.89 OTHER CHEST PAIN: ICD-10-CM

## 2025-01-01 DIAGNOSIS — E87.20 LACTIC ACIDOSIS: ICD-10-CM

## 2025-01-01 DIAGNOSIS — J96.02 ACUTE RESPIRATORY FAILURE WITH HYPOXIA AND HYPERCARBIA: ICD-10-CM

## 2025-01-01 DIAGNOSIS — J96.01 ACUTE RESPIRATORY FAILURE WITH HYPOXIA AND HYPERCARBIA: ICD-10-CM

## 2025-01-01 DIAGNOSIS — I38 ENDOCARDITIS: ICD-10-CM

## 2025-01-01 LAB
ABS NEUT (OLG): 12.25 X10(3)/MCL (ref 2.1–9.2)
ABS NEUT (OLG): 9.37 X10(3)/MCL (ref 2.1–9.2)
ALBUMIN SERPL-MCNC: 1.7 G/DL (ref 3.4–4.8)
ALBUMIN SERPL-MCNC: 2.3 G/DL (ref 3.4–4.8)
ALBUMIN SERPL-MCNC: 2.4 G/DL (ref 3.4–4.8)
ALBUMIN SERPL-MCNC: 2.6 G/DL (ref 3.4–4.8)
ALBUMIN SERPL-MCNC: 2.9 G/DL (ref 3.4–4.8)
ALBUMIN SERPL-MCNC: 3.2 G/DL (ref 3.4–4.8)
ALBUMIN SERPL-MCNC: 3.3 G/DL (ref 3.4–4.8)
ALBUMIN SERPL-MCNC: 3.3 G/DL (ref 3.4–4.8)
ALBUMIN SERPL-MCNC: 3.8 G/DL (ref 3.4–4.8)
ALBUMIN SERPL-MCNC: 4.1 G/DL (ref 3.4–4.8)
ALBUMIN/GLOB SERPL: 0.4 RATIO (ref 1.1–2)
ALBUMIN/GLOB SERPL: 0.5 RATIO (ref 1.1–2)
ALBUMIN/GLOB SERPL: 0.6 RATIO (ref 1.1–2)
ALBUMIN/GLOB SERPL: 0.6 RATIO (ref 1.1–2)
ALBUMIN/GLOB SERPL: 0.7 RATIO (ref 1.1–2)
ALBUMIN/GLOB SERPL: 0.8 RATIO (ref 1.1–2)
ALBUMIN/GLOB SERPL: 0.9 RATIO (ref 1.1–2)
ALBUMIN/GLOB SERPL: 0.9 RATIO (ref 1.1–2)
ALBUMIN/GLOB SERPL: 1 RATIO (ref 1.1–2)
ALBUMIN/GLOB SERPL: 1.1 RATIO (ref 1.1–2)
ALBUMIN/GLOB SERPL: 1.1 RATIO (ref 1.1–2)
ALBUMIN/GLOB SERPL: 1.2 RATIO (ref 1.1–2)
ALLENS TEST BLOOD GAS (OHS): YES
ALP SERPL-CCNC: 122 UNIT/L (ref 40–150)
ALP SERPL-CCNC: 139 UNIT/L (ref 40–150)
ALP SERPL-CCNC: 165 UNIT/L (ref 40–150)
ALP SERPL-CCNC: 213 UNIT/L (ref 40–150)
ALP SERPL-CCNC: 52 UNIT/L (ref 40–150)
ALP SERPL-CCNC: 53 UNIT/L (ref 40–150)
ALP SERPL-CCNC: 56 UNIT/L (ref 40–150)
ALP SERPL-CCNC: 61 UNIT/L (ref 40–150)
ALP SERPL-CCNC: 63 UNIT/L (ref 40–150)
ALP SERPL-CCNC: 68 UNIT/L (ref 40–150)
ALP SERPL-CCNC: 71 UNIT/L (ref 40–150)
ALP SERPL-CCNC: 71 UNIT/L (ref 40–150)
ALP SERPL-CCNC: 86 UNIT/L (ref 40–150)
ALP SERPL-CCNC: 91 UNIT/L (ref 40–150)
ALT SERPL-CCNC: 115 UNIT/L (ref 0–55)
ALT SERPL-CCNC: 18 UNIT/L (ref 0–55)
ALT SERPL-CCNC: 186 UNIT/L (ref 0–55)
ALT SERPL-CCNC: 24 UNIT/L (ref 0–55)
ALT SERPL-CCNC: 279 UNIT/L (ref 0–55)
ALT SERPL-CCNC: 28 UNIT/L (ref 0–55)
ALT SERPL-CCNC: 283 UNIT/L (ref 0–55)
ALT SERPL-CCNC: 304 UNIT/L (ref 0–55)
ALT SERPL-CCNC: 36 UNIT/L (ref 0–55)
ALT SERPL-CCNC: 37 UNIT/L (ref 0–55)
ALT SERPL-CCNC: 39 UNIT/L (ref 0–55)
ALT SERPL-CCNC: 40 UNIT/L (ref 0–55)
ALT SERPL-CCNC: 59 UNIT/L (ref 0–55)
ALT SERPL-CCNC: 93 UNIT/L (ref 0–55)
AMPAR2 IGG CSF QL CBA IFA: NEGATIVE
AMPHIPHYSIN AB CSF QL IF: NEGATIVE
ANION GAP SERPL CALC-SCNC: 10 MEQ/L
ANION GAP SERPL CALC-SCNC: 12 MEQ/L
ANION GAP SERPL CALC-SCNC: 12 MEQ/L
ANION GAP SERPL CALC-SCNC: 5 MEQ/L
ANION GAP SERPL CALC-SCNC: 6 MEQ/L
ANION GAP SERPL CALC-SCNC: 6 MEQ/L
ANION GAP SERPL CALC-SCNC: 7 MEQ/L
ANION GAP SERPL CALC-SCNC: 8 MEQ/L
ANION GAP SERPL CALC-SCNC: 9 MEQ/L
ANNOTATION COMMENT IMP: NORMAL
APICAL FOUR CHAMBER EJECTION FRACTION: 63 %
APICAL TWO CHAMBER EJECTION FRACTION: 62 %
APPEARANCE CSF: CLEAR
APTT PPP: 29.7 SECONDS (ref 23.2–33.7)
AST SERPL-CCNC: 106 UNIT/L (ref 11–45)
AST SERPL-CCNC: 115 UNIT/L (ref 11–45)
AST SERPL-CCNC: 138 UNIT/L (ref 11–45)
AST SERPL-CCNC: 17 UNIT/L (ref 11–45)
AST SERPL-CCNC: 187 UNIT/L (ref 11–45)
AST SERPL-CCNC: 26 UNIT/L (ref 11–45)
AST SERPL-CCNC: 29 UNIT/L (ref 11–45)
AST SERPL-CCNC: 38 UNIT/L (ref 11–45)
AST SERPL-CCNC: 44 UNIT/L (ref 11–45)
AST SERPL-CCNC: 55 UNIT/L (ref 11–45)
AST SERPL-CCNC: 59 UNIT/L (ref 11–45)
AST SERPL-CCNC: 66 UNIT/L (ref 11–45)
AST SERPL-CCNC: 71 UNIT/L (ref 11–45)
AST SERPL-CCNC: 78 UNIT/L (ref 11–45)
AV INDEX (PROSTH): 0.65
AV MEAN GRADIENT: 6 MMHG
AV PEAK GRADIENT: 12 MMHG
AV VALVE AREA BY VELOCITY RATIO: 1.3 CM²
AV VALVE AREA: 1.5 CM²
AV VELOCITY RATIO: 0.59
B AFZ+BURG+GARI IGG CSF QL IA: NEGATIVE
B AFZ+BURG+GARI IGG SER QL IA: NORMAL
B PERT.PT PRMT NPH QL NAA+NON-PROBE: NOT DETECTED
BACTERIA #/AREA URNS AUTO: ABNORMAL /HPF
BACTERIA #/AREA URNS AUTO: ABNORMAL /HPF
BACTERIA BLD CULT: NORMAL
BACTERIA CSF CULT: NORMAL
BACTERIA SPT CULT: ABNORMAL
BASE EXCESS BLD CALC-SCNC: -0.3 MMOL/L (ref -2–2)
BASE EXCESS BLD CALC-SCNC: 1 MMOL/L (ref -2–2)
BASE EXCESS BLD CALC-SCNC: 3.3 MMOL/L (ref -2–2)
BASE EXCESS BLD CALC-SCNC: 3.6 MMOL/L (ref -2–2)
BASE EXCESS BLD CALC-SCNC: 4.1 MMOL/L (ref -2–2)
BASE EXCESS BLD CALC-SCNC: 4.7 MMOL/L
BASE EXCESS BLD CALC-SCNC: 4.8 MMOL/L
BASE EXCESS BLD CALC-SCNC: 5.3 MMOL/L (ref -2–2)
BASOPHILS # BLD AUTO: 0.04 X10(3)/MCL
BASOPHILS # BLD AUTO: 0.05 X10(3)/MCL
BASOPHILS # BLD AUTO: 0.06 X10(3)/MCL
BASOPHILS # BLD AUTO: 0.07 X10(3)/MCL
BASOPHILS # BLD AUTO: 0.07 X10(3)/MCL
BASOPHILS # BLD AUTO: 0.08 X10(3)/MCL
BASOPHILS NFR BLD AUTO: 0.5 %
BASOPHILS NFR BLD AUTO: 0.6 %
BASOPHILS NFR BLD AUTO: 0.7 %
BASOPHILS NFR BLD AUTO: 0.7 %
BILIRUB SERPL-MCNC: 0.2 MG/DL
BILIRUB SERPL-MCNC: 0.3 MG/DL
BILIRUB SERPL-MCNC: 0.4 MG/DL
BILIRUB SERPL-MCNC: 0.4 MG/DL
BILIRUB SERPL-MCNC: 0.5 MG/DL
BILIRUB SERPL-MCNC: 0.5 MG/DL
BILIRUB SERPL-MCNC: 0.6 MG/DL
BILIRUB UR QL STRIP.AUTO: NEGATIVE
BILIRUB UR QL STRIP.AUTO: NEGATIVE
BLOOD GAS SAMPLE TYPE (OHS): ABNORMAL
BNP BLD-MCNC: 72.6 PG/ML
BSA FOR ECHO PROCEDURE: 1.96 M2
BUN SERPL-MCNC: 14.2 MG/DL (ref 8.4–25.7)
BUN SERPL-MCNC: 14.4 MG/DL (ref 8.4–25.7)
BUN SERPL-MCNC: 14.5 MG/DL (ref 8.4–25.7)
BUN SERPL-MCNC: 14.5 MG/DL (ref 8.4–25.7)
BUN SERPL-MCNC: 14.7 MG/DL (ref 8.4–25.7)
BUN SERPL-MCNC: 15.4 MG/DL (ref 8.4–25.7)
BUN SERPL-MCNC: 15.7 MG/DL (ref 8.4–25.7)
BUN SERPL-MCNC: 19.3 MG/DL (ref 8.4–25.7)
BUN SERPL-MCNC: 19.4 MG/DL (ref 8.4–25.7)
BUN SERPL-MCNC: 19.5 MG/DL (ref 8.4–25.7)
BUN SERPL-MCNC: 21.2 MG/DL (ref 8.4–25.7)
BUN SERPL-MCNC: 21.7 MG/DL (ref 8.4–25.7)
BUN SERPL-MCNC: 22.4 MG/DL (ref 8.4–25.7)
BUN SERPL-MCNC: 29.3 MG/DL (ref 8.4–25.7)
BUN SERPL-MCNC: 35.6 MG/DL (ref 8.4–25.7)
C GATTII+NEOFOR DNA CSF QL NAA+NON-PROBE: NOT DETECTED
C PNEUM DNA NPH QL NAA+NON-PROBE: NOT DETECTED
CA-I BLD-SCNC: 1.09 MMOL/L (ref 1.12–1.23)
CA-I BLD-SCNC: 1.15 MMOL/L (ref 1.12–1.23)
CA-I BLD-SCNC: 1.16 MMOL/L (ref 1.12–1.23)
CA-I BLD-SCNC: 1.19 MMOL/L (ref 1.12–1.23)
CA-I BLD-SCNC: 1.22 MMOL/L (ref 1.12–1.23)
CA-I BLD-SCNC: 1.25 MMOL/L (ref 1.12–1.23)
CA-I BLD-SCNC: 1.3 MMOL/L (ref 1.12–1.23)
CA-I BLD-SCNC: 1.4 MMOL/L (ref 1.12–1.23)
CALCIUM SERPL-MCNC: 8 MG/DL (ref 8.8–10)
CALCIUM SERPL-MCNC: 8 MG/DL (ref 8.8–10)
CALCIUM SERPL-MCNC: 8.2 MG/DL (ref 8.8–10)
CALCIUM SERPL-MCNC: 8.4 MG/DL (ref 8.8–10)
CALCIUM SERPL-MCNC: 8.5 MG/DL (ref 8.8–10)
CALCIUM SERPL-MCNC: 8.5 MG/DL (ref 8.8–10)
CALCIUM SERPL-MCNC: 8.7 MG/DL (ref 8.8–10)
CALCIUM SERPL-MCNC: 8.7 MG/DL (ref 8.8–10)
CALCIUM SERPL-MCNC: 8.8 MG/DL (ref 8.8–10)
CALCIUM SERPL-MCNC: 8.9 MG/DL (ref 8.8–10)
CALCIUM SERPL-MCNC: 9.1 MG/DL (ref 8.8–10)
CALCIUM SERPL-MCNC: 9.4 MG/DL (ref 8.8–10)
CALCIUM SERPL-MCNC: 9.5 MG/DL (ref 8.8–10)
CALCIUM SERPL-MCNC: 9.6 MG/DL (ref 8.8–10)
CALCIUM SERPL-MCNC: 9.7 MG/DL (ref 8.8–10)
CASPR2 IGG CSF QL CBA IFA: NEGATIVE
CHLORIDE SERPL-SCNC: 101 MMOL/L (ref 98–107)
CHLORIDE SERPL-SCNC: 102 MMOL/L (ref 98–107)
CHLORIDE SERPL-SCNC: 103 MMOL/L (ref 98–107)
CHLORIDE SERPL-SCNC: 103 MMOL/L (ref 98–107)
CHLORIDE SERPL-SCNC: 104 MMOL/L (ref 98–107)
CHLORIDE SERPL-SCNC: 108 MMOL/L (ref 98–107)
CHLORIDE SERPL-SCNC: 99 MMOL/L (ref 98–107)
CK SERPL-CCNC: 111 U/L (ref 30–200)
CLARITY UR: CLEAR
CLARITY UR: CLEAR
CMV DNA CSF QL NAA+NON-PROBE: NOT DETECTED
CO2 BLDA-SCNC: 26.3 MMOL/L
CO2 BLDA-SCNC: 26.8 MMOL/L
CO2 BLDA-SCNC: 27.3 MMOL/L
CO2 BLDA-SCNC: 27.8 MMOL/L
CO2 BLDA-SCNC: 29.8 MMOL/L
CO2 BLDA-SCNC: 29.9 MMOL/L
CO2 BLDA-SCNC: 30.2 MMOL/L
CO2 BLDA-SCNC: 30.5 MMOL/L
CO2 SERPL-SCNC: 21 MMOL/L (ref 23–31)
CO2 SERPL-SCNC: 22 MMOL/L (ref 23–31)
CO2 SERPL-SCNC: 23 MMOL/L (ref 23–31)
CO2 SERPL-SCNC: 24 MMOL/L (ref 23–31)
CO2 SERPL-SCNC: 25 MMOL/L (ref 23–31)
CO2 SERPL-SCNC: 25 MMOL/L (ref 23–31)
CO2 SERPL-SCNC: 26 MMOL/L (ref 23–31)
CO2 SERPL-SCNC: 26 MMOL/L (ref 23–31)
CO2 SERPL-SCNC: 28 MMOL/L (ref 23–31)
COHGB MFR BLDA: 1.6 % (ref 0.5–1.5)
COHGB MFR BLDA: 1.6 % (ref 0.5–1.5)
COHGB MFR BLDA: 1.7 % (ref 0.5–1.5)
COHGB MFR BLDA: 1.9 % (ref 0.5–1.5)
COHGB MFR BLDA: 1.9 % (ref 0.5–1.5)
COHGB MFR BLDA: 2.7 % (ref 0.5–1.5)
COLOR CSF: COLORLESS
COLOR UR AUTO: ABNORMAL
COLOR UR AUTO: YELLOW
CREAT SERPL-MCNC: 0.61 MG/DL (ref 0.72–1.25)
CREAT SERPL-MCNC: 0.67 MG/DL (ref 0.72–1.25)
CREAT SERPL-MCNC: 0.69 MG/DL (ref 0.72–1.25)
CREAT SERPL-MCNC: 0.69 MG/DL (ref 0.72–1.25)
CREAT SERPL-MCNC: 0.71 MG/DL (ref 0.72–1.25)
CREAT SERPL-MCNC: 0.73 MG/DL (ref 0.72–1.25)
CREAT SERPL-MCNC: 0.74 MG/DL (ref 0.72–1.25)
CREAT SERPL-MCNC: 0.75 MG/DL (ref 0.72–1.25)
CREAT SERPL-MCNC: 0.77 MG/DL (ref 0.72–1.25)
CREAT SERPL-MCNC: 0.78 MG/DL (ref 0.72–1.25)
CREAT SERPL-MCNC: 0.89 MG/DL (ref 0.72–1.25)
CREAT SERPL-MCNC: 0.98 MG/DL (ref 0.72–1.25)
CREAT SERPL-MCNC: 1 MG/DL (ref 0.72–1.25)
CREAT SERPL-MCNC: 1.01 MG/DL (ref 0.72–1.25)
CREAT SERPL-MCNC: 1.01 MG/DL (ref 0.72–1.25)
CREAT/UREA NIT SERPL: 14
CREAT/UREA NIT SERPL: 14
CREAT/UREA NIT SERPL: 16
CREAT/UREA NIT SERPL: 16
CREAT/UREA NIT SERPL: 20
CREAT/UREA NIT SERPL: 20
CREAT/UREA NIT SERPL: 21
CREAT/UREA NIT SERPL: 23
CREAT/UREA NIT SERPL: 26
CREAT/UREA NIT SERPL: 30
CREAT/UREA NIT SERPL: 31
CREAT/UREA NIT SERPL: 31
CREAT/UREA NIT SERPL: 32
CREAT/UREA NIT SERPL: 38
CREAT/UREA NIT SERPL: 46
CRP SERPL-MCNC: 248.8 MG/L
CRYPTOC AG CSF QL IA.RAPID: NEGATIVE
CV ECHO LV RWT: 0.5 CM
CV2 AB CSF QL IF: NEGATIVE
DIRECT COOMBS (DAT): NORMAL
DOP CALC AO PEAK VEL: 1.7 M/S
DOP CALC AO VTI: 24.9 CM
DOP CALC LVOT AREA: 2.3 CM2
DOP CALC LVOT DIAMETER: 1.7 CM
DOP CALC LVOT PEAK VEL: 1 M/S
DOP CALC LVOT STROKE VOLUME: 36.8 CM3
DOP CALC MV VTI: 19.9 CM
DOP CALCLVOT PEAK VEL VTI: 16.2 CM
DPPX IGG CSF QL CBA IFA: NEGATIVE
DRAWN BY BLOOD GAS (OHS): ABNORMAL
E COLI K1 DNA CSF QL NAA+NON-PROBE: NOT DETECTED
E WAVE DECELERATION TIME: 190 MSEC
E/A RATIO: 0.64
E/E' RATIO: 8 M/S
EBV EA IGG SER QL IA: NEGATIVE
ECHO LV POSTERIOR WALL: 1 CM (ref 0.6–1.1)
EOSINOPHIL # BLD AUTO: 0.02 X10(3)/MCL (ref 0–0.9)
EOSINOPHIL # BLD AUTO: 0.05 X10(3)/MCL (ref 0–0.9)
EOSINOPHIL # BLD AUTO: 0.13 X10(3)/MCL (ref 0–0.9)
EOSINOPHIL # BLD AUTO: 0.28 X10(3)/MCL (ref 0–0.9)
EOSINOPHIL # BLD AUTO: 0.31 X10(3)/MCL (ref 0–0.9)
EOSINOPHIL # BLD AUTO: 0.31 X10(3)/MCL (ref 0–0.9)
EOSINOPHIL # BLD AUTO: 0.33 X10(3)/MCL (ref 0–0.9)
EOSINOPHIL # BLD AUTO: 0.38 X10(3)/MCL (ref 0–0.9)
EOSINOPHIL # BLD AUTO: 0.39 X10(3)/MCL (ref 0–0.9)
EOSINOPHIL # BLD AUTO: 0.4 X10(3)/MCL (ref 0–0.9)
EOSINOPHIL # BLD AUTO: 0.44 X10(3)/MCL (ref 0–0.9)
EOSINOPHIL # BLD AUTO: 0.46 X10(3)/MCL (ref 0–0.9)
EOSINOPHIL NFR BLD AUTO: 0.2 %
EOSINOPHIL NFR BLD AUTO: 0.5 %
EOSINOPHIL NFR BLD AUTO: 1.2 %
EOSINOPHIL NFR BLD AUTO: 2 %
EOSINOPHIL NFR BLD AUTO: 2.6 %
EOSINOPHIL NFR BLD AUTO: 3.2 %
EOSINOPHIL NFR BLD AUTO: 3.2 %
EOSINOPHIL NFR BLD AUTO: 3.9 %
EOSINOPHIL NFR BLD AUTO: 4 %
EOSINOPHIL NFR BLD AUTO: 4.1 %
EOSINOPHIL NFR BLD AUTO: 4.3 %
EOSINOPHIL NFR BLD AUTO: 4.9 %
EOSINOPHIL NFR BLD MANUAL: 0.24 X10(3)/MCL (ref 0–0.9)
EOSINOPHIL NFR BLD MANUAL: 2 %
ERYTHROCYTE [DISTWIDTH] IN BLOOD BY AUTOMATED COUNT: 15.2 % (ref 11.5–17)
ERYTHROCYTE [DISTWIDTH] IN BLOOD BY AUTOMATED COUNT: 15.6 % (ref 11.5–17)
ERYTHROCYTE [DISTWIDTH] IN BLOOD BY AUTOMATED COUNT: 15.7 % (ref 11.5–17)
ERYTHROCYTE [DISTWIDTH] IN BLOOD BY AUTOMATED COUNT: 15.8 % (ref 11.5–17)
ERYTHROCYTE [DISTWIDTH] IN BLOOD BY AUTOMATED COUNT: 15.9 % (ref 11.5–17)
ERYTHROCYTE [DISTWIDTH] IN BLOOD BY AUTOMATED COUNT: 16 % (ref 11.5–17)
ERYTHROCYTE [DISTWIDTH] IN BLOOD BY AUTOMATED COUNT: 16.1 % (ref 11.5–17)
ERYTHROCYTE [DISTWIDTH] IN BLOOD BY AUTOMATED COUNT: 16.5 % (ref 11.5–17)
ERYTHROCYTE [DISTWIDTH] IN BLOOD BY AUTOMATED COUNT: 16.7 % (ref 11.5–17)
ERYTHROCYTE [DISTWIDTH] IN BLOOD BY AUTOMATED COUNT: 16.9 % (ref 11.5–17)
ERYTHROCYTE [DISTWIDTH] IN BLOOD BY AUTOMATED COUNT: 17.1 % (ref 11.5–17)
ERYTHROCYTE [DISTWIDTH] IN BLOOD BY AUTOMATED COUNT: 17.2 % (ref 11.5–17)
ERYTHROCYTE [DISTWIDTH] IN BLOOD BY AUTOMATED COUNT: 17.8 % (ref 11.5–17)
ERYTHROCYTE [DISTWIDTH] IN BLOOD BY AUTOMATED COUNT: 17.9 % (ref 11.5–17)
ERYTHROCYTE [SEDIMENTATION RATE] IN BLOOD: 87 MM/HR (ref 0–20)
EST. AVERAGE GLUCOSE BLD GHB EST-MCNC: 134.1 MG/DL
EV RNA CSF QL NAA+NON-PROBE: NOT DETECTED
FERRITIN SERPL-MCNC: 13.73 NG/ML (ref 21.81–274.66)
FLUAV AG UPPER RESP QL IA.RAPID: NOT DETECTED
FLUBV AG UPPER RESP QL IA.RAPID: NOT DETECTED
FOLATE SERPL-MCNC: 16.2 NG/ML (ref 7–31.4)
FRACTIONAL SHORTENING: 30 % (ref 28–44)
GABABR IGG CSF QL CBA IFA: NEGATIVE
GAD65 AB CSF-SCNC: 0 NMOL/L
GFAP ALPHA IGG CSF QL IF: NEGATIVE
GFR SERPLBLD CREATININE-BSD FMLA CKD-EPI: >60 ML/MIN/1.73/M2
GLIAL NUC TYPE 1 AB CSF QL IF: NEGATIVE
GLOBULIN SER-MCNC: 3.1 GM/DL (ref 2.4–3.5)
GLOBULIN SER-MCNC: 3.1 GM/DL (ref 2.4–3.5)
GLOBULIN SER-MCNC: 3.3 GM/DL (ref 2.4–3.5)
GLOBULIN SER-MCNC: 3.4 GM/DL (ref 2.4–3.5)
GLOBULIN SER-MCNC: 3.5 GM/DL (ref 2.4–3.5)
GLOBULIN SER-MCNC: 3.5 GM/DL (ref 2.4–3.5)
GLOBULIN SER-MCNC: 3.6 GM/DL (ref 2.4–3.5)
GLOBULIN SER-MCNC: 3.8 GM/DL (ref 2.4–3.5)
GLOBULIN SER-MCNC: 4 GM/DL (ref 2.4–3.5)
GLOBULIN SER-MCNC: 4.1 GM/DL (ref 2.4–3.5)
GLOBULIN SER-MCNC: 4.2 GM/DL (ref 2.4–3.5)
GLOBULIN SER-MCNC: 4.8 GM/DL (ref 2.4–3.5)
GLUCOSE CSF-MCNC: 81 MG/DL (ref 40–70)
GLUCOSE SERPL-MCNC: 115 MG/DL (ref 82–115)
GLUCOSE SERPL-MCNC: 115 MG/DL (ref 82–115)
GLUCOSE SERPL-MCNC: 143 MG/DL (ref 82–115)
GLUCOSE SERPL-MCNC: 147 MG/DL (ref 82–115)
GLUCOSE SERPL-MCNC: 148 MG/DL (ref 82–115)
GLUCOSE SERPL-MCNC: 159 MG/DL (ref 82–115)
GLUCOSE SERPL-MCNC: 159 MG/DL (ref 82–115)
GLUCOSE SERPL-MCNC: 165 MG/DL (ref 82–115)
GLUCOSE SERPL-MCNC: 165 MG/DL (ref 82–115)
GLUCOSE SERPL-MCNC: 175 MG/DL (ref 82–115)
GLUCOSE SERPL-MCNC: 186 MG/DL (ref 82–115)
GLUCOSE SERPL-MCNC: 197 MG/DL (ref 82–115)
GLUCOSE SERPL-MCNC: 241 MG/DL (ref 82–115)
GLUCOSE SERPL-MCNC: 261 MG/DL (ref 82–115)
GLUCOSE SERPL-MCNC: 95 MG/DL (ref 82–115)
GLUCOSE UR QL STRIP: ABNORMAL
GLUCOSE UR QL STRIP: NORMAL
GP B STREP DNA CSF QL NAA+NON-PROBE: NOT DETECTED
GRAM STN SPEC: ABNORMAL
GRAM STN SPEC: ABNORMAL
GRAM STN SPEC: NORMAL
GROUP & RH: NORMAL
HADV DNA NPH QL NAA+NON-PROBE: NOT DETECTED
HAEM INFLU DNA CSF QL NAA+NON-PROBE: NOT DETECTED
HBA1C MFR BLD: 6.3 %
HCO3 BLDA-SCNC: 25.4 MMOL/L (ref 22–26)
HCO3 BLDA-SCNC: 25.5 MMOL/L (ref 22–26)
HCO3 BLDA-SCNC: 26 MMOL/L (ref 22–26)
HCO3 BLDA-SCNC: 26.7 MMOL/L (ref 22–26)
HCO3 BLDA-SCNC: 28.5 MMOL/L (ref 22–26)
HCO3 BLDA-SCNC: 28.5 MMOL/L (ref 22–26)
HCO3 BLDA-SCNC: 29 MMOL/L (ref 22–26)
HCO3 BLDA-SCNC: 29.2 MMOL/L (ref 22–26)
HCOV 229E RNA NPH QL NAA+NON-PROBE: NOT DETECTED
HCOV HKU1 RNA NPH QL NAA+NON-PROBE: NOT DETECTED
HCOV NL63 RNA NPH QL NAA+NON-PROBE: NOT DETECTED
HCOV OC43 RNA NPH QL NAA+NON-PROBE: NOT DETECTED
HCT VFR BLD AUTO: 24.2 % (ref 42–52)
HCT VFR BLD AUTO: 26.9 % (ref 42–52)
HCT VFR BLD AUTO: 28 % (ref 42–52)
HCT VFR BLD AUTO: 29.8 % (ref 42–52)
HCT VFR BLD AUTO: 30 % (ref 42–52)
HCT VFR BLD AUTO: 30.6 % (ref 42–52)
HCT VFR BLD AUTO: 31 % (ref 42–52)
HCT VFR BLD AUTO: 31.6 % (ref 42–52)
HCT VFR BLD AUTO: 33 % (ref 42–52)
HCT VFR BLD AUTO: 33.5 % (ref 42–52)
HCT VFR BLD AUTO: 33.6 % (ref 42–52)
HCT VFR BLD AUTO: 34.9 % (ref 42–52)
HCT VFR BLD AUTO: 35 % (ref 42–52)
HCT VFR BLD AUTO: 35.2 % (ref 42–52)
HGB BLD-MCNC: 10 G/DL (ref 14–18)
HGB BLD-MCNC: 10 G/DL (ref 14–18)
HGB BLD-MCNC: 10.5 G/DL (ref 14–18)
HGB BLD-MCNC: 10.6 G/DL (ref 14–18)
HGB BLD-MCNC: 10.8 G/DL (ref 14–18)
HGB BLD-MCNC: 11 G/DL (ref 14–18)
HGB BLD-MCNC: 11.2 G/DL (ref 14–18)
HGB BLD-MCNC: 7.6 G/DL (ref 14–18)
HGB BLD-MCNC: 8.2 G/DL (ref 14–18)
HGB BLD-MCNC: 8.7 G/DL (ref 14–18)
HGB BLD-MCNC: 9.6 G/DL (ref 14–18)
HGB BLD-MCNC: 9.6 G/DL (ref 14–18)
HGB BLD-MCNC: 9.7 G/DL (ref 14–18)
HGB BLD-MCNC: 9.8 G/DL (ref 14–18)
HGB S BLD QL SOLY: NEGATIVE
HGB UR QL STRIP: ABNORMAL
HGB UR QL STRIP: NEGATIVE
HHV6 DNA CSF QL NAA+NON-PROBE: NOT DETECTED
HMPV RNA NPH QL NAA+NON-PROBE: NOT DETECTED
HPIV1 RNA NPH QL NAA+NON-PROBE: NOT DETECTED
HPIV2 RNA NPH QL NAA+NON-PROBE: NOT DETECTED
HPIV3 RNA NPH QL NAA+NON-PROBE: NOT DETECTED
HPIV4 RNA NPH QL NAA+NON-PROBE: NOT DETECTED
HSV1 DNA CSF QL NAA+NON-PROBE: DETECTED
HSV1 DNA CSF QL NAA+PROBE: POSITIVE
HSV2 DNA CSF QL NAA+NON-PROBE: NOT DETECTED
HSV2 DNA CSF QL NAA+PROBE: NEGATIVE
HU1 AB CSF QL IF: NEGATIVE
HU2 AB CSF QL IF: NEGATIVE
HU3 AB CSF QL IF: NEGATIVE
HYALINE CASTS #/AREA URNS LPF: ABNORMAL /LPF
IGLON5 IGG CSF QL CBA IFA: NEGATIVE
IMM GRANULOCYTES # BLD AUTO: 0.02 X10(3)/MCL (ref 0–0.04)
IMM GRANULOCYTES # BLD AUTO: 0.03 X10(3)/MCL (ref 0–0.04)
IMM GRANULOCYTES # BLD AUTO: 0.03 X10(3)/MCL (ref 0–0.04)
IMM GRANULOCYTES # BLD AUTO: 0.04 X10(3)/MCL (ref 0–0.04)
IMM GRANULOCYTES # BLD AUTO: 0.06 X10(3)/MCL (ref 0–0.04)
IMM GRANULOCYTES # BLD AUTO: 0.12 X10(3)/MCL (ref 0–0.04)
IMM GRANULOCYTES # BLD AUTO: 0.24 X10(3)/MCL (ref 0–0.04)
IMM GRANULOCYTES # BLD AUTO: 0.26 X10(3)/MCL (ref 0–0.04)
IMM GRANULOCYTES # BLD AUTO: 0.36 X10(3)/MCL (ref 0–0.04)
IMM GRANULOCYTES # BLD AUTO: 0.47 X10(3)/MCL (ref 0–0.04)
IMM GRANULOCYTES NFR BLD AUTO: 0.2 %
IMM GRANULOCYTES NFR BLD AUTO: 0.3 %
IMM GRANULOCYTES NFR BLD AUTO: 0.3 %
IMM GRANULOCYTES NFR BLD AUTO: 0.4 %
IMM GRANULOCYTES NFR BLD AUTO: 0.6 %
IMM GRANULOCYTES NFR BLD AUTO: 1.3 %
IMM GRANULOCYTES NFR BLD AUTO: 1.7 %
IMM GRANULOCYTES NFR BLD AUTO: 2.4 %
IMM GRANULOCYTES NFR BLD AUTO: 3.7 %
IMM GRANULOCYTES NFR BLD AUTO: 3.7 %
IMMUNOLOGIST REVIEW: NORMAL
IMMUNOLOGIST REVIEW: NORMAL
INDIA INK PREP CSF: NEGATIVE
INDIRECT COOMBS: NORMAL
INHALED O2 CONCENTRATION: 30 %
INHALED O2 CONCENTRATION: 40 %
INHALED O2 CONCENTRATION: 40 %
INHALED O2 CONCENTRATION: 60 %
INHALED O2 CONCENTRATION: 75 %
INR PPP: 1.3
INSTRUMENT WBC (OLG): 12.17 X10(3)/MCL
INSTRUMENT WBC (OLG): 15.31 X10(3)/MCL
INTERVENTRICULAR SEPTUM: 1.3 CM (ref 0.6–1.1)
IRON SATN MFR SERPL: 9 % (ref 20–50)
IRON SERPL-MCNC: 30 UG/DL (ref 65–175)
KETONES UR QL STRIP: NEGATIVE
KETONES UR QL STRIP: NEGATIVE
L MONOCYTOG DNA CSF QL NAA+NON-PROBE: NOT DETECTED
LACTATE SERPL-SCNC: 1.5 MMOL/L (ref 0.5–2.2)
LACTATE SERPL-SCNC: 2.1 MMOL/L (ref 0.5–2.2)
LACTATE SERPL-SCNC: 2.5 MMOL/L (ref 0.5–2.2)
LDH SERPL-CCNC: 157 U/L (ref 125–220)
LEFT ATRIUM AREA SYSTOLIC (APICAL 2 CHAMBER): 15.4 CM2
LEFT ATRIUM AREA SYSTOLIC (APICAL 4 CHAMBER): 20.6 CM2
LEFT ATRIUM VOLUME INDEX MOD: 27 ML/M2
LEFT ATRIUM VOLUME MOD: 52 ML
LEFT INTERNAL DIMENSION IN SYSTOLE: 2.8 CM (ref 2.1–4)
LEFT VENTRICLE DIASTOLIC VOLUME INDEX: 35.6 ML/M2
LEFT VENTRICLE DIASTOLIC VOLUME: 68 ML
LEFT VENTRICLE END DIASTOLIC VOLUME APICAL 2 CHAMBER: 67.8 ML
LEFT VENTRICLE END DIASTOLIC VOLUME APICAL 4 CHAMBER: 88 ML
LEFT VENTRICLE END SYSTOLIC VOLUME APICAL 2 CHAMBER: 41.7 ML
LEFT VENTRICLE END SYSTOLIC VOLUME APICAL 4 CHAMBER: 60.1 ML
LEFT VENTRICLE MASS INDEX: 81.4 G/M2
LEFT VENTRICLE SYSTOLIC VOLUME INDEX: 15.7 ML/M2
LEFT VENTRICLE SYSTOLIC VOLUME: 30 ML
LEFT VENTRICULAR INTERNAL DIMENSION IN DIASTOLE: 4 CM (ref 3.5–6)
LEFT VENTRICULAR MASS: 155.4 G
LEUKOCYTE ESTERASE UR QL STRIP: 500
LEUKOCYTE ESTERASE UR QL STRIP: NEGATIVE
LGI1 IGG CSF QL CBA IFA: NEGATIVE
LIPASE SERPL-CCNC: 55 U/L
LPM (OHS): 2
LV LATERAL E/E' RATIO: 7.5 M/S
LV SEPTAL E/E' RATIO: 9.4 M/S
LVED V (TEICH): 68.35 ML
LVES V (TEICH): 30.34 ML
LVOT MG: 2 MMHG
LVOT MV: 0.66 CM/S
LYMPHOCYTE MAN % CSF (OLG): 99 %
LYMPHOCYTES # BLD AUTO: 1.28 X10(3)/MCL (ref 0.6–4.6)
LYMPHOCYTES # BLD AUTO: 1.29 X10(3)/MCL (ref 0.6–4.6)
LYMPHOCYTES # BLD AUTO: 1.33 X10(3)/MCL (ref 0.6–4.6)
LYMPHOCYTES # BLD AUTO: 1.34 X10(3)/MCL (ref 0.6–4.6)
LYMPHOCYTES # BLD AUTO: 1.44 X10(3)/MCL (ref 0.6–4.6)
LYMPHOCYTES # BLD AUTO: 1.47 X10(3)/MCL (ref 0.6–4.6)
LYMPHOCYTES # BLD AUTO: 1.48 X10(3)/MCL (ref 0.6–4.6)
LYMPHOCYTES # BLD AUTO: 1.69 X10(3)/MCL (ref 0.6–4.6)
LYMPHOCYTES # BLD AUTO: 1.73 X10(3)/MCL (ref 0.6–4.6)
LYMPHOCYTES # BLD AUTO: 1.74 X10(3)/MCL (ref 0.6–4.6)
LYMPHOCYTES # BLD AUTO: 1.8 X10(3)/MCL (ref 0.6–4.6)
LYMPHOCYTES # BLD AUTO: 3.09 X10(3)/MCL (ref 0.6–4.6)
LYMPHOCYTES NFR BLD AUTO: 12.3 %
LYMPHOCYTES NFR BLD AUTO: 13.5 %
LYMPHOCYTES NFR BLD AUTO: 13.8 %
LYMPHOCYTES NFR BLD AUTO: 15 %
LYMPHOCYTES NFR BLD AUTO: 15.3 %
LYMPHOCYTES NFR BLD AUTO: 15.4 %
LYMPHOCYTES NFR BLD AUTO: 15.7 %
LYMPHOCYTES NFR BLD AUTO: 16.2 %
LYMPHOCYTES NFR BLD AUTO: 16.3 %
LYMPHOCYTES NFR BLD AUTO: 18.6 %
LYMPHOCYTES NFR BLD AUTO: 28.7 %
LYMPHOCYTES NFR BLD AUTO: 9.3 %
LYMPHOCYTES NFR BLD MANUAL: 1.07 X10(3)/MCL (ref 0.6–4.6)
LYMPHOCYTES NFR BLD MANUAL: 1.58 X10(3)/MCL (ref 0.6–4.6)
LYMPHOCYTES NFR BLD MANUAL: 13 %
LYMPHOCYTES NFR BLD MANUAL: 7 %
M MGLUR1 AB IFA, CSF: NEGATIVE
M NEUROCHONDRIN IFA, CSF: NEGATIVE
M PNEUMO DNA NPH QL NAA+NON-PROBE: NOT DETECTED
M SEPTIN-7 IFA, CSF: NEGATIVE
MAGNESIUM SERPL-MCNC: 1.7 MG/DL (ref 1.6–2.6)
MAYO GENERIC ORDERABLE RESULT: NORMAL
MCH RBC QN AUTO: 24.8 PG (ref 27–31)
MCH RBC QN AUTO: 24.9 PG (ref 27–31)
MCH RBC QN AUTO: 25 PG (ref 27–31)
MCH RBC QN AUTO: 25.1 PG (ref 27–31)
MCH RBC QN AUTO: 25.2 PG (ref 27–31)
MCH RBC QN AUTO: 25.2 PG (ref 27–31)
MCH RBC QN AUTO: 25.4 PG (ref 27–31)
MCH RBC QN AUTO: 25.5 PG (ref 27–31)
MCH RBC QN AUTO: 25.6 PG (ref 27–31)
MCH RBC QN AUTO: 25.8 PG (ref 27–31)
MCHC RBC AUTO-ENTMCNC: 30.3 G/DL (ref 33–36)
MCHC RBC AUTO-ENTMCNC: 30.4 G/DL (ref 33–36)
MCHC RBC AUTO-ENTMCNC: 30.5 G/DL (ref 33–36)
MCHC RBC AUTO-ENTMCNC: 31.1 G/DL (ref 33–36)
MCHC RBC AUTO-ENTMCNC: 31.3 G/DL (ref 33–36)
MCHC RBC AUTO-ENTMCNC: 31.4 G/DL (ref 33–36)
MCHC RBC AUTO-ENTMCNC: 31.4 G/DL (ref 33–36)
MCHC RBC AUTO-ENTMCNC: 31.6 G/DL (ref 33–36)
MCHC RBC AUTO-ENTMCNC: 31.6 G/DL (ref 33–36)
MCHC RBC AUTO-ENTMCNC: 31.7 G/DL (ref 33–36)
MCHC RBC AUTO-ENTMCNC: 31.8 G/DL (ref 33–36)
MCHC RBC AUTO-ENTMCNC: 32 G/DL (ref 33–36)
MCHC RBC AUTO-ENTMCNC: 32.1 G/DL (ref 33–36)
MCHC RBC AUTO-ENTMCNC: 32.2 G/DL (ref 33–36)
MCV RBC AUTO: 78.6 FL (ref 80–94)
MCV RBC AUTO: 78.8 FL (ref 80–94)
MCV RBC AUTO: 79 FL (ref 80–94)
MCV RBC AUTO: 79.3 FL (ref 80–94)
MCV RBC AUTO: 79.4 FL (ref 80–94)
MCV RBC AUTO: 79.7 FL (ref 80–94)
MCV RBC AUTO: 80 FL (ref 80–94)
MCV RBC AUTO: 80.3 FL (ref 80–94)
MCV RBC AUTO: 80.5 FL (ref 80–94)
MCV RBC AUTO: 81.2 FL (ref 80–94)
MCV RBC AUTO: 81.5 FL (ref 80–94)
MCV RBC AUTO: 82 FL (ref 80–94)
MCV RBC AUTO: 82.7 FL (ref 80–94)
MCV RBC AUTO: 83.3 FL (ref 80–94)
MECH RR (OHS): 18 B/MIN
MECH RR (OHS): 20 B/MIN
METHGB MFR BLDA: 0.8 % (ref 0.4–1.5)
METHGB MFR BLDA: 1.3 % (ref 0.4–1.5)
METHGB MFR BLDA: 1.3 % (ref 0.4–1.5)
METHGB MFR BLDA: 1.4 % (ref 0.4–1.5)
METHGB MFR BLDA: 1.5 % (ref 0.4–1.5)
METHGB MFR BLDA: 1.6 % (ref 0.4–1.5)
MODE (OHS): ABNORMAL
MODE (OHS): AC
MONOCYTE MAN % CSF (OLG): 1 %
MONOCYTES # BLD AUTO: 0.7 X10(3)/MCL (ref 0.1–1.3)
MONOCYTES # BLD AUTO: 1 X10(3)/MCL (ref 0.1–1.3)
MONOCYTES # BLD AUTO: 1.03 X10(3)/MCL (ref 0.1–1.3)
MONOCYTES # BLD AUTO: 1.04 X10(3)/MCL (ref 0.1–1.3)
MONOCYTES # BLD AUTO: 1.04 X10(3)/MCL (ref 0.1–1.3)
MONOCYTES # BLD AUTO: 1.11 X10(3)/MCL (ref 0.1–1.3)
MONOCYTES # BLD AUTO: 1.15 X10(3)/MCL (ref 0.1–1.3)
MONOCYTES # BLD AUTO: 1.16 X10(3)/MCL (ref 0.1–1.3)
MONOCYTES # BLD AUTO: 1.4 X10(3)/MCL (ref 0.1–1.3)
MONOCYTES # BLD AUTO: 1.46 X10(3)/MCL (ref 0.1–1.3)
MONOCYTES # BLD AUTO: 1.57 X10(3)/MCL (ref 0.1–1.3)
MONOCYTES # BLD AUTO: 1.65 X10(3)/MCL (ref 0.1–1.3)
MONOCYTES NFR BLD AUTO: 10.2 %
MONOCYTES NFR BLD AUTO: 10.4 %
MONOCYTES NFR BLD AUTO: 10.5 %
MONOCYTES NFR BLD AUTO: 10.7 %
MONOCYTES NFR BLD AUTO: 10.9 %
MONOCYTES NFR BLD AUTO: 12 %
MONOCYTES NFR BLD AUTO: 12.3 %
MONOCYTES NFR BLD AUTO: 13.2 %
MONOCYTES NFR BLD AUTO: 14.4 %
MONOCYTES NFR BLD AUTO: 15.3 %
MONOCYTES NFR BLD AUTO: 7.6 %
MONOCYTES NFR BLD AUTO: 9.2 %
MONOCYTES NFR BLD MANUAL: 0.97 X10(3)/MCL (ref 0.1–1.3)
MONOCYTES NFR BLD MANUAL: 1.99 X10(3)/MCL (ref 0.1–1.3)
MONOCYTES NFR BLD MANUAL: 13 %
MONOCYTES NFR BLD MANUAL: 8 %
MRSA PCR SCRN (OHS): NOT DETECTED
MRSA PCR SCRN (OHS): NOT DETECTED
MUCOUS THREADS URNS QL MICRO: ABNORMAL /LPF
MUCOUS THREADS URNS QL MICRO: ABNORMAL /LPF
MV MEAN GRADIENT: 3 MMHG
MV PEAK A VEL: 1.17 M/S
MV PEAK E VEL: 0.75 M/S
MV PEAK GRADIENT: 6 MMHG
MV VALVE AREA BY CONTINUITY EQUATION: 1.85 CM2
N MEN DNA CSF QL NAA+NON-PROBE: NOT DETECTED
NEUTROPHILS # BLD AUTO: 10.52 X10(3)/MCL (ref 2.1–9.2)
NEUTROPHILS # BLD AUTO: 5.35 X10(3)/MCL (ref 2.1–9.2)
NEUTROPHILS # BLD AUTO: 5.89 X10(3)/MCL (ref 2.1–9.2)
NEUTROPHILS # BLD AUTO: 6.35 X10(3)/MCL (ref 2.1–9.2)
NEUTROPHILS # BLD AUTO: 6.54 X10(3)/MCL (ref 2.1–9.2)
NEUTROPHILS # BLD AUTO: 6.59 X10(3)/MCL (ref 2.1–9.2)
NEUTROPHILS # BLD AUTO: 6.65 X10(3)/MCL (ref 2.1–9.2)
NEUTROPHILS # BLD AUTO: 6.93 X10(3)/MCL (ref 2.1–9.2)
NEUTROPHILS # BLD AUTO: 7.07 X10(3)/MCL (ref 2.1–9.2)
NEUTROPHILS # BLD AUTO: 7.43 X10(3)/MCL (ref 2.1–9.2)
NEUTROPHILS # BLD AUTO: 7.67 X10(3)/MCL (ref 2.1–9.2)
NEUTROPHILS # BLD AUTO: 8.84 X10(3)/MCL (ref 2.1–9.2)
NEUTROPHILS NFR BLD AUTO: 54.7 %
NEUTROPHILS NFR BLD AUTO: 65.7 %
NEUTROPHILS NFR BLD AUTO: 65.8 %
NEUTROPHILS NFR BLD AUTO: 66 %
NEUTROPHILS NFR BLD AUTO: 67 %
NEUTROPHILS NFR BLD AUTO: 68.4 %
NEUTROPHILS NFR BLD AUTO: 68.4 %
NEUTROPHILS NFR BLD AUTO: 69.6 %
NEUTROPHILS NFR BLD AUTO: 69.7 %
NEUTROPHILS NFR BLD AUTO: 70.1 %
NEUTROPHILS NFR BLD AUTO: 75.5 %
NEUTROPHILS NFR BLD AUTO: 76.3 %
NEUTROPHILS NFR BLD MANUAL: 77 %
NEUTROPHILS NFR BLD MANUAL: 80 %
NIF IGG CSF QL IF: NEGATIVE
NITRITE UR QL STRIP: NEGATIVE
NITRITE UR QL STRIP: NEGATIVE
NMDAR1 IGG CSF QL CBA IFA: NEGATIVE
NRBC BLD AUTO-RTO: 0 %
NRBC BLD AUTO-RTO: 0.2 %
NRBC BLD AUTO-RTO: 0.2 %
O2 HB BLOOD GAS (OHS): 92.8 % (ref 94–97)
O2 HB BLOOD GAS (OHS): 93.7 % (ref 94–97)
O2 HB BLOOD GAS (OHS): 94.6 % (ref 94–97)
O2 HB BLOOD GAS (OHS): 95.3 % (ref 94–97)
O2 HB BLOOD GAS (OHS): 95.8 % (ref 94–97)
O2 HB BLOOD GAS (OHS): 96.4 % (ref 94–97)
OHS CV RV/LV RATIO: 0.83 CM
OHS LV EJECTION FRACTION SIMPSONS BIPLANE MOD: 62 %
OHS QRS DURATION: 116 MS
OHS QRS DURATION: 118 MS
OHS QTC CALCULATION: 440 MS
OHS QTC CALCULATION: 447 MS
OXYGEN DEVICE BLOOD GAS (OHS): ABNORMAL
OXYHGB MFR BLDA: 10 G/DL (ref 12–16)
OXYHGB MFR BLDA: 10.2 G/DL (ref 12–16)
OXYHGB MFR BLDA: 10.5 G/DL (ref 12–16)
OXYHGB MFR BLDA: 8.2 G/DL (ref 12–16)
OXYHGB MFR BLDA: 9.4 G/DL (ref 12–16)
OXYHGB MFR BLDA: 9.8 G/DL (ref 12–16)
PARECHOVIRUS A RNA CSF QL NAA+NON-PROBE: NOT DETECTED
PCA-1 AB CSF QL IF: NEGATIVE
PCA-2 AB CSF QL IF: NEGATIVE
PCA-TR AB CSF QL IF: NEGATIVE
PCO2 BLDA: 26 MMHG (ref 35–45)
PCO2 BLDA: 35 MMHG (ref 35–45)
PCO2 BLDA: 38 MMHG (ref 35–45)
PCO2 BLDA: 41 MMHG (ref 35–45)
PCO2 BLDA: 42 MMHG (ref 35–45)
PCO2 BLDA: 42 MMHG (ref 35–45)
PCO2 BLDA: 44 MMHG (ref 35–45)
PCO2 BLDA: 46 MMHG (ref 35–45)
PDE10A AB IFA, CSF: NEGATIVE
PEEP RESPIRATORY: 5 CMH2O
PEEP RESPIRATORY: 8 CMH2O
PH BLDA: 7.35 [PH] (ref 7.35–7.45)
PH BLDA: 7.4 [PH] (ref 7.35–7.45)
PH BLDA: 7.42 [PH] (ref 7.35–7.45)
PH BLDA: 7.45 [PH] (ref 7.35–7.45)
PH BLDA: 7.45 [PH] (ref 7.35–7.45)
PH BLDA: 7.49 [PH] (ref 7.35–7.45)
PH BLDA: 7.49 [PH] (ref 7.35–7.45)
PH BLDA: 7.6 [PH] (ref 7.35–7.45)
PH UR STRIP: 5.5 [PH]
PH UR STRIP: 6 [PH]
PLATELET # BLD AUTO: 231 X10(3)/MCL (ref 130–400)
PLATELET # BLD AUTO: 235 X10(3)/MCL (ref 130–400)
PLATELET # BLD AUTO: 247 X10(3)/MCL (ref 130–400)
PLATELET # BLD AUTO: 263 X10(3)/MCL (ref 130–400)
PLATELET # BLD AUTO: 263 X10(3)/MCL (ref 130–400)
PLATELET # BLD AUTO: 275 X10(3)/MCL (ref 130–400)
PLATELET # BLD AUTO: 285 X10(3)/MCL (ref 130–400)
PLATELET # BLD AUTO: 319 X10(3)/MCL (ref 130–400)
PLATELET # BLD AUTO: 330 X10(3)/MCL (ref 130–400)
PLATELET # BLD AUTO: 331 X10(3)/MCL (ref 130–400)
PLATELET # BLD AUTO: 354 X10(3)/MCL (ref 130–400)
PLATELET # BLD AUTO: 435 X10(3)/MCL (ref 130–400)
PLATELET # BLD AUTO: 509 X10(3)/MCL (ref 130–400)
PLATELET # BLD AUTO: 557 X10(3)/MCL (ref 130–400)
PLATELET # BLD EST: NORMAL 10*3/UL
PLATELET # BLD EST: NORMAL 10*3/UL
PLATELETS.RETICULATED NFR BLD AUTO: 3.8 % (ref 0.9–11.2)
PMV BLD AUTO: 10.5 FL (ref 7.4–10.4)
PMV BLD AUTO: 8.3 FL (ref 7.4–10.4)
PMV BLD AUTO: 8.6 FL (ref 7.4–10.4)
PMV BLD AUTO: 8.6 FL (ref 7.4–10.4)
PMV BLD AUTO: 8.7 FL (ref 7.4–10.4)
PMV BLD AUTO: 8.7 FL (ref 7.4–10.4)
PMV BLD AUTO: 8.8 FL (ref 7.4–10.4)
PMV BLD AUTO: 8.9 FL (ref 7.4–10.4)
PMV BLD AUTO: 8.9 FL (ref 7.4–10.4)
PMV BLD AUTO: 9.2 FL (ref 7.4–10.4)
PMV BLD AUTO: 9.2 FL (ref 7.4–10.4)
PMV BLD AUTO: 9.3 FL (ref 7.4–10.4)
PMV BLD AUTO: 9.4 FL (ref 7.4–10.4)
PMV BLD AUTO: 9.5 FL (ref 7.4–10.4)
PO2 BLDA: 105 MMHG (ref 80–100)
PO2 BLDA: 115 MMHG (ref 80–100)
PO2 BLDA: 180 MMHG (ref 80–100)
PO2 BLDA: 73 MMHG (ref 80–100)
PO2 BLDA: 76 MMHG (ref 80–100)
PO2 BLDA: 78 MMHG (ref 80–100)
PO2 BLDA: 81 MMHG (ref 80–100)
PO2 BLDA: 98 MMHG (ref 80–100)
POCT GLUCOSE: 102 MG/DL (ref 70–110)
POCT GLUCOSE: 104 MG/DL (ref 70–110)
POCT GLUCOSE: 112 MG/DL (ref 70–110)
POCT GLUCOSE: 116 MG/DL (ref 70–110)
POCT GLUCOSE: 125 MG/DL (ref 70–110)
POCT GLUCOSE: 125 MG/DL (ref 70–110)
POCT GLUCOSE: 139 MG/DL (ref 70–110)
POCT GLUCOSE: 152 MG/DL (ref 70–110)
POCT GLUCOSE: 154 MG/DL (ref 70–110)
POCT GLUCOSE: 159 MG/DL (ref 70–110)
POCT GLUCOSE: 159 MG/DL (ref 70–110)
POCT GLUCOSE: 162 MG/DL (ref 70–110)
POCT GLUCOSE: 166 MG/DL (ref 70–110)
POCT GLUCOSE: 170 MG/DL (ref 70–110)
POCT GLUCOSE: 173 MG/DL (ref 70–110)
POCT GLUCOSE: 173 MG/DL (ref 70–110)
POCT GLUCOSE: 174 MG/DL (ref 70–110)
POCT GLUCOSE: 181 MG/DL (ref 70–110)
POCT GLUCOSE: 182 MG/DL (ref 70–110)
POCT GLUCOSE: 185 MG/DL (ref 70–110)
POCT GLUCOSE: 187 MG/DL (ref 70–110)
POCT GLUCOSE: 191 MG/DL (ref 70–110)
POCT GLUCOSE: 194 MG/DL (ref 70–110)
POCT GLUCOSE: 197 MG/DL (ref 70–110)
POCT GLUCOSE: 201 MG/DL (ref 70–110)
POCT GLUCOSE: 204 MG/DL (ref 70–110)
POCT GLUCOSE: 204 MG/DL (ref 70–110)
POCT GLUCOSE: 208 MG/DL (ref 70–110)
POCT GLUCOSE: 208 MG/DL (ref 70–110)
POCT GLUCOSE: 219 MG/DL (ref 70–110)
POCT GLUCOSE: 221 MG/DL (ref 70–110)
POCT GLUCOSE: 221 MG/DL (ref 70–110)
POCT GLUCOSE: 223 MG/DL (ref 70–110)
POCT GLUCOSE: 224 MG/DL (ref 70–110)
POCT GLUCOSE: 231 MG/DL (ref 70–110)
POCT GLUCOSE: 232 MG/DL (ref 70–110)
POCT GLUCOSE: 238 MG/DL (ref 70–110)
POCT GLUCOSE: 241 MG/DL (ref 70–110)
POCT GLUCOSE: 245 MG/DL (ref 70–110)
POCT GLUCOSE: 249 MG/DL (ref 70–110)
POCT GLUCOSE: 252 MG/DL (ref 70–110)
POCT GLUCOSE: 255 MG/DL (ref 70–110)
POCT GLUCOSE: 271 MG/DL (ref 70–110)
POCT GLUCOSE: 277 MG/DL (ref 70–110)
POCT GLUCOSE: 283 MG/DL (ref 70–110)
POCT GLUCOSE: 318 MG/DL (ref 70–110)
POCT GLUCOSE: 336 MG/DL (ref 70–110)
POIKILOCYTOSIS BLD QL SMEAR: ABNORMAL
POTASSIUM BLOOD GAS (OHS): 3.4 MMOL/L (ref 3.5–5)
POTASSIUM BLOOD GAS (OHS): 3.4 MMOL/L (ref 3.5–5)
POTASSIUM BLOOD GAS (OHS): 3.6 MMOL/L (ref 3.5–5)
POTASSIUM BLOOD GAS (OHS): 3.8 MMOL/L (ref 3.5–5)
POTASSIUM BLOOD GAS (OHS): 3.8 MMOL/L (ref 3.5–5)
POTASSIUM BLOOD GAS (OHS): 3.9 MMOL/L (ref 3.5–5)
POTASSIUM BLOOD GAS (OHS): 4.4 MMOL/L (ref 3.5–5)
POTASSIUM BLOOD GAS (OHS): 4.5 MMOL/L (ref 3.5–5)
POTASSIUM SERPL-SCNC: 3.1 MMOL/L (ref 3.5–5.1)
POTASSIUM SERPL-SCNC: 3.7 MMOL/L (ref 3.5–5.1)
POTASSIUM SERPL-SCNC: 3.8 MMOL/L (ref 3.5–5.1)
POTASSIUM SERPL-SCNC: 4 MMOL/L (ref 3.5–5.1)
POTASSIUM SERPL-SCNC: 4.1 MMOL/L (ref 3.5–5.1)
POTASSIUM SERPL-SCNC: 4.2 MMOL/L (ref 3.5–5.1)
POTASSIUM SERPL-SCNC: 4.2 MMOL/L (ref 3.5–5.1)
POTASSIUM SERPL-SCNC: 4.4 MMOL/L (ref 3.5–5.1)
POTASSIUM SERPL-SCNC: 4.6 MMOL/L (ref 3.5–5.1)
POTASSIUM SERPL-SCNC: 5.3 MMOL/L (ref 3.5–5.1)
PROCALCITONIN SERPL-MCNC: 2.03 NG/ML
PROT CSF-MCNC: 136.6 MG/DL (ref 15–45)
PROT SERPL-MCNC: 5.7 GM/DL (ref 5.8–7.6)
PROT SERPL-MCNC: 5.9 GM/DL (ref 5.8–7.6)
PROT SERPL-MCNC: 6.1 GM/DL (ref 5.8–7.6)
PROT SERPL-MCNC: 6.2 GM/DL (ref 5.8–7.6)
PROT SERPL-MCNC: 6.2 GM/DL (ref 5.8–7.6)
PROT SERPL-MCNC: 6.4 GM/DL (ref 5.8–7.6)
PROT SERPL-MCNC: 6.4 GM/DL (ref 5.8–7.6)
PROT SERPL-MCNC: 6.5 GM/DL (ref 5.8–7.6)
PROT SERPL-MCNC: 6.5 GM/DL (ref 5.8–7.6)
PROT SERPL-MCNC: 6.6 GM/DL (ref 5.8–7.6)
PROT SERPL-MCNC: 6.6 GM/DL (ref 5.8–7.6)
PROT SERPL-MCNC: 6.7 GM/DL (ref 5.8–7.6)
PROT SERPL-MCNC: 7.2 GM/DL (ref 5.8–7.6)
PROT SERPL-MCNC: 7.4 GM/DL (ref 5.8–7.6)
PROT UR QL STRIP: ABNORMAL
PROT UR QL STRIP: NEGATIVE
PROTHROMBIN TIME: 16.2 SECONDS (ref 12.5–14.5)
PSYCHE PATHOLOGY RESULT: NORMAL
RA WIDTH: 4.44 CM
RBC # BLD AUTO: 2.95 X10(6)/MCL (ref 4.7–6.1)
RBC # BLD AUTO: 3.23 X10(6)/MCL (ref 4.7–6.1)
RBC # BLD AUTO: 3.45 X10(6)/MCL (ref 4.7–6.1)
RBC # BLD AUTO: 3.75 X10(6)/MCL (ref 4.7–6.1)
RBC # BLD AUTO: 3.77 X10(6)/MCL (ref 4.7–6.1)
RBC # BLD AUTO: 3.85 X10(6)/MCL (ref 4.7–6.1)
RBC # BLD AUTO: 3.86 X10(6)/MCL (ref 4.7–6.1)
RBC # BLD AUTO: 3.99 X10(6)/MCL (ref 4.7–6.1)
RBC # BLD AUTO: 4.01 X10(6)/MCL (ref 4.7–6.1)
RBC # BLD AUTO: 4.17 X10(6)/MCL (ref 4.7–6.1)
RBC # BLD AUTO: 4.23 X10(6)/MCL (ref 4.7–6.1)
RBC # BLD AUTO: 4.28 X10(6)/MCL (ref 4.7–6.1)
RBC # BLD AUTO: 4.39 X10(6)/MCL (ref 4.7–6.1)
RBC # BLD AUTO: 4.48 X10(6)/MCL (ref 4.7–6.1)
RBC # CSF MANUAL: 20 /UL
RBC #/AREA URNS AUTO: ABNORMAL /HPF
RBC #/AREA URNS AUTO: ABNORMAL /HPF
RBC MORPH BLD: ABNORMAL
RBC MORPH BLD: NORMAL
RET# (OHS): 0.06 X10E6/UL (ref 0.03–0.1)
RETICULOCYTE COUNT AUTOMATED (OLG): 1.75 % (ref 1.1–2.1)
RIGHT VENTRICLE DIASTOLIC BASEL DIMENSION: 3.3 CM
RIGHT VENTRICULAR END-DIASTOLIC DIMENSION: 3.33 CM
RSV A 5' UTR RNA NPH QL NAA+PROBE: NOT DETECTED
RSV RNA NPH QL NAA+NON-PROBE: NOT DETECTED
RV+EV RNA NPH QL NAA+NON-PROBE: NOT DETECTED
S PNEUM DNA CSF QL NAA+NON-PROBE: NOT DETECTED
SAMPLE SITE BLOOD GAS (OHS): ABNORMAL
SAO2 % BLDA: 100 %
SAO2 % BLDA: 94.7 %
SAO2 % BLDA: 95.2 %
SAO2 % BLDA: 96 %
SAO2 % BLDA: 96.8 %
SAO2 % BLDA: 98 %
SAO2 % BLDA: 98.1 %
SAO2 % BLDA: 98.6 %
SARS-COV-2 RNA RESP QL NAA+PROBE: NOT DETECTED
SODIUM BLOOD GAS (OHS): 130 MMOL/L (ref 137–145)
SODIUM BLOOD GAS (OHS): 130 MMOL/L (ref 137–145)
SODIUM BLOOD GAS (OHS): 131 MMOL/L (ref 137–145)
SODIUM BLOOD GAS (OHS): 131 MMOL/L (ref 137–145)
SODIUM BLOOD GAS (OHS): 132 MMOL/L (ref 137–145)
SODIUM BLOOD GAS (OHS): 133 MMOL/L (ref 137–145)
SODIUM BLOOD GAS (OHS): 134 MMOL/L (ref 137–145)
SODIUM BLOOD GAS (OHS): 135 MMOL/L (ref 137–145)
SODIUM SERPL-SCNC: 131 MMOL/L (ref 136–145)
SODIUM SERPL-SCNC: 133 MMOL/L (ref 136–145)
SODIUM SERPL-SCNC: 134 MMOL/L (ref 136–145)
SODIUM SERPL-SCNC: 135 MMOL/L (ref 136–145)
SODIUM SERPL-SCNC: 135 MMOL/L (ref 136–145)
SODIUM SERPL-SCNC: 136 MMOL/L (ref 136–145)
SODIUM SERPL-SCNC: 137 MMOL/L (ref 136–145)
SODIUM SERPL-SCNC: 138 MMOL/L (ref 136–145)
SP GR UR STRIP.AUTO: 1.02 (ref 1–1.03)
SP GR UR STRIP.AUTO: 1.02 (ref 1–1.03)
SPECIMEN OUTDATE: NORMAL
SPECIMEN VOL CSF: 1 ML
SPONT+MECH VT ON VENT: 450 ML
SPONT+MECH VT ON VENT: 500 ML
SQUAMOUS #/AREA URNS LPF: ABNORMAL /HPF
SQUAMOUS #/AREA URNS LPF: ABNORMAL /HPF
STREP A PCR (OHS): NOT DETECTED
T PALLIDUM AB SER QL: NONREACTIVE
TARGETS BLD QL SMEAR: ABNORMAL
TDI LATERAL: 0.1 M/S
TDI SEPTAL: 0.08 M/S
TDI: 0.09 M/S
TIBC SERPL-MCNC: 296 UG/DL (ref 60–240)
TIBC SERPL-MCNC: 326 UG/DL (ref 250–450)
TNC CSF (OLG): 393 /UL
TRANSFERRIN SERPL-MCNC: 298 MG/DL (ref 163–344)
TRIM46 AB IFA, CSF: NEGATIVE
TROPONIN I SERPL-MCNC: <0.01 NG/ML (ref 0–0.04)
TSH SERPL-ACNC: 0.74 UIU/ML (ref 0.35–4.94)
TUBE NUMBER CSF (BEAKER): 3
UROBILINOGEN UR STRIP-ACNC: NORMAL
UROBILINOGEN UR STRIP-ACNC: NORMAL
VANCOMYCIN TROUGH SERPL-MCNC: 11 UG/ML (ref 15–20)
VANCOMYCIN TROUGH SERPL-MCNC: 12.8 UG/ML (ref 15–20)
VDRL CSF QL: NEGATIVE
VIT B12 SERPL-MCNC: 683 PG/ML (ref 213–816)
VZV DNA CSF QL NAA+NON-PROBE: NOT DETECTED
WBC # BLD AUTO: 10.7 X10(3)/MCL (ref 4.5–11.5)
WBC # BLD AUTO: 10.76 X10(3)/MCL (ref 4.5–11.5)
WBC # BLD AUTO: 10.87 X10(3)/MCL (ref 4.5–11.5)
WBC # BLD AUTO: 11.02 X10(3)/MCL (ref 4.5–11.5)
WBC # BLD AUTO: 12.17 X10(3)/MCL (ref 4.5–11.5)
WBC # BLD AUTO: 12.62 X10(3)/MCL (ref 4.5–11.5)
WBC # BLD AUTO: 13.79 X10(3)/MCL (ref 4.5–11.5)
WBC # BLD AUTO: 15.31 X10(3)/MCL (ref 4.5–11.5)
WBC # BLD AUTO: 8.14 X10(3)/MCL (ref 4.5–11.5)
WBC # BLD AUTO: 9.18 X10(3)/MCL (ref 4.5–11.5)
WBC # BLD AUTO: 9.54 X10(3)/MCL (ref 4.5–11.5)
WBC # BLD AUTO: 9.58 X10(3)/MCL (ref 4.5–11.5)
WBC # BLD AUTO: 9.68 X10(3)/MCL (ref 4.5–11.5)
WBC # BLD AUTO: 9.82 X10(3)/MCL (ref 4.5–11.5)
WBC #/AREA URNS AUTO: ABNORMAL /HPF
WBC #/AREA URNS AUTO: ABNORMAL /HPF
WBC CLUMPS UR QL AUTO: ABNORMAL
Z-SCORE OF LEFT VENTRICULAR DIMENSION IN END DIASTOLE: -2.95
Z-SCORE OF LEFT VENTRICULAR DIMENSION IN END SYSTOLE: -1.32

## 2025-01-01 PROCEDURE — 20000000 HC ICU ROOM

## 2025-01-01 PROCEDURE — 94760 N-INVAS EAR/PLS OXIMETRY 1: CPT

## 2025-01-01 PROCEDURE — 99900035 HC TECH TIME PER 15 MIN (STAT)

## 2025-01-01 PROCEDURE — 27100171 HC OXYGEN HIGH FLOW UP TO 24 HOURS

## 2025-01-01 PROCEDURE — 99900026 HC AIRWAY MAINTENANCE (STAT)

## 2025-01-01 PROCEDURE — 63600175 PHARM REV CODE 636 W HCPCS: Performed by: INTERNAL MEDICINE

## 2025-01-01 PROCEDURE — 63600175 PHARM REV CODE 636 W HCPCS

## 2025-01-01 PROCEDURE — 36415 COLL VENOUS BLD VENIPUNCTURE: CPT

## 2025-01-01 PROCEDURE — 27200966 HC CLOSED SUCTION SYSTEM

## 2025-01-01 PROCEDURE — 36415 COLL VENOUS BLD VENIPUNCTURE: CPT | Performed by: STUDENT IN AN ORGANIZED HEALTH CARE EDUCATION/TRAINING PROGRAM

## 2025-01-01 PROCEDURE — 94003 VENT MGMT INPAT SUBQ DAY: CPT

## 2025-01-01 PROCEDURE — 36600 WITHDRAWAL OF ARTERIAL BLOOD: CPT

## 2025-01-01 PROCEDURE — 99900031 HC PATIENT EDUCATION (STAT)

## 2025-01-01 PROCEDURE — 82746 ASSAY OF FOLIC ACID SERUM: CPT | Performed by: EMERGENCY MEDICINE

## 2025-01-01 PROCEDURE — 25000003 PHARM REV CODE 250

## 2025-01-01 PROCEDURE — 85025 COMPLETE CBC W/AUTO DIFF WBC: CPT | Performed by: INTERNAL MEDICINE

## 2025-01-01 PROCEDURE — 99233 SBSQ HOSP IP/OBS HIGH 50: CPT | Mod: ,,, | Performed by: INTERNAL MEDICINE

## 2025-01-01 PROCEDURE — G0427 INPT/ED TELECONSULT70: HCPCS | Mod: 95,Q6,, | Performed by: INTERNAL MEDICINE

## 2025-01-01 PROCEDURE — 63600175 PHARM REV CODE 636 W HCPCS: Performed by: STUDENT IN AN ORGANIZED HEALTH CARE EDUCATION/TRAINING PROGRAM

## 2025-01-01 PROCEDURE — 25000003 PHARM REV CODE 250: Performed by: STUDENT IN AN ORGANIZED HEALTH CARE EDUCATION/TRAINING PROGRAM

## 2025-01-01 PROCEDURE — G0545 PR VISIT INHERENT TO INPT OR OBS CARE, INFECTIOUS DISEASE: HCPCS | Mod: ,,, | Performed by: INTERNAL MEDICINE

## 2025-01-01 PROCEDURE — 25000003 PHARM REV CODE 250: Performed by: INTERNAL MEDICINE

## 2025-01-01 PROCEDURE — 83605 ASSAY OF LACTIC ACID: CPT | Performed by: INTERNAL MEDICINE

## 2025-01-01 PROCEDURE — 94761 N-INVAS EAR/PLS OXIMETRY MLT: CPT

## 2025-01-01 PROCEDURE — 86900 BLOOD TYPING SEROLOGIC ABO: CPT | Performed by: EMERGENCY MEDICINE

## 2025-01-01 PROCEDURE — 63600175 PHARM REV CODE 636 W HCPCS: Performed by: PSYCHIATRY & NEUROLOGY

## 2025-01-01 PROCEDURE — 80053 COMPREHEN METABOLIC PANEL: CPT

## 2025-01-01 PROCEDURE — 99223 1ST HOSP IP/OBS HIGH 75: CPT | Mod: ,,, | Performed by: NURSE PRACTITIONER

## 2025-01-01 PROCEDURE — 83036 HEMOGLOBIN GLYCOSYLATED A1C: CPT | Performed by: STUDENT IN AN ORGANIZED HEALTH CARE EDUCATION/TRAINING PROGRAM

## 2025-01-01 PROCEDURE — 63600175 PHARM REV CODE 636 W HCPCS: Performed by: HOSPITALIST

## 2025-01-01 PROCEDURE — 86618 LYME DISEASE ANTIBODY: CPT

## 2025-01-01 PROCEDURE — 82550 ASSAY OF CK (CPK): CPT | Performed by: EMERGENCY MEDICINE

## 2025-01-01 PROCEDURE — 85025 COMPLETE CBC W/AUTO DIFF WBC: CPT | Performed by: EMERGENCY MEDICINE

## 2025-01-01 PROCEDURE — 63600175 PHARM REV CODE 636 W HCPCS: Performed by: NURSE ANESTHETIST, CERTIFIED REGISTERED

## 2025-01-01 PROCEDURE — 99232 SBSQ HOSP IP/OBS MODERATE 35: CPT | Mod: ,,, | Performed by: INTERNAL MEDICINE

## 2025-01-01 PROCEDURE — 25000242 PHARM REV CODE 250 ALT 637 W/ HCPCS: Performed by: STUDENT IN AN ORGANIZED HEALTH CARE EDUCATION/TRAINING PROGRAM

## 2025-01-01 PROCEDURE — 94760 N-INVAS EAR/PLS OXIMETRY 1: CPT | Mod: XB

## 2025-01-01 PROCEDURE — 31500 INSERT EMERGENCY AIRWAY: CPT

## 2025-01-01 PROCEDURE — 87040 BLOOD CULTURE FOR BACTERIA: CPT | Performed by: EMERGENCY MEDICINE

## 2025-01-01 PROCEDURE — 27202364 HC PAD, COOLING, ANY SIZE

## 2025-01-01 PROCEDURE — 85610 PROTHROMBIN TIME: CPT | Performed by: INTERNAL MEDICINE

## 2025-01-01 PROCEDURE — 80053 COMPREHEN METABOLIC PANEL: CPT | Performed by: EMERGENCY MEDICINE

## 2025-01-01 PROCEDURE — 25500020 PHARM REV CODE 255: Performed by: STUDENT IN AN ORGANIZED HEALTH CARE EDUCATION/TRAINING PROGRAM

## 2025-01-01 PROCEDURE — 87641 MR-STAPH DNA AMP PROBE: CPT

## 2025-01-01 PROCEDURE — G0408 INPT/TELE FOLLOW UP 35: HCPCS | Mod: 95,Q6,, | Performed by: INTERNAL MEDICINE

## 2025-01-01 PROCEDURE — 009U3ZX DRAINAGE OF SPINAL CANAL, PERCUTANEOUS APPROACH, DIAGNOSTIC: ICD-10-PCS | Performed by: PREVENTIVE MEDICINE

## 2025-01-01 PROCEDURE — 85652 RBC SED RATE AUTOMATED: CPT | Performed by: INTERNAL MEDICINE

## 2025-01-01 PROCEDURE — 83605 ASSAY OF LACTIC ACID: CPT | Mod: 59 | Performed by: EMERGENCY MEDICINE

## 2025-01-01 PROCEDURE — 82803 BLOOD GASES ANY COMBINATION: CPT

## 2025-01-01 PROCEDURE — 87529 HSV DNA AMP PROBE: CPT | Performed by: INTERNAL MEDICINE

## 2025-01-01 PROCEDURE — 81001 URINALYSIS AUTO W/SCOPE: CPT | Performed by: EMERGENCY MEDICINE

## 2025-01-01 PROCEDURE — 85025 COMPLETE CBC W/AUTO DIFF WBC: CPT

## 2025-01-01 PROCEDURE — 31500 INSERT EMERGENCY AIRWAY: CPT | Mod: ,,, | Performed by: NURSE ANESTHETIST, CERTIFIED REGISTERED

## 2025-01-01 PROCEDURE — 86663 EPSTEIN-BARR ANTIBODY: CPT | Performed by: STUDENT IN AN ORGANIZED HEALTH CARE EDUCATION/TRAINING PROGRAM

## 2025-01-01 PROCEDURE — 95714 VEEG EA 12-26 HR UNMNTR: CPT

## 2025-01-01 PROCEDURE — 84443 ASSAY THYROID STIM HORMONE: CPT | Performed by: EMERGENCY MEDICINE

## 2025-01-01 PROCEDURE — 94640 AIRWAY INHALATION TREATMENT: CPT

## 2025-01-01 PROCEDURE — 37000008 HC ANESTHESIA 1ST 15 MINUTES: Performed by: ANESTHESIOLOGY

## 2025-01-01 PROCEDURE — 27000221 HC OXYGEN, UP TO 24 HOURS

## 2025-01-01 PROCEDURE — 36569 INSJ PICC 5 YR+ W/O IMAGING: CPT

## 2025-01-01 PROCEDURE — 82728 ASSAY OF FERRITIN: CPT | Performed by: INTERNAL MEDICINE

## 2025-01-01 PROCEDURE — 96361 HYDRATE IV INFUSION ADD-ON: CPT

## 2025-01-01 PROCEDURE — 21400001 HC TELEMETRY ROOM

## 2025-01-01 PROCEDURE — 87077 CULTURE AEROBIC IDENTIFY: CPT

## 2025-01-01 PROCEDURE — 82945 GLUCOSE OTHER FLUID: CPT | Performed by: INTERNAL MEDICINE

## 2025-01-01 PROCEDURE — 99232 SBSQ HOSP IP/OBS MODERATE 35: CPT | Mod: ,,, | Performed by: NURSE PRACTITIONER

## 2025-01-01 PROCEDURE — 93010 ELECTROCARDIOGRAM REPORT: CPT | Mod: 76,,, | Performed by: INTERNAL MEDICINE

## 2025-01-01 PROCEDURE — 83880 ASSAY OF NATRIURETIC PEPTIDE: CPT | Performed by: INTERNAL MEDICINE

## 2025-01-01 PROCEDURE — 99291 CRITICAL CARE FIRST HOUR: CPT

## 2025-01-01 PROCEDURE — 87641 MR-STAPH DNA AMP PROBE: CPT | Performed by: INTERNAL MEDICINE

## 2025-01-01 PROCEDURE — 92611 MOTION FLUOROSCOPY/SWALLOW: CPT

## 2025-01-01 PROCEDURE — 86880 COOMBS TEST DIRECT: CPT | Performed by: EMERGENCY MEDICINE

## 2025-01-01 PROCEDURE — 84484 ASSAY OF TROPONIN QUANT: CPT | Performed by: EMERGENCY MEDICINE

## 2025-01-01 PROCEDURE — 86592 SYPHILIS TEST NON-TREP QUAL: CPT

## 2025-01-01 PROCEDURE — 93005 ELECTROCARDIOGRAM TRACING: CPT

## 2025-01-01 PROCEDURE — 85660 RBC SICKLE CELL TEST: CPT | Performed by: EMERGENCY MEDICINE

## 2025-01-01 PROCEDURE — 87040 BLOOD CULTURE FOR BACTERIA: CPT | Performed by: INTERNAL MEDICINE

## 2025-01-01 PROCEDURE — 36415 COLL VENOUS BLD VENIPUNCTURE: CPT | Performed by: INTERNAL MEDICINE

## 2025-01-01 PROCEDURE — 92610 EVALUATE SWALLOWING FUNCTION: CPT

## 2025-01-01 PROCEDURE — 87070 CULTURE OTHR SPECIMN AEROBIC: CPT | Performed by: INTERNAL MEDICINE

## 2025-01-01 PROCEDURE — 87102 FUNGUS ISOLATION CULTURE: CPT

## 2025-01-01 PROCEDURE — 87210 SMEAR WET MOUNT SALINE/INK: CPT

## 2025-01-01 PROCEDURE — 88112 CYTOPATH CELL ENHANCE TECH: CPT

## 2025-01-01 PROCEDURE — 87483 CNS DNA AMP PROBE TYPE 12-25: CPT

## 2025-01-01 PROCEDURE — 83615 LACTATE (LD) (LDH) ENZYME: CPT | Performed by: EMERGENCY MEDICINE

## 2025-01-01 PROCEDURE — 86140 C-REACTIVE PROTEIN: CPT | Performed by: INTERNAL MEDICINE

## 2025-01-01 PROCEDURE — 87077 CULTURE AEROBIC IDENTIFY: CPT | Performed by: INTERNAL MEDICINE

## 2025-01-01 PROCEDURE — 83735 ASSAY OF MAGNESIUM: CPT | Performed by: INTERNAL MEDICINE

## 2025-01-01 PROCEDURE — 87070 CULTURE OTHR SPECIMN AEROBIC: CPT

## 2025-01-01 PROCEDURE — 81015 MICROSCOPIC EXAM OF URINE: CPT | Performed by: INTERNAL MEDICINE

## 2025-01-01 PROCEDURE — 80048 BASIC METABOLIC PNL TOTAL CA: CPT

## 2025-01-01 PROCEDURE — 36415 COLL VENOUS BLD VENIPUNCTURE: CPT | Performed by: HOSPITALIST

## 2025-01-01 PROCEDURE — 80202 ASSAY OF VANCOMYCIN: CPT

## 2025-01-01 PROCEDURE — 96374 THER/PROPH/DIAG INJ IV PUSH: CPT

## 2025-01-01 PROCEDURE — 63600175 PHARM REV CODE 636 W HCPCS: Performed by: ANESTHESIOLOGY

## 2025-01-01 PROCEDURE — 87040 BLOOD CULTURE FOR BACTERIA: CPT | Performed by: HOSPITALIST

## 2025-01-01 PROCEDURE — 80053 COMPREHEN METABOLIC PANEL: CPT | Performed by: INTERNAL MEDICINE

## 2025-01-01 PROCEDURE — 02H633Z INSERTION OF INFUSION DEVICE INTO RIGHT ATRIUM, PERCUTANEOUS APPROACH: ICD-10-PCS | Performed by: HOSPITALIST

## 2025-01-01 PROCEDURE — 82607 VITAMIN B-12: CPT | Performed by: EMERGENCY MEDICINE

## 2025-01-01 PROCEDURE — 0241U COVID/RSV/FLU A&B PCR: CPT | Performed by: EMERGENCY MEDICINE

## 2025-01-01 PROCEDURE — 84157 ASSAY OF PROTEIN OTHER: CPT | Performed by: INTERNAL MEDICINE

## 2025-01-01 PROCEDURE — 85730 THROMBOPLASTIN TIME PARTIAL: CPT | Performed by: INTERNAL MEDICINE

## 2025-01-01 PROCEDURE — 85045 AUTOMATED RETICULOCYTE COUNT: CPT | Performed by: EMERGENCY MEDICINE

## 2025-01-01 PROCEDURE — 83550 IRON BINDING TEST: CPT | Performed by: EMERGENCY MEDICINE

## 2025-01-01 PROCEDURE — 94002 VENT MGMT INPAT INIT DAY: CPT

## 2025-01-01 PROCEDURE — 84145 PROCALCITONIN (PCT): CPT | Performed by: INTERNAL MEDICINE

## 2025-01-01 PROCEDURE — A9698 NON-RAD CONTRAST MATERIALNOC: HCPCS | Performed by: STUDENT IN AN ORGANIZED HEALTH CARE EDUCATION/TRAINING PROGRAM

## 2025-01-01 PROCEDURE — C1751 CATH, INF, PER/CENT/MIDLINE: HCPCS

## 2025-01-01 PROCEDURE — 25000003 PHARM REV CODE 250: Performed by: PSYCHIATRY & NEUROLOGY

## 2025-01-01 PROCEDURE — 89051 BODY FLUID CELL COUNT: CPT

## 2025-01-01 PROCEDURE — 5A1955Z RESPIRATORY VENTILATION, GREATER THAN 96 CONSECUTIVE HOURS: ICD-10-PCS | Performed by: INTERNAL MEDICINE

## 2025-01-01 PROCEDURE — 94761 N-INVAS EAR/PLS OXIMETRY MLT: CPT | Mod: XB

## 2025-01-01 PROCEDURE — 87651 STREP A DNA AMP PROBE: CPT | Performed by: EMERGENCY MEDICINE

## 2025-01-01 PROCEDURE — 37799 UNLISTED PX VASCULAR SURGERY: CPT

## 2025-01-01 PROCEDURE — 80202 ASSAY OF VANCOMYCIN: CPT | Performed by: INTERNAL MEDICINE

## 2025-01-01 PROCEDURE — 63600175 PHARM REV CODE 636 W HCPCS: Performed by: EMERGENCY MEDICINE

## 2025-01-01 PROCEDURE — 86780 TREPONEMA PALLIDUM: CPT

## 2025-01-01 PROCEDURE — 83690 ASSAY OF LIPASE: CPT | Performed by: EMERGENCY MEDICINE

## 2025-01-01 PROCEDURE — 51702 INSERT TEMP BLADDER CATH: CPT

## 2025-01-01 PROCEDURE — 87798 DETECT AGENT NOS DNA AMP: CPT | Performed by: INTERNAL MEDICINE

## 2025-01-01 PROCEDURE — 93010 ELECTROCARDIOGRAM REPORT: CPT | Mod: ,,, | Performed by: INTERNAL MEDICINE

## 2025-01-01 PROCEDURE — 87899 AGENT NOS ASSAY W/OPTIC: CPT

## 2025-01-01 PROCEDURE — 0BH17EZ INSERTION OF ENDOTRACHEAL AIRWAY INTO TRACHEA, VIA NATURAL OR ARTIFICIAL OPENING: ICD-10-PCS | Performed by: INTERNAL MEDICINE

## 2025-01-01 PROCEDURE — 86255 FLUORESCENT ANTIBODY SCREEN: CPT

## 2025-01-01 PROCEDURE — 25500020 PHARM REV CODE 255: Performed by: INTERNAL MEDICINE

## 2025-01-01 PROCEDURE — A9577 INJ MULTIHANCE: HCPCS | Performed by: INTERNAL MEDICINE

## 2025-01-01 RX ORDER — DOXYCYCLINE HYCLATE 100 MG
100 TABLET ORAL EVERY 12 HOURS
Status: DISCONTINUED | OUTPATIENT
Start: 2025-01-01 | End: 2025-01-01

## 2025-01-01 RX ORDER — ALPRAZOLAM 0.5 MG/1
0.5 TABLET ORAL 3 TIMES DAILY PRN
Status: DISCONTINUED | OUTPATIENT
Start: 2025-01-01 | End: 2025-01-01

## 2025-01-01 RX ORDER — LORAZEPAM 2 MG/ML
INJECTION INTRAMUSCULAR
Status: COMPLETED
Start: 2025-01-01 | End: 2025-01-01

## 2025-01-01 RX ORDER — PANTOPRAZOLE SODIUM 40 MG/10ML
40 INJECTION, POWDER, LYOPHILIZED, FOR SOLUTION INTRAVENOUS DAILY
Status: DISCONTINUED | OUTPATIENT
Start: 2025-01-01 | End: 2025-01-01

## 2025-01-01 RX ORDER — IBUPROFEN 200 MG
16 TABLET ORAL
Status: DISCONTINUED | OUTPATIENT
Start: 2025-01-01 | End: 2025-01-01

## 2025-01-01 RX ORDER — ACETAMINOPHEN 500 MG
1000 TABLET ORAL EVERY 6 HOURS PRN
Status: DISCONTINUED | OUTPATIENT
Start: 2025-01-01 | End: 2025-01-01

## 2025-01-01 RX ORDER — NAPROXEN SODIUM 220 MG/1
81 TABLET, FILM COATED ORAL DAILY
Status: DISCONTINUED | OUTPATIENT
Start: 2025-01-01 | End: 2025-01-01

## 2025-01-01 RX ORDER — MONTELUKAST SODIUM 10 MG/1
10 TABLET ORAL NIGHTLY
Status: DISCONTINUED | OUTPATIENT
Start: 2025-01-01 | End: 2025-01-01 | Stop reason: HOSPADM

## 2025-01-01 RX ORDER — PHENOBARBITAL SODIUM 130 MG/ML
100 INJECTION, SOLUTION INTRAMUSCULAR; INTRAVENOUS DAILY
Status: DISCONTINUED | OUTPATIENT
Start: 2025-01-01 | End: 2025-01-01

## 2025-01-01 RX ORDER — LORAZEPAM 2 MG/ML
2 INJECTION INTRAMUSCULAR
Status: DISCONTINUED | OUTPATIENT
Start: 2025-01-01 | End: 2025-01-01

## 2025-01-01 RX ORDER — MUPIROCIN 20 MG/G
OINTMENT TOPICAL 2 TIMES DAILY
Status: COMPLETED | OUTPATIENT
Start: 2025-01-01 | End: 2025-01-01

## 2025-01-01 RX ORDER — HALOPERIDOL LACTATE 5 MG/ML
1 INJECTION, SOLUTION INTRAMUSCULAR EVERY 4 HOURS PRN
Status: DISCONTINUED | OUTPATIENT
Start: 2025-01-01 | End: 2025-01-01 | Stop reason: HOSPADM

## 2025-01-01 RX ORDER — MIDAZOLAM HYDROCHLORIDE 1 MG/ML
0-5 INJECTION, SOLUTION INTRAVENOUS CONTINUOUS
Status: DISCONTINUED | OUTPATIENT
Start: 2025-01-01 | End: 2025-01-01 | Stop reason: HOSPADM

## 2025-01-01 RX ORDER — METFORMIN HYDROCHLORIDE 500 MG/1
500 TABLET ORAL 2 TIMES DAILY WITH MEALS
Status: DISCONTINUED | OUTPATIENT
Start: 2025-01-01 | End: 2025-01-01

## 2025-01-01 RX ORDER — MAGNESIUM SULFATE 1 G/100ML
1 INJECTION INTRAVENOUS ONCE
Status: COMPLETED | OUTPATIENT
Start: 2025-01-01 | End: 2025-01-01

## 2025-01-01 RX ORDER — PHENOBARBITAL SODIUM 130 MG/ML
100 INJECTION, SOLUTION INTRAMUSCULAR; INTRAVENOUS ONCE
Status: COMPLETED | OUTPATIENT
Start: 2025-01-01 | End: 2025-01-01

## 2025-01-01 RX ORDER — ROCURONIUM BROMIDE 10 MG/ML
INJECTION, SOLUTION INTRAVENOUS CODE/TRAUMA/SEDATION MEDICATION
Status: DISCONTINUED | OUTPATIENT
Start: 2025-01-01 | End: 2025-01-01

## 2025-01-01 RX ORDER — NAPROXEN SODIUM 220 MG/1
81 TABLET, FILM COATED ORAL DAILY
Status: DISCONTINUED | OUTPATIENT
Start: 2025-01-01 | End: 2025-01-01 | Stop reason: HOSPADM

## 2025-01-01 RX ORDER — HYDRALAZINE HYDROCHLORIDE 20 MG/ML
10 INJECTION INTRAMUSCULAR; INTRAVENOUS EVERY 6 HOURS PRN
Status: DISCONTINUED | OUTPATIENT
Start: 2025-01-01 | End: 2025-01-01 | Stop reason: HOSPADM

## 2025-01-01 RX ORDER — INSULIN GLARGINE 100 [IU]/ML
8 INJECTION, SOLUTION SUBCUTANEOUS NIGHTLY
Status: DISCONTINUED | OUTPATIENT
Start: 2025-01-01 | End: 2025-01-01 | Stop reason: HOSPADM

## 2025-01-01 RX ORDER — PIOGLITAZONE 15 MG/1
15 TABLET ORAL DAILY
Status: DISCONTINUED | OUTPATIENT
Start: 2025-01-01 | End: 2025-01-01

## 2025-01-01 RX ORDER — MIDAZOLAM HYDROCHLORIDE 1 MG/ML
INJECTION, SOLUTION INTRAVENOUS
Status: COMPLETED
Start: 2025-01-01 | End: 2025-01-01

## 2025-01-01 RX ORDER — ONDANSETRON HYDROCHLORIDE 2 MG/ML
4 INJECTION, SOLUTION INTRAVENOUS
Status: COMPLETED | OUTPATIENT
Start: 2025-01-01 | End: 2025-01-01

## 2025-01-01 RX ORDER — IPRATROPIUM BROMIDE AND ALBUTEROL SULFATE 2.5; .5 MG/3ML; MG/3ML
3 SOLUTION RESPIRATORY (INHALATION) EVERY 6 HOURS PRN
Status: DISCONTINUED | OUTPATIENT
Start: 2025-01-01 | End: 2025-01-01 | Stop reason: HOSPADM

## 2025-01-01 RX ORDER — ATORVASTATIN CALCIUM 10 MG/1
20 TABLET, FILM COATED ORAL NIGHTLY
Status: DISCONTINUED | OUTPATIENT
Start: 2025-01-01 | End: 2025-01-01

## 2025-01-01 RX ORDER — LORAZEPAM 2 MG/ML
INJECTION INTRAMUSCULAR
Status: DISCONTINUED
Start: 2025-01-01 | End: 2025-01-01 | Stop reason: WASHOUT

## 2025-01-01 RX ORDER — PROPOFOL 10 MG/ML
0-50 INJECTION, EMULSION INTRAVENOUS CONTINUOUS
Status: DISCONTINUED | OUTPATIENT
Start: 2025-01-01 | End: 2025-01-01

## 2025-01-01 RX ORDER — SUCCINYLCHOLINE CHLORIDE 20 MG/ML
INJECTION INTRAMUSCULAR; INTRAVENOUS
Status: DISCONTINUED | OUTPATIENT
Start: 2025-01-01 | End: 2025-01-01

## 2025-01-01 RX ORDER — MONTELUKAST SODIUM 10 MG/1
10 TABLET ORAL NIGHTLY
Status: DISCONTINUED | OUTPATIENT
Start: 2025-01-01 | End: 2025-01-01

## 2025-01-01 RX ORDER — LEVETIRACETAM 15 MG/ML
1500 INJECTION INTRAVASCULAR 2 TIMES DAILY
Status: DISCONTINUED | OUTPATIENT
Start: 2025-01-01 | End: 2025-01-01 | Stop reason: HOSPADM

## 2025-01-01 RX ORDER — NOREPINEPHRINE BITARTRATE/D5W 8 MG/250ML
PLASTIC BAG, INJECTION (ML) INTRAVENOUS
Status: COMPLETED
Start: 2025-01-01 | End: 2025-01-01

## 2025-01-01 RX ORDER — SODIUM CHLORIDE 9 MG/ML
INJECTION, SOLUTION INTRAVENOUS
Status: DISCONTINUED | OUTPATIENT
Start: 2025-01-01 | End: 2025-01-01 | Stop reason: HOSPADM

## 2025-01-01 RX ORDER — MIDAZOLAM HYDROCHLORIDE 2 MG/2ML
1 INJECTION, SOLUTION INTRAMUSCULAR; INTRAVENOUS ONCE
Status: COMPLETED | OUTPATIENT
Start: 2025-01-01 | End: 2025-01-01

## 2025-01-01 RX ORDER — PROMETHAZINE HYDROCHLORIDE 12.5 MG/1
12.5 TABLET ORAL EVERY 6 HOURS PRN
Status: DISCONTINUED | OUTPATIENT
Start: 2025-01-01 | End: 2025-01-01 | Stop reason: HOSPADM

## 2025-01-01 RX ORDER — NOREPINEPHRINE BITARTRATE/D5W 8 MG/250ML
0-3 PLASTIC BAG, INJECTION (ML) INTRAVENOUS CONTINUOUS
Status: DISCONTINUED | OUTPATIENT
Start: 2025-01-01 | End: 2025-01-01 | Stop reason: HOSPADM

## 2025-01-01 RX ORDER — BUSPIRONE HYDROCHLORIDE 5 MG/1
10 TABLET ORAL 3 TIMES DAILY
Status: DISCONTINUED | OUTPATIENT
Start: 2025-01-01 | End: 2025-01-01

## 2025-01-01 RX ORDER — CEFEPIME HYDROCHLORIDE 2 G/1
2 INJECTION, POWDER, FOR SOLUTION INTRAVENOUS
Status: DISCONTINUED | OUTPATIENT
Start: 2025-01-01 | End: 2025-01-01 | Stop reason: HOSPADM

## 2025-01-01 RX ORDER — GABAPENTIN 100 MG/1
200 CAPSULE ORAL 3 TIMES DAILY
Status: DISCONTINUED | OUTPATIENT
Start: 2025-01-01 | End: 2025-01-01

## 2025-01-01 RX ORDER — PANTOPRAZOLE SODIUM 40 MG/10ML
40 INJECTION, POWDER, LYOPHILIZED, FOR SOLUTION INTRAVENOUS DAILY
Status: DISCONTINUED | OUTPATIENT
Start: 2025-01-01 | End: 2025-01-01 | Stop reason: HOSPADM

## 2025-01-01 RX ORDER — INSULIN ASPART 100 [IU]/ML
0-5 INJECTION, SOLUTION INTRAVENOUS; SUBCUTANEOUS
Status: DISCONTINUED | OUTPATIENT
Start: 2025-01-01 | End: 2025-01-01

## 2025-01-01 RX ORDER — CITALOPRAM 20 MG/1
20 TABLET, FILM COATED ORAL DAILY
Status: DISCONTINUED | OUTPATIENT
Start: 2025-01-01 | End: 2025-01-01

## 2025-01-01 RX ORDER — POTASSIUM CHLORIDE 14.9 MG/ML
60 INJECTION INTRAVENOUS ONCE
Status: COMPLETED | OUTPATIENT
Start: 2025-01-01 | End: 2025-01-01

## 2025-01-01 RX ORDER — PROPOFOL 10 MG/ML
0-50 INJECTION, EMULSION INTRAVENOUS CONTINUOUS
Status: DISCONTINUED | OUTPATIENT
Start: 2025-01-01 | End: 2025-01-01 | Stop reason: HOSPADM

## 2025-01-01 RX ORDER — MORPHINE SULFATE 4 MG/ML
4 INJECTION, SOLUTION INTRAMUSCULAR; INTRAVENOUS
Refills: 0 | Status: DISCONTINUED | OUTPATIENT
Start: 2025-01-01 | End: 2025-01-01

## 2025-01-01 RX ORDER — NOREPINEPHRINE BITARTRATE/D5W 4MG/250ML
0-1 PLASTIC BAG, INJECTION (ML) INTRAVENOUS CONTINUOUS
Status: DISCONTINUED | OUTPATIENT
Start: 2025-01-01 | End: 2025-01-01

## 2025-01-01 RX ORDER — SUCCINYLCHOLINE CHLORIDE 20 MG/ML
100 INJECTION INTRAMUSCULAR; INTRAVENOUS ONCE
Status: COMPLETED | OUTPATIENT
Start: 2025-01-01 | End: 2025-01-01

## 2025-01-01 RX ORDER — IBUPROFEN 200 MG
24 TABLET ORAL
Status: DISCONTINUED | OUTPATIENT
Start: 2025-01-01 | End: 2025-01-01

## 2025-01-01 RX ORDER — ETOMIDATE 2 MG/ML
INJECTION INTRAVENOUS CODE/TRAUMA/SEDATION MEDICATION
Status: DISCONTINUED | OUTPATIENT
Start: 2025-01-01 | End: 2025-01-01

## 2025-01-01 RX ORDER — FENTANYL CITRATE-0.9 % NACL/PF 10 MCG/ML
0-250 PLASTIC BAG, INJECTION (ML) INTRAVENOUS CONTINUOUS
Refills: 0 | Status: DISCONTINUED | OUTPATIENT
Start: 2025-01-01 | End: 2025-01-01 | Stop reason: HOSPADM

## 2025-01-01 RX ORDER — ACETAMINOPHEN 10 MG/ML
1000 INJECTION, SOLUTION INTRAVENOUS ONCE
Status: COMPLETED | OUTPATIENT
Start: 2025-01-01 | End: 2025-01-01

## 2025-01-01 RX ORDER — PROPOFOL 10 MG/ML
INJECTION, EMULSION INTRAVENOUS
Status: COMPLETED
Start: 2025-01-01 | End: 2025-01-01

## 2025-01-01 RX ORDER — MORPHINE SULFATE 10 MG/ML
10 INJECTION INTRAMUSCULAR; INTRAVENOUS; SUBCUTANEOUS
Status: DISCONTINUED | OUTPATIENT
Start: 2025-01-01 | End: 2025-01-01 | Stop reason: HOSPADM

## 2025-01-01 RX ORDER — LOSARTAN POTASSIUM 50 MG/1
100 TABLET ORAL DAILY
Status: DISCONTINUED | OUTPATIENT
Start: 2025-01-01 | End: 2025-01-01

## 2025-01-01 RX ORDER — PANTOPRAZOLE SODIUM 40 MG/1
40 TABLET, DELAYED RELEASE ORAL DAILY
Status: DISCONTINUED | OUTPATIENT
Start: 2025-01-01 | End: 2025-01-01

## 2025-01-01 RX ORDER — ACETAMINOPHEN 500 MG
1000 TABLET ORAL EVERY 6 HOURS PRN
Status: DISCONTINUED | OUTPATIENT
Start: 2025-01-01 | End: 2025-01-01 | Stop reason: HOSPADM

## 2025-01-01 RX ORDER — MIDAZOLAM HYDROCHLORIDE 2 MG/2ML
4 INJECTION, SOLUTION INTRAMUSCULAR; INTRAVENOUS ONCE
Status: DISCONTINUED | OUTPATIENT
Start: 2025-01-01 | End: 2025-01-01 | Stop reason: HOSPADM

## 2025-01-01 RX ORDER — FENTANYL CITRATE 50 UG/ML
50 INJECTION, SOLUTION INTRAMUSCULAR; INTRAVENOUS
Refills: 0 | Status: DISCONTINUED | OUTPATIENT
Start: 2025-01-01 | End: 2025-01-01

## 2025-01-01 RX ORDER — INSULIN ASPART 100 [IU]/ML
0-10 INJECTION, SOLUTION INTRAVENOUS; SUBCUTANEOUS EVERY 6 HOURS PRN
Status: DISCONTINUED | OUTPATIENT
Start: 2025-01-01 | End: 2025-01-01 | Stop reason: HOSPADM

## 2025-01-01 RX ORDER — PROPOFOL 10 MG/ML
VIAL (ML) INTRAVENOUS
Status: DISCONTINUED | OUTPATIENT
Start: 2025-01-01 | End: 2025-01-01

## 2025-01-01 RX ORDER — GLUCAGON 1 MG
1 KIT INJECTION
Status: DISCONTINUED | OUTPATIENT
Start: 2025-01-01 | End: 2025-01-01 | Stop reason: HOSPADM

## 2025-01-01 RX ORDER — ENOXAPARIN SODIUM 100 MG/ML
40 INJECTION SUBCUTANEOUS EVERY 24 HOURS
Status: DISCONTINUED | OUTPATIENT
Start: 2025-01-01 | End: 2025-01-01 | Stop reason: HOSPADM

## 2025-01-01 RX ORDER — CEFTRIAXONE 2 G/1
2 INJECTION, POWDER, FOR SOLUTION INTRAMUSCULAR; INTRAVENOUS
Status: DISCONTINUED | OUTPATIENT
Start: 2025-01-01 | End: 2025-01-01

## 2025-01-01 RX ORDER — LOSARTAN POTASSIUM 50 MG/1
100 TABLET ORAL DAILY
Status: DISCONTINUED | OUTPATIENT
Start: 2025-01-01 | End: 2025-01-01 | Stop reason: HOSPADM

## 2025-01-01 RX ORDER — LEVETIRACETAM 10 MG/ML
INJECTION INTRAVASCULAR
Status: COMPLETED
Start: 2025-01-01 | End: 2025-01-01

## 2025-01-01 RX ORDER — SODIUM CHLORIDE 0.9 % (FLUSH) 0.9 %
10 SYRINGE (ML) INJECTION EVERY 12 HOURS PRN
Status: DISCONTINUED | OUTPATIENT
Start: 2025-01-01 | End: 2025-01-01 | Stop reason: HOSPADM

## 2025-01-01 RX ORDER — INSULIN ASPART 100 [IU]/ML
0-5 INJECTION, SOLUTION INTRAVENOUS; SUBCUTANEOUS EVERY 6 HOURS PRN
Status: DISCONTINUED | OUTPATIENT
Start: 2025-01-01 | End: 2025-01-01

## 2025-01-01 RX ORDER — ASPIRIN 81 MG/1
81 TABLET ORAL DAILY
Status: DISCONTINUED | OUTPATIENT
Start: 2025-01-01 | End: 2025-01-01

## 2025-01-01 RX ORDER — FOSPHENYTOIN SODIUM 50 MG/ML
100 INJECTION, SOLUTION INTRAMUSCULAR; INTRAVENOUS 3 TIMES DAILY
Status: DISCONTINUED | OUTPATIENT
Start: 2025-01-01 | End: 2025-01-01

## 2025-01-01 RX ORDER — MIDODRINE HYDROCHLORIDE 2.5 MG/1
2.5 TABLET ORAL 2 TIMES DAILY WITH MEALS
Status: DISCONTINUED | OUTPATIENT
Start: 2025-01-01 | End: 2025-01-01

## 2025-01-01 RX ADMIN — PROPOFOL 50 MCG/KG/MIN: 10 INJECTION, EMULSION INTRAVENOUS at 05:04

## 2025-01-01 RX ADMIN — VANCOMYCIN HYDROCHLORIDE 1250 MG: 1.25 INJECTION, POWDER, LYOPHILIZED, FOR SOLUTION INTRAVENOUS at 05:04

## 2025-01-01 RX ADMIN — LEVETIRACETAM 1500 MG: 15 INJECTION INTRAVENOUS at 08:04

## 2025-01-01 RX ADMIN — MIDAZOLAM HYDROCHLORIDE 0.5 MG/HR: 1 INJECTION, SOLUTION INTRAVENOUS at 11:04

## 2025-01-01 RX ADMIN — Medication 250 MCG/HR: at 03:04

## 2025-01-01 RX ADMIN — MUPIROCIN: 20 OINTMENT TOPICAL at 08:04

## 2025-01-01 RX ADMIN — LORAZEPAM 2 MG: 2 INJECTION INTRAMUSCULAR; INTRAVENOUS at 01:04

## 2025-01-01 RX ADMIN — FENTANYL CITRATE 50 MCG: 50 INJECTION, SOLUTION INTRAMUSCULAR; INTRAVENOUS at 04:04

## 2025-01-01 RX ADMIN — INSULIN ASPART 4 UNITS: 100 INJECTION, SOLUTION INTRAVENOUS; SUBCUTANEOUS at 06:04

## 2025-01-01 RX ADMIN — SODIUM CHLORIDE, POTASSIUM CHLORIDE, SODIUM LACTATE AND CALCIUM CHLORIDE 1000 ML: 600; 310; 30; 20 INJECTION, SOLUTION INTRAVENOUS at 04:04

## 2025-01-01 RX ADMIN — PROPOFOL 50 MCG/KG/MIN: 10 INJECTION, EMULSION INTRAVENOUS at 02:04

## 2025-01-01 RX ADMIN — PROPOFOL 50 MCG/KG/MIN: 10 INJECTION, EMULSION INTRAVENOUS at 08:04

## 2025-01-01 RX ADMIN — SODIUM CHLORIDE, POTASSIUM CHLORIDE, SODIUM LACTATE AND CALCIUM CHLORIDE 1000 ML: 600; 310; 30; 20 INJECTION, SOLUTION INTRAVENOUS at 11:04

## 2025-01-01 RX ADMIN — ASPIRIN 81 MG CHEWABLE TABLET 81 MG: 81 TABLET CHEWABLE at 08:04

## 2025-01-01 RX ADMIN — PIPERACILLIN SODIUM AND TAZOBACTAM SODIUM 4.5 G: 4; .5 INJECTION, POWDER, LYOPHILIZED, FOR SOLUTION INTRAVENOUS at 05:04

## 2025-01-01 RX ADMIN — MIDAZOLAM HYDROCHLORIDE 5 MG/HR: 1 INJECTION, SOLUTION INTRAVENOUS at 03:04

## 2025-01-01 RX ADMIN — MONTELUKAST 10 MG: 10 TABLET, FILM COATED ORAL at 08:04

## 2025-01-01 RX ADMIN — ACYCLOVIR SODIUM 620 MG: 50 INJECTION, SOLUTION INTRAVENOUS at 08:04

## 2025-01-01 RX ADMIN — ACYCLOVIR SODIUM 620 MG: 50 INJECTION, SOLUTION INTRAVENOUS at 12:04

## 2025-01-01 RX ADMIN — ACYCLOVIR SODIUM 620 MG: 50 INJECTION, SOLUTION INTRAVENOUS at 09:04

## 2025-01-01 RX ADMIN — ACYCLOVIR SODIUM 620 MG: 50 INJECTION, SOLUTION INTRAVENOUS at 04:04

## 2025-01-01 RX ADMIN — Medication 250 MCG/HR: at 05:04

## 2025-01-01 RX ADMIN — FENTANYL CITRATE 50 MCG: 50 INJECTION, SOLUTION INTRAMUSCULAR; INTRAVENOUS at 02:04

## 2025-01-01 RX ADMIN — PROPOFOL 40 MCG/KG/MIN: 10 INJECTION, EMULSION INTRAVENOUS at 10:04

## 2025-01-01 RX ADMIN — PROPOFOL 50 MCG/KG/MIN: 10 INJECTION, EMULSION INTRAVENOUS at 04:04

## 2025-01-01 RX ADMIN — INSULIN ASPART 2 UNITS: 100 INJECTION, SOLUTION INTRAVENOUS; SUBCUTANEOUS at 05:04

## 2025-01-01 RX ADMIN — PROMETHAZINE HYDROCHLORIDE 12.5 MG: 12.5 TABLET ORAL at 01:04

## 2025-01-01 RX ADMIN — ACYCLOVIR SODIUM 620 MG: 50 INJECTION, SOLUTION INTRAVENOUS at 01:04

## 2025-01-01 RX ADMIN — PROPOFOL 50 MCG/KG/MIN: 10 INJECTION, EMULSION INTRAVENOUS at 12:04

## 2025-01-01 RX ADMIN — SUCCINYLCHOLINE CHLORIDE 100 MG: 20 INJECTION, SOLUTION INTRAMUSCULAR; INTRAVENOUS at 09:04

## 2025-01-01 RX ADMIN — ACYCLOVIR SODIUM 620 MG: 50 INJECTION, SOLUTION INTRAVENOUS at 05:04

## 2025-01-01 RX ADMIN — MIDAZOLAM HYDROCHLORIDE 5 MG/HR: 1 INJECTION, SOLUTION INTRAVENOUS at 02:04

## 2025-01-01 RX ADMIN — SODIUM CHLORIDE, POTASSIUM CHLORIDE, SODIUM LACTATE AND CALCIUM CHLORIDE 1000 ML: 600; 310; 30; 20 INJECTION, SOLUTION INTRAVENOUS at 01:04

## 2025-01-01 RX ADMIN — PHENOBARBITAL SODIUM 100.1 MG: 130 INJECTION INTRAMUSCULAR; INTRAVENOUS at 06:04

## 2025-01-01 RX ADMIN — PROPOFOL 50 MCG/KG/MIN: 10 INJECTION, EMULSION INTRAVENOUS at 03:04

## 2025-01-01 RX ADMIN — ACETAMINOPHEN 1000 MG: 500 TABLET ORAL at 03:04

## 2025-01-01 RX ADMIN — ACYCLOVIR SODIUM 620 MG: 50 INJECTION, SOLUTION INTRAVENOUS at 10:04

## 2025-01-01 RX ADMIN — SODIUM CHLORIDE: 9 INJECTION, SOLUTION INTRAVENOUS at 11:04

## 2025-01-01 RX ADMIN — LOSARTAN POTASSIUM 100 MG: 50 TABLET, FILM COATED ORAL at 08:04

## 2025-01-01 RX ADMIN — LORAZEPAM 2 MG: 2 INJECTION INTRAMUSCULAR; INTRAVENOUS at 11:04

## 2025-01-01 RX ADMIN — PANTOPRAZOLE SODIUM 40 MG: 40 INJECTION, POWDER, FOR SOLUTION INTRAVENOUS at 08:04

## 2025-01-01 RX ADMIN — METFORMIN HYDROCHLORIDE 500 MG: 500 TABLET, FILM COATED ORAL at 06:04

## 2025-01-01 RX ADMIN — PROPOFOL 50 MCG/KG/MIN: 10 INJECTION, EMULSION INTRAVENOUS at 06:04

## 2025-01-01 RX ADMIN — ACETAMINOPHEN 1000 MG: 500 TABLET ORAL at 04:04

## 2025-01-01 RX ADMIN — LORAZEPAM 2 MG: 2 INJECTION INTRAMUSCULAR; INTRAVENOUS at 10:04

## 2025-01-01 RX ADMIN — INSULIN ASPART 4 UNITS: 100 INJECTION, SOLUTION INTRAVENOUS; SUBCUTANEOUS at 05:04

## 2025-01-01 RX ADMIN — INSULIN ASPART 2 UNITS: 100 INJECTION, SOLUTION INTRAVENOUS; SUBCUTANEOUS at 12:04

## 2025-01-01 RX ADMIN — PROPOFOL 1000 MG: 10 INJECTION, EMULSION INTRAVENOUS at 06:04

## 2025-01-01 RX ADMIN — Medication 100 MCG/HR: at 03:04

## 2025-01-01 RX ADMIN — Medication 250 MCG/HR: at 06:04

## 2025-01-01 RX ADMIN — LEVETIRACETAM INJECTION 2000 MG: 10 INJECTION INTRAVENOUS at 02:04

## 2025-01-01 RX ADMIN — MIDAZOLAM HYDROCHLORIDE 5 MG/HR: 1 INJECTION, SOLUTION INTRAVENOUS at 11:04

## 2025-01-01 RX ADMIN — MONTELUKAST 10 MG: 10 TABLET, FILM COATED ORAL at 09:04

## 2025-01-01 RX ADMIN — PIPERACILLIN SODIUM AND TAZOBACTAM SODIUM 4.5 G: 4; .5 INJECTION, POWDER, LYOPHILIZED, FOR SOLUTION INTRAVENOUS at 08:04

## 2025-01-01 RX ADMIN — MAGNESIUM SULFATE IN DEXTROSE 1 G: 10 INJECTION, SOLUTION INTRAVENOUS at 11:04

## 2025-01-01 RX ADMIN — IPRATROPIUM BROMIDE AND ALBUTEROL SULFATE 3 ML: .5; 3 SOLUTION RESPIRATORY (INHALATION) at 08:04

## 2025-01-01 RX ADMIN — PIPERACILLIN SODIUM AND TAZOBACTAM SODIUM 4.5 G: 4; .5 INJECTION, POWDER, LYOPHILIZED, FOR SOLUTION INTRAVENOUS at 06:04

## 2025-01-01 RX ADMIN — ACETAMINOPHEN 1000 MG: 500 TABLET ORAL at 05:04

## 2025-01-01 RX ADMIN — MIDAZOLAM HYDROCHLORIDE 5 MG/HR: 1 INJECTION, SOLUTION INTRAVENOUS at 04:04

## 2025-01-01 RX ADMIN — DEXTROSE MONOHYDRATE 1000 MG PE: 50 INJECTION, SOLUTION INTRAVENOUS at 02:04

## 2025-01-01 RX ADMIN — CEFEPIME 2 G: 2 INJECTION, POWDER, FOR SOLUTION INTRAVENOUS at 03:04

## 2025-01-01 RX ADMIN — LEVETIRACETAM 1500 MG: 15 INJECTION INTRAVENOUS at 09:04

## 2025-01-01 RX ADMIN — GADOBENATE DIMEGLUMINE 15 ML: 529 INJECTION, SOLUTION INTRAVENOUS at 02:04

## 2025-01-01 RX ADMIN — PROPOFOL 35 MCG/KG/MIN: 10 INJECTION, EMULSION INTRAVENOUS at 10:04

## 2025-01-01 RX ADMIN — MUPIROCIN: 20 OINTMENT TOPICAL at 09:04

## 2025-01-01 RX ADMIN — FENTANYL CITRATE 50 MCG: 50 INJECTION, SOLUTION INTRAMUSCULAR; INTRAVENOUS at 11:04

## 2025-01-01 RX ADMIN — PROPOFOL 50 MCG/KG/MIN: 10 INJECTION, EMULSION INTRAVENOUS at 01:04

## 2025-01-01 RX ADMIN — MIDAZOLAM HYDROCHLORIDE 5 MG/HR: 1 INJECTION, SOLUTION INTRAVENOUS at 05:04

## 2025-01-01 RX ADMIN — PROPOFOL 45 MCG/KG/MIN: 10 INJECTION, EMULSION INTRAVENOUS at 09:04

## 2025-01-01 RX ADMIN — CITALOPRAM HYDROBROMIDE 20 MG: 20 TABLET ORAL at 12:04

## 2025-01-01 RX ADMIN — VANCOMYCIN HYDROCHLORIDE 1250 MG: 1.25 INJECTION, POWDER, LYOPHILIZED, FOR SOLUTION INTRAVENOUS at 11:04

## 2025-01-01 RX ADMIN — ENOXAPARIN SODIUM 40 MG: 40 INJECTION SUBCUTANEOUS at 05:04

## 2025-01-01 RX ADMIN — ONDANSETRON 4 MG: 2 INJECTION INTRAMUSCULAR; INTRAVENOUS at 01:04

## 2025-01-01 RX ADMIN — MIDAZOLAM HYDROCHLORIDE 5 MG/HR: 1 INJECTION, SOLUTION INTRAVENOUS at 08:04

## 2025-01-01 RX ADMIN — VANCOMYCIN HYDROCHLORIDE 1250 MG: 1.25 INJECTION, POWDER, LYOPHILIZED, FOR SOLUTION INTRAVENOUS at 12:04

## 2025-01-01 RX ADMIN — PHENOBARBITAL SODIUM 100.1 MG: 130 INJECTION INTRAMUSCULAR at 08:04

## 2025-01-01 RX ADMIN — NOREPINEPHRINE BITARTRATE 0.02 MCG/KG/MIN: 8 INJECTION, SOLUTION INTRAVENOUS at 10:04

## 2025-01-01 RX ADMIN — PROPOFOL 50 MCG/KG/MIN: 10 INJECTION, EMULSION INTRAVENOUS at 09:04

## 2025-01-01 RX ADMIN — PROPOFOL 50 MCG/KG/MIN: 10 INJECTION, EMULSION INTRAVENOUS at 11:04

## 2025-01-01 RX ADMIN — INSULIN ASPART 4 UNITS: 100 INJECTION, SOLUTION INTRAVENOUS; SUBCUTANEOUS at 08:04

## 2025-01-01 RX ADMIN — ACETAMINOPHEN 1000 MG: 500 TABLET ORAL at 02:04

## 2025-01-01 RX ADMIN — MIDAZOLAM HYDROCHLORIDE 5 MG/HR: 1 INJECTION, SOLUTION INTRAVENOUS at 10:04

## 2025-01-01 RX ADMIN — PIPERACILLIN SODIUM AND TAZOBACTAM SODIUM 4.5 G: 4; .5 INJECTION, POWDER, LYOPHILIZED, FOR SOLUTION INTRAVENOUS at 12:04

## 2025-01-01 RX ADMIN — PROPOFOL 45 MCG/KG/MIN: 10 INJECTION, EMULSION INTRAVENOUS at 11:04

## 2025-01-01 RX ADMIN — PROPOFOL 45 MCG/KG/MIN: 10 INJECTION, EMULSION INTRAVENOUS at 06:04

## 2025-01-01 RX ADMIN — LOSARTAN POTASSIUM 100 MG: 50 TABLET, FILM COATED ORAL at 09:04

## 2025-01-01 RX ADMIN — PROPOFOL 10 MCG/KG/MIN: 10 INJECTION, EMULSION INTRAVENOUS at 10:04

## 2025-01-01 RX ADMIN — FOSPHENYTOIN SODIUM 100 MG PE: 50 INJECTION, SOLUTION INTRAMUSCULAR; INTRAVENOUS at 08:04

## 2025-01-01 RX ADMIN — ACETAMINOPHEN 1000 MG: 500 TABLET ORAL at 10:04

## 2025-01-01 RX ADMIN — SODIUM CHLORIDE, POTASSIUM CHLORIDE, SODIUM LACTATE AND CALCIUM CHLORIDE 1000 ML: 600; 310; 30; 20 INJECTION, SOLUTION INTRAVENOUS at 02:04

## 2025-01-01 RX ADMIN — PROPOFOL 45 MCG/KG/MIN: 10 INJECTION, EMULSION INTRAVENOUS at 07:04

## 2025-01-01 RX ADMIN — Medication 50 MCG/HR: at 01:04

## 2025-01-01 RX ADMIN — ETOMIDATE 20 MG: 2 INJECTION INTRAVENOUS at 02:04

## 2025-01-01 RX ADMIN — INSULIN ASPART 2 UNITS: 100 INJECTION, SOLUTION INTRAVENOUS; SUBCUTANEOUS at 10:04

## 2025-01-01 RX ADMIN — PROPOFOL 1000 MG: 10 INJECTION, EMULSION INTRAVENOUS at 03:04

## 2025-01-01 RX ADMIN — BARIUM SULFATE 5 ML: 0.81 POWDER, FOR SUSPENSION ORAL at 01:04

## 2025-01-01 RX ADMIN — CEFEPIME 2 G: 2 INJECTION, POWDER, FOR SOLUTION INTRAVENOUS at 09:04

## 2025-01-01 RX ADMIN — PANTOPRAZOLE SODIUM 40 MG: 40 INJECTION, POWDER, FOR SOLUTION INTRAVENOUS at 09:04

## 2025-01-01 RX ADMIN — PROPOFOL 40 MCG/KG/MIN: 10 INJECTION, EMULSION INTRAVENOUS at 11:04

## 2025-01-01 RX ADMIN — INSULIN ASPART 2 UNITS: 100 INJECTION, SOLUTION INTRAVENOUS; SUBCUTANEOUS at 04:04

## 2025-01-01 RX ADMIN — INSULIN ASPART 3 UNITS: 100 INJECTION, SOLUTION INTRAVENOUS; SUBCUTANEOUS at 11:04

## 2025-01-01 RX ADMIN — INSULIN ASPART 8 UNITS: 100 INJECTION, SOLUTION INTRAVENOUS; SUBCUTANEOUS at 10:04

## 2025-01-01 RX ADMIN — ACETAMINOPHEN 1000 MG: 500 TABLET ORAL at 09:04

## 2025-01-01 RX ADMIN — GABAPENTIN 200 MG: 100 CAPSULE ORAL at 09:04

## 2025-01-01 RX ADMIN — INSULIN GLARGINE 8 UNITS: 100 INJECTION, SOLUTION SUBCUTANEOUS at 11:04

## 2025-01-01 RX ADMIN — LORAZEPAM 2 MG: 2 INJECTION INTRAMUSCULAR; INTRAVENOUS at 07:04

## 2025-01-01 RX ADMIN — ENOXAPARIN SODIUM 40 MG: 40 INJECTION SUBCUTANEOUS at 04:04

## 2025-01-01 RX ADMIN — MIDAZOLAM HYDROCHLORIDE 1 MG/HR: 1 INJECTION, SOLUTION INTRAVENOUS at 01:04

## 2025-01-01 RX ADMIN — INSULIN ASPART 6 UNITS: 100 INJECTION, SOLUTION INTRAVENOUS; SUBCUTANEOUS at 08:04

## 2025-01-01 RX ADMIN — ACETAMINOPHEN 1000 MG: 10 INJECTION, SOLUTION INTRAVENOUS at 05:04

## 2025-01-01 RX ADMIN — PIPERACILLIN SODIUM AND TAZOBACTAM SODIUM 4.5 G: 4; .5 INJECTION, POWDER, LYOPHILIZED, FOR SOLUTION INTRAVENOUS at 09:04

## 2025-01-01 RX ADMIN — FENTANYL CITRATE 50 MCG: 50 INJECTION, SOLUTION INTRAMUSCULAR; INTRAVENOUS at 03:04

## 2025-01-01 RX ADMIN — FENTANYL CITRATE 50 MCG: 50 INJECTION, SOLUTION INTRAMUSCULAR; INTRAVENOUS at 05:04

## 2025-01-01 RX ADMIN — PROPOFOL 45 MCG/KG/MIN: 10 INJECTION, EMULSION INTRAVENOUS at 03:04

## 2025-01-01 RX ADMIN — MIDAZOLAM HYDROCHLORIDE 5 MG/HR: 1 INJECTION, SOLUTION INTRAVENOUS at 01:04

## 2025-01-01 RX ADMIN — GABAPENTIN 200 MG: 100 CAPSULE ORAL at 04:04

## 2025-01-01 RX ADMIN — ACETAMINOPHEN 1000 MG: 500 TABLET ORAL at 01:04

## 2025-01-01 RX ADMIN — MIDAZOLAM HYDROCHLORIDE 4 MG/HR: 1 INJECTION, SOLUTION INTRAVENOUS at 09:04

## 2025-01-01 RX ADMIN — CEFTRIAXONE SODIUM 2 G: 2 INJECTION, POWDER, FOR SOLUTION INTRAMUSCULAR; INTRAVENOUS at 08:04

## 2025-01-01 RX ADMIN — SUCCINYLCHOLINE CHLORIDE 100 MG: 20 INJECTION, SOLUTION INTRAMUSCULAR; INTRAVENOUS; PARENTERAL at 09:04

## 2025-01-01 RX ADMIN — IOHEXOL 100 ML: 350 INJECTION, SOLUTION INTRAVENOUS at 11:04

## 2025-01-01 RX ADMIN — FENTANYL CITRATE 50 MCG: 50 INJECTION, SOLUTION INTRAMUSCULAR; INTRAVENOUS at 09:04

## 2025-01-01 RX ADMIN — Medication 250 MCG/HR: at 01:04

## 2025-01-01 RX ADMIN — PROPOFOL 35 MCG/KG/MIN: 10 INJECTION, EMULSION INTRAVENOUS at 08:04

## 2025-01-01 RX ADMIN — INSULIN GLARGINE 8 UNITS: 100 INJECTION, SOLUTION SUBCUTANEOUS at 08:04

## 2025-01-01 RX ADMIN — MIDAZOLAM HYDROCHLORIDE 1 MG: 1 INJECTION, SOLUTION INTRAMUSCULAR; INTRAVENOUS at 06:04

## 2025-01-01 RX ADMIN — MIDAZOLAM HYDROCHLORIDE 5 MG/HR: 1 INJECTION, SOLUTION INTRAVENOUS at 09:04

## 2025-01-01 RX ADMIN — VANCOMYCIN HYDROCHLORIDE 750 MG: 750 INJECTION, POWDER, LYOPHILIZED, FOR SOLUTION INTRAVENOUS at 07:04

## 2025-01-01 RX ADMIN — Medication 250 MCG/HR: at 08:04

## 2025-01-01 RX ADMIN — FENTANYL CITRATE 50 MCG: 50 INJECTION, SOLUTION INTRAMUSCULAR; INTRAVENOUS at 12:04

## 2025-01-01 RX ADMIN — ACETAMINOPHEN 1000 MG: 500 TABLET ORAL at 11:04

## 2025-01-01 RX ADMIN — LORAZEPAM 2 MG: 2 INJECTION INTRAMUSCULAR; INTRAVENOUS at 06:04

## 2025-01-01 RX ADMIN — MUPIROCIN: 20 OINTMENT TOPICAL at 10:04

## 2025-01-01 RX ADMIN — MORPHINE SULFATE 10 MG: 10 INJECTION INTRAVENOUS at 03:04

## 2025-01-01 RX ADMIN — INSULIN ASPART 4 UNITS: 100 INJECTION, SOLUTION INTRAVENOUS; SUBCUTANEOUS at 11:04

## 2025-01-01 RX ADMIN — VANCOMYCIN HYDROCHLORIDE 1000 MG: 1 INJECTION, POWDER, LYOPHILIZED, FOR SOLUTION INTRAVENOUS at 05:04

## 2025-01-01 RX ADMIN — PROPOFOL 40 MCG/KG/MIN: 10 INJECTION, EMULSION INTRAVENOUS at 09:04

## 2025-01-01 RX ADMIN — LOSARTAN POTASSIUM 100 MG: 50 TABLET, FILM COATED ORAL at 12:04

## 2025-01-01 RX ADMIN — HYDRALAZINE HYDROCHLORIDE 10 MG: 20 INJECTION INTRAMUSCULAR; INTRAVENOUS at 04:04

## 2025-01-01 RX ADMIN — ROCURONIUM BROMIDE 100 MG: 10 INJECTION, SOLUTION INTRAVENOUS at 02:04

## 2025-01-01 RX ADMIN — INSULIN GLARGINE 8 UNITS: 100 INJECTION, SOLUTION SUBCUTANEOUS at 09:04

## 2025-01-01 RX ADMIN — LORAZEPAM 2 MG: 2 INJECTION INTRAMUSCULAR; INTRAVENOUS at 02:04

## 2025-01-01 RX ADMIN — Medication 250 MCG/HR: at 09:04

## 2025-01-01 RX ADMIN — INSULIN ASPART 4 UNITS: 100 INJECTION, SOLUTION INTRAVENOUS; SUBCUTANEOUS at 02:04

## 2025-01-01 RX ADMIN — BUSPIRONE HYDROCHLORIDE 10 MG: 5 TABLET ORAL at 04:04

## 2025-01-01 RX ADMIN — POTASSIUM CHLORIDE 60 MEQ: 14.9 INJECTION, SOLUTION INTRAVENOUS at 12:04

## 2025-01-01 RX ADMIN — CEFEPIME 2 G: 2 INJECTION, POWDER, FOR SOLUTION INTRAVENOUS at 05:04

## 2025-01-01 RX ADMIN — LEVETIRACETAM 500 MG: 100 INJECTION, SOLUTION INTRAVENOUS at 09:04

## 2025-01-01 RX ADMIN — LEVETIRACETAM 1500 MG: 15 INJECTION INTRAVENOUS at 02:04

## 2025-01-01 RX ADMIN — VANCOMYCIN HYDROCHLORIDE 1000 MG: 1 INJECTION, POWDER, LYOPHILIZED, FOR SOLUTION INTRAVENOUS at 10:04

## 2025-01-01 RX ADMIN — LEVETIRACETAM 1500 MG: 15 INJECTION INTRAVENOUS at 10:04

## 2025-01-01 RX ADMIN — ACETAMINOPHEN 1000 MG: 10 INJECTION, SOLUTION INTRAVENOUS at 03:04

## 2025-01-01 RX ADMIN — BUSPIRONE HYDROCHLORIDE 10 MG: 5 TABLET ORAL at 09:04

## 2025-01-01 RX ADMIN — PIPERACILLIN SODIUM AND TAZOBACTAM SODIUM 4.5 G: 4; .5 INJECTION, POWDER, LYOPHILIZED, FOR SOLUTION INTRAVENOUS at 04:04

## 2025-01-01 RX ADMIN — PROPOFOL 50 MCG/KG/MIN: 10 INJECTION, EMULSION INTRAVENOUS at 10:04

## 2025-01-01 RX ADMIN — INSULIN ASPART 2 UNITS: 100 INJECTION, SOLUTION INTRAVENOUS; SUBCUTANEOUS at 01:04

## 2025-01-01 RX ADMIN — Medication 250 MCG/HR: at 04:04

## 2025-01-01 RX ADMIN — PROPOFOL 45 MCG/KG/MIN: 10 INJECTION, EMULSION INTRAVENOUS at 01:04

## 2025-01-01 RX ADMIN — LORAZEPAM 2 MG: 2 INJECTION INTRAMUSCULAR; INTRAVENOUS at 03:04

## 2025-01-01 RX ADMIN — PROPOFOL 40 MCG/KG/MIN: 10 INJECTION, EMULSION INTRAVENOUS at 01:04

## 2025-01-01 RX ADMIN — PROPOFOL 40 MCG/KG/MIN: 10 INJECTION, EMULSION INTRAVENOUS at 04:04

## 2025-01-01 RX ADMIN — MIDAZOLAM HYDROCHLORIDE 5 MG/HR: 1 INJECTION, SOLUTION INTRAVENOUS at 12:04

## 2025-01-01 RX ADMIN — VANCOMYCIN HYDROCHLORIDE 1250 MG: 1.25 INJECTION, POWDER, LYOPHILIZED, FOR SOLUTION INTRAVENOUS at 02:04

## 2025-01-01 RX ADMIN — ACETAMINOPHEN 1000 MG: 10 INJECTION, SOLUTION INTRAVENOUS at 11:04

## 2025-01-01 RX ADMIN — NOREPINEPHRINE BITARTRATE 0.02 MCG/KG/MIN: 8 INJECTION, SOLUTION INTRAVENOUS at 04:04

## 2025-01-01 RX ADMIN — ASPIRIN 81 MG: 81 TABLET, COATED ORAL at 12:04

## 2025-01-01 RX ADMIN — PIOGLITAZONE 15 MG: 15 TABLET ORAL at 12:04

## 2025-01-01 RX ADMIN — PIPERACILLIN SODIUM AND TAZOBACTAM SODIUM 4.5 G: 4; .5 INJECTION, POWDER, LYOPHILIZED, FOR SOLUTION INTRAVENOUS at 01:04

## 2025-01-01 RX ADMIN — ASPIRIN 81 MG CHEWABLE TABLET 81 MG: 81 TABLET CHEWABLE at 09:04

## 2025-01-01 RX ADMIN — INSULIN ASPART 4 UNITS: 100 INJECTION, SOLUTION INTRAVENOUS; SUBCUTANEOUS at 01:04

## 2025-01-01 RX ADMIN — PROPOFOL 40 MCG/KG/MIN: 10 INJECTION, EMULSION INTRAVENOUS at 02:04

## 2025-01-01 RX ADMIN — ATORVASTATIN CALCIUM 20 MG: 10 TABLET, FILM COATED ORAL at 09:04

## 2025-01-01 RX ADMIN — MIDAZOLAM HYDROCHLORIDE 5 MG/HR: 1 INJECTION, SOLUTION INTRAVENOUS at 06:04

## 2025-01-01 RX ADMIN — INSULIN ASPART 6 UNITS: 100 INJECTION, SOLUTION INTRAVENOUS; SUBCUTANEOUS at 02:04

## 2025-01-01 RX ADMIN — ACETAMINOPHEN 1000 MG: 500 TABLET ORAL at 07:04

## 2025-01-01 RX ADMIN — PROPOFOL 40 MCG/KG/MIN: 10 INJECTION, EMULSION INTRAVENOUS at 05:04

## 2025-01-01 RX ADMIN — PROPOFOL 25 MCG/KG/MIN: 10 INJECTION, EMULSION INTRAVENOUS at 05:04

## 2025-01-01 RX ADMIN — INSULIN ASPART 6 UNITS: 100 INJECTION, SOLUTION INTRAVENOUS; SUBCUTANEOUS at 05:04

## 2025-01-01 RX ADMIN — ACETAMINOPHEN 1000 MG: 500 TABLET ORAL at 08:04

## 2025-01-01 RX ADMIN — INSULIN ASPART 2 UNITS: 100 INJECTION, SOLUTION INTRAVENOUS; SUBCUTANEOUS at 03:04

## 2025-01-01 RX ADMIN — PROPOFOL 35 MCG/KG/MIN: 10 INJECTION, EMULSION INTRAVENOUS at 04:04

## 2025-01-01 RX ADMIN — PROPOFOL 50 MG: 10 INJECTION, EMULSION INTRAVENOUS at 09:04

## 2025-01-01 RX ADMIN — IOHEXOL 90 ML: 350 INJECTION, SOLUTION INTRAVENOUS at 03:04

## 2025-01-01 RX ADMIN — MIDAZOLAM HYDROCHLORIDE 50 MG: 1 INJECTION, SOLUTION INTRAVENOUS at 09:04

## 2025-01-01 RX ADMIN — Medication 250 MCG/HR: at 12:04

## 2025-01-01 RX ADMIN — PROPOFOL 50 MCG/KG/MIN: 10 INJECTION, EMULSION INTRAVENOUS at 07:04

## 2025-01-01 RX ADMIN — NOREPINEPHRINE BITARTRATE 0.04 MCG/KG/MIN: 8 INJECTION, SOLUTION INTRAVENOUS at 10:04

## 2025-01-01 RX ADMIN — LEVETIRACETAM 500 MG: 100 INJECTION, SOLUTION INTRAVENOUS at 08:04

## 2025-01-03 RX ORDER — PANTOPRAZOLE SODIUM 40 MG/1
TABLET, DELAYED RELEASE ORAL
Qty: 90 TABLET | Refills: 3 | Status: SHIPPED | OUTPATIENT
Start: 2025-01-03

## 2025-01-29 ENCOUNTER — OFFICE VISIT (OUTPATIENT)
Dept: NEUROLOGY | Facility: CLINIC | Age: 80
End: 2025-01-29
Payer: MEDICARE

## 2025-01-29 VITALS
DIASTOLIC BLOOD PRESSURE: 90 MMHG | WEIGHT: 190 LBS | BODY MASS INDEX: 28.14 KG/M2 | HEIGHT: 69 IN | SYSTOLIC BLOOD PRESSURE: 132 MMHG

## 2025-01-29 DIAGNOSIS — G47.33 OBSTRUCTIVE SLEEP APNEA: Primary | ICD-10-CM

## 2025-01-29 PROCEDURE — 99999 PR PBB SHADOW E&M-EST. PATIENT-LVL IV: CPT | Mod: PBBFAC,,,

## 2025-01-29 PROCEDURE — 99213 OFFICE O/P EST LOW 20 MIN: CPT | Mod: S$PBB,,,

## 2025-01-29 PROCEDURE — 99214 OFFICE O/P EST MOD 30 MIN: CPT | Mod: PBBFAC

## 2025-01-29 NOTE — PROGRESS NOTES
Neurology    Established RERE Patient   SUBJECTIVE:    Patient ID: River Sheehan Jr. is a 79 y.o. male.    Chief Complaint: RERE f/u    History of Present Illness:     Pt presents to clinic for 10 week RERE f/u. Wears CPAP qhs and is tolerating it well. Sleeps better w CPAP. Sleeps 8 hrs a night and awakens 1 time due to nocturia and is able to go right back to sleep. Awakens refreshed and has some EDS. Naps 30min-1 hr wo CPAP. [facility]. Compliance 12/30/2024-01/28/2025 AHI-9.5  > 4 hrs 97 %     He's been getting a 'sad face' on machine d/t leak. Says he was never sent the cushions that are supposed to be replaced once/month, has been using the same cushions for over 2 months.         1/29/2025    10:33 AM   EPWORTH SLEEPINESS SCALE   Sitting and reading 0   Watching TV 0   Sitting, inactive in a public place (e.g. a theatre or a meeting) 0   As a passenger in a car for an hour without a break 0   Lying down to rest in the afternoon when circumstances permit 3   Sitting and talking to someone 0   Sitting quietly after a lunch without alcohol 3   In a car, while stopped for a few minutes in traffic 0   Total score 6       Review of Systems -  as per HPI, otherwise pertinent systems review is negative           Past Medical History:   Diagnosis Date    Acid reflux     Adenomatous polyp of ascending colon 09/20/2021    Arthritis     Asymptomatic stenosis of left carotid artery     CAD (coronary artery disease)     COVID-19 07/06/2022    Depression     Diabetes mellitus     Gout     Hearing loss     High cholesterol     HTN (hypertension)     Kidney stone     Macrocytic anemia     Osteoporosis     Personal history of colonic polyps 09/20/2021    Dr. Chun Smith    Seasonal allergies        Past Surgical History:   Procedure Laterality Date    CAROTID ENDARTERECTOMY Left 9/12/2024    Procedure: ENDARTERECTOMY-CAROTID;  Surgeon: Hany Pendleton MD;  Location: [facility];  Service: Peripheral Vascular;   Laterality: Left;    COLONOSCOPY W/ BIOPSIES  09/20/2021    Dr. Chun Smith    CYSTOSCOPY      EXTRACAPSULAR EXTRACTION OF CATARACT      HERNIA REPAIR      LITHOTRIPSY  09/2023    RETROGRADE PYELOGRAPHY      WISDOM TOOTH EXTRACTION         Family History   Problem Relation Name Age of Onset    Bladder Cancer Mother      Hypertension Sister      Asthma Sister      Diabetes Sister      Lung cancer Sister      Hypertension Brother      Diabetes Brother      Coronary artery disease Brother      Atrial fibrillation Brother      Esophageal cancer Brother      Kidney cancer Brother      Hypertension Brother      Coronary artery disease Brother      Diabetes Brother      Progressive Supranuclear Palsy Brother      Coronary artery disease Brother      Pancreatic cancer Brother      Colon polyps Brother      Heart murmur Brother         Social History     Socioeconomic History    Marital status:    Tobacco Use    Smoking status: Never     Passive exposure: Never    Smokeless tobacco: Never   Substance and Sexual Activity    Alcohol use: Not Currently     Alcohol/week: 1.0 standard drink of alcohol     Types: 1 Cans of beer per week    Drug use: Never    Sexual activity: Yes     Social Drivers of Health     Financial Resource Strain: Low Risk  (5/11/2023)    Overall Financial Resource Strain (CARDIA)     Difficulty of Paying Living Expenses: Not hard at all   Food Insecurity: No Food Insecurity (9/13/2024)    Hunger Vital Sign     Worried About Running Out of Food in the Last Year: Never true     Ran Out of Food in the Last Year: Never true   Transportation Needs: No Transportation Needs (9/13/2024)    TRANSPORTATION NEEDS     Transportation : No   Physical Activity: Sufficiently Active (5/11/2023)    Exercise Vital Sign     Days of Exercise per Week: 5 days     Minutes of Exercise per Session: 30 min   Stress: No Stress Concern Present (5/11/2023)    [employer] - Occupational Stress  "Questionnaire     Feeling of Stress : Not at all   Housing Stability: Low Risk  (9/13/2024)    Housing Stability Vital Sign     Unable to Pay for Housing in the Last Year: No     Homeless in the Last Year: No       Review of patient's allergies indicates:  No Known Allergies    Current Outpatient Medications   Medication Instructions    albuterol (PROAIR HFA) 90 mcg/actuation inhaler 2 puffs, Inhalation, Every 6 hours PRN, Rescue    allopurinoL (ZYLOPRIM) 100 MG tablet TAKE 1 TABLET EVERY DAY    aspirin (ECOTRIN) 81 mg, Daily    atorvastatin (LIPITOR) 20 MG tablet TAKE 1 TABLET AT BEDTIME    b complex vitamins tablet 1 tablet, Every other day    blood sugar diagnostic (TRUE METRIX GLUCOSE TEST STRIP) Strp 1 strip, Misc.(Non-Drug; Combo Route), Daily    blood-glucose meter (TRUE METRIX AIR GLUCOSE METER) kit Test daily for DM II E11.9    busPIRone (BUSPAR) 10 MG tablet TAKE 1/2 TO 1 TABLET THREE TIMES DAILY    cinnamon bark 1,000 mg, Daily    citalopram (CELEXA) 20 mg, Oral    gabapentin (NEURONTIN) 100 MG capsule TAKE 2 CAPSULES (200 MG TOTAL) THREE TIMES DAILY    glucosamine/chondr navarro A sod (OSTEO BI-FLEX ORAL) Daily    KRILL OIL ORAL 500 mg, Daily    losartan (COZAAR) 100 mg, Daily    metFORMIN (GLUCOPHAGE) 500 mg, Oral, 2 times daily with meals    montelukast (SINGULAIR) 10 mg, Oral, Nightly    pantoprazole (PROTONIX) 40 MG tablet TAKE 1 TABLET EVERY DAY    pioglitazone (ACTOS) 15 mg, Oral, Daily    tamsulosin (FLOMAX) 0.4 mg, Daily       OBJECTIVE:    Vitals:  Visit Vitals  BP (!) 132/90 (BP Location: Left arm, Patient Position: Sitting)   Ht 5' 9" (1.753 m)   Wt 86.2 kg (190 lb)   BMI 28.06 kg/m²       Physical Exam:  Constitutional  he appears well-developed and well-nourished. he is well groomed.    Accompanied by - wife  Appearance - well appearing, no apparent distress, unassisted  Oropharynx - Mallampati: class 4, dentition ok  Heart - RRR auscultated without murmur  Lungs - CTA, pulmonary effort " normal  Skin- no obvious lesions noted    Neurologic  Cortical function - The patient is alert, attentive   Speech - clear   Cranial nerves:  CN 3, 4, 6 EOMs - normal. No ptosis, nystagmus, or lateral gaze deviation  CN 7 - no face asymmetry   CN 8 - hearing is grossly normal  Gait - unassisted; posture upright. Gait is steady with normal steps    Review of Data:      PAP Compliance Report  Last 30 days  Usage- 97%  Usage > 4 hrs - 97%  AHI - 9.5  Autopap 5-20cm  95th percentile leak 32.3    ASSESSMENT/PLAN:    1. Obstructive sleep apnea    Elevated AHI is likely d/t leak--integrity of mask may be worn out since he hasn't changed out cushions. He also states he often turns on his side and mask will shift on his face/leak.  Met with Ivana before he left and she will mail the cushions to his house.   Will check a compliance download in about 1 month to reassess AHI.     - the patient is benefiting from PAP therapy. Encouraged nightly use of PAP.   - Drowsy driving may still occur despite PAP use.     Follow-up October 2025. In addition to their scheduled follow up, the patient has also been instructed to follow up on as needed basis.     Questions and concerns were sought and answered to the patient's stated verbal satisfaction.    The patient verbalizes understanding and agreement with the above stated treatment plan.   Items discussed include acute and/or chronic neurological, sleep, or other issues and their attendant differential diagnoses.  Potential for additional testing, treatment options, and prognosis also discussed.  Dr. Sam Maher available during encounter.    Domitila Haro, MSN, APRN, FNP-C  [facility]  [phone]

## 2025-01-31 ENCOUNTER — LAB VISIT (OUTPATIENT)
Dept: LAB | Facility: HOSPITAL | Age: 80
End: 2025-01-31
Attending: INTERNAL MEDICINE
Payer: MEDICARE

## 2025-01-31 DIAGNOSIS — E21.0 PRIMARY HYPERPARATHYROIDISM: Primary | ICD-10-CM

## 2025-01-31 LAB
25(OH)D3+25(OH)D2 SERPL-MCNC: 35 NG/ML (ref 30–80)
ALBUMIN SERPL-MCNC: 3.7 G/DL (ref 3.4–4.8)
BUN SERPL-MCNC: 22.1 MG/DL (ref 8.4–25.7)
CALCIUM SERPL-MCNC: 10.4 MG/DL (ref 8.8–10)
CHLORIDE SERPL-SCNC: 105 MMOL/L (ref 98–107)
CO2 SERPL-SCNC: 29 MMOL/L (ref 23–31)
CREAT SERPL-MCNC: 0.93 MG/DL (ref 0.72–1.25)
GFR SERPLBLD CREATININE-BSD FMLA CKD-EPI: >60 ML/MIN/1.73/M2
GLUCOSE SERPL-MCNC: 133 MG/DL (ref 82–115)
PHOSPHATE SERPL-MCNC: 2.7 MG/DL (ref 2.3–4.7)
POTASSIUM SERPL-SCNC: 5.5 MMOL/L (ref 3.5–5.1)
SODIUM SERPL-SCNC: 140 MMOL/L (ref 136–145)

## 2025-01-31 PROCEDURE — 36415 COLL VENOUS BLD VENIPUNCTURE: CPT

## 2025-01-31 PROCEDURE — 80069 RENAL FUNCTION PANEL: CPT

## 2025-01-31 PROCEDURE — 82306 VITAMIN D 25 HYDROXY: CPT

## 2025-02-20 LAB — CRC RECOMMENDATION EXT: NORMAL

## 2025-02-26 ENCOUNTER — DOCUMENTATION ONLY (OUTPATIENT)
Dept: PRIMARY CARE CLINIC | Facility: CLINIC | Age: 80
End: 2025-02-26
Payer: MEDICARE

## 2025-02-27 RX ORDER — METFORMIN HYDROCHLORIDE 500 MG/1
500 TABLET ORAL 2 TIMES DAILY WITH MEALS
Qty: 180 TABLET | Refills: 3 | Status: SHIPPED | OUTPATIENT
Start: 2025-02-27

## 2025-02-27 RX ORDER — BUSPIRONE HYDROCHLORIDE 10 MG/1
TABLET ORAL
Qty: 270 TABLET | Refills: 3 | Status: SHIPPED | OUTPATIENT
Start: 2025-02-27

## 2025-03-07 ENCOUNTER — LAB VISIT (OUTPATIENT)
Dept: LAB | Facility: HOSPITAL | Age: 80
End: 2025-03-07
Attending: INTERNAL MEDICINE
Payer: MEDICARE

## 2025-03-07 DIAGNOSIS — E21.0 PRIMARY HYPERPARATHYROIDISM: Primary | ICD-10-CM

## 2025-03-07 LAB
25(OH)D3+25(OH)D2 SERPL-MCNC: 30 NG/ML (ref 30–80)
ALBUMIN SERPL-MCNC: 3.5 G/DL (ref 3.4–4.8)
BUN SERPL-MCNC: 16.6 MG/DL (ref 8.4–25.7)
CALCIUM SERPL-MCNC: 10.3 MG/DL (ref 8.8–10)
CHLORIDE SERPL-SCNC: 104 MMOL/L (ref 98–107)
CO2 SERPL-SCNC: 29 MMOL/L (ref 23–31)
CREAT SERPL-MCNC: 0.89 MG/DL (ref 0.72–1.25)
GFR SERPLBLD CREATININE-BSD FMLA CKD-EPI: >60 ML/MIN/1.73/M2
GLUCOSE SERPL-MCNC: 128 MG/DL (ref 82–115)
PHOSPHATE SERPL-MCNC: 2.7 MG/DL (ref 2.3–4.7)
POTASSIUM SERPL-SCNC: 5.1 MMOL/L (ref 3.5–5.1)
PTH-INTACT SERPL-MCNC: 83.5 PG/ML (ref 8.7–77)
SODIUM SERPL-SCNC: 141 MMOL/L (ref 136–145)

## 2025-03-07 PROCEDURE — 82306 VITAMIN D 25 HYDROXY: CPT

## 2025-03-07 PROCEDURE — 36415 COLL VENOUS BLD VENIPUNCTURE: CPT

## 2025-03-07 PROCEDURE — 80069 RENAL FUNCTION PANEL: CPT

## 2025-03-07 PROCEDURE — 83970 ASSAY OF PARATHORMONE: CPT

## 2025-03-10 ENCOUNTER — TELEPHONE (OUTPATIENT)
Dept: PRIMARY CARE CLINIC | Facility: CLINIC | Age: 80
End: 2025-03-10
Payer: MEDICARE

## 2025-03-10 DIAGNOSIS — N18.2 TYPE 2 DIABETES MELLITUS WITH STAGE 2 CHRONIC KIDNEY DISEASE, WITHOUT LONG-TERM CURRENT USE OF INSULIN: ICD-10-CM

## 2025-03-10 DIAGNOSIS — E11.22 TYPE 2 DIABETES MELLITUS WITH STAGE 2 CHRONIC KIDNEY DISEASE, WITHOUT LONG-TERM CURRENT USE OF INSULIN: ICD-10-CM

## 2025-03-10 NOTE — TELEPHONE ENCOUNTER
----- Message from Brianda sent at 3/10/2025  3:45 PM CDT -----  Regarding: refill  Who Called: River Sheehan Jr.Refill or New Rx:RefillWhat supplies are needed:blood sugar diagnostic (TRUE METRIX GLUCOSE TEST STRIP) Strpblood-glucose meter (TRUE METRIX AIR GLUCOSE METER) kit What is the brand of the supplies:If checking glucose, how many times do they check it?:Who prescribed the original supplies:List of preferred pharmacies on file (remove uneeded): [facility] [address] different, enter Pharmacy information here including location and phone number:  What Medical Supply company is the patient requesting?: Preferred Method of Contact: Phone CallPatient's Preferred Phone Number on File: [phone] Best Call Back Number, if different:Additional Information: stated that [facility] not get that brand of test strips anymore and is needing a new machine to go with new brand test strips. Please advise

## 2025-03-10 NOTE — TELEPHONE ENCOUNTER
stated that [facility] pharmacy can not get that brand of test strips anymore and is needing a new machine to go with new brand test strips.    (TRUE METRIX GLUCOSE TEST STRIP) Strpblood-glucose meter (TRUE METRIX AIR GLUCOSE METER) kit

## 2025-03-11 NOTE — TELEPHONE ENCOUNTER
----- Message from Faith sent at 3/11/2025 12:26 PM CDT -----  Who Called: River Sheehan Jr.Patient is returning phone callWho Left Message for Patient: Does the patient know what this is regarding?: Preferred Method of Contact: Phone CallPatient's Preferred Phone Number on File: [phone] Best Call Back Number, if different:Additional Information:  returning call back

## 2025-03-11 NOTE — TELEPHONE ENCOUNTER
Spoke to wife states [facility] no longer have true Metrix test strips requesting a new glucometer that his insurance pays for.

## 2025-03-12 NOTE — TELEPHONE ENCOUNTER
Spoke to [facility] states they do carry the true metrix just new order for test strips he hadn't fill it in  awhile.

## 2025-03-14 ENCOUNTER — TELEPHONE (OUTPATIENT)
Dept: PRIMARY CARE CLINIC | Facility: CLINIC | Age: 80
End: 2025-03-14
Payer: MEDICARE

## 2025-03-14 NOTE — TELEPHONE ENCOUNTER
----- Message from Yessi sent at 3/13/2025  3:36 PM CDT -----  .Who Called: Rosalia with [facility] is requesting assistance/information from provider's office.Symptoms (please be specific): N/A How long has patient had these symptoms: N/AList of preferred pharmacies on file (remove unneeded): [unfilled]If different, enter pharmacy into here including location and phone number: N/APreferred Method of Contact: Phone CallPatient's Preferred Phone Number on File: [phone] Best Call Back Number, if different: [phone] [facility]Additional Information: Called stating she's needing clinic notes with proof stating pt is diabetic for approval of his diabetic supplies. Please fax to [phone]. Please advise, thank you.

## 2025-03-26 ENCOUNTER — OFFICE VISIT (OUTPATIENT)
Dept: VASCULAR SURGERY | Facility: CLINIC | Age: 80
End: 2025-03-26
Payer: MEDICARE

## 2025-03-26 DIAGNOSIS — I65.23 CAROTID STENOSIS, ASYMPTOMATIC, BILATERAL: Primary | ICD-10-CM

## 2025-03-26 PROCEDURE — 99214 OFFICE O/P EST MOD 30 MIN: CPT | Mod: ,,, | Performed by: SURGERY

## 2025-03-28 NOTE — PROGRESS NOTES
"    [facility] V[facility] - Clinic Note  Hany Pendleton MD      Patient Name: River Sheehan Jr.                   : 1945      MRN: [id number]   Visit Date: 3/26/2025       History Present Illness     Reason for Visit: Carotid Artery Disease    Mr. Sheehan presents to the clinic for 6 month surveillance of left carotid artery disease. He is s/p left carotid endarterectomy 2024. He reports he has been doing well since last office visit. Denies any stroke or stroke like symptoms.       REVIEW OF SYSTEMS:  12 point review of systems conducted, negative except as stated in the history of present illness. See HPI for details.        Physical Exam      Vitals:    25 1504 25 1507   BP: 139/68 126/68   BP Location: Right arm Left arm   Patient Position: Sitting Sitting   Pulse: 72 78   Weight: 88.5 kg (195 lb)    Height: 5' 9" (1.753 m)           General: well-nourished, no acute distress, and healthy appearing, alert, pleasant, conversant, and oriented  Neurologic: cranial nerves are grossly intact, no neurologic deficits, no motor deficits, and no sensory deficits  Neck/Chest: normal , soft without lymphadenopathy, and no carotid bruits noted  Respiratory: breathing easily, without respiratory distress, and normal breath sounds  Abdomen: normal and soft  Cardiology: regular rate and rhythm and no audible murmur    Upper Extremity Arterial Exam:   Right - radial is palpable and brachial is palpable  Left - radial is palpable and brachial is palpable      Musculoskeletal:   Upper Extremity: normal bilateral hand function,  5/5  and 5/5 strength  Lower Extremity:  5/5 strength , normal dorsiflexion, and normal plantar flexion        Assessment and Plan     Mr. Sheehan is a 79 y.o. male status post left CEA.  Overall he is doing well.  He is still below threshold for intervention on the right.  I did discuss with him I would like to re-evaluate him sooner due to his increasing velocities on the " right.  I will have him come back in 3 months with a carotid duplex at that point.  I do recommend he continue his aspirin and Lipitor at this time.        1. Asymptomatic stenosis of left carotid artery  - CV Ultrasound Bilateral Doppler Carotid; Future    2. Carotid stenosis, asymptomatic, bilateral          Imaging Obtained/Reviewed   Study: carotid duplex  Date:   3/26/2025  This shows right ICA stenosis measuring 50-69%.  Left ICA stenosis less than 50%.  There is some slight increase in the ICA velocities.      Medical History     Past Medical History:   Diagnosis Date    Acid reflux     Adenomatous polyp of ascending colon 09/20/2021    Arthritis     Asymptomatic stenosis of left carotid artery     CAD (coronary artery disease)     Carotid artery stenosis     US 3/26/25 less than 50% bilateral    Colon polyps 02/20/2025    Transvers polyp benigne mucosa, Ascending Colon Tubular adenoma, Rectum hyperplastic    COVID-19 07/06/2022    Depression     Diabetes mellitus     Gout     Hearing loss     High cholesterol     HTN (hypertension)     Kidney stone     Macrocytic anemia     Osteoporosis     Seasonal allergies      Past Surgical History:   Procedure Laterality Date    CAROTID ENDARTERECTOMY Left 09/12/2024    Procedure: ENDARTERECTOMY-CAROTID;  Surgeon: Hany Pendleton MD;  Location: [facility];  Service: Peripheral Vascular;  Laterality: Left;    COLONOSCOPY W/ BIOPSIES  09/20/2021    Dr. Chun Smith    COLONOSCOPY W/ BIOPSIES  02/20/2025    CYSTOSCOPY      EXTRACAPSULAR EXTRACTION OF CATARACT      HERNIA REPAIR      LITHOTRIPSY  09/2023    RETROGRADE PYELOGRAPHY      WISDOM TOOTH EXTRACTION       Family History   Problem Relation Name Age of Onset    Bladder Cancer Mother      Hypertension Sister      Asthma Sister      Diabetes Sister      Lung cancer Sister      Hypertension Brother      Diabetes Brother      Coronary artery disease Brother      Atrial fibrillation Brother      Esophageal cancer  Brother      Kidney cancer Brother      Hypertension Brother      Coronary artery disease Brother      Diabetes Brother      Progressive Supranuclear Palsy Brother      Coronary artery disease Brother      Pancreatic cancer Brother      Colon polyps Brother      Heart murmur Brother       [Social History]    [Social History]  Socioeconomic History    Marital status:    Tobacco Use    Smoking status: Never     Passive exposure: Never    Smokeless tobacco: Never   Substance and Sexual Activity    Alcohol use: Not Currently     Alcohol/week: 1.0 standard drink of alcohol     Types: 1 Cans of beer per week    Drug use: Never    Sexual activity: Yes     Social Drivers of Health     Financial Resource Strain: Low Risk  (5/11/2023)    Overall Financial Resource Strain (CARDIA)     Difficulty of Paying Living Expenses: Not hard at all   Food Insecurity: No Food Insecurity (9/13/2024)    Hunger Vital Sign     Worried About Running Out of Food in the Last Year: Never true     Ran Out of Food in the Last Year: Never true   Transportation Needs: No Transportation Needs (9/13/2024)    TRANSPORTATION NEEDS     Transportation : No   Physical Activity: Sufficiently Active (5/11/2023)    Exercise Vital Sign     Days of Exercise per Week: 5 days     Minutes of Exercise per Session: 30 min   Stress: No Stress Concern Present (5/11/2023)    [employer] - Occupational Stress Questionnaire     Feeling of Stress : Not at all   Housing Stability: Unknown (9/13/2024)    Housing Stability Vital Sign     Unable to Pay for Housing in the Last Year: No     Homeless in the Last Year: No     Current Outpatient Medications   Medication Instructions    albuterol (PROAIR HFA) 90 mcg/actuation inhaler 2 puffs, Inhalation, Every 6 hours PRN, Rescue    allopurinoL (ZYLOPRIM) 100 MG tablet TAKE 1 TABLET EVERY DAY    amLODIPine (NORVASC) 10 MG tablet amLODIPine 10 mg tablet, [RxNorm: 965228]    aspirin (ECOTRIN) 81 mg,  Daily    atorvastatin (LIPITOR) 20 MG tablet TAKE 1 TABLET AT BEDTIME    blood sugar diagnostic (TRUE METRIX GLUCOSE TEST STRIP) Strp 1 strip, Misc.(Non-Drug; Combo Route), Daily    blood-glucose meter (TRUE METRIX AIR GLUCOSE METER) kit Test daily for DM II E11.9    busPIRone (BUSPAR) 10 MG tablet TAKE 1/2 TO 1 TABLET THREE TIMES DAILY    cinnamon bark 1,000 mg, Daily    citalopram (CELEXA) 20 mg, Oral    gabapentin (NEURONTIN) 100 MG capsule TAKE 2 CAPSULES (200 MG TOTAL) THREE TIMES DAILY    glucosamine/chondr navarro A sod (OSTEO BI-FLEX ORAL) Daily    KRILL OIL ORAL 500 mg, Daily    losartan (COZAAR) 100 mg, Daily    metFORMIN (GLUCOPHAGE) 500 mg, Oral, 2 times daily with meals    montelukast (SINGULAIR) 10 mg, Oral, Nightly    pantoprazole (PROTONIX) 40 MG tablet TAKE 1 TABLET EVERY DAY    pioglitazone (ACTOS) 15 mg, Oral, Daily    tamsulosin (FLOMAX) 0.4 mg, Daily     Review of patient's allergies indicates:  No Known Allergies    Patient Care Team:  Chio Villela MD as PCP - General (Internal Medicine)  Marlene Figueroa MD as Consulting Physician (Cardiovascular Disease)  Jamin Berrios MD as Consulting Physician (Urology)  Leonie Roca MD as Consulting Physician (Nephrology)  Hany Pendleton MD as Consulting Physician (Vascular Surgery)  Dank Berrios MD as Consulting Physician (Endocrinology)        No follow-ups on file. In addition to their scheduled follow up, the patient has also been instructed to follow up on as needed basis.     Future Appointments   Date Time Provider Department Center   5/28/2025  8:40 AM Chio Villela MD [facility] PCP   7/2/2025 10:00 AM CV [facility]   7/2/2025 10:40 AM Hany Pendleton MD [facility]   10/2/2025 10:30 AM Domitila Haro FNP [facility] [address]   11/25/2025 10:20 AM Chio Villela MD [facility] PCP   12/18/2025  1:15 PM Leonie Roca MD [facility]

## 2025-04-01 ENCOUNTER — OFFICE VISIT (OUTPATIENT)
Dept: PRIMARY CARE CLINIC | Facility: CLINIC | Age: 80
End: 2025-04-01
Payer: MEDICARE

## 2025-04-01 VITALS
OXYGEN SATURATION: 94 % | WEIGHT: 195 LBS | DIASTOLIC BLOOD PRESSURE: 70 MMHG | HEIGHT: 69 IN | TEMPERATURE: 98 F | BODY MASS INDEX: 28.88 KG/M2 | SYSTOLIC BLOOD PRESSURE: 142 MMHG | RESPIRATION RATE: 16 BRPM | HEART RATE: 78 BPM

## 2025-04-01 DIAGNOSIS — F33.41 RECURRENT MAJOR DEPRESSIVE DISORDER, IN PARTIAL REMISSION: ICD-10-CM

## 2025-04-01 DIAGNOSIS — N18.2 TYPE 2 DIABETES MELLITUS WITH STAGE 2 CHRONIC KIDNEY DISEASE, WITHOUT LONG-TERM CURRENT USE OF INSULIN: ICD-10-CM

## 2025-04-01 DIAGNOSIS — E21.0 PRIMARY HYPERPARATHYROIDISM: ICD-10-CM

## 2025-04-01 DIAGNOSIS — I95.2 HYPOTENSION DUE TO DRUGS: Primary | ICD-10-CM

## 2025-04-01 DIAGNOSIS — E11.22 TYPE 2 DIABETES MELLITUS WITH STAGE 2 CHRONIC KIDNEY DISEASE, WITHOUT LONG-TERM CURRENT USE OF INSULIN: ICD-10-CM

## 2025-04-01 DIAGNOSIS — I10 PRIMARY HYPERTENSION: ICD-10-CM

## 2025-04-01 DIAGNOSIS — R42 DIZZY: ICD-10-CM

## 2025-04-01 PROCEDURE — 99214 OFFICE O/P EST MOD 30 MIN: CPT | Mod: ,,, | Performed by: INTERNAL MEDICINE

## 2025-04-01 PROCEDURE — 36415 COLL VENOUS BLD VENIPUNCTURE: CPT | Mod: ,,, | Performed by: INTERNAL MEDICINE

## 2025-04-01 NOTE — PROGRESS NOTES
Chio Villela MD   [id number] [id number]     Patient ID: [id number]     Chief Complaint: Nausea and Dizziness (Complain of runny nose weakness and blurred vision for a few weeks Low blood pressure.)        HPI:     River Sheehan Jr. is a 79 y.o. male here today for low BP. He will feel dizzy and check blood pressure and it will usually be low. He doesn't drink much water. He will be high at times in the day. At home he was 130/67 but he was doing work this morning. He was told to try taking the Losartan at night but it hasn't helped. He brought his log and has some under 90 systolic. He has been doing work up on the parathyroid to see if they can find the oversecretion. He has been sitting around. He just doesn't feel right. No other complaints today.       Subjective:     Review of Systems   Constitutional:         Almost thought he had Covid but not having fever   Eyes:  Positive for blurred vision.        Going on for a few weeks, with the dizziness.    Respiratory: Negative.     Cardiovascular:  Negative for chest pain and palpitations.        He had recent follow up with Dr Pendleton his vascular doctor.    Musculoskeletal:  Positive for back pain and joint pain. Negative for falls.        With his aches and pains he had increased the gabapentin, he is taking 7 caps a day now.    Neurological:  Positive for weakness.        Weakness has been there for over a month   Endo/Heme/Allergies:         He just did some lab last month   Psychiatric/Behavioral:          Doesn't feel it is his mood. Has reduced the Buspar down to one in the morning.        Past Medical History:   Diagnosis Date    Acid reflux     Adenomatous polyp of ascending colon 09/20/2021    Arthritis     Asymptomatic stenosis of left carotid artery     CAD (coronary artery disease)     Carotid artery stenosis      3/26/25 less than 50% bilateral    Colon polyps 02/20/2025    Transvers polyp benigne mucosa, Ascending Colon Tubular adenoma,  Rectum hyperplastic    COVID-19 07/06/2022    Depression     Diabetes mellitus     Gout     Hearing loss     High cholesterol     HTN (hypertension)     Kidney stone     Macrocytic anemia     Osteoporosis     Seasonal allergies         Past Surgical History:   Procedure Laterality Date    CAROTID ENDARTERECTOMY Left 09/12/2024    Procedure: ENDARTERECTOMY-CAROTID;  Surgeon: Hany Pendleton MD;  Location: [facility];  Service: Peripheral Vascular;  Laterality: Left;    COLONOSCOPY W/ BIOPSIES  09/20/2021    Dr. Chun Smith    COLONOSCOPY W/ BIOPSIES  02/20/2025    CYSTOSCOPY      EXTRACAPSULAR EXTRACTION OF CATARACT      HERNIA REPAIR      LITHOTRIPSY  09/2023    RETROGRADE PYELOGRAPHY      WISDOM TOOTH EXTRACTION         Family History   Problem Relation Name Age of Onset    Bladder Cancer Mother      Hypertension Sister      Asthma Sister      Diabetes Sister      Lung cancer Sister      Hypertension Brother      Diabetes Brother      Coronary artery disease Brother      Atrial fibrillation Brother      Esophageal cancer Brother      Kidney cancer Brother      Hypertension Brother      Coronary artery disease Brother      Diabetes Brother      Progressive Supranuclear Palsy Brother      Coronary artery disease Brother      Pancreatic cancer Brother      Colon polyps Brother      Heart murmur Brother          Social History[1]    Review of patient's allergies indicates:  No Known Allergies    Outpatient Medications Marked as Taking for the 4/1/25 encounter (Office Visit) with Chio Villela MD   Medication Sig Dispense Refill    albuterol (PROAIR HFA) 90 mcg/actuation inhaler Inhale 2 puffs into the lungs every 6 (six) hours as needed for Wheezing. Rescue 8 g 0    allopurinoL (ZYLOPRIM) 100 MG tablet TAKE 1 TABLET EVERY DAY 90 tablet 3    aspirin (ECOTRIN) 81 MG EC tablet Take 81 mg by mouth once daily.      atorvastatin (LIPITOR) 20 MG tablet TAKE 1 TABLET AT BEDTIME 90 tablet 3    blood sugar diagnostic  "(TRUE METRIX GLUCOSE TEST STRIP) Strp 1 strip by Misc.(Non-Drug; Combo Route) route once daily. 150 strip 3    busPIRone (BUSPAR) 10 MG tablet TAKE 1/2 TO 1 TABLET THREE TIMES DAILY 270 tablet 3    cinnamon bark 500 mg capsule Take 1,000 mg by mouth once daily.      citalopram (CELEXA) 20 MG tablet TAKE 1 TABLET EVERY DAY 90 tablet 3    gabapentin (NEURONTIN) 100 MG capsule TAKE 2 CAPSULES (200 MG TOTAL) THREE TIMES DAILY 540 capsule 3    glucosamine/chondr navarro A sod (OSTEO BI-FLEX ORAL) Take by mouth Daily. TAKING 2 QD      KRILL OIL ORAL Take 500 mg by mouth once daily at 6am.      losartan (COZAAR) 50 MG tablet Take 100 mg by mouth once daily.      metFORMIN (GLUCOPHAGE) 500 MG tablet TAKE 1 TABLET TWICE DAILY WITH MEALS 180 tablet 3    montelukast (SINGULAIR) 10 mg tablet TAKE 1 TABLET EVERY EVENING 90 tablet 3    pantoprazole (PROTONIX) 40 MG tablet TAKE 1 TABLET EVERY DAY 90 tablet 3    pioglitazone (ACTOS) 15 MG tablet Take 1 tablet (15 mg total) by mouth once daily. 90 tablet 3    tamsulosin (FLOMAX) 0.4 mg Cap Take 0.4 mg by mouth once daily.         Patient Care Team:  Chio Villela MD as PCP - General (Internal Medicine)  Marlene Figueroa MD as Consulting Physician (Cardiovascular Disease)  Jamin Berrios MD as Consulting Physician (Urology)  Leonie Roca MD as Consulting Physician (Nephrology)  Hany Pendleton MD as Consulting Physician (Vascular Surgery)  Dank Berrios MD as Consulting Physician (Endocrinology)       Objective:     BP (!) 142/70   Pulse 78   Temp 98.1 °F (36.7 °C) (Oral)   Resp 16   Ht 5' 9" (1.753 m)   Wt 88.5 kg (195 lb)   SpO2 (!) 94%   BMI 28.80 kg/m²     Physical Exam  Vitals reviewed.   HENT:      Head: Normocephalic and atraumatic.      Right Ear: Tympanic membrane, ear canal and external ear normal.      Left Ear: Tympanic membrane, ear canal and external ear normal.      Mouth/Throat:      Mouth: Mucous membranes are moist.      Pharynx: No " oropharyngeal exudate or posterior oropharyngeal erythema.   Cardiovascular:      Rate and Rhythm: Normal rate and regular rhythm.      Heart sounds:      No gallop.   Pulmonary:      Effort: Pulmonary effort is normal.      Breath sounds: Normal breath sounds.   Skin:     General: Skin is warm and dry.   Neurological:      Mental Status: He is alert.   Psychiatric:         Mood and Affect: Mood normal.         Behavior: Behavior normal.         Thought Content: Thought content normal.         Judgment: Judgment normal.               Assessment/Plan:     1. Hypotension due to drugs  Comments:  Will try splitting Losartan and see if that helps. I would also like to reduce the Buspar to 1/2 tablet daily then next week consider reduction of gabapentin    2. Primary hypertension  Comments:  He's high now so I don't know if he can reduce the Losartan, we can try 1/2 tab BID to see if breaking it up would decrease events.  Overview:  Amlodipine stopped by Dr Figueroa in December.       3. Type 2 diabetes mellitus with stage 2 chronic kidney disease, without long-term current use of insulin  Comments:  We want good BP for renal function but he's symptomatic with his current readings. Some go down in 80's.    4. Dizzy  Comments:  Reminded to drink more water, get up slowly and consider compression stockings.  Orders:  -     CBC Auto Differential    5. Primary hyperparathyroidism  Comments:  Will do scan with Dr Berrios's office next visit    6. Recurrent major depressive disorder, in partial remission  Comments:  Stable, will try lowering Buspar, he's taking one a day in morning.             Follow up in about 3 weeks (around 4/22/2025). In addition to their scheduled follow up, the patient has also been instructed to follow up on as needed basis.     Signature:  Chio Villela MD  [facility]  [id number] [id number]         [1]   Social History  Socioeconomic History    Marital status:    Tobacco  Use    Smoking status: Never     Passive exposure: Never    Smokeless tobacco: Never   Substance and Sexual Activity    Alcohol use: Not Currently     Alcohol/week: 1.0 standard drink of alcohol     Types: 1 Cans of beer per week    Drug use: Never    Sexual activity: Yes     Social Drivers of Health     Financial Resource Strain: Low Risk  (5/11/2023)    Overall Financial Resource Strain (CARDIA)     Difficulty of Paying Living Expenses: Not hard at all   Food Insecurity: No Food Insecurity (9/13/2024)    Hunger Vital Sign     Worried About Running Out of Food in the Last Year: Never true     Ran Out of Food in the Last Year: Never true   Transportation Needs: No Transportation Needs (9/13/2024)    TRANSPORTATION NEEDS     Transportation : No   Physical Activity: Sufficiently Active (5/11/2023)    Exercise Vital Sign     Days of Exercise per Week: 5 days     Minutes of Exercise per Session: 30 min   Stress: No Stress Concern Present (5/11/2023)    [employer] - Occupational Stress Questionnaire     Feeling of Stress : Not at all   Housing Stability: Unknown (9/13/2024)    Housing Stability Vital Sign     Unable to Pay for Housing in the Last Year: No     Homeless in the Last Year: No

## 2025-04-02 ENCOUNTER — TELEPHONE (OUTPATIENT)
Dept: PRIMARY CARE CLINIC | Facility: CLINIC | Age: 80
End: 2025-04-02
Payer: MEDICARE

## 2025-04-02 DIAGNOSIS — D50.9 MICROCYTIC ANEMIA: Primary | ICD-10-CM

## 2025-04-02 LAB
BASOPHILS # BLD AUTO: 0.1 X10E3/UL (ref 0–0.2)
BASOPHILS NFR BLD AUTO: 1 %
EOSINOPHIL # BLD AUTO: 0.1 X10E3/UL (ref 0–0.4)
EOSINOPHIL NFR BLD AUTO: 2 %
ERYTHROCYTE [DISTWIDTH] IN BLOOD BY AUTOMATED COUNT: 14.7 % (ref 11.6–15.4)
HCT VFR BLD AUTO: 32.2 % (ref 37.5–51)
HGB BLD-MCNC: 9.9 G/DL (ref 13–17.7)
IMM GRANULOCYTES NFR BLD AUTO: 0 %
LYMPHOCYTES # BLD AUTO: 1.9 X10E3/UL (ref 0.7–3.1)
LYMPHOCYTES NFR BLD AUTO: 27 %
MCH RBC QN AUTO: 24.9 PG (ref 26.6–33)
MCHC RBC AUTO-ENTMCNC: 30.7 G/DL (ref 31.5–35.7)
MCV RBC AUTO: 81 FL (ref 79–97)
MONOCYTES # BLD AUTO: 0.7 X10E3/UL (ref 0.1–0.9)
MONOCYTES NFR BLD AUTO: 10 %
NEUTROPHILS # BLD AUTO: 4.5 X10E3/UL (ref 1.4–7)
NEUTROPHILS NFR BLD AUTO: 60 %
PLATELET # BLD AUTO: 413 X10E3/UL (ref 150–450)
RBC # BLD AUTO: 3.97 X10E6/UL (ref 4.14–5.8)
WBC # BLD AUTO: 7.3 X10E3/UL (ref 3.4–10.8)

## 2025-04-02 NOTE — TELEPHONE ENCOUNTER
----- Message from Chio Villela MD sent at 4/2/2025  6:01 PM CDT -----  He's much more anemic, that's why his pressure is down. It looks like iron deficiency. Is he seeing any blood in stools or urine? Any dark tarry stools? If not on iron I can do occult blood.   ----- Message -----  From: Janel Black  Sent: 4/2/2025  10:12 AM CDT  To: Chio Villela MD

## 2025-04-05 ENCOUNTER — APPOINTMENT (OUTPATIENT)
Dept: LAB | Facility: HOSPITAL | Age: 80
End: 2025-04-05
Attending: INTERNAL MEDICINE
Payer: MEDICARE

## 2025-04-05 LAB
HEMOCCULT SP1 STL QL: NEGATIVE
HEMOCCULT SP2 STL QL: NEGATIVE
HEMOCCULT SP3 STL QL: NEGATIVE

## 2025-04-05 PROCEDURE — 82272 OCCULT BLD FECES 1-3 TESTS: CPT | Performed by: INTERNAL MEDICINE

## 2025-04-07 ENCOUNTER — TELEPHONE (OUTPATIENT)
Dept: PRIMARY CARE CLINIC | Facility: CLINIC | Age: 80
End: 2025-04-07
Payer: MEDICARE

## 2025-04-07 ENCOUNTER — LAB VISIT (OUTPATIENT)
Dept: LAB | Facility: HOSPITAL | Age: 80
End: 2025-04-07
Attending: INTERNAL MEDICINE
Payer: MEDICARE

## 2025-04-07 DIAGNOSIS — K92.2 ACUTE GI BLEEDING: Primary | ICD-10-CM

## 2025-04-07 DIAGNOSIS — K92.2 ACUTE GI BLEEDING: ICD-10-CM

## 2025-04-07 DIAGNOSIS — D50.0 IRON DEFICIENCY ANEMIA DUE TO CHRONIC BLOOD LOSS: Primary | ICD-10-CM

## 2025-04-07 LAB
BASOPHILS # BLD AUTO: 0.04 X10(3)/MCL
BASOPHILS NFR BLD AUTO: 0.6 %
EOSINOPHIL # BLD AUTO: 0.12 X10(3)/MCL (ref 0–0.9)
EOSINOPHIL NFR BLD AUTO: 1.9 %
ERYTHROCYTE [DISTWIDTH] IN BLOOD BY AUTOMATED COUNT: 14.4 % (ref 11.5–17)
HCT VFR BLD AUTO: 33.2 % (ref 42–52)
HGB BLD-MCNC: 10.4 G/DL (ref 14–18)
IMM GRANULOCYTES # BLD AUTO: 0.01 X10(3)/MCL (ref 0–0.04)
IMM GRANULOCYTES NFR BLD AUTO: 0.2 %
IRON SATN MFR SERPL: 8 % (ref 20–50)
IRON SERPL-MCNC: 30 UG/DL (ref 65–175)
LYMPHOCYTES # BLD AUTO: 2.08 X10(3)/MCL (ref 0.6–4.6)
LYMPHOCYTES NFR BLD AUTO: 32.3 %
MCH RBC QN AUTO: 25.2 PG (ref 27–31)
MCHC RBC AUTO-ENTMCNC: 31.3 G/DL (ref 33–36)
MCV RBC AUTO: 80.6 FL (ref 80–94)
MONOCYTES # BLD AUTO: 0.89 X10(3)/MCL (ref 0.1–1.3)
MONOCYTES NFR BLD AUTO: 13.8 %
NEUTROPHILS # BLD AUTO: 3.29 X10(3)/MCL (ref 2.1–9.2)
NEUTROPHILS NFR BLD AUTO: 51.2 %
PLATELET # BLD AUTO: 338 X10(3)/MCL (ref 130–400)
PMV BLD AUTO: 8 FL (ref 7.4–10.4)
RBC # BLD AUTO: 4.12 X10(6)/MCL (ref 4.7–6.1)
TIBC SERPL-MCNC: 330 UG/DL (ref 60–240)
TIBC SERPL-MCNC: 360 UG/DL (ref 250–450)
TRANSFERRIN SERPL-MCNC: 329 MG/DL (ref 163–344)
WBC # BLD AUTO: 6.43 X10(3)/MCL (ref 4.5–11.5)

## 2025-04-07 PROCEDURE — 83540 ASSAY OF IRON: CPT

## 2025-04-07 PROCEDURE — 36415 COLL VENOUS BLD VENIPUNCTURE: CPT

## 2025-04-07 PROCEDURE — 85025 COMPLETE CBC W/AUTO DIFF WBC: CPT

## 2025-04-07 NOTE — TELEPHONE ENCOUNTER
Result given to wife voices understanding agree on repeat lab. Also complain of continue to fee like he going to black out weakness and heart rate is running over 100 -110.

## 2025-04-07 NOTE — TELEPHONE ENCOUNTER
Result given to wife voices understanding agree to call if is unable to  tolerate iron everyday.agree on lab in one month.

## 2025-04-07 NOTE — TELEPHONE ENCOUNTER
If he's continued to bleed and feels really weak it would be best to go to ER in [place] were GI could find the source. He still had some reserve when we did the lab but if he kept bleeding he may be much lower now.

## 2025-04-07 NOTE — TELEPHONE ENCOUNTER
----- Message from Chio Villela MD sent at 4/7/2025  3:12 PM CDT -----  The blood count looks better although the iron count is pretty low. If he can't tolerate the oral iron consistently I'll set up an infusion.   ----- Message -----  From: Lab, Background User  Sent: 4/7/2025   2:25 PM CDT  To: Chio Villela MD

## 2025-04-07 NOTE — TELEPHONE ENCOUNTER
----- Message from Chio Villela MD sent at 4/5/2025 12:04 PM CDT -----  They are all ok, copy to GI.  some iron and take every other day. I'd like to repeat the blood count in a week or so, does he want to do here or at hospital?  ----- Message -----  From: Lab, Background User  Sent: 4/5/2025  10:39 AM CDT  To: Chio Villela MD

## 2025-04-08 ENCOUNTER — HOSPITAL ENCOUNTER (EMERGENCY)
Facility: HOSPITAL | Age: 80
Discharge: HOME | End: 2025-04-09
Attending: EMERGENCY MEDICINE
Payer: MEDICARE

## 2025-04-08 DIAGNOSIS — I10 BENIGN ESSENTIAL HTN: ICD-10-CM

## 2025-04-08 DIAGNOSIS — I95.1 ORTHOSTATIC HYPOTENSION: ICD-10-CM

## 2025-04-08 DIAGNOSIS — R42 DIZZINESS: Primary | ICD-10-CM

## 2025-04-08 DIAGNOSIS — R55 NEAR SYNCOPE: ICD-10-CM

## 2025-04-08 LAB
ALBUMIN SERPL-MCNC: 3.9 G/DL (ref 3.4–4.8)
ALBUMIN/GLOB SERPL: 1.1 RATIO (ref 1.1–2)
ALP SERPL-CCNC: 82 UNIT/L (ref 40–150)
ALT SERPL-CCNC: 18 UNIT/L (ref 0–55)
ANION GAP SERPL CALC-SCNC: 10 MEQ/L
AST SERPL-CCNC: 16 UNIT/L (ref 11–45)
BACTERIA #/AREA URNS AUTO: ABNORMAL /HPF
BASOPHILS # BLD AUTO: 0.06 X10(3)/MCL
BASOPHILS NFR BLD AUTO: 0.8 %
BILIRUB SERPL-MCNC: 0.4 MG/DL
BILIRUB UR QL STRIP.AUTO: NEGATIVE
BNP BLD-MCNC: 18.5 PG/ML
BUN SERPL-MCNC: 21.8 MG/DL (ref 8.4–25.7)
CALCIUM SERPL-MCNC: 10.4 MG/DL (ref 8.8–10)
CHLORIDE SERPL-SCNC: 102 MMOL/L (ref 98–107)
CLARITY UR: CLEAR
CO2 SERPL-SCNC: 24 MMOL/L (ref 23–31)
COLOR UR AUTO: YELLOW
CREAT SERPL-MCNC: 0.94 MG/DL (ref 0.72–1.25)
CREAT/UREA NIT SERPL: 23
D DIMER PPP IA.FEU-MCNC: 0.48 UG/ML FEU (ref 0–0.5)
EOSINOPHIL # BLD AUTO: 0.07 X10(3)/MCL (ref 0–0.9)
EOSINOPHIL NFR BLD AUTO: 0.9 %
ERYTHROCYTE [DISTWIDTH] IN BLOOD BY AUTOMATED COUNT: 14.9 % (ref 11.5–17)
FLUAV AG UPPER RESP QL IA.RAPID: NOT DETECTED
FLUBV AG UPPER RESP QL IA.RAPID: NOT DETECTED
GFR SERPLBLD CREATININE-BSD FMLA CKD-EPI: >60 ML/MIN/1.73/M2
GLOBULIN SER-MCNC: 3.7 GM/DL (ref 2.4–3.5)
GLUCOSE SERPL-MCNC: 103 MG/DL (ref 82–115)
GLUCOSE UR QL STRIP: NORMAL
HCT VFR BLD AUTO: 33 % (ref 42–52)
HGB BLD-MCNC: 10.5 G/DL (ref 14–18)
HGB UR QL STRIP: NEGATIVE
IMM GRANULOCYTES # BLD AUTO: 0.02 X10(3)/MCL (ref 0–0.04)
IMM GRANULOCYTES NFR BLD AUTO: 0.3 %
KETONES UR QL STRIP: NEGATIVE
LEUKOCYTE ESTERASE UR QL STRIP: NEGATIVE
LYMPHOCYTES # BLD AUTO: 1.81 X10(3)/MCL (ref 0.6–4.6)
LYMPHOCYTES NFR BLD AUTO: 24.2 %
MCH RBC QN AUTO: 25.5 PG (ref 27–31)
MCHC RBC AUTO-ENTMCNC: 31.8 G/DL (ref 33–36)
MCV RBC AUTO: 80.1 FL (ref 80–94)
MONOCYTES # BLD AUTO: 0.97 X10(3)/MCL (ref 0.1–1.3)
MONOCYTES NFR BLD AUTO: 13 %
MUCOUS THREADS URNS QL MICRO: ABNORMAL /LPF
NEUTROPHILS # BLD AUTO: 4.54 X10(3)/MCL (ref 2.1–9.2)
NEUTROPHILS NFR BLD AUTO: 60.8 %
NITRITE UR QL STRIP: NEGATIVE
NRBC BLD AUTO-RTO: 0 %
PH UR STRIP: 5.5 [PH]
PLATELET # BLD AUTO: 350 X10(3)/MCL (ref 130–400)
PMV BLD AUTO: 8.1 FL (ref 7.4–10.4)
POTASSIUM SERPL-SCNC: 4.5 MMOL/L (ref 3.5–5.1)
PROT SERPL-MCNC: 7.6 GM/DL (ref 5.8–7.6)
PROT UR QL STRIP: NEGATIVE
RBC # BLD AUTO: 4.12 X10(6)/MCL (ref 4.7–6.1)
RBC #/AREA URNS AUTO: ABNORMAL /HPF
SARS-COV-2 RNA RESP QL NAA+PROBE: NOT DETECTED
SODIUM SERPL-SCNC: 136 MMOL/L (ref 136–145)
SP GR UR STRIP.AUTO: 1.02 (ref 1–1.03)
SQUAMOUS #/AREA URNS LPF: ABNORMAL /HPF
TROPONIN I SERPL-MCNC: <0.01 NG/ML (ref 0–0.04)
UROBILINOGEN UR STRIP-ACNC: NORMAL
WBC # BLD AUTO: 7.47 X10(3)/MCL (ref 4.5–11.5)
WBC #/AREA URNS AUTO: ABNORMAL /HPF

## 2025-04-08 PROCEDURE — 81001 URINALYSIS AUTO W/SCOPE: CPT | Performed by: EMERGENCY MEDICINE

## 2025-04-08 PROCEDURE — 25000003 PHARM REV CODE 250: Performed by: EMERGENCY MEDICINE

## 2025-04-08 PROCEDURE — 83880 ASSAY OF NATRIURETIC PEPTIDE: CPT

## 2025-04-08 PROCEDURE — 93005 ELECTROCARDIOGRAM TRACING: CPT

## 2025-04-08 PROCEDURE — 0240U COVID/FLU A&B PCR: CPT | Performed by: EMERGENCY MEDICINE

## 2025-04-08 PROCEDURE — 80053 COMPREHEN METABOLIC PANEL: CPT

## 2025-04-08 PROCEDURE — 93010 ELECTROCARDIOGRAM REPORT: CPT | Mod: ,,, | Performed by: INTERNAL MEDICINE

## 2025-04-08 PROCEDURE — 84484 ASSAY OF TROPONIN QUANT: CPT

## 2025-04-08 PROCEDURE — 85025 COMPLETE CBC W/AUTO DIFF WBC: CPT

## 2025-04-08 PROCEDURE — 84443 ASSAY THYROID STIM HORMONE: CPT | Performed by: EMERGENCY MEDICINE

## 2025-04-08 PROCEDURE — 85379 FIBRIN DEGRADATION QUANT: CPT | Performed by: EMERGENCY MEDICINE

## 2025-04-08 PROCEDURE — 99285 EMERGENCY DEPT VISIT HI MDM: CPT | Mod: 25

## 2025-04-08 RX ORDER — SODIUM CHLORIDE 9 MG/ML
500 INJECTION, SOLUTION INTRAVENOUS
Status: COMPLETED | OUTPATIENT
Start: 2025-04-08 | End: 2025-04-08

## 2025-04-08 RX ADMIN — SODIUM CHLORIDE 500 ML: 0.9 INJECTION, SOLUTION INTRAVENOUS at 08:04

## 2025-04-08 RX ADMIN — SODIUM CHLORIDE 500 ML: 0.9 INJECTION, SOLUTION INTRAVENOUS at 11:04

## 2025-04-08 NOTE — FIRST PROVIDER EVALUATION
"Medical screening examination initiated.  I have conducted a focused provider triage encounter, findings are as follows:    Brief history of present illness:  arrived to ED due to weakness, hypotension, and fatigue for several weeks. Reports BP 60/40s at home. Also c/o SOB.     Vitals:    04/08/25 1431   BP: 122/68   BP Location: Left arm   Pulse: 98   Resp: (!) 22   Temp: 98.4 °F (36.9 °C)   TempSrc: Oral   SpO2: 96%   Weight: 83.9 kg (185 lb)   Height: 5' 5" (1.651 m)       Pertinent physical exam:  awake, alert, has non-labored breathing, in wheelchair.     Brief workup plan:  labs, EKG, imaging      Preliminary workup initiated; this workup will be continued and followed by the physician or advanced practice provider that is assigned to the patient when roomed.  "

## 2025-04-09 ENCOUNTER — TELEPHONE (OUTPATIENT)
Dept: PRIMARY CARE CLINIC | Facility: CLINIC | Age: 80
End: 2025-04-09
Payer: MEDICARE

## 2025-04-09 VITALS
BODY MASS INDEX: 30.82 KG/M2 | OXYGEN SATURATION: 96 % | HEIGHT: 65 IN | DIASTOLIC BLOOD PRESSURE: 90 MMHG | RESPIRATION RATE: 21 BRPM | WEIGHT: 185 LBS | TEMPERATURE: 98 F | HEART RATE: 89 BPM | SYSTOLIC BLOOD PRESSURE: 156 MMHG

## 2025-04-09 LAB
OHS QRS DURATION: 108 MS
OHS QTC CALCULATION: 441 MS
TSH SERPL-ACNC: 1.06 UIU/ML (ref 0.35–4.94)

## 2025-04-09 RX ORDER — MIDODRINE HYDROCHLORIDE 2.5 MG/1
2.5 TABLET ORAL 2 TIMES DAILY WITH MEALS
Qty: 60 TABLET | Refills: 0 | Status: CANCELLED | OUTPATIENT
Start: 2025-04-09

## 2025-04-09 RX ORDER — MIDODRINE HYDROCHLORIDE 2.5 MG/1
2.5 TABLET ORAL 2 TIMES DAILY WITH MEALS
Qty: 60 TABLET | Refills: 0 | Status: ON HOLD | OUTPATIENT
Start: 2025-04-09

## 2025-04-09 RX ORDER — MIDODRINE HYDROCHLORIDE 2.5 MG/1
2.5 TABLET ORAL 2 TIMES DAILY WITH MEALS
Qty: 60 TABLET | Refills: 0 | Status: SHIPPED | OUTPATIENT
Start: 2025-04-09 | End: 2025-04-09 | Stop reason: SDUPTHER

## 2025-04-09 NOTE — TELEPHONE ENCOUNTER
----- Message from Yessi sent at 4/9/2025  4:47 PM CDT -----  .Who Called: Olinda (wife)Caller is requesting assistance/information from provider's office.Symptoms (please be specific): n/a How long has patient had these symptoms:  n/aList of preferred pharmacies on file (remove unneeded): [facility] [address] Phone: [phone]: [phone] Method of Contact: Phone CallPatient's Preferred Phone Number on File: [phone] Best Call Back Number, if different: [phone] (Olinda)Additional Information: Called stating prescription midodrine (PROAMATINE) 2.5 MG Tab went to incorrect pharmacy.     Says pt will not get medication form [facility] for a week or so. Requesting medication please be sent to pharmacy above ([facility]). Requesting a cb once complete. Also, requesting to speak with Esther regarding instructions they received earlier regarding sore throat. Please advise, thank you.

## 2025-04-09 NOTE — TELEPHONE ENCOUNTER
Spoke to wife states they did go to ER yesterday complain of weakness off balance  states he collapse against the wall today b/p without medication 92/52 85/46 83/51.

## 2025-04-09 NOTE — ED PROVIDER NOTES
Encounter Date: 4/8/2025       History     Chief Complaint   Patient presents with    Weakness     Weakness and fatigue for x 1 week. Reports BP of 60/40 over last week. C/o SOB. Denies chest pain.      79-year-old male presents for evaluation of generalized weakness and fatigue that began last month, has been progressively worsening over the past week.  He has had multiple near syncopal and syncopal episodes.  Denies any head trauma.  Wife became concerned this morning as he was unable to get out of bed and walk.  Blood pressure was taken and noted to be 60s/40s.  Of note, he states he has not been eating or drinking well, has lost some weight over the past month due to this.  He has been following with his primary care provider about this.  Recently found to be anemic, started on iron tablets.  He denies any black or bloody stool and had stool testing for occult blood x3 that were negative.  He has no other signs of bleeding.  His PCP has been decreasing his antihypertensives, he is currently taking losartan 25 mg twice daily.  He did take a dose at noon.  He denies any fever, associated chest pain, shortness of breath, palpitations, vomiting or diarrhea.  No urinary symptoms.  Currently he is feeling improved    The history is provided by the patient and the spouse.     Review of patient's allergies indicates:  No Known Allergies  Past Medical History:   Diagnosis Date    Acid reflux     Adenomatous polyp of ascending colon 09/20/2021    Arthritis     Asymptomatic stenosis of left carotid artery     CAD (coronary artery disease)     Carotid artery stenosis      3/26/25 less than 50% bilateral    Colon polyps 02/20/2025    Transvers polyp benigne mucosa, Ascending Colon Tubular adenoma, Rectum hyperplastic    COVID-19 07/06/2022    Depression     Diabetes mellitus     Gout     Hearing loss     High cholesterol     HTN (hypertension)     Kidney stone     Macrocytic anemia     Osteoporosis     Seasonal allergies       Past Surgical History:   Procedure Laterality Date    CAROTID ENDARTERECTOMY Left 09/12/2024    Procedure: ENDARTERECTOMY-CAROTID;  Surgeon: Hany Pendleton MD;  Location: [facility] OR;  Service: Peripheral Vascular;  Laterality: Left;    COLONOSCOPY W/ BIOPSIES  09/20/2021    Dr. Chun Smith    COLONOSCOPY W/ BIOPSIES  02/20/2025    CYSTOSCOPY      EXTRACAPSULAR EXTRACTION OF CATARACT      HERNIA REPAIR      LITHOTRIPSY  09/2023    RETROGRADE PYELOGRAPHY      WISDOM TOOTH EXTRACTION       Family History   Problem Relation Name Age of Onset    Bladder Cancer Mother      Hypertension Sister      Asthma Sister      Diabetes Sister      Lung cancer Sister      Hypertension Brother      Diabetes Brother      Coronary artery disease Brother      Atrial fibrillation Brother      Esophageal cancer Brother      Kidney cancer Brother      Hypertension Brother      Coronary artery disease Brother      Diabetes Brother      Progressive Supranuclear Palsy Brother      Coronary artery disease Brother      Pancreatic cancer Brother      Colon polyps Brother      Heart murmur Brother       Social History[1]  Review of Systems   Constitutional:  Positive for fatigue. Negative for fever.   HENT:  Negative for congestion, hearing loss, rhinorrhea, sore throat, tinnitus and trouble swallowing.    Eyes:  Negative for visual disturbance.   Respiratory:  Negative for cough and shortness of breath.    Cardiovascular:  Negative for chest pain, palpitations and leg swelling.   Gastrointestinal:  Negative for abdominal pain, blood in stool, diarrhea, nausea and vomiting.   Genitourinary:  Negative for difficulty urinating, dysuria, flank pain, frequency and hematuria.   Musculoskeletal:  Negative for back pain and myalgias.   Skin:  Negative for rash.   Neurological:  Positive for syncope and light-headedness. Negative for speech difficulty, weakness, numbness and headaches.       Physical Exam     Initial Vitals [04/08/25 1431]    BP Pulse Resp Temp SpO2   122/68 98 (!) 22 98.4 °F (36.9 °C) 96 %      MAP       --         Physical Exam    Nursing note and vitals reviewed.  Constitutional: He appears well-developed and well-nourished. He is not diaphoretic. No distress.   HENT:   Head: Normocephalic and atraumatic.   Lips dry   Eyes: Conjunctivae and EOM are normal. Pupils are equal, round, and reactive to light.   Neck: Neck supple.   Cardiovascular:  Normal rate, regular rhythm and intact distal pulses.           Pulmonary/Chest: Breath sounds normal. No respiratory distress. He has no wheezes.   Abdominal: Abdomen is soft. Bowel sounds are normal. He exhibits no distension. There is no abdominal tenderness. There is no rebound.   Musculoskeletal:         General: No tenderness or edema. Normal range of motion.      Cervical back: Neck supple.     Neurological: He is alert and oriented to person, place, and time. He has normal strength. No cranial nerve deficit or sensory deficit. GCS score is 15. GCS eye subscore is 4. GCS verbal subscore is 5. GCS motor subscore is 6.   Skin: Skin is warm and dry. Capillary refill takes less than 2 seconds. No pallor.   Psychiatric: He has a normal mood and affect.         ED Course   Procedures  Labs Reviewed   COMPREHENSIVE METABOLIC PANEL - Abnormal       Result Value    Sodium 136      Potassium 4.5      Chloride 102      CO2 24      Glucose 103      Blood Urea Nitrogen 21.8      Creatinine 0.94      Calcium 10.4 (*)     Protein Total 7.6      Albumin 3.9      Globulin 3.7 (*)     Albumin/Globulin Ratio 1.1      Bilirubin Total 0.4      ALP 82      ALT 18      AST 16      eGFR >60      Anion Gap 10.0      BUN/Creatinine Ratio 23     CBC WITH DIFFERENTIAL - Abnormal    WBC 7.47      RBC 4.12 (*)     Hgb 10.5 (*)     Hct 33.0 (*)     MCV 80.1      MCH 25.5 (*)     MCHC 31.8 (*)     RDW 14.9      Platelet 350      MPV 8.1      Neut % 60.8      Lymph % 24.2      Mono % 13.0      Eos % 0.9      Basophil %  0.8      Imm Grans % 0.3      Neut # 4.54      Lymph # 1.81      Mono # 0.97      Eos # 0.07      Baso # 0.06      Imm Gran # 0.02      NRBC% 0.0     URINALYSIS, REFLEX TO URINE CULTURE - Abnormal    Color, UA Yellow      Appearance, UA Clear      Specific Gravity, UA 1.019      pH, UA 5.5      Protein, UA Negative      Glucose, UA Normal      Ketones, UA Negative      Blood, UA Negative      Bilirubin, UA Negative      Urobilinogen, UA Normal      Nitrites, UA Negative      Leukocyte Esterase, UA Negative      RBC, UA 0-5      WBC, UA 0-5      Bacteria, UA None Seen      Squamous Epithelial Cells, UA None Seen      Mucous, UA Trace (*)    TROPONIN I - Normal    Troponin-I <0.010     B-TYPE NATRIURETIC PEPTIDE - Normal    Natriuretic Peptide 18.5     COVID/FLU A&B PCR - Normal    Influenza A PCR Not Detected      Influenza B PCR Not Detected      SARS-CoV-2 PCR Not Detected      Narrative:     The Xpert Xpress SARS-CoV-2/FLU/RSV plus is a rapid, multiplexed real-time PCR test intended for the simultaneous qualitative detection and differentiation of SARS-CoV-2, Influenza A, Influenza B, and respiratory syncytial virus (RSV) viral RNA in either nasopharyngeal swab or nasal swab specimens.         D DIMER, QUANTITATIVE - Normal    D-Dimer 0.48     CBC W/ AUTO DIFFERENTIAL    Narrative:     The following orders were created for panel order CBC Auto Differential.  Procedure                               Abnormality         Status                     ---------                               -----------         ------                     CBC with Differential[[id number]]       Abnormal            Final result                 Please view results for these tests on the individual orders.     EKG Readings: (Independently Interpreted)   Initial Reading: No STEMI. Previous EKG: Compared with most recent EKG Rhythm: Normal Sinus Rhythm. Heart Rate: 98. Ectopy: No Ectopy. Conduction: Normal. ST Segments: Normal ST Segments. T  Waves: Normal.   1435       Imaging Results              MRI Brain Without Contrast (Preliminary result)  Result time 04/09/25 00:59:58   Procedure changed from MRI Brain Limited Without Contrast     Preliminary result by Rashad Tolbert Jr., MD (04/09/25 00:59:58)                   Narrative:    START OF REPORT:  Technique: Multiplanar, multisequence magnetic resonance imaging of the brain was performed without intravenous contrast.    Comparison: Correlation is with CT study dated2025-04-08 23:45:41.    Clinical history: Weakness and fatigue for x 1 week. Reports BP of 60/40 over last week.    Findings:  Hemorrhage: No acute intracranial hemorrhage is identified.  Stroke: No abnormal signal is identified on the diffusion images to suggest acute infarct.  Extra axial spaces: The ventricles and sulci and basal cisterns all appear mildly suggesting an element of global cerebral atrophy.  Cerebellopontine angles: Normal.  Cerebral, cerebellar, and brainstem parenchyma: Moderate scattered periventricular and subcortical white matter signal abnormalities are seen. The main consideration is small vessel ischemic changes in a patient of this age.  Herniation: None.  Visualized paranasal sinuses: Normal.  Visualized orbits: The visualized orbits appear unremarkable.  Visualized upper cervical spine: Normal.  Sella and skull base: Normal.  Temporal bones and mastoids: Within normal limits.      Impression:  1. No abnormal signal is identified on the diffusion images to suggest acute infarct.  2. No acute intracranial process is identified. Details and other findings as discussed above.                                         CT Head Without Contrast (Preliminary result)  Result time 04/09/25 00:15:07      Preliminary result by Rashad Tolbert Jr., MD (04/09/25 00:15:07)                   Narrative:    START OF REPORT:  Technique: CT of the head was performed without intravenous contrast with axial as well as  coronal and sagittal images.    Comparison: None.    Dosage Information: Automated exposure control was utilized.    Clinical history: Dizziness.    Findings:  Hemorrhage: No acute intracranial hemorrhage is seen.  CSF spaces: The ventricles, sulci and basal cisterns all appear mildly prominent global cerebral atrophy.  Brain parenchyma: Moderate microvascular change is seen in portions of the periventricular and deep white matter tracts.  Cerebellum: Unremarkable.  Sella and skull base: The sella appears to be within normal limits for age.  Intracranial calcifications: Incidental note is made of bilateral choroid plexus calcification. Incidental note is made of some pineal region calcification. Incidental note is made of some calcification of the falx.  Calvarium: No acute linear or depressed skull fracture is seen.    Maxillofacial Structures:  Paranasal sinuses: The visualized paranasal sinuses appear clear with no mucoperiosteal thickening or air fluid levels identified.  Orbits: The orbits appear unremarkable.  Zygomatic arches: The zygomatic arches are intact and unremarkable.  Temporal bones and mastoids: The temporal bones and mastoids appear unremarkable.  TMJ: The mandibular condyles appear normally placed with respect to the mandibular fossa.      Impression:  1. No acute intracranial process identified. Details and findings as noted above.                                         X-Ray Chest PA And Lateral (Final result)  Result time 04/08/25 15:17:28      Final result by Jose Corbin MD (04/08/25 15:17:28)                   Impression:      No acute chest disease is identified.      Electronically signed by: Jose Corbin  Date:    04/08/2025  Time:    15:17               Narrative:    EXAMINATION:  XR CHEST PA AND LATERAL    CLINICAL HISTORY:  SOB;, .    COMPARISON:  August 28, 2024    FINDINGS:  No alveolar consolidation, effusion, or pneumothorax is seen.   The thoracic aorta is normal   cardiac silhouette, central pulmonary vessels and mediastinum are normal in size and are grossly unremarkable.   visualized osseous structures are grossly unremarkable.                                    X-Rays:   Independently Interpreted Readings:   Chest X-Ray: No infiltrates.  No acute abnormalities.     Medications   0.9% NaCl infusion (500 mLs Intravenous New Bag 4/8/25 2028)   0.9% NaCl infusion (500 mLs Intravenous New Bag 4/8/25 2328)     Medical Decision Making  71-year-old male presents for evaluation of fatigue and near syncopal episodes progressively worsening over the past month.  He is afebrile, reportedly hypotensive at home, blood pressure has improved here in the ED. He is currently asymptomatic at rest, denying any other associated symptoms to me including chest pain, shortness of breath or signs of bleeding.  Stool tested for occult blood negative x3.  Family reports he has been seen by his PCP, antihypertensives are being decreased incrementally.  He did take losartan 25 mg earlier today, has not taken his night dose.  Labs obtained in triage to expedite care with chest x-ray, unremarkable.  EKG nonischemic.  I have added urinalysis, D-dimer, and orthostatic vital signs.  I will give him a small saline bolus considering he has not been eating or drinking.  Reassess    Differential diagnosis includes it is not limited to arrhythmia, anemia, dehydration, orthostatic hypotension, electrolyte abnormality, VTE and others.    Problems Addressed:  Dizziness: acute illness or injury  Near syncope: acute illness or injury    Amount and/or Complexity of Data Reviewed  External Data Reviewed: labs, ECG and notes.  Labs: ordered. Decision-making details documented in ED Course.  Radiology: ordered and independent interpretation performed. Decision-making details documented in ED Course.  ECG/medicine tests: ordered and independent interpretation performed. Decision-making details documented in ED  "Course.    Risk  Prescription drug management.               ED Course as of 04/09/25 0137   Tue Apr 08, 2025 2057 Patient is orthostatic.  IV fluids are infusing.  Urinalysis is negative for evidence of infection.  D-dimer is negative as well.  He remains asymptomatic at this time. [RB]   2235 Reexamine.  Patient remains asymptomatic.  Blood pressure had increased for 1 reading, otherwise has been stable.  It is on the lower end, I will hold his losartan this evening.  IV fluids have finally finished infusing.  I will ambulate him prior to discharge to ensure there are no issues. [RB]   2316 Ambulated with difficulty, off balance, falling over. Admission for CVA work-up discussed in detail with the patient and his wife at bedside.  Patient does not want to stay in the hospital, he is adamant that he would like to go home this evening.  He does agree to obtain MRI of the brain to assess for CVA.  If it is positive, admission will be discussed.  If it is negative, discharge will be considered with ENT and PCP follow-up.  He is capable of making medical decisions. [RB]   Wed Apr 09, 2025   0008 CT head without acute ICH or mass on my review. Care turned over to Dr. Simpson. [RB]   0103 Discussed results, patient again wants to go home, will f/u with pcp, hold bp meds otnight and check bp before taking in am [BS]      ED Course User Index  [BS] Kathryn Simpson MD  [RB] Sandra Ramirez MD             BP (!) 156/90   Pulse 89   Temp 98.4 °F (36.9 °C) (Oral)   Resp (!) 21   Ht 5' 5" (1.651 m)   Wt 83.9 kg (185 lb)   SpO2 96%   BMI 30.79 kg/m²                 Clinical Impression:  Final diagnoses:  [R55] Near syncope  [R42] Dizziness (Primary)  [I95.1] Orthostatic hypotension  [I10] Benign essential HTN          ED Disposition Condition    Discharge Stable          ED Prescriptions    None       Follow-up Information       Follow up With Specialties Details Why Contact Info    Chio Villela MD Internal " Medicine Schedule an appointment as soon as possible for a visit   [address] [facility] [address]  [phone]      [facility] - Emergency Dept Emergency Medicine  As needed, If symptoms worsen [address] [facility] [id number] [phone]               [1]   Social History  Tobacco Use    Smoking status: Never     Passive exposure: Never    Smokeless tobacco: Never   Substance Use Topics    Alcohol use: Not Currently     Alcohol/week: 1.0 standard drink of alcohol     Types: 1 Cans of beer per week    Drug use: Never        Sandra Ramirez MD  04/09/25 0138

## 2025-04-09 NOTE — Clinical Note
Diagnosis: Syncope [206001]   Future Attending Provider: REYES, THAIRY G [[id number]]   Admit to which facility:: [facility] [[id number]]

## 2025-04-09 NOTE — DISCHARGE INSTRUCTIONS
Do not take your blood pressure medication tonight and check your blood pressure before taking your medication in the morning. Drink plenty of fluids and schedule follow up with your primary care provider

## 2025-04-09 NOTE — TELEPHONE ENCOUNTER
Patient informed on medication voices understanding also he mention his throat is hurting only on left side  hurts with swallowing and he has a  coating on his tongue that is hard to brush off.

## 2025-04-09 NOTE — TELEPHONE ENCOUNTER
He has an appointment but I'm going to send a medication to start to see if it will help without sending his BP too high. Especially watch if he gets headache laying down as supine hypertension is a risk.

## 2025-04-09 NOTE — TELEPHONE ENCOUNTER
----- Message from Iza sent at 4/9/2025 12:28 PM CDT -----  Regarding: advice  Who Called: Olinda Sheehan-WifeCaller is requesting assistance/information from provider's office.Symptoms (please be specific):  How long has patient had these symptoms:  List of preferred pharmacies on file (remove unneeded): [unfilled]If different, enter pharmacy into here including location and phone number: Preferred Method of Contact: Phone CallPatient's Preferred Phone Number on File: [phone] Best Call Back Number, if different:Additional Information: Pt's wife is calling to speak to someone about her .  He is really not doing well and she doesn't know if he can wait until tomorrow's apt.  He went to the hospital last night.

## 2025-04-10 PROBLEM — J96.02 ACUTE RESPIRATORY FAILURE WITH HYPOXIA AND HYPERCARBIA: Status: ACTIVE | Noted: 2025-01-01

## 2025-04-10 PROBLEM — J96.01 ACUTE RESPIRATORY FAILURE WITH HYPOXIA AND HYPERCARBIA: Status: ACTIVE | Noted: 2025-04-10

## 2025-04-10 NOTE — PROGRESS NOTES
[facility]  [facility] Medicine  Progress Note    Patient Name: River Sheehan Jr.  MRN: [id number]  Patient Class: IP- Inpatient   Admission Date: 4/9/2025  Length of Stay: 0 days  Attending Physician: Reyes, Thairy G, DO  Primary Care Provider: Chio Villela MD        Subjective     Principal Problem:Acute respiratory failure with hypoxia and hypercarbia        HPI:     79-year-old male presents ED for evaluation after a syncopal event.  He was in the ED the day prior for worsening weakness and increased frequency of near syncopal events and had an extensive workup including an MRI of the brain and a D-dimer that were unremarkable.  He started having a fever yesterday up to 101.4 as well as some nausea and malaise.  It was going to the bathroom and had a syncopal episode.  He denies any chest pain or shortness of breath or any prodrome.  He endorses some mild cough and sore throat but denies any abdominal pain or shortness of breath     The history is provided by the spouse and the patient. No  was used.        Overview/Hospital Course:  4/10/25-Patient is still SOB, which has been present for some time now.  It has gotten progressively worse.  He has been to the point that he has not gotten out of house for 1 month.He is currently requiring supplemental O2.  Will add Nebs and consult pulmonology.    Interval History:     Review of Systems   Constitutional:  Positive for activity change and fatigue.   HENT: Negative.     Eyes: Negative.    Respiratory:  Positive for chest tightness and shortness of breath.    Cardiovascular: Negative.    Gastrointestinal: Negative.    Endocrine: Negative.    Genitourinary: Negative.    Musculoskeletal: Negative.    Skin: Negative.    Allergic/Immunologic: Negative.    Neurological: Negative.    Hematological: Negative.    Psychiatric/Behavioral: Negative.       Objective:     Vital Signs (Most Recent):  Temp: 99.1 °F (37.3 °C)  (04/10/25 1328)  Pulse: 71 (04/10/25 1328)  Resp: (!) 35 (04/10/25 1328)  BP: (!) 156/79 (04/10/25 1328)  SpO2: 96 % (04/10/25 1328) Vital Signs (24h Range):  Temp:  [99.1 °F (37.3 °C)-99.3 °F (37.4 °C)] 99.1 °F (37.3 °C)  Pulse:  [69-97] 71  Resp:  [22-39] 35  SpO2:  [89 %-100 %] 96 %  BP: ()/(59-90) 156/79     Weight: 83.9 kg (185 lb)  Body mass index is 30.79 kg/m².  No intake or output data in the 24 hours ending 04/10/25 1349      Physical Exam  Constitutional:       Appearance: Normal appearance. He is normal weight.   HENT:      Head: Normocephalic and atraumatic.      Nose: Nose normal.      Mouth/Throat:      Mouth: Mucous membranes are moist.      Pharynx: Oropharynx is clear.   Eyes:      Extraocular Movements: Extraocular movements intact.      Conjunctiva/sclera: Conjunctivae normal.      Pupils: Pupils are equal, round, and reactive to light.   Cardiovascular:      Rate and Rhythm: Normal rate and regular rhythm.      Pulses: Normal pulses.      Heart sounds: Normal heart sounds.   Pulmonary:      Effort: Pulmonary effort is normal.      Breath sounds: Decreased air movement present.   Abdominal:      General: Bowel sounds are normal.      Palpations: Abdomen is soft.   Musculoskeletal:         General: Normal range of motion.      Cervical back: Normal range of motion and neck supple.   Skin:     General: Skin is warm and dry.      Capillary Refill: Capillary refill takes 2 to 3 seconds.   Neurological:      General: No focal deficit present.      Mental Status: He is alert and oriented to person, place, and time. Mental status is at baseline.   Psychiatric:         Mood and Affect: Mood normal.         Behavior: Behavior normal.         Thought Content: Thought content normal.         Judgment: Judgment normal.               Significant Labs: All pertinent labs within the past 24 hours have been reviewed.  BMP:   Recent Labs   Lab 04/10/25  0010   *   K 4.1      CO2 23   BUN 19.5  "  CREATININE 1.00   CALCIUM 9.4     CBC:   Recent Labs   Lab 04/08/25  1517 04/10/25  0010   WBC 7.47 9.18   HGB 10.5* 9.8*   HCT 33.0* 31.0*    330     CMP:   Recent Labs   Lab 04/08/25  1517 04/10/25  0010    135*   K 4.5 4.1    102   CO2 24 23   BUN 21.8 19.5   CREATININE 0.94 1.00   CALCIUM 10.4* 9.4   ALBUMIN 3.9 3.8   BILITOT 0.4 0.2   ALKPHOS 82 71   AST 16 17   ALT 18 18     Magnesium: No results for input(s): "MG" in the last 48 hours.    Significant Imaging: I have reviewed all pertinent imaging results/findings within the past 24 hours.      Assessment & Plan  Acute respiratory failure with hypoxia and hypercarbia  Patient with Hypoxic Respiratory failure which is Acute.  he is not on home oxygen. Supplemental oxygen was provided and noted-      .   Signs/symptoms of respiratory failure include- tachypnea, increased work of breathing, respiratory distress, and use of accessory muscles. Contributing diagnoses includes -  unknown  Labs and images were reviewed. Patient Has not had a recent ABG. Will treat underlying causes and adjust management of respiratory failure as follows- .  Consult pulmonology  Nebs  Wean O2  Follow labs  Primary hypertension  Patient's blood pressure range in the last 24 hours was: BP  Min: 96/64  Max: 164/82.The patient's inpatient anti-hypertensive regimen is listed below:  Current Antihypertensives  hydrALAZINE injection 10 mg, Every 6 hours PRN, Intravenous  losartan tablet 100 mg, Daily, Oral    Plan  - BP is controlled, no changes needed to their regimen  - .  Type 2 diabetes mellitus with stage 2 chronic kidney disease, without long-term current use of insulin  Creatine stable for now. BMP reviewed- noted Estimated Creatinine Clearance: 60.7 mL/min (based on SCr of 1 mg/dL). according to latest data. 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  Monitor UOP and serial BMP and adjust therapy as needed. Renally dose meds. Avoid nephrotoxic " medications and procedures.  Obstructive sleep apnea  Needs CPAP brought to hospital    VTE Risk Mitigation (From admission, onward)      None            Discharge Planning   AGUSTIN:      Code Status: Not on file   Medical Readiness for Discharge Date:   Discharge Plan A: Other (TBD)                        Feliciano Maya MD  Department of Hospital Medicine   [facility] - Emergency Dept     DC instructions

## 2025-04-10 NOTE — SUBJECTIVE & OBJECTIVE
Interval History:     Review of Systems   Constitutional:  Positive for activity change and fatigue.   HENT: Negative.     Eyes: Negative.    Respiratory:  Positive for chest tightness and shortness of breath.    Cardiovascular: Negative.    Gastrointestinal: Negative.    Endocrine: Negative.    Genitourinary: Negative.    Musculoskeletal: Negative.    Skin: Negative.    Allergic/Immunologic: Negative.    Neurological: Negative.    Hematological: Negative.    Psychiatric/Behavioral: Negative.       Objective:     Vital Signs (Most Recent):  Temp: 99.1 °F (37.3 °C) (04/10/25 1328)  Pulse: 71 (04/10/25 1328)  Resp: (!) 35 (04/10/25 1328)  BP: (!) 156/79 (04/10/25 1328)  SpO2: 96 % (04/10/25 1328) Vital Signs (24h Range):  Temp:  [99.1 °F (37.3 °C)-99.3 °F (37.4 °C)] 99.1 °F (37.3 °C)  Pulse:  [69-97] 71  Resp:  [22-39] 35  SpO2:  [89 %-100 %] 96 %  BP: ()/(59-90) 156/79     Weight: 83.9 kg (185 lb)  Body mass index is 30.79 kg/m².  No intake or output data in the 24 hours ending 04/10/25 1349      Physical Exam  Constitutional:       Appearance: Normal appearance. He is normal weight.   HENT:      Head: Normocephalic and atraumatic.      Nose: Nose normal.      Mouth/Throat:      Mouth: Mucous membranes are moist.      Pharynx: Oropharynx is clear.   Eyes:      Extraocular Movements: Extraocular movements intact.      Conjunctiva/sclera: Conjunctivae normal.      Pupils: Pupils are equal, round, and reactive to light.   Cardiovascular:      Rate and Rhythm: Normal rate and regular rhythm.      Pulses: Normal pulses.      Heart sounds: Normal heart sounds.   Pulmonary:      Effort: Pulmonary effort is normal.      Breath sounds: Decreased air movement present.   Abdominal:      General: Bowel sounds are normal.      Palpations: Abdomen is soft.   Musculoskeletal:         General: Normal range of motion.      Cervical back: Normal range of motion and neck supple.   Skin:     General: Skin is warm and dry.       "Capillary Refill: Capillary refill takes 2 to 3 seconds.   Neurological:      General: No focal deficit present.      Mental Status: He is alert and oriented to person, place, and time. Mental status is at baseline.   Psychiatric:         Mood and Affect: Mood normal.         Behavior: Behavior normal.         Thought Content: Thought content normal.         Judgment: Judgment normal.               Significant Labs: All pertinent labs within the past 24 hours have been reviewed.  BMP:   Recent Labs   Lab 04/10/25  0010   *   K 4.1      CO2 23   BUN 19.5   CREATININE 1.00   CALCIUM 9.4     CBC:   Recent Labs   Lab 04/08/25  1517 04/10/25  0010   WBC 7.47 9.18   HGB 10.5* 9.8*   HCT 33.0* 31.0*    330     CMP:   Recent Labs   Lab 04/08/25 1517 04/10/25  0010    135*   K 4.5 4.1    102   CO2 24 23   BUN 21.8 19.5   CREATININE 0.94 1.00   CALCIUM 10.4* 9.4   ALBUMIN 3.9 3.8   BILITOT 0.4 0.2   ALKPHOS 82 71   AST 16 17   ALT 18 18     Magnesium: No results for input(s): "MG" in the last 48 hours.    Significant Imaging: I have reviewed all pertinent imaging results/findings within the past 24 hours.  "

## 2025-04-10 NOTE — PLAN OF CARE
Pt is , 2 living children, no living will or POA.    04/10/25 0916   Discharge Assessment   Assessment Type Discharge Planning Assessment   Confirmed/corrected address, phone number and insurance Yes   Confirmed Demographics Correct on Facesheet   Source of Information patient   When was your last doctors appointment?   (PCP Chio Villela)   Communicated AGUSTIN with patient/caregiver Date not available/Unable to determine   People in Home spouse   Do you expect to return to your current living situation? Yes   Do you have help at home or someone to help you manage your care at home? Yes   Who are your caregiver(s) and their phone number(s)? wife Olinda [phone]   Prior to hospitilization cognitive status: Unable to Assess   Current cognitive status: Alert/Oriented   Walking or Climbing Stairs Difficulty yes   Mobility Management reported recent weakness but no equipment/ difficulty prior   Dressing/Bathing Difficulty no   Home Accessibility wheelchair accessible   Home Layout Able to live on 1st floor   Equipment Currently Used at Home CPAP;glucometer;blood pressure machine   Readmission within 30 days? No   Patient currently being followed by outpatient case management? No   Do you currently have service(s) that help you manage your care at home? No   Do you take prescription medications? Yes  (fills with [employer] or Monitor[employer])   Do you have prescription coverage? Yes   Coverage Medicare   Do you have any problems affording any of your prescribed medications? No   Is the patient taking medications as prescribed? yes   Who is going to help you get home at discharge? wife   How do you get to doctors appointments? car, drives self   Are you on dialysis? No   Do you take coumadin? No   Discharge Plan A Other  (TBD)   DME Needed Upon Discharge  other (see comments)  (TBD)   Discharge Plan discussed with: Patient   Transition of Care Barriers None   Physical Activity   On average, how many days per week do you  engage in moderate to strenuous exercise (like a brisk walk)? 0 days  (no formal exercise but active)   On average, how many minutes do you engage in exercise at this level? 0 min   Financial Resource Strain   How hard is it for you to pay for the very basics like food, housing, medical care, and heating? Not very   Housing Stability   In the last 12 months, was there a time when you were not able to pay the mortgage or rent on time? N   At any time in the past 12 months, were you homeless or living in a shelter (including now)? N   Transportation Needs   In the past 12 months, has lack of transportation kept you from medical appointments or from getting medications? no   In the past 12 months, has lack of transportation kept you from meetings, work, or from getting things needed for daily living? No   Food Insecurity   Within the past 12 months, you worried that your food would run out before you got the money to buy more. Never true   Within the past 12 months, the food you bought just didn't last and you didn't have money to get more. Never true   Stress   Do you feel stress - tense, restless, nervous, or anxious, or unable to sleep at night because your mind is troubled all the time - these days? Rather much  (recent death of daughter/ on meds)   Social Isolation   How often do you feel lonely or isolated from those around you?  Sometimes   Alcohol Use   Q1: How often do you have a drink containing alcohol? Never   Q2: How many drinks containing alcohol do you have on a typical day when you are drinking? None   Q3: How often do you have six or more drinks on one occasion? Never   Utilities   In the past 12 months has the electric, gas, oil, or water company threatened to shut off services in your home? No   Health Literacy   How often do you need to have someone help you when you read instructions, pamphlets, or other written material from your doctor or pharmacy? Never   OTHER   Name(s) of People in Home wife  Olinda

## 2025-04-10 NOTE — CONSULTS
[facility] - Observation Unit  Pulmonary Critical Care Note    Patient Name: River Sheehan Jr.  MRN: [id number]  Admission Date: 4/9/2025  Hospital Length of Stay: 0 days  Code Status: No Order  Attending Provider: Reyes, Thairy G, DO  Primary Care Provider: Chio Villela MD     Subjective:     HPI:   This is a 79 year old female who is admitted in the hospital for further evaluation after the syncopal episode he had at home 2 days back. The wife reported that he is usually physically active at home and walks with his dog at home in their yard and go get Sob at times on exertion but nothing major. He also has RERE for which he uses CPAP and is complaint with it. Lately he has been having issues with his BP and the cardiologist they see have been adjusting his medications. Per wife he has been having episodes of black outs recently. He also had fever at home and had the episodes of Sob prior to fever. He also had remote history of asthma when he was kid and no issues after reaching adulthood and never a smoker. He also got colonoscopy done recently and got polypectomy done.He complained of dry cough and sore throat, fatigue and denied any chest pain.  Pulmonology consulted regarding his dyspnea.     Hospital Course/Significant events:      24 Hour Interval History:      Past Medical History:   Diagnosis Date    Acid reflux     Adenomatous polyp of ascending colon 09/20/2021    Arthritis     Asymptomatic stenosis of left carotid artery     CAD (coronary artery disease)     Carotid artery stenosis      3/26/25 less than 50% bilateral    Colon polyps 02/20/2025    Transvers polyp benigne mucosa, Ascending Colon Tubular adenoma, Rectum hyperplastic    COVID-19 07/06/2022    Depression     Diabetes mellitus     Gout     Hearing loss     High cholesterol     HTN (hypertension)     Kidney stone     Macrocytic anemia     Osteoporosis     Seasonal allergies        Past Surgical History:   Procedure  Laterality Date    CAROTID ENDARTERECTOMY Left 09/12/2024    Procedure: ENDARTERECTOMY-CAROTID;  Surgeon: Hany Pendleton MD;  Location: [facility] OR;  Service: Peripheral Vascular;  Laterality: Left;    COLONOSCOPY W/ BIOPSIES  09/20/2021    Dr. Chun Smith    COLONOSCOPY W/ BIOPSIES  02/20/2025    CYSTOSCOPY      EXTRACAPSULAR EXTRACTION OF CATARACT      HERNIA REPAIR      LITHOTRIPSY  09/2023    RETROGRADE PYELOGRAPHY      WISDOM TOOTH EXTRACTION         Social History[1]        Current Outpatient Medications   Medication Instructions    albuterol (PROAIR HFA) 90 mcg/actuation inhaler 2 puffs, Inhalation, Every 6 hours PRN, Rescue    allopurinoL (ZYLOPRIM) 100 MG tablet TAKE 1 TABLET EVERY DAY    aspirin (ECOTRIN) 81 mg, Daily    atorvastatin (LIPITOR) 20 MG tablet TAKE 1 TABLET AT BEDTIME    blood sugar diagnostic (TRUE METRIX GLUCOSE TEST STRIP) Strp 1 strip, Misc.(Non-Drug; Combo Route), Daily    blood-glucose meter (TRUE METRIX AIR GLUCOSE METER) kit Test daily for DM II E11.9    busPIRone (BUSPAR) 10 MG tablet TAKE 1/2 TO 1 TABLET THREE TIMES DAILY    cinnamon bark 1,000 mg, Daily    citalopram (CELEXA) 20 mg, Oral    gabapentin (NEURONTIN) 100 MG capsule TAKE 2 CAPSULES (200 MG TOTAL) THREE TIMES DAILY    glucosamine/chondr navarro A sod (OSTEO BI-FLEX ORAL) Daily    KRILL OIL ORAL 500 mg, Daily    losartan (COZAAR) 100 mg, Daily    metFORMIN (GLUCOPHAGE) 500 mg, Oral, 2 times daily with meals    midodrine (PROAMATINE) 2.5 mg, Oral, 2 times daily with meals    montelukast (SINGULAIR) 10 mg, Oral, Nightly    pantoprazole (PROTONIX) 40 MG tablet TAKE 1 TABLET EVERY DAY    pioglitazone (ACTOS) 15 mg, Oral, Daily    tamsulosin (FLOMAX) 0.4 mg, Daily       Review of patient's allergies indicates:  No Known Allergies     Current Inpatient Medications   aspirin  81 mg Oral Daily    atorvastatin  20 mg Oral QHS    busPIRone  10 mg Oral TID    citalopram  20 mg Oral Daily    gabapentin  200 mg Oral TID    losartan   100 mg Oral Daily    metFORMIN  500 mg Oral BID WM    midodrine  2.5 mg Oral BID WM    montelukast  10 mg Oral QHS    [START ON 4/11/2025] pantoprazole  40 mg Oral Daily    pioglitazone  15 mg Oral Daily       Current Intravenous Infusions        Review of Systems   Constitutional:  Positive for fever and malaise/fatigue.   HENT: Negative.     Respiratory:  Positive for cough (dry) and shortness of breath.    Cardiovascular:  Negative for chest pain, palpitations, orthopnea and leg swelling.   Genitourinary: Negative.    Musculoskeletal: Negative.    Neurological:  Positive for dizziness and loss of consciousness.          Objective:       Intake/Output Summary (Last 24 hours) at 4/10/2025 1507  Last data filed at 4/10/2025 1356  Gross per 24 hour   Intake --   Output 560 ml   Net -560 ml         Vital Signs (Most Recent):  Temp: 100.1 °F (37.8 °C) (04/10/25 1405)  Pulse: 71 (04/10/25 1405)  Resp: (!) 35 (04/10/25 1328)  BP: (!) 155/80 (04/10/25 1405)  SpO2: 96 % (04/10/25 1405)  Body mass index is 30.79 kg/m².  Weight: 83.9 kg (185 lb) Vital Signs (24h Range):  Temp:  [99.1 °F (37.3 °C)-100.1 °F (37.8 °C)] 100.1 °F (37.8 °C)  Pulse:  [69-97] 71  Resp:  [22-39] 35  SpO2:  [89 %-100 %] 96 %  BP: ()/(59-90) 155/80     Physical Exam  Vitals and nursing note reviewed.   Constitutional:       Appearance: He is obese.      Comments: Drowsy    HENT:      Mouth/Throat:      Mouth: Mucous membranes are moist.      Pharynx: No oropharyngeal exudate or posterior oropharyngeal erythema.   Cardiovascular:      Rate and Rhythm: Normal rate and regular rhythm.   Pulmonary:      Effort: Pulmonary effort is normal. No respiratory distress.      Breath sounds: Normal breath sounds. No stridor. No wheezing or rales.      Comments: Patient is breathing through nap and was dozing of in the middle of conversation and actively having apneic episodes  Musculoskeletal:         General: No swelling or tenderness.   Skin:     General:  "Skin is warm.   Neurological:      Mental Status: He is oriented to person, place, and time.   Psychiatric:         Thought Content: Thought content normal.           Lines/Drains/Airways       Drain  Duration             Male External Urinary Catheter 04/10/25 0831 <1 day              Peripheral Intravenous Line  Duration                  Peripheral IV - Single Lumen 18 G Left;Posterior Hand -- days                    Significant Labs:    Lab Results   Component Value Date    WBC 9.18 04/10/2025    HGB 9.8 (L) 04/10/2025    HCT 31.0 (L) 04/10/2025    MCV 80.5 04/10/2025     04/10/2025           BMP  Lab Results   Component Value Date     (L) 04/10/2025    K 4.1 04/10/2025    CO2 23 04/10/2025    BUN 19.5 04/10/2025    CREATININE 1.00 04/10/2025    CALCIUM 9.4 04/10/2025    AGAP 10.0 04/10/2025    EGFRNONAA >60 06/09/2022         ABG  No results for input(s): "PH", "PO2", "PCO2", "HCO3", "POCBASEDEF" in the last 168 hours.    Mechanical Ventilation Support:         Significant Imaging:  I have reviewed the pertinent imaging within the past 24 hours.        Assessment/Plan:     Assessment  SOB , multifactorial   RERE  IRLANDA   Syncope    Xray chest reviewed no issues acutely.       Plan  Will do CT chest non-contrast  for better view of his lungs   Will repeat echo   Resp panel, ABG  PT/ot evaluation   Supplement iron  CPAP when sleeping   Outpatient PFTs and pulmonology referral.    MICHAEL GAINES MD  Internal Medicine - PGY-1             [1]   Social History  Socioeconomic History    Marital status:    Tobacco Use    Smoking status: Never     Passive exposure: Never    Smokeless tobacco: Never   Substance and Sexual Activity    Alcohol use: Not Currently     Alcohol/week: 1.0 standard drink of alcohol     Types: 1 Cans of beer per week    Drug use: Never    Sexual activity: Yes     Social Drivers of Health     Financial Resource Strain: Low Risk  (4/10/2025)    Overall Financial Resource " Strain (CARDIA)     Difficulty of Paying Living Expenses: Not very hard   Food Insecurity: No Food Insecurity (4/10/2025)    Hunger Vital Sign     Worried About Running Out of Food in the Last Year: Never true     Ran Out of Food in the Last Year: Never true   Transportation Needs: No Transportation Needs (4/10/2025)    PRAPARE - Transportation     Lack of Transportation (Medical): No     Lack of Transportation (Non-Medical): No   Physical Activity: Inactive (4/10/2025)    Exercise Vital Sign     Days of Exercise per Week: 0 days     Minutes of Exercise per Session: 0 min   Stress: Stress Concern Present (4/10/2025)    [employer] - Occupational Stress Questionnaire     Feeling of Stress : Rather much   Housing Stability: Low Risk  (4/10/2025)    Housing Stability Vital Sign     Unable to Pay for Housing in the Last Year: No     Homeless in the Last Year: No

## 2025-04-10 NOTE — ED PROVIDER NOTES
Encounter Date: 4/9/2025       History     Chief Complaint   Patient presents with    Loss of Consciousness     Pt arrives via [facility], EMS / Pt reports syncope today while getting up from toilet. Pt reports he was here yesterday for same complaint. Pt was febrile at home, 101.6, pt took Tylenol at home, afebrile in triage. EMS .      79-year-old male presents ED for evaluation after a syncopal event.  He was in the ED the day prior for worsening weakness and increased frequency of near syncopal events and had an extensive workup including an MRI of the brain and a D-dimer that were unremarkable.  He started having a fever yesterday up to 101.4 as well as some nausea and malaise.  It was going to the bathroom and had a syncopal episode.  He denies any chest pain or shortness of breath or any prodrome.  He endorses some mild cough and sore throat but denies any abdominal pain or shortness of breath    The history is provided by the spouse and the patient. No  was used.     Review of patient's allergies indicates:  No Known Allergies  Past Medical History:   Diagnosis Date    Acid reflux     Adenomatous polyp of ascending colon 09/20/2021    Arthritis     Asymptomatic stenosis of left carotid artery     CAD (coronary artery disease)     Carotid artery stenosis     US 3/26/25 less than 50% bilateral    Colon polyps 02/20/2025    Transvers polyp benigne mucosa, Ascending Colon Tubular adenoma, Rectum hyperplastic    COVID-19 07/06/2022    Depression     Diabetes mellitus     Gout     Hearing loss     High cholesterol     HTN (hypertension)     Kidney stone     Macrocytic anemia     Osteoporosis     Seasonal allergies      Past Surgical History:   Procedure Laterality Date    CAROTID ENDARTERECTOMY Left 09/12/2024    Procedure: ENDARTERECTOMY-CAROTID;  Surgeon: Hany Pendleton MD;  Location: [facility];  Service: Peripheral Vascular;  Laterality: Left;    COLONOSCOPY W/ BIOPSIES  09/20/2021     Dr. Chun Smith    COLONOSCOPY W/ BIOPSIES  02/20/2025    CYSTOSCOPY      EXTRACAPSULAR EXTRACTION OF CATARACT      HERNIA REPAIR      LITHOTRIPSY  09/2023    RETROGRADE PYELOGRAPHY      WISDOM TOOTH EXTRACTION       Family History   Problem Relation Name Age of Onset    Bladder Cancer Mother      Hypertension Sister      Asthma Sister      Diabetes Sister      Lung cancer Sister      Hypertension Brother      Diabetes Brother      Coronary artery disease Brother      Atrial fibrillation Brother      Esophageal cancer Brother      Kidney cancer Brother      Hypertension Brother      Coronary artery disease Brother      Diabetes Brother      Progressive Supranuclear Palsy Brother      Coronary artery disease Brother      Pancreatic cancer Brother      Colon polyps Brother      Heart murmur Brother       Social History[1]  Review of Systems   Constitutional:  Positive for appetite change, fatigue and fever.   Respiratory:  Positive for cough.    Gastrointestinal:  Positive for nausea and vomiting.   Neurological:  Positive for syncope and weakness.       Physical Exam     Initial Vitals [04/09/25 2246]   BP Pulse Resp Temp SpO2   (!) 149/74 89 (!) 34 99.3 °F (37.4 °C) 96 %      MAP       --         Physical Exam    Nursing note and vitals reviewed.  Constitutional: He appears well-developed and well-nourished. He is not diaphoretic. No distress.   HENT:   Head: Normocephalic and atraumatic.   Nose: Nose normal. Mouth/Throat: Oropharynx is clear and moist.   Eyes: Conjunctivae and EOM are normal. Pupils are equal, round, and reactive to light.   Neck: Trachea normal. Neck supple.   Normal range of motion.  Cardiovascular:  Normal rate, regular rhythm, normal heart sounds and intact distal pulses.     Exam reveals no gallop and no friction rub.       No murmur heard.  Pulmonary/Chest: Breath sounds normal. No respiratory distress. He has no wheezes. He has no rhonchi. He has no rales. He exhibits no tenderness.    Abdominal: Abdomen is soft. Bowel sounds are normal. He exhibits no distension and no mass. There is no abdominal tenderness. There is no rebound.   Musculoskeletal:         General: No tenderness or edema. Normal range of motion.      Cervical back: Normal range of motion and neck supple.      Lumbar back: Normal. No tenderness. Normal range of motion.     Neurological: He is alert and oriented to person, place, and time. He has normal strength. No cranial nerve deficit or sensory deficit.   Skin: Skin is warm and dry. Capillary refill takes less than 2 seconds. No rash and no abscess noted. No erythema. No pallor.   Psychiatric: He has a normal mood and affect. His behavior is normal. Judgment and thought content normal.         ED Course   Critical Care    Date/Time: 4/10/2025 3:02 AM    Performed by: Kathryn Simpson MD  Authorized by: Kathryn Simpson MD  Direct patient critical care time: 30 minutes  Total critical care time (exclusive of procedural time) : 30 minutes  Critical care was necessary to treat or prevent imminent or life-threatening deterioration of the following conditions: dehydration.  Critical care was time spent personally by me on the following activities: development of treatment plan with patient or surrogate, discussions with consultants, evaluation of patient's response to treatment, obtaining history from patient or surrogate, examination of patient, ordering and performing treatments and interventions, ordering and review of radiographic studies, ordering and review of laboratory studies, pulse oximetry, re-evaluation of patient's condition and review of old charts.        Labs Reviewed   COMPREHENSIVE METABOLIC PANEL - Abnormal       Result Value    Sodium 135 (*)     Potassium 4.1      Chloride 102      CO2 23      Glucose 175 (*)     Blood Urea Nitrogen 19.5      Creatinine 1.00      Calcium 9.4      Protein Total 7.2      Albumin 3.8      Globulin 3.4      Albumin/Globulin  Ratio 1.1      Bilirubin Total 0.2      ALP 71      ALT 18      AST 17      eGFR >60      Anion Gap 10.0      BUN/Creatinine Ratio 20     URINALYSIS, REFLEX TO URINE CULTURE - Abnormal    Color, UA Yellow      Appearance, UA Clear      Specific Gravity, UA 1.025      pH, UA 5.5      Protein, UA Negative      Glucose, UA Normal      Ketones, UA Negative      Blood, UA Negative      Bilirubin, UA Negative      Urobilinogen, UA Normal      Nitrites, UA Negative      Leukocyte Esterase, UA Negative      RBC, UA 0-5      WBC, UA 0-5      Bacteria, UA None Seen      Squamous Epithelial Cells, UA None Seen      Mucous, UA Occasional (*)     Hyaline Casts, UA 0-2 (*)    CBC WITH DIFFERENTIAL - Abnormal    WBC 9.18      RBC 3.85 (*)     Hgb 9.8 (*)     Hct 31.0 (*)     MCV 80.5      MCH 25.5 (*)     MCHC 31.6 (*)     RDW 15.2      Platelet 330      MPV 8.3      Neut % 75.5      Lymph % 15.7      Mono % 7.6      Eos % 0.2      Basophil % 0.7      Imm Grans % 0.3      Neut # 6.93      Lymph # 1.44      Mono # 0.70      Eos # 0.02      Baso # 0.06      Imm Gran # 0.03      NRBC% 0.0     LACTIC ACID, PLASMA - Abnormal    Lactic Acid Level 2.5 (*)    IRON AND TIBC - Abnormal    Iron Binding Capacity Unsaturated 296 (*)     Iron Level 30 (*)     Transferrin 298      Iron Binding Capacity Total 326      Iron Saturation 9 (*)    COVID/RSV/FLU A&B PCR - Normal    Influenza A PCR Not Detected      Influenza B PCR Not Detected      Respiratory Syncytial Virus PCR Not Detected      SARS-CoV-2 PCR Not Detected      Narrative:     The Xpert Xpress SARS-CoV-2/FLU/RSV plus is a rapid, multiplexed real-time PCR test intended for the simultaneous qualitative detection and differentiation of SARS-CoV-2, Influenza A, Influenza B, and respiratory syncytial virus (RSV) viral RNA in either nasopharyngeal swab or nasal swab specimens.         LACTIC ACID, PLASMA - Normal    Lactic Acid Level 2.1     TROPONIN I - Normal    Troponin-I <0.010     TSH  - Normal    TSH 0.737     LIPASE - Normal    Lipase Level 55     STREP GROUP A BY PCR - Normal    STREP A PCR ([facility]) Not Detected      Narrative:     The Xpert Xpress Strep A test is a rapid, qualitative in vitro diagnostic test for the detection of Streptococcus pyogenes (Group A ß-hemolytic Streptococcus, Strep A) in throat swab specimens from patients with signs and symptoms of pharyngitis.     RETICULOCYTES - Normal    Retic Cnt Auto 1.75      RET# 0.0630     FOLATE - Normal    Folate Level 16.2     VITAMIN B12 - Normal    Vitamin B12 683     LACTATE DEHYDROGENASE - Normal    Lactate Dehydrogenase 157     CK - Normal    Creatine Kinase 111     BLOOD CULTURE [facility]   BLOOD CULTURE [facility]   CBC W/ AUTO DIFFERENTIAL    Narrative:     The following orders were created for panel order CBC auto differential.  Procedure                               Abnormality         Status                     ---------                               -----------         ------                     CBC with Differential[[id number]]       Abnormal            Final result                 Please view results for these tests on the individual orders.   SICKLE CELL SCREEN   DIRECT ANTIGLOBULIN TEST   TYPE & SCREEN   POCT GLUCOSE MONITORING CONTINUOUS     EKG Readings: (Independently Interpreted)   Rhythm: Normal Sinus Rhythm. Heart Rate: 91.   2258  Incomplete rbbb, lvh       Imaging Results              X-Ray Chest AP Portable (In process)                   X-Rays:   Independently Interpreted Readings:   Chest X-Ray: Normal heart size.  No infiltrates.  No acute abnormalities.     Medications   lactated ringers bolus 1,000 mL (1,000 mLs Intravenous New Bag 4/10/25 0247)   lactated ringers bolus 1,000 mL (0 mLs Intravenous Stopped 4/10/25 0208)   ondansetron injection 4 mg (4 mg Intravenous Given 4/10/25 0109)     Medical Decision Making  Given patient's presentation, differential diagnosis includes but is not limited to viral uri, dehydration,  dysrhythmia, beltran, anemia, sepsis, uti, pneumonia, acs  To evaluate these  possible etiologies cbc,c mp, trop, lactic, flu/covid, ua, EKG, cxr were ordered and reviewed  Lactic elevated, stable h/h. Remainder of wo rkup unremarkable  Orthostatic, ivf  Obs for syncope    Problems Addressed:  Chronic anemia: chronic illness or injury  Fever, unspecified fever cause: acute illness or injury  Lactic acidosis: acute illness or injury that poses a threat to life or bodily functions  Malaise: acute illness or injury that poses a threat to life or bodily functions  Orthostatic hypotension: acute illness or injury that poses a threat to life or bodily functions  Syncope: acute illness or injury that poses a threat to life or bodily functions  Syncope, unspecified syncope type: acute illness or injury that poses a threat to life or bodily functions    Amount and/or Complexity of Data Reviewed  Independent Historian: spouse  External Data Reviewed: labs, radiology, ECG and notes.     Details: Visit yesterday for weakness and syncope, recommended admit but pt declined at that time  Labs: ordered. Decision-making details documented in ED Course.  Radiology: ordered and independent interpretation performed.  ECG/medicine tests: ordered and independent interpretation performed.    Risk  OTC drugs.  Prescription drug management.  Decision regarding hospitalization.    Critical Care  Total time providing critical care: 30 minutes               ED Course as of 04/10/25 0303   Thu Apr 10, 2025   0023 Hemoglobin(!): 9.8  Has been stable, had fobt yesterday that was negative [BS]   0100 eklg obtained at 1241 rate of 93 normal sinus rhythm with incomplete right bundle-branch block [BS]   0121 Discussed with wife and patient and they are agreeable with obs admission at this time [BS]      ED Course User Index  [BS] Kathryn Simpson MD                           Clinical Impression:  Final diagnoses:  [R55] Syncope  [I95.1] Orthostatic  hypotension (Primary)  [R55] Syncope, unspecified syncope type  [R50.9] Fever, unspecified fever cause  [E87.20] Lactic acidosis  [R53.81] Malaise  [D64.9] Chronic anemia          ED Disposition Condition    Observation Stable                    [1]   Social History  Tobacco Use    Smoking status: Never     Passive exposure: Never    Smokeless tobacco: Never   Substance Use Topics    Alcohol use: Not Currently     Alcohol/week: 1.0 standard drink of alcohol     Types: 1 Cans of beer per week    Drug use: Never        Kathryn Simpson MD  04/10/25 0303

## 2025-04-10 NOTE — ASSESSMENT & PLAN NOTE
Patient with Hypoxic Respiratory failure which is Acute.  he is not on home oxygen. Supplemental oxygen was provided and noted-      .   Signs/symptoms of respiratory failure include- tachypnea, increased work of breathing, respiratory distress, and use of accessory muscles. Contributing diagnoses includes - unknown Labs and images were reviewed. Patient Has not had a recent ABG. Will treat underlying causes and adjust management of respiratory failure as follows- .  Consult pulmonology  Nebs  Wean O2  Follow labs

## 2025-04-10 NOTE — HOSPITAL COURSE
4/10/25-Patient is still SOB, which has been present for some time now.  It has gotten progressively worse.  He has been to the point that he has not gotten out of house for 1 month.He is currently requiring supplemental O2.  Will add Nebs and consult pulmonology.

## 2025-04-10 NOTE — HPI
79-year-old male presents ED for evaluation after a syncopal event.  He was in the ED the day prior for worsening weakness and increased frequency of near syncopal events and had an extensive workup including an MRI of the brain and a D-dimer that were unremarkable.  He started having a fever yesterday up to 101.4 as well as some nausea and malaise.  It was going to the bathroom and had a syncopal episode.  He denies any chest pain or shortness of breath or any prodrome.  He endorses some mild cough and sore throat but denies any abdominal pain or shortness of breath     The history is provided by the spouse and the patient. No  was used.

## 2025-04-10 NOTE — ASSESSMENT & PLAN NOTE
Patient's blood pressure range in the last 24 hours was: BP  Min: 96/64  Max: 164/82.The patient's inpatient anti-hypertensive regimen is listed below:  Current Antihypertensives  hydrALAZINE injection 10 mg, Every 6 hours PRN, Intravenous  losartan tablet 100 mg, Daily, Oral    Plan  - BP is controlled, no changes needed to their regimen  - .

## 2025-04-10 NOTE — ASSESSMENT & PLAN NOTE
Creatine stable for now. BMP reviewed- noted Estimated Creatinine Clearance: 60.7 mL/min (based on SCr of 1 mg/dL). according to latest data. 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  Monitor UOP and serial BMP and adjust therapy as needed. Renally dose meds. Avoid nephrotoxic medications and procedures.

## 2025-04-11 NOTE — PROCEDURES
[facility]  Speech Language Pathology Department  Modified Barium Swallow (MBS) Study    Patient Name:  River Sheehan Jr.   MRN:  [id number]    Recommendations     General recommendations:  dysphagia therapy  Repeat MBS study: as clinically warranted  Solid texture recommendation:  NPO  Liquid consistency recommendation: NPO   Medications: crushed in puree    History     79-year-old male presents for evaluation of generalized weakness and fatigue that began last month, has been progressively worsening over the past week. He has had multiple near syncopal and syncopal episodes. Denies any head trauma. Wife became concerned this morning as he was unable to get out of bed and walk. Blood pressure was taken and noted to be 60s/40s. Of note, he states he has not been eating or drinking well, has lost some weight over the past month due to this.     Past Medical History:   Diagnosis Date    Acid reflux     Adenomatous polyp of ascending colon 09/20/2021    Arthritis     Asymptomatic stenosis of left carotid artery     CAD (coronary artery disease)     Carotid artery stenosis     US 3/26/25 less than 50% bilateral    Colon polyps 02/20/2025    Transvers polyp benigne mucosa, Ascending Colon Tubular adenoma, Rectum hyperplastic    COVID-19 07/06/2022    Depression     Diabetes mellitus     Gout     Hearing loss     High cholesterol     HTN (hypertension)     Kidney stone     Macrocytic anemia     Osteoporosis     Seasonal allergies      Past Surgical History:   Procedure Laterality Date    CAROTID ENDARTERECTOMY Left 09/12/2024    Procedure: ENDARTERECTOMY-CAROTID;  Surgeon: Hany Pendleton MD;  Location: [facility];  Service: Peripheral Vascular;  Laterality: Left;    COLONOSCOPY W/ BIOPSIES  09/20/2021    Dr. Chun Smith    COLONOSCOPY W/ BIOPSIES  02/20/2025    CYSTOSCOPY      EXTRACAPSULAR EXTRACTION OF CATARACT      HERNIA REPAIR      LITHOTRIPSY  09/2023    RETROGRADE PYELOGRAPHY       WISDOM TOOTH EXTRACTION       A MBS Study was completed to assess the efficiency of his swallow function, rule out aspiration and make recommendations regarding safe dietary consistencies, effective compensatory strategies, and safe eating environment.     Home diet texture/consistency: Regular and thin liquids  Current Method of Nutrition: NPO    Imaging     Results for orders placed during the hospital encounter of 04/08/25    MRI Brain Without Contrast    Narrative  EXAMINATION:  MRI BRAIN WITHOUT CONTRAST    CLINICAL HISTORY:  dizziness;  Dizziness and giddiness.  Weakness and fatigue for 1 week.    TECHNIQUE:  Multiplanar multisequence MR imaging of the brain was performed without contrast.    COMPARISON:  CT head without contrast 04/08/2025 and 07/15/2024..    FINDINGS:  There is no evidence of diffusion restriction.  There is a very small amount of T2 shine through noted within the right insular cortex.  Patchy foci of chronic small vessel ischemic changes are noted in the supratentorial periventricular and subcortical white matter with additional involvement of the right external capsule and frontal operculum.  Mild generalized brain atrophy.    No acute intracranial hemorrhage, hydrocephalus, or herniation.    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  Remaining arterial flow voids are normal in appearance.    Bilateral lens replacements, otherwise orbits are unremarkable.    Paranasal sinuses are clear.  Mastoid air cells are clear.    Impression  No acute intracranial pathology.    Moderate chronic small vessel ischemic changes of the supratentorial white matter.  The chronic ischemic changes of the right external capsule and frontal operculum are new when compared to CT head without contrast from 07/15/2024.    No significant discrepancy between preliminary and final reports.      Electronically signed by: Preston Diaz  Date:    04/09/2025  Time:    06:31    Subjective     Patient  lethargic but able to rouse with verbal/tactile stimulation.    Spiritual/Cultural/Druze Beliefs/Practices that affect care: no  Pain/Comfort: Pain Rating 1: 0/10    Fluoroscopic Findings     Setup  Upright in bed  Unable to self feed due to lethargy/mental status  Poor head control    Visualization  Lateral view    Oral Phase:   Reduced lip closure  Bolus holding  Prolonged/disorganized bolus formation  Piecemeal deglutition  Reduced bolus cohesion  Cues required to initiate anterior-posterior transport  Reduced bolus control      Pharyngeal Phase:   Reduced base of tongue retraction  Adequate epiglottic deflection  Adequate hyolaryngeal excursion  Adequate airway protection  Reduced UES opening    Consistency Fed by Laryngeal Penetration Aspiration Residue   Thin liquid by cup SLP During the swallow  Cleared spontaneously None Trace   Thin liquid by straw SLP During the swallow  Cleared spontaneously None Mild-moderate   Puree SLP None None Mild-moderate     Cervical Esophageal Phase:   decreased UES opening    Additional Comments:  Change in mental status/AIMEE noted. SLP transported patient back to room and notified RN. At the time of exam, patient able to bilaterally squeeze hands with no facial droop observed.    Assessment     Patient exhibited mild-moderate oropharyngeal dysphagia characterized by the findings noted above. Both swallow safety and efficiency are impaired.     Patient appears to be at high risk for aspiration related pneumonia when considering complicated medical status and decreased AIMEE .     **SLP to follow up at bedside with PO trials prior to initiating PO diet.    Outcome Measures     Functional Oral Intake Scale: 1 - Nothing by mouth    Goals     Multidisciplinary Problems       SLP Goals       Not on file                  Education     Patient provided with verbal education regarding ST POC.  Understanding was verbalized, however additional teaching warranted.    Plan     SLP  Follow-Up:  Yes    Patient to be seen:  5 x/week   Plan of Care reviewed with:  patient     Time Tracking     SLP Treatment Date:   04/11/25  Speech Start Time:  1200  Speech Stop Time:  1220     Speech Total Time (min):  20 min    Billable minutes:   Motion Fluoroscopic Evaluation, Video Recording, 20 minutes     04/11/2025

## 2025-04-11 NOTE — PROGRESS NOTES
Pharmacokinetic Initial Assessment: IV Vancomycin    Assessment/Plan:    Initiate intravenous vancomycin with loading dose of 1750 mg once followed by a maintenance dose of vancomycin 1250mg IV every 18 hours  Desired empiric serum trough concentration is 15 to 20 mcg/mL  Draw vancomycin trough level 60 min prior to fourth dose on 4/13 at approximately 2300  Pharmacy will continue to follow and monitor vancomycin.      Please contact pharmacy at extension [phone] with any questions regarding this assessment.     Thank you for the consult,   Christine Quevedoard       Patient brief summary:  River Sheehan Jr. is a 79 y.o. male initiated on antimicrobial therapy with IV Vancomycin for treatment of suspected sepsis    Drug Allergies:   Review of patient's allergies indicates:  No Known Allergies    Actual Body Weight:   83.9 kg    Renal Function:   Estimated Creatinine Clearance: 60.1 mL/min (based on SCr of 1.01 mg/dL).,     Dialysis Method (if applicable):  N/A    CBC (last 72 hours):  Recent Labs   Lab Result Units 04/10/25  0010 04/11/25  0331   WBC x10(3)/mcL 9.18 10.76   Hgb g/dL 9.8* 10.5*   Hct % 31.0* 33.5*   Platelet x10(3)/mcL 330 331   Mono % % 7.6 15.3   Eos % % 0.2 0.5   Basophil % % 0.7 0.6       Metabolic Panel (last 72 hours):  Recent Labs   Lab Result Units 04/08/25  2041 04/10/25  0010 04/10/25  0205 04/10/25  1650 04/11/25  0331   Sodium mmol/L  --  135*  --   --  136   Sodium, Blood Gas mmol/L  --   --   --  134*  --    Potassium mmol/L  --  4.1  --   --  4.2   Potassium, Blood Gas mmol/L  --   --   --  3.6  --    Chloride mmol/L  --  102  --   --  101   CO2 mmol/L  --  23  --   --  28   Glucose mg/dL  --  175*  --   --  95   Glucose, UA  Normal  --  Normal  --   --    Blood Urea Nitrogen mg/dL  --  19.5  --   --  14.4   Creatinine mg/dL  --  1.00  --   --  1.01   Albumin g/dL  --  3.8  --   --  4.1   Bilirubin Total mg/dL  --  0.2  --   --  0.5   ALP unit/L  --  71  --   --  68   AST unit/L  --  17  --    "--  26   ALT unit/L  --  18  --   --  24   Magnesium Level mg/dL  --   --   --   --  1.70       Drug levels (last 3 results):  No results for input(s): "VANCOMYCINRA", "VANCORANDOM", "VANCOMYCINPE", "VANCOPEAK", "VANCOMYCINTR", "VANCOTROUGH" in the last 72 hours.    Microbiologic Results:  Microbiology Results (last 7 days)       Procedure Component Value Units Date/Time    Respiratory Culture [[id number]]     Order Status: Sent Specimen: Sputum from Endotracheal Aspirate     Blood culture #1 **CANNOT BE ORDERED STAT** [[id number]]  (Normal) Collected: 04/10/25 0010    Order Status: Completed Specimen: Blood Updated: 04/11/25 0106     Blood Culture No Growth At 24 Hours    Blood culture #2 **CANNOT BE ORDERED STAT** [[id number]]  (Normal) Collected: 04/10/25 0010    Order Status: Completed Specimen: Blood Updated: 04/11/25 0106     Blood Culture No Growth At 24 Hours            "

## 2025-04-11 NOTE — PROVIDER PROGRESS NOTES - EMERGENCY DEPT.
Encounter Date: 4/9/2025    ED Physician Progress Notes        Physician Note:   Asked to intubate patient.  He was called as a code stroke on the floor however whenever he was brought down to Radiology for CT began to have seizure-like activity and and vomiting was given Ativan. Then he had significant decreased GCS and respiratory status.  Asked to intubate for airway protection.  Patient intubated here in the emergency department and transferred back to CT and then to ICU for further evaluation.     Intubation    Date/Time: 4/11/2025 2:40 PM  Location procedure was performed: [facility] EMERGENCY DEPARTMENT    Performed by: Antonio Mcdaniels MD  Authorized by: Antonio Mcdaniels MD  Consent Done: Emergent Situation  Indications: respiratory distress and airway protection  Intubation method: video-assisted  Patient status: paralyzed (RSI)  Preoxygenation: nonrebreather mask and bag valve mask  Sedatives: etomidate (20)  Paralytic: rocuronium (100)  Laryngoscope size: Glide 4 (lowepro 3)  Tube size: 8.0 mm  Tube type: cuffed  Number of attempts: 2  Ventilation between attempts: BVM  Cords visualized: yes  Post-procedure assessment: chest rise and CO2 detector  Breath sounds: equal and absent over the epigastrium  Cuff inflated: yes  ETT to teeth: 24 cm  Tube secured with: ETT reina  Chest x-ray interpreted by me.  Chest x-ray findings: endotracheal tube in appropriate position      Gastric Lavage    Date/Time: 4/11/2025 2:50 PM  Location procedure was performed: [facility] EMERGENCY DEPARTMENT    Authorized by: Antonio Mcdaniels MD    Performed by: Antonio Mcdaniels MDConsent: The procedure was performed in an emergent situation  Consent given by: patient  Indications: gastric decompression  Patient position: supine  Tube size: 16 Fr  Insertion location: right nare  Endoscope used: no  Vasoconstrictor administered: no  Aspirate appearance: gastric contents  Complications: No  Tube position confirmed: x-ray  Tube placement  difficulty: minimal  Tube status: LIWS  Patient tolerance: patient tolerated the procedure well with no immediate complications

## 2025-04-11 NOTE — PT/OT/SLP PROGRESS
Occupational Therapy      Patient Name:  River Sheehan Jr.   MRN:  [id number]    Attempted to see pt for OT eval. Upon evaluation, pt noted to have R preferred gaze, able to move RUE/RLE but weaker in LUE/LLE, and had +clonus in LLE. Pt did not follow commands to smile. Pt able to state name and location, but did not respond to other questions. Per spouse, pt was able to ambulate to bathroom and shower yesterday. RN notified and RRT called. Will follow up as appropriate.     4/11/2025

## 2025-04-11 NOTE — PROGRESS NOTES
[facility]  Hospital Medicine Progress Note    Cc- sob      Subjective:      HPI:   This is a 79 year old female who is admitted in the hospital for further evaluation after the syncopal episode he had at home 2 days back. The wife reported that he is usually physically active at home and walks with his dog at home in their yard and go get Sob at times on exertion but nothing major. He also has RERE for which he uses CPAP and is complaint with it. Lately he has been having issues with his BP and the cardiologist they see have been adjusting his medications. Per wife he has been having episodes of black outs recently. He also had fever at home and had the episodes of Sob prior to fever. He also had remote history of asthma when he was kid and no issues after reaching adulthood and never a smoker. He also got colonoscopy done recently and got polypectomy done.He complained of dry cough and sore throat, fatigue and denied any chest pain.  Pulmonology consulted regarding his dyspnea.      Patient currently being managed for acute hypoxic respiratory failure due to right lower lobe pneumonia.  Patient with high-grade fevers.      Today's information   Patient seen and examined at bedside, family member at bedside   Patient is lethargic not able to answer any questions.  Vitals significant for high-grade fevers   CT of the lungs 2 small right lower lobe atelectasis versus pneumonia   Respiratory cultures and panel is negative         Case was discussed with patient's nurse and  on the floor.    Objective/physical exam:  General: In no acute distress, afebrile  Chest: Clear to auscultation bilaterally  Heart: RRR, +S1, S2, no appreciable murmur  Abdomen: Soft, nontender, BS +  MSK: Warm, no lower extremity edema, no clubbing or cyanosis  Neurologic: Alert and oriented x4, Cranial nerve II-XII intact, Strength 5/5 in all 4 extremities    Assessment/Plan:  Acute metabolic  encephalopathy   Acute hypoxic respiratory failure due to right lower lobe pneumonia, small   High-grade fevers   Lactic acidosis   Iron-deficiency anemia   Syncope  RERE on CPAP   Hypertension   Diabetes      Plan  Symptomatic management   Tylenol p.r.n. for fever   Adjust antibiotics to IV ceftriaxone and doxycycline   Disc azithromycin   Check echo   Check CT abdomen and pelvis to look for other source   Consult ID for source of fever, and if need for lumbar puncture  Follow up with pulmonology recommendations   Check ferritin levels, lactic acid and BNP   PT eval       VTE prophylaxis:   sc lovenox 40 qdaily                 Patient condition:  Guarded/ Serious/ Critical    Anticipated discharge and Disposition:   tbd     Critical care time 39 minutes       VITAL SIGNS: 24 HRS MIN & MAX LAST   Temp  Min: 98.4 °F (36.9 °C)  Max: 102.8 °F (39.3 °C) 99.4 °F (37.4 °C)   BP  Min: 141/68  Max: 171/77 (!) 153/75   Pulse  Min: 71  Max: 92  92   Resp  Min: 18  Max: 35 19   SpO2  Min: 92 %  Max: 97 % 97 %     I reviewed the labs below:  Recent Labs   Lab 04/08/25  1517 04/10/25  0010 04/11/25  0331   WBC 7.47 9.18 10.76   RBC 4.12* 3.85* 4.17*   HGB 10.5* 9.8* 10.5*   HCT 33.0* 31.0* 33.5*   MCV 80.1 80.5 80.3   MCH 25.5* 25.5* 25.2*   MCHC 31.8* 31.6* 31.3*   RDW 14.9 15.2 15.6    330 331   MPV 8.1 8.3 8.6     Recent Labs   Lab 04/08/25  1517 04/10/25  0010 04/10/25  1650 04/11/25  0331    135*  --  136   K 4.5 4.1  --  4.2    102  --  101   CO2 24 23  --  28   BUN 21.8 19.5  --  14.4   CREATININE 0.94 1.00  --  1.01   CALCIUM 10.4* 9.4  --  9.6   PH  --   --  7.490*  --    MG  --   --   --  1.70   ALBUMIN 3.9 3.8  --  4.1   ALKPHOS 82 71  --  68   ALT 18 18  --  24   AST 16 17  --  26   BILITOT 0.4 0.2  --  0.5     Microbiology Results (last 7 days)       Procedure Component Value Units Date/Time    Blood culture #1 **CANNOT BE ORDERED STAT** [[id number]]  (Normal) Collected: 04/10/25 0010    Order  Status: Completed Specimen: Blood Updated: 04/11/25 0106     Blood Culture No Growth At 24 Hours    Blood culture #2 **CANNOT BE ORDERED STAT** [[id number]]  (Normal) Collected: 04/10/25 0010    Order Status: Completed Specimen: Blood Updated: 04/11/25 0106     Blood Culture No Growth At 24 Hours           See below for Radiology    Intake and Output:  Intake/Output Summary (Last 24 hours) at 4/11/2025 1232  Last data filed at 4/11/2025 0500  Gross per 24 hour   Intake 0 ml   Output 1560 ml   Net -1560 ml         All diagnosis and differential diagnosis have been reviewed; assessment and plan has been documented; I have personally reviewed the labs and test results that are presently available; I have reviewed the patients medication list; I have reviewed the consulting providers response and recommendations. I have reviewed or attempted to review medical records based upon their availability    All of the patient's questions have been  addressed and answered. Patient's is agreeable to the above stated plan. I will continue to monitor closely and make adjustments to medical management as needed.    Portions of this note dictated using EMR integrated voice recognition software, and may be subject to voice recognition errors not corrected at proofreading. Please contact writer for clarification if needed.   _____________________________________________________________________    Nutrition Status:  Nutrition consulted. Most recent weight and BMI monitored-     Measurements:  Wt Readings from Last 1 Encounters:   04/09/25 83.9 kg (185 lb)   Body mass index is 30.79 kg/m².    Patient has been screened and assessed by RD.    Malnutrition Type:  Context:    Level:      Malnutrition Characteristic Summary:       Interventions/Recommendations (treatment strategy):         A minimum of two characteristics is recommended for diagnosis of either severe or non-severe malnutrition.     Current Med:   aspirin  81 mg Oral Daily     atorvastatin  20 mg Oral QHS    busPIRone  10 mg Oral TID    cefTRIAXone (Rocephin) IV (PEDS and ADULTS)  2 g Intravenous Q24H    citalopram  20 mg Oral Daily    doxycycline  100 mg Oral Q12H    gabapentin  200 mg Oral TID    losartan  100 mg Oral Daily    midodrine  2.5 mg Oral BID WM    montelukast  10 mg Oral QHS    pantoprazole  40 mg Oral Daily    pioglitazone  15 mg Oral Daily      PRN meds:  Current Facility-Administered Medications:     acetaminophen, 1,000 mg, Oral, Q6H PRN    albuterol-ipratropium, 3 mL, Nebulization, Q6H PRN    ALPRAZolam, 0.5 mg, Oral, TID PRN    dextrose 50%, 12.5 g, Intravenous, PRN    dextrose 50%, 25 g, Intravenous, PRN    glucagon (human recombinant), 1 mg, Intramuscular, PRN    glucose, 16 g, Oral, PRN    glucose, 24 g, Oral, PRN    hydrALAZINE, 10 mg, Intravenous, Q6H PRN    insulin aspart U-100, 0-5 Units, Subcutaneous, QID (AC + HS) PRN    promethazine, 12.5 mg, Oral, Q6H PRN    Continuous infusion:       Radiology:   I have personally reviewed the images and agree with radiologist report  CT Abdomen Pelvis With IV Contrast NO Oral Contrast  Narrative: EXAMINATION:  CT ABDOMEN PELVIS WITH IV CONTRAST    CLINICAL HISTORY:  Sepsis;    TECHNIQUE:  Axial images of the abdomen and pelvis were obtained with IV contrast administration.  Coronal and sagittal reconstructions were provided.  Three dimensional and MIP images were obtained and evaluated.  Total DLP was 1093 mGy-cm. Dose lowering technique and automated exposure control were utilized for this exam.    COMPARISON:  CT of the abdomen and pelvis 01/04/2021.    FINDINGS:  There is minimal right lower lobe subsegmental atelectasis.  The left lung base is clear.  The heart is normal in size.    There are poorly defined cystic nodules in the left and right hepatic lobe.  The portal vein is patent.  The gallbladder is contracted.  The spleen and adrenal glands are normal.  There are calcifications throughout the pancreatic head and  uncinate process consistent with chronic pancreatitis.  There is a cystic lesion in the pancreatic body measuring 2.4 x 1.7 cm.  This has slightly enlarged from the prior exam measuring 2.0 x 1.2 cm previously.  There is no surrounding inflammatory stranding.    There is a simple cyst in the left kidney.  There are punctate nonobstructing stones in the bilateral kidneys.  There is no hydronephrosis.    The stomach and small bowel are decompressed.  The appendix is not visualized.  There is colonic diverticulosis without diverticulitis.  The urinary bladder is normal.  There is no pelvic or retroperitoneal lymphadenopathy.  The aorta is nonaneurysmal.  There is no lytic or blastic osseous lesion.  Impression: 1. Incompletely characterized cystic nodule in the pancreatic body which has slightly increased in size from the prior exam.  This would be best characterized with MRI with and without IV contrast.  2. Indeterminate hypodense cystic nodules in the liver which can be evaluated on the above MRI.  3. Diverticulosis without diverticulitis.  4. Punctate nonobstructing caliceal renal stones bilaterally.  5. Minimal right lower lobe atelectasis.    Electronically signed by: Adalberto Torres MD  Date:    04/11/2025  Time:    11:54        Ck Torrez MD  Department of Hospital Medicine   [facility]   04/11/2025

## 2025-04-11 NOTE — PROGRESS NOTES
[facility] - Observation Unit  Pulmonary Critical Care Note    Patient Name: River Sheehan Jr.  MRN: [id number]  Admission Date: 4/9/2025  Hospital Length of Stay: 1 days  Code Status: No Order  Attending Provider: Reyes, Thairy G, DO  Primary Care Provider: Chio Villela MD     Subjective:     HPI:   This is a 79 year old female who is admitted in the hospital for further evaluation after the syncopal episode he had at home 2 days back. The wife reported that he is usually physically active at home and walks with his dog at home in their yard and go get Sob at times on exertion but nothing major. He also has RERE for which he uses CPAP and is complaint with it. Lately he has been having issues with his BP and the cardiologist they see have been adjusting his medications. Per wife he has been having episodes of black outs recently. He also had fever at home and had the episodes of Sob prior to fever. He also had remote history of asthma when he was kid and no issues after reaching adulthood and never a smoker. He also got colonoscopy done recently and got polypectomy done.He complained of dry cough and sore throat, fatigue and denied any chest pain.  Pulmonology consulted regarding his dyspnea.     24 Hour Interval History:  Mild right lower lobe infiltrate noted on the chest CT.  Febrile to 102.8 F overnight. No viruses detected on respiratory viral panel.  ABG 7.49/35/98.  More lethargic on evaluation and frequent coughing after eating.    Past Medical History:   Diagnosis Date    Acid reflux     Adenomatous polyp of ascending colon 09/20/2021    Arthritis     Asymptomatic stenosis of left carotid artery     CAD (coronary artery disease)     Carotid artery stenosis      3/26/25 less than 50% bilateral    Colon polyps 02/20/2025    Transvers polyp benigne mucosa, Ascending Colon Tubular adenoma, Rectum hyperplastic    COVID-19 07/06/2022    Depression     Diabetes mellitus     Gout     Hearing  loss     High cholesterol     HTN (hypertension)     Kidney stone     Macrocytic anemia     Osteoporosis     Seasonal allergies        Past Surgical History:   Procedure Laterality Date    CAROTID ENDARTERECTOMY Left 09/12/2024    Procedure: ENDARTERECTOMY-CAROTID;  Surgeon: Hany Pendleton MD;  Location: [facility];  Service: Peripheral Vascular;  Laterality: Left;    COLONOSCOPY W/ BIOPSIES  09/20/2021    Dr. Chun Smith    COLONOSCOPY W/ BIOPSIES  02/20/2025    CYSTOSCOPY      EXTRACAPSULAR EXTRACTION OF CATARACT      HERNIA REPAIR      LITHOTRIPSY  09/2023    RETROGRADE PYELOGRAPHY      WISDOM TOOTH EXTRACTION         Social History[1]        Current Outpatient Medications   Medication Instructions    albuterol (PROAIR HFA) 90 mcg/actuation inhaler 2 puffs, Inhalation, Every 6 hours PRN, Rescue    allopurinoL (ZYLOPRIM) 100 MG tablet TAKE 1 TABLET EVERY DAY    aspirin (ECOTRIN) 81 mg, Daily    atorvastatin (LIPITOR) 20 MG tablet TAKE 1 TABLET AT BEDTIME    blood sugar diagnostic (TRUE METRIX GLUCOSE TEST STRIP) Strp 1 strip, Misc.(Non-Drug; Combo Route), Daily    blood-glucose meter (TRUE METRIX AIR GLUCOSE METER) kit Test daily for DM II E11.9    busPIRone (BUSPAR) 10 MG tablet TAKE 1/2 TO 1 TABLET THREE TIMES DAILY    cinnamon bark 1,000 mg, Daily    citalopram (CELEXA) 20 mg, Oral    gabapentin (NEURONTIN) 100 MG capsule TAKE 2 CAPSULES (200 MG TOTAL) THREE TIMES DAILY    glucosamine/chondr navarro A sod (OSTEO BI-FLEX ORAL) Daily    KRILL OIL ORAL 500 mg, Daily    losartan (COZAAR) 100 mg, Daily    metFORMIN (GLUCOPHAGE) 500 mg, Oral, 2 times daily with meals    midodrine (PROAMATINE) 2.5 mg, Oral, 2 times daily with meals    montelukast (SINGULAIR) 10 mg, Oral, Nightly    pantoprazole (PROTONIX) 40 MG tablet TAKE 1 TABLET EVERY DAY    pioglitazone (ACTOS) 15 mg, Oral, Daily    tamsulosin (FLOMAX) 0.4 mg, Daily       Review of patient's allergies indicates:  No Known Allergies     Current Inpatient  Medications   aspirin  81 mg Oral Daily    atorvastatin  20 mg Oral QHS    busPIRone  10 mg Oral TID    citalopram  20 mg Oral Daily    gabapentin  200 mg Oral TID    losartan  100 mg Oral Daily    metFORMIN  500 mg Oral BID WM    midodrine  2.5 mg Oral BID WM    montelukast  10 mg Oral QHS    pantoprazole  40 mg Oral Daily    pioglitazone  15 mg Oral Daily       Current Intravenous Infusions        ROS       Objective:       Intake/Output Summary (Last 24 hours) at 4/11/2025 0748  Last data filed at 4/11/2025 0500  Gross per 24 hour   Intake 0 ml   Output 1560 ml   Net -1560 ml         Vital Signs (Most Recent):  Temp: (!) 100.7 °F (38.2 °C) (04/11/25 0442)  Pulse: 82 (04/11/25 0442)  Resp: 18 (04/11/25 0442)  BP: (!) 141/68 (04/11/25 0442)  SpO2: 96 % (04/11/25 0442)  Body mass index is 30.79 kg/m².  Weight: 83.9 kg (185 lb) Vital Signs (24h Range):  Temp:  [98.4 °F (36.9 °C)-102.8 °F (39.3 °C)] 100.7 °F (38.2 °C)  Pulse:  [69-94] 82  Resp:  [18-39] 18  SpO2:  [89 %-97 %] 96 %  BP: (141-171)/(59-90) 141/68     Physical Exam  Vitals reviewed.   Constitutional:       Comments: Sleepy but arousable and follows commands   Cardiovascular:      Rate and Rhythm: Normal rate and regular rhythm.   Pulmonary:      Effort: Pulmonary effort is normal. No respiratory distress.      Breath sounds: No wheezing or rales.   Musculoskeletal:      Right lower leg: No edema.      Left lower leg: No edema.   Neurological:      General: No focal deficit present.      Mental Status: He is oriented to person, place, and time.           Lines/Drains/Airways       Drain  Duration             Male External Urinary Catheter 04/10/25 0831 <1 day              Peripheral Intravenous Line  Duration                  Peripheral IV - Single Lumen 18 G Left;Posterior Hand -- days                    Significant Labs:    Lab Results   Component Value Date    WBC 10.76 04/11/2025    HGB 10.5 (L) 04/11/2025    HCT 33.5 (L) 04/11/2025    MCV 80.3  04/11/2025     04/11/2025           BMP  Lab Results   Component Value Date     04/11/2025    K 4.2 04/11/2025    CO2 28 04/11/2025    BUN 14.4 04/11/2025    CREATININE 1.01 04/11/2025    CALCIUM 9.6 04/11/2025    AGAP 7.0 04/11/2025    EGFRNONAA >60 06/09/2022         ABG  Recent Labs   Lab 04/10/25  1650   PH 7.490*   PO2 98.0   PCO2 35.0   HCO3 26.7*   POCBASEDEF 3.30*         Significant Imaging:  I have reviewed the pertinent imaging within the past 24 hours.  CT Chest with a small RLL infiltrate      Assessment/Plan:     Assessment  SOB , multifactorial likely due to iron-deficiency anemia and sepsis. Source may be the small RLL infiltrate  RERE  IRLANDA   Syncope      Plan  Start ceftriaxone and azithromycin.  Complete a course of antibiotics for community-acquired pneumonia  NPO pending swallow evaluation  Continue infectious work up  Follow up echocardiogram   PT/OT  Consider IV iron for iron-deficiency  CPAP with naps and sleep  Outpatient PFTs with pulmonary clinic follow up     Pulmonary will continue to follow.    Yusuf Guzman MD  Pulmonary and Critical Care         [1]   Social History  Socioeconomic History    Marital status:    Tobacco Use    Smoking status: Never     Passive exposure: Never    Smokeless tobacco: Never   Substance and Sexual Activity    Alcohol use: Not Currently     Alcohol/week: 1.0 standard drink of alcohol     Types: 1 Cans of beer per week    Drug use: Never    Sexual activity: Yes     Social Drivers of Health     Financial Resource Strain: Low Risk  (4/10/2025)    Overall Financial Resource Strain (CARDIA)     Difficulty of Paying Living Expenses: Not very hard   Food Insecurity: No Food Insecurity (4/10/2025)    Hunger Vital Sign     Worried About Running Out of Food in the Last Year: Never true     Ran Out of Food in the Last Year: Never true   Transportation Needs: No Transportation Needs (4/10/2025)    PRAPARE - Transportation     Lack of Transportation  (Medical): No     Lack of Transportation (Non-Medical): No   Physical Activity: Inactive (4/10/2025)    Exercise Vital Sign     Days of Exercise per Week: 0 days     Minutes of Exercise per Session: 0 min   Stress: Stress Concern Present (4/10/2025)    [employer] - Occupational Stress Questionnaire     Feeling of Stress : Rather much   Housing Stability: Low Risk  (4/10/2025)    Housing Stability Vital Sign     Unable to Pay for Housing in the Last Year: No     Homeless in the Last Year: No

## 2025-04-11 NOTE — PT/OT/SLP PROGRESS
Physical Therapy      Patient Name:  River Sheehan Jr.   MRN:  [id number]    Attempted to see pt for PT eval. Upon evaluation, pt noted to have R preferred gaze, able to move RUE/RLE but not LUE/LLE, and had +clonus in LLE. Pt did not follow commands to smile and or turn head/track eyes to L. Pt able to state name and location, but did not respond to other questions. Per spouse, pt was able to ambulate to bathroom and shower yesterday. RN notified and RRT called. Will follow up as appropriate.     History/PLOF obtained from spouse: Pt lives with spouse in a [facility] with threshold entrance. Pt was independent at baseline but began requiring assist for mobility for the past few days and used a RW the day before admit 2/2 weakness. Pt has a WIS with no seat; no other DME besides RW and CPAP that he wears at night.     4/11/2025

## 2025-04-11 NOTE — SIGNIFICANT EVENT
Called to the 79-year-old male's bedside for a stroke code.  Patient was found to be in bed with a leftward gaze, left hemiparesis, appearing confused and not following commands initially.  Subsequently, patient looked at me and was also able to move his right arm and leg on command.  He reverted to be confused and not following commands looking to the left side.    Patient was taken to CT scan for a stat CTA and CT head.  On route, patient started getting worse and gurgling; appearing to have a focal tonic-clonic seizure.  Oral airway was put and patient was bad as his saturations fell to 87%.  Ativan 2 mg IV was given.  Patient was found to be clenching his teeth at that time, difficulty in attempting the oral airway.  He subsequently relaxed when the airway was put in.      Eventually, rapid response was called and patient was intubated and ventilated.  Subsequent CT head and CT angiogram of the head and neck showed a right frontal hypodensity which correlated with a right insular chronic stroke on MRI FLAIR studies done yesterday.    The CT angiogram of the head and neck was unremarkable.      Impression:  Focal tonic-clonic complex partial seizure secondary to old stroke.    Recommendation:     Continue Keppra 500 mg IV twice a day.    Stat EEG ordered.      Consult General Neurology for further management.

## 2025-04-11 NOTE — H&P
"[facility] - 7th Floor ICU  Pulmonary Critical Care Note    Patient Name: River Sheehan Jr.  MRN: [id number]  Admission Date: 4/9/2025  Hospital Length of Stay: 1 days  Code Status: No Order  Attending Provider: Chaka Diaz MD  Primary Care Provider: Chio Villela MD     Subjective:     HPI:   Patient is a 79-year-old male with a past medical history of carotid artery stenosis, CAD, diabetes mellitus, hyperlipidemia, hypertension, and anemia who initially presented to the emergency department after a syncopal episode occurring at home.  Patient was accompanied by his wife at this time who stated that he was very physically active at home but did complain of shortness of breath at times.  She reported that he was having episodes of "black outs" over the last several weeks.  He has been following cardiology on outpatient basis with recent adjustments in his home medications.  Wife also reported fever at home.  Patient was initially admitted to the hospital medicine service for these issues and possible pneumonia present on initial imaging.  He was started on Rocephin and azithromycin for suspected CAP.  However, this morning patient was found to have left-sided deficits including leftward gaze, left hemiparesis, and confusion.  He was not following commands at this time which was a significant change from patient's baseline.  Patient was taken to CT scan STAT to evaluate for stroke.  Patient displayed seizure-like activity in route to CT scanner and he was administered Ativan 2 mg IV.  Patient desaturated after administration of Ativan and ultimately required rapid sequence intubation.  CT and MRI flare displaying right insular chronic stroke.  Patient was admitted to the ICU after this event.    Hospital Course/Significant events:  4/11/2025: Patient admitted to the ICU status post intubation    24 Hour Interval History:  N/A    Past Medical History:   Diagnosis Date    Acid reflux     Adenomatous " polyp of ascending colon 09/20/2021    Arthritis     Asymptomatic stenosis of left carotid artery     CAD (coronary artery disease)     Carotid artery stenosis     US 3/26/25 less than 50% bilateral    Colon polyps 02/20/2025    Transvers polyp benigne mucosa, Ascending Colon Tubular adenoma, Rectum hyperplastic    COVID-19 07/06/2022    Depression     Diabetes mellitus     Gout     Hearing loss     High cholesterol     HTN (hypertension)     Kidney stone     Macrocytic anemia     Osteoporosis     Seasonal allergies        Past Surgical History:   Procedure Laterality Date    CAROTID ENDARTERECTOMY Left 09/12/2024    Procedure: ENDARTERECTOMY-CAROTID;  Surgeon: Hany Pendleton MD;  Location: [facility];  Service: Peripheral Vascular;  Laterality: Left;    COLONOSCOPY W/ BIOPSIES  09/20/2021    Dr. Chun Smith    COLONOSCOPY W/ BIOPSIES  02/20/2025    CYSTOSCOPY      EXTRACAPSULAR EXTRACTION OF CATARACT      HERNIA REPAIR      LITHOTRIPSY  09/2023    RETROGRADE PYELOGRAPHY      WISDOM TOOTH EXTRACTION         Social History[1]        Current Outpatient Medications   Medication Instructions    albuterol (PROAIR HFA) 90 mcg/actuation inhaler 2 puffs, Inhalation, Every 6 hours PRN, Rescue    allopurinoL (ZYLOPRIM) 100 MG tablet TAKE 1 TABLET EVERY DAY    aspirin (ECOTRIN) 81 mg, Daily    atorvastatin (LIPITOR) 20 MG tablet TAKE 1 TABLET AT BEDTIME    blood sugar diagnostic (TRUE METRIX GLUCOSE TEST STRIP) Strp 1 strip, Misc.(Non-Drug; Combo Route), Daily    blood-glucose meter (TRUE METRIX AIR GLUCOSE METER) kit Test daily for DM II E11.9    busPIRone (BUSPAR) 10 MG tablet TAKE 1/2 TO 1 TABLET THREE TIMES DAILY    cinnamon bark 1,000 mg, Daily    citalopram (CELEXA) 20 mg, Oral    gabapentin (NEURONTIN) 100 MG capsule TAKE 2 CAPSULES (200 MG TOTAL) THREE TIMES DAILY    glucosamine/chondr navarro A sod (OSTEO BI-FLEX ORAL) Daily    KRILL OIL ORAL 500 mg, Daily    losartan (COZAAR) 100 mg, Daily    metFORMIN  (GLUCOPHAGE) 500 mg, Oral, 2 times daily with meals    midodrine (PROAMATINE) 2.5 mg, Oral, 2 times daily with meals    montelukast (SINGULAIR) 10 mg, Oral, Nightly    pantoprazole (PROTONIX) 40 MG tablet TAKE 1 TABLET EVERY DAY    pioglitazone (ACTOS) 15 mg, Oral, Daily    tamsulosin (FLOMAX) 0.4 mg, Daily       Review of patient's allergies indicates:  No Known Allergies     Current Inpatient Medications   aspirin  81 mg Oral Daily    levETIRAcetam (Keppra) IV (PEDS and ADULTS)  500 mg Intravenous Q12H    losartan  100 mg Oral Daily    midodrine  2.5 mg Oral BID WM    montelukast  10 mg Oral QHS    mupirocin   Nasal BID    pantoprazole  40 mg Oral Daily    piperacillin-tazobactam (Zosyn) IV (PEDS and ADULTS) (extended infusion is not appropriate)  4.5 g Intravenous Q8H       Current Intravenous Infusions   Amino acid 4.25% - dextrose 5% (CLINIMIX-E) solution (1L provides 42.5 gm AA, 50 gm CHO (170 kcal/L dextrose), Na 35, K 30, Mg 5, Ca 4.5, Acetate 70, Cl 39, Phos 15)   Intravenous Continuous             Review of Systems   Unable to perform ROS: Intubated          Objective:       Intake/Output Summary (Last 24 hours) at 4/11/2025 1616  Last data filed at 4/11/2025 0500  Gross per 24 hour   Intake 0 ml   Output 1000 ml   Net -1000 ml         Vital Signs (Most Recent):  Temp: 99.4 °F (37.4 °C) (04/11/25 0755)  Pulse: 71 (04/11/25 1541)  Resp: (!) 24 (04/11/25 1420)  BP: (!) 162/72 (04/11/25 1420)  SpO2: 100 % (04/11/25 1541)  Body mass index is 30.79 kg/m².  Weight: 83.9 kg (185 lb) Vital Signs (24h Range):  Temp:  [99.4 °F (37.4 °C)-102.8 °F (39.3 °C)] 99.4 °F (37.4 °C)  Pulse:  [71-93] 71  Resp:  [18-24] 24  SpO2:  [89 %-100 %] 100 %  BP: (141-162)/(68-75) 162/72     Physical Exam  Vitals reviewed.   Constitutional:       Comments: Patient intubated and sedated   HENT:      Head: Normocephalic and atraumatic.      Mouth/Throat:      Comments: ET tube in place  Eyes:      Comments: Pinpoint pupils bilaterally    Cardiovascular:      Rate and Rhythm: Normal rate and regular rhythm.      Heart sounds: No murmur heard.     No friction rub. No gallop.   Pulmonary:      Breath sounds: No wheezing, rhonchi or rales.   Abdominal:      General: There is no distension.      Palpations: Abdomen is soft.      Tenderness: There is no abdominal tenderness.   Musculoskeletal:      Right lower leg: No edema.      Left lower leg: No edema.   Neurological:      Comments: Unable to assess secondary to patient currently being intubated and sedated     Lines/Drains/Airways       Drain  Duration             Male External Urinary Catheter 04/10/25 0831 1 day              Airway  Duration                  Airway - Non-Surgical 04/11/25 1443 Endotracheal Tube <1 day              Peripheral Intravenous Line  Duration                  Peripheral IV - Single Lumen 18 G Left;Posterior Hand -- days                    Significant Labs:    Lab Results   Component Value Date    WBC 10.76 04/11/2025    HGB 10.5 (L) 04/11/2025    HCT 33.5 (L) 04/11/2025    MCV 80.3 04/11/2025     04/11/2025           BMP  Lab Results   Component Value Date     04/11/2025    K 4.2 04/11/2025    CO2 28 04/11/2025    BUN 14.4 04/11/2025    CREATININE 1.01 04/11/2025    CALCIUM 9.6 04/11/2025    AGAP 7.0 04/11/2025    EGFRNONAA >60 06/09/2022         ABG  Recent Labs   Lab 04/10/25  1650   PH 7.490*   PO2 98.0   PCO2 35.0   HCO3 26.7*   POCBASEDEF 3.30*       Mechanical Ventilation Support:         Significant Imaging:  I have reviewed the pertinent imaging within the past 24 hours.    Assessment/Plan:     Assessment  Hypoxemic respiratory failure status post intubation on 04/11  Focal tonic-clonic complex partial seizure leading to above  Chronic right insular ischemic stroke  Suspected community-acquired pneumonia  Sepsis secondary to above  Anemia    Plan  1. Patient admitted to the ICU  2. Currently intubated and sedated; wean ventilatory settings as  tolerated moving forward per ARDS net protocol  3. Started on vancomycin and Zosyn (D1); blood cultures, respiratory culture, and MRSA PCR pending.  Deescalate antibiotic therapy pending culture results.  Infectious Disease evaluated patient today due to persistent fever.  Some concern for infectious endocarditis.  TTE ordered to further evaluate.  4. Continue Keppra 500 mg BID; EEG ordered by Neurology.  Will consult General Neurology.  5. CT abdomen and pelvis displayed a cystic nodule in the pancreatic body as well as cystic nodules within the liver.  Will consider MRI with and without contrast to further evaluate.  6. Family updated at bedside this afternoon    DVT Prophylaxis: SCDs  GI Prophylaxis: Protonix     32 minutes of critical care was time spent personally by me on the following activities: development of treatment plan with patient or surrogate and bedside caregivers, discussions with consultants, evaluation of patient's response to treatment, examination of patient, ordering and performing treatments and interventions, ordering and review of laboratory studies, ordering and review of radiographic studies, pulse oximetry, re-evaluation of patient's condition.  This critical care time did not overlap with that of any other provider or involve time for any procedures.     Hany Holbrook,   Pulmonary Critical Care Medicine  [facility] - 7th Floor ICU  DOS: 04/11/2025        [1]   Social History  Socioeconomic History    Marital status:    Tobacco Use    Smoking status: Never     Passive exposure: Never    Smokeless tobacco: Never   Substance and Sexual Activity    Alcohol use: Not Currently     Alcohol/week: 1.0 standard drink of alcohol     Types: 1 Cans of beer per week    Drug use: Never    Sexual activity: Yes     Social Drivers of Health     Financial Resource Strain: Low Risk  (4/10/2025)    Overall Financial Resource Strain (CARDIA)     Difficulty of Paying Living Expenses:  Not very hard   Food Insecurity: No Food Insecurity (4/10/2025)    Hunger Vital Sign     Worried About Running Out of Food in the Last Year: Never true     Ran Out of Food in the Last Year: Never true   Transportation Needs: No Transportation Needs (4/10/2025)    PRAPARE - Transportation     Lack of Transportation (Medical): No     Lack of Transportation (Non-Medical): No   Physical Activity: Inactive (4/10/2025)    Exercise Vital Sign     Days of Exercise per Week: 0 days     Minutes of Exercise per Session: 0 min   Stress: Stress Concern Present (4/10/2025)    [employer] - Occupational Stress Questionnaire     Feeling of Stress : Rather much   Housing Stability: Low Risk  (4/10/2025)    Housing Stability Vital Sign     Unable to Pay for Housing in the Last Year: No     Homeless in the Last Year: No

## 2025-04-11 NOTE — NURSING
1415- OT informed nursing staff that patient appeared to be weaker on the left side and not responding to questions although awake. Patient able to participate in MBS in radiology approximately one hour ago.     1416-RRT called for potential code FAST. , /72, 93, 88% on 2LNC, R 24.     1420: Response nurse called Code Fast, patient transported downstairs to CT.

## 2025-04-11 NOTE — PT/OT/SLP EVAL
[facility]  Speech Language Pathology Department  Clinical Swallow Evaluation    Patient Name:  River Sheehan Jr.   MRN:  [id number]    Recommendations     General recommendations:  Modified Barium Swallow Study  Solid texture recommendation:  NPO  Liquid consistency recommendation: NPO   Medications: crushed in puree    History     79-year-old male presents for evaluation of generalized weakness and fatigue that began last month, has been progressively worsening over the past week. He has had multiple near syncopal and syncopal episodes. Denies any head trauma. Wife became concerned this morning as he was unable to get out of bed and walk. Blood pressure was taken and noted to be 60s/40s. Of note, he states he has not been eating or drinking well, has lost some weight over the past month due to this.    Past Medical History:   Diagnosis Date    Acid reflux     Adenomatous polyp of ascending colon 09/20/2021    Arthritis     Asymptomatic stenosis of left carotid artery     CAD (coronary artery disease)     Carotid artery stenosis     US 3/26/25 less than 50% bilateral    Colon polyps 02/20/2025    Transvers polyp benigne mucosa, Ascending Colon Tubular adenoma, Rectum hyperplastic    COVID-19 07/06/2022    Depression     Diabetes mellitus     Gout     Hearing loss     High cholesterol     HTN (hypertension)     Kidney stone     Macrocytic anemia     Osteoporosis     Seasonal allergies      Past Surgical History:   Procedure Laterality Date    CAROTID ENDARTERECTOMY Left 09/12/2024    Procedure: ENDARTERECTOMY-CAROTID;  Surgeon: Hany Pendleton MD;  Location: [facility];  Service: Peripheral Vascular;  Laterality: Left;    COLONOSCOPY W/ BIOPSIES  09/20/2021    Dr. Chun Smith    COLONOSCOPY W/ BIOPSIES  02/20/2025    CYSTOSCOPY      EXTRACAPSULAR EXTRACTION OF CATARACT      HERNIA REPAIR      LITHOTRIPSY  09/2023    RETROGRADE PYELOGRAPHY      WISDOM TOOTH EXTRACTION       Home  diet texture/consistency: Regular and thin liquids  Current method of nutrition: NPO    Imaging   No results found for this or any previous visit.    No results found for this or any previous visit.    Results for orders placed during the hospital encounter of 04/08/25    MRI Brain Without Contrast    Narrative  EXAMINATION:  MRI BRAIN WITHOUT CONTRAST    CLINICAL HISTORY:  dizziness;  Dizziness and giddiness.  Weakness and fatigue for 1 week.    TECHNIQUE:  Multiplanar multisequence MR imaging of the brain was performed without contrast.    COMPARISON:  CT head without contrast 04/08/2025 and 07/15/2024..    FINDINGS:  There is no evidence of diffusion restriction.  There is a very small amount of T2 shine through noted within the right insular cortex.  Patchy foci of chronic small vessel ischemic changes are noted in the supratentorial periventricular and subcortical white matter with additional involvement of the right external capsule and frontal operculum.  Mild generalized brain atrophy.    No acute intracranial hemorrhage, hydrocephalus, or herniation.    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  Remaining arterial flow voids are normal in appearance.    Bilateral lens replacements, otherwise orbits are unremarkable.    Paranasal sinuses are clear.  Mastoid air cells are clear.    Impression  No acute intracranial pathology.    Moderate chronic small vessel ischemic changes of the supratentorial white matter.  The chronic ischemic changes of the right external capsule and frontal operculum are new when compared to CT head without contrast from 07/15/2024.    No significant discrepancy between preliminary and final reports.      Electronically signed by: Preston Diaz  Date:    04/09/2025  Time:    06:31    Subjective     Patient awake, alert, and cooperative.    Spiritual/Cultural/Protestant Beliefs/Practices that affect care: no    Pain/Comfort: Pain Rating 1: 0/10    Objective     ORAL  MUSCULATURE  Dentition: own teeth  Secretion Management: adequate  Mucosal Quality: good  Facial Movement: general weakness  Vocal Quality: adequate    PO TRIALS  Consistency Fed By Oral Symptoms Pharyngeal Symptoms   Thin liquid by straw SLP None None   Puree SLP None None     Assessment     Family reports signs/sx of aspiration with PO intake. Abnormal CXR upon admit. Recommend MBS order to further evaluate oropharyngeal swallow function/safety.    Outcome Measures     Functional Oral Intake Scale: 1 - Nothing by mouth    Goals     Multidisciplinary Problems       SLP Goals       Not on file                  Education     Patient and spouse were provided with verbal education regarding ST POC.  Understanding was verbalized, however additional teaching warranted.    Plan     SLP Follow-Up:  Yes   Plan of Care reviewed with:  patient     Time Tracking     SLP Treatment Date:   04/11/25  Speech Start Time:  1040  Speech Stop Time:  1100     Speech Total Time (min):  20 min    Billable minutes:  Swallow and Oral Function Evaluation, 20 minutes     04/11/2025

## 2025-04-11 NOTE — CONSULTS
Tele-Infectious Diseases Consultation      Patient Name: River Sheehan Jr.  Patient : 1945  Age/Sex: 79 y.o. male  Room/Bed: 1054/1054 A  Admission Date/Time: 2025 11:55 PM  Date of consultation:  2025  Referring MD: Reyes, Thairy G, DO       Assessment and Plan:     River Sheehan Jr. is a 79 y.o. male with:  One episode of fever on 04/10  Possible right lower lobe opacity    Impression:     At this time, the source of the patients symptoms remains unclear. While he is now febrile with a Tmax of 102.8°F, he has no leukocytosis and two sets of blood cultures have remained negative. CT chest revealed a small right lower lobe subpleural opacity, which may represent atelectasis, scarring, or pneumonia, but is not definitive. His initial presentation was more suggestive of a non-infectious process, such as hypovolemia, medication-related hypotension, or anemia-related weakness.     Given the new onset of fever in the context of subacute symptoms, anemia, and syncope, endocarditis remains a consideration, especially in the absence of another clear source. A transthoracic echocardiogram will be obtained to evaluate for valvular pathology or vegetations. We will continue IV ceftriaxone empirically while further workup is underway.    Recommendations:     Pending CT abdomen obtained today  Agree with ceftriaxone and doxycycline  Obtain transthoracic echo  No need for lumbar puncture    The antibiotics being administered require intensive monitoring for drug toxicity    All questions and concerns addressed with patient and understands and agrees with plan.      Thank you for allowing us to contribute to this patient's care. Please call ID with any questions.     Zoltan Alonzo MD  Attending, Infectious Disease     Reason for consultation:      Fever without a clear source    Chief complain:      Weakness    History of present illness:      A 79-year-old male presents for evaluation of generalized  weakness and fatigue, which began approximately one month ago and has progressively worsened over the past week. He reports multiple episodes of near-syncope and syncope but denies any associated head trauma. This morning, his wife became concerned when he was unable to get out of bed or walk; a home blood pressure reading was noted to be in the 60s/40s.    He reports poor oral intake over the past month with associated unintentional weight loss. He has been under the care of his primary physician and was recently found to be anemic, for which he was started on oral iron therapy. He denies melena, hematochezia, or other signs of overt bleeding. Three separate stool occult blood tests were negative. He denies fever, chest pain, shortness of breath, palpitations, vomiting, diarrhea, or urinary symptoms. At the time of evaluation, he reports feeling improved.    Upon presenting to the ED, patient's initial vital signs relatively stable.  He started spiking fever on 04/10 with temperature of 102.8° F. he had 2 sets of blood culture obtained which remained negative.  He was started on IV ceftriaxone.  He had CT chest obtained which showed small, subpleural dependent consolidative opacity at the right lower lobe may be related to atelectasis, scarring or pneumonia.  Patient has not had any elevation in his white cell count since admission.     Review of system:      A review of the patient's history and complaints did not reveal any medical problems other than those outlined in the HPI. Specifically, there were no additional constitutional complaints, and no complaints of problems with the eyes, ears, nose, and mouth, cardiovascular, respiratory, gastrointestinal, musculoskeletal, neurological, integumentary, psychiatric, endocrine, or hematological systems.    Antibiotics Received:     Ceftriaxone 4/10-    Social history:      Social History     Socioeconomic History    Marital status:    Tobacco Use    Smoking  status: Never     Passive exposure: Never    Smokeless tobacco: Never   Substance and Sexual Activity    Alcohol use: Not Currently     Alcohol/week: 1.0 standard drink of alcohol     Types: 1 Cans of beer per week    Drug use: Never    Sexual activity: Yes     Social Drivers of Health     Financial Resource Strain: Low Risk  (4/10/2025)    Overall Financial Resource Strain (CARDIA)     Difficulty of Paying Living Expenses: Not very hard   Food Insecurity: No Food Insecurity (4/10/2025)    Hunger Vital Sign     Worried About Running Out of Food in the Last Year: Never true     Ran Out of Food in the Last Year: Never true   Transportation Needs: No Transportation Needs (4/10/2025)    PRAPARE - Transportation     Lack of Transportation (Medical): No     Lack of Transportation (Non-Medical): No   Physical Activity: Inactive (4/10/2025)    Exercise Vital Sign     Days of Exercise per Week: 0 days     Minutes of Exercise per Session: 0 min   Stress: Stress Concern Present (4/10/2025)    [place] [employer] [place] [employer] - Occupational Stress Questionnaire     Feeling of Stress : Rather much   Housing Stability: Low Risk  (4/10/2025)    Housing Stability Vital Sign     Unable to Pay for Housing in the Last Year: No     Homeless in the Last Year: No       Past medical history:     Past Medical History:   Diagnosis Date    Acid reflux     Adenomatous polyp of ascending colon 09/20/2021    Arthritis     Asymptomatic stenosis of left carotid artery     CAD (coronary artery disease)     Carotid artery stenosis      3/26/25 less than 50% bilateral    Colon polyps 02/20/2025    Transvers polyp benigne mucosa, Ascending Colon Tubular adenoma, Rectum hyperplastic    COVID-19 07/06/2022    Depression     Diabetes mellitus     Gout     Hearing loss     High cholesterol     HTN (hypertension)     Kidney stone     Macrocytic anemia     Osteoporosis     Seasonal allergies        Past Surgical history:     Past Surgical  History:   Procedure Laterality Date    CAROTID ENDARTERECTOMY Left 2024    Procedure: ENDARTERECTOMY-CAROTID;  Surgeon: Hany Pendleton MD;  Location: [facility];  Service: Peripheral Vascular;  Laterality: Left;    COLONOSCOPY W/ BIOPSIES  2021    Dr. Chun Smith    COLONOSCOPY W/ BIOPSIES  2025    CYSTOSCOPY      EXTRACAPSULAR EXTRACTION OF CATARACT      HERNIA REPAIR      LITHOTRIPSY  2023    RETROGRADE PYELOGRAPHY      WISDOM TOOTH EXTRACTION         Family history:     Family History   Problem Relation Name Age of Onset    Bladder Cancer Mother      Hypertension Sister      Asthma Sister      Diabetes Sister      Lung cancer Sister      Hypertension Brother      Diabetes Brother      Coronary artery disease Brother      Atrial fibrillation Brother      Esophageal cancer Brother      Kidney cancer Brother      Hypertension Brother      Coronary artery disease Brother      Diabetes Brother      Progressive Supranuclear Palsy Brother      Coronary artery disease Brother      Pancreatic cancer Brother      Colon polyps Brother      Heart murmur Brother         Allergy history:    Review of patient's allergies indicates:  No Known Allergies    Objective:    Vital signs:  Vitals:    04/10/25 2347 04/10/25 2351 25 0442 25 0755   BP: (!) 146/71  (!) 141/68 (!) 153/75   BP Location:       Patient Position: Lying   Lying   Pulse: 85  82 92   Resp: 18  18 19   Temp: (!) 102.8 °F (39.3 °C) (!) 102.8 °F (39.3 °C) (!) 100.7 °F (38.2 °C) 99.4 °F (37.4 °C)   TempSrc: Oral  Axillary Oral   SpO2: 96%  96% 97%   Weight:       Height:         Temp (24hrs), Av.3 °F (37.9 °C), Min:98.4 °F (36.9 °C), Max:102.8 °F (39.3 °C)      Physical examination:  GEN: Awake, resting comfortably  EYES: EOMI, no scleral icterus  HENT: MMM. No oral lesions. Fair dentition.  NECK: Supple, no cervical lymphadenopathy or meningismus.   CARDIO: RRR, no murmur.  PULM/CHEST: CTAB. No increased work of  breathing  ABD: Normal bowel sounds. Soft, not tender or distended. No HSM appreciated.  MSK: no obvious effusion, swelling, increased warmth, or erythema of major joints. No pedal edema.  SKIN: No rashes. No stigmata of endocarditis.  NEURO: AOx3. Speech fluent. Face symmetric.  PSYCH: Mood appropriate    Diagnostic data:     CBC  Recent Labs   Lab 04/07/25  1415 04/08/25  1517 04/10/25  0010 04/11/25  0331   WBC 6.43 7.47 9.18 10.76   HGB 10.4* 10.5* 9.8* 10.5*    350 330 331       BMP  Recent Labs   Lab 04/08/25  1517 04/10/25  0010 04/10/25  1650 04/11/25  0331    135*  --  136   K 4.5 4.1  --  4.2    102  --  101   CO2 24 23  --  28   BUN 21.8 19.5  --  14.4   CREATININE 0.94 1.00  --  1.01   CALCIUM 10.4* 9.4  --  9.6   CAION  --   --  1.19  --    MG  --   --   --  1.70       Results for orders placed during the hospital encounter of 04/09/25    X-Ray Chest AP Portable    Narrative  EXAMINATION:  XR CHEST AP PORTABLE    CLINICAL HISTORY:  fever;, .    COMPARISON:  April 8, 2025    FINDINGS:  No alveolar consolidation, effusion, or pneumothorax is seen.   The thoracic aorta is normal  cardiac silhouette, central pulmonary vessels and mediastinum are normal in size and are grossly unremarkable.   visualized osseous structures are grossly unremarkable.    Impression  No acute chest disease is identified.      Electronically signed by: Jose Corbin  Date:    04/10/2025  Time:    10:41    Recent Labs   Lab 04/08/25  1517 04/10/25  0010 04/11/25  0331   WBC 7.47 9.18 10.76   HGB 10.5* 9.8* 10.5*   HCT 33.0* 31.0* 33.5*    330 331     Estimated Creatinine Clearance: 60.1 mL/min (based on SCr of 1.01 mg/dL).    Lab Results   Component Value Date    CREATININE 1.01 04/11/2025    CREATININE 1.00 04/10/2025    CREATININE 0.94 04/08/2025    ALKPHOS 68 04/11/2025    ALKPHOS 71 04/10/2025    ALKPHOS 82 04/08/2025         Microbiology and ID tests:     Microbiology Results (last 7 days)        Procedure Component Value Units Date/Time    Blood culture #1 **CANNOT BE ORDERED STAT** [[id number]]  (Normal) Collected: 04/10/25 0010    Order Status: Completed Specimen: Blood Updated: 04/11/25 0106     Blood Culture No Growth At 24 Hours    Blood culture #2 **CANNOT BE ORDERED STAT** [[id number]]  (Normal) Collected: 04/10/25 0010    Order Status: Completed Specimen: Blood Updated: 04/11/25 0106     Blood Culture No Growth At 24 Hours              Medications   Inpatient  Scheduled Meds:   aspirin  81 mg Oral Daily    atorvastatin  20 mg Oral QHS    busPIRone  10 mg Oral TID    cefTRIAXone (Rocephin) IV (PEDS and ADULTS)  2 g Intravenous Q24H    citalopram  20 mg Oral Daily    doxycycline  100 mg Oral Q12H    gabapentin  200 mg Oral TID    losartan  100 mg Oral Daily    midodrine  2.5 mg Oral BID WM    montelukast  10 mg Oral QHS    pantoprazole  40 mg Oral Daily    pioglitazone  15 mg Oral Daily     Continuous Infusions:  PRN Meds:.  Current Facility-Administered Medications:     acetaminophen, 1,000 mg, Oral, Q6H PRN    albuterol-ipratropium, 3 mL, Nebulization, Q6H PRN    ALPRAZolam, 0.5 mg, Oral, TID PRN    dextrose 50%, 12.5 g, Intravenous, PRN    dextrose 50%, 25 g, Intravenous, PRN    glucagon (human recombinant), 1 mg, Intramuscular, PRN    glucose, 16 g, Oral, PRN    glucose, 24 g, Oral, PRN    hydrALAZINE, 10 mg, Intravenous, Q6H PRN    insulin aspart U-100, 0-5 Units, Subcutaneous, QID (AC + HS) PRN    promethazine, 12.5 mg, Oral, Q6H PRN    Home Meds  Current Outpatient Medications   Medication Instructions    albuterol (PROAIR HFA) 90 mcg/actuation inhaler 2 puffs, Inhalation, Every 6 hours PRN, Rescue    allopurinoL (ZYLOPRIM) 100 MG tablet TAKE 1 TABLET EVERY DAY    aspirin (ECOTRIN) 81 mg, Daily    atorvastatin (LIPITOR) 20 MG tablet TAKE 1 TABLET AT BEDTIME    blood sugar diagnostic (TRUE METRIX GLUCOSE TEST STRIP) Strp 1 strip, Misc.(Non-Drug; Combo Route), Daily    blood-glucose meter (TRUE  METRIX AIR GLUCOSE METER) kit Test daily for DM II E11.9    busPIRone (BUSPAR) 10 MG tablet TAKE 1/2 TO 1 TABLET THREE TIMES DAILY    cinnamon bark 1,000 mg, Daily    citalopram (CELEXA) 20 mg, Oral    gabapentin (NEURONTIN) 100 MG capsule TAKE 2 CAPSULES (200 MG TOTAL) THREE TIMES DAILY    glucosamine/chondr navarro A sod (OSTEO BI-FLEX ORAL) Daily    KRILL OIL ORAL 500 mg, Daily    losartan (COZAAR) 100 mg, Daily    metFORMIN (GLUCOPHAGE) 500 mg, Oral, 2 times daily with meals    midodrine (PROAMATINE) 2.5 mg, Oral, 2 times daily with meals    montelukast (SINGULAIR) 10 mg, Oral, Nightly    pantoprazole (PROTONIX) 40 MG tablet TAKE 1 TABLET EVERY DAY    pioglitazone (ACTOS) 15 mg, Oral, Daily    tamsulosin (FLOMAX) 0.4 mg, Daily       Diagnostic studies:      CT Chest Without Contrast   Final Result      Small, subpleural dependent consolidative opacity at the right lower lobe may be related to atelectasis, scarring or pneumonia.         Electronically signed by: Lashell Minor   Date:    04/10/2025   Time:    16:46      X-Ray Chest AP Portable   Final Result      No acute chest disease is identified.         Electronically signed by: Jose Corbin   Date:    04/10/2025   Time:    10:41      CT Abdomen Pelvis With IV Contrast Routine Oral Contrast    (Results Pending)         4/11/2025 9:59 AM      The patient location is: [facility]  Total time spent with patient: 75    The attending portion of this evaluation, treatment, and documentation was performed per Zoltan Alonzo MD via Telemedicine AudioVisual using the secure [employer] software platform with 2 way audio/video. The provider was located off-site and the patient is located in the hospital. The aforementioned video software was utilized to document the relevant history and physical exam.     Critical care time spent: >75 minutes

## 2025-04-12 NOTE — PT/OT/SLP PROGRESS
Physical Therapy      Patient Name:  River Sheehan Jr.   MRN:  [id number]    Patient now upgraded to ICU, intubated/sedated. Per RN, currently on EEG. Will f/u 4/13 pending MD clearance as appropriate.

## 2025-04-12 NOTE — PLAN OF CARE
Problem: Adult Inpatient Plan of Care  Goal: Plan of Care Review  Outcome: Not Progressing  Goal: Patient-Specific Goal (Individualized)  Outcome: Not Progressing  Goal: Absence of Hospital-Acquired Illness or Injury  Outcome: Not Progressing  Goal: Optimal Comfort and Wellbeing  Outcome: Not Progressing  Goal: Readiness for Transition of Care  Outcome: Not Progressing     Problem: Diabetes Comorbidity  Goal: Blood Glucose Level Within Targeted Range  Outcome: Not Progressing     Problem: Wound  Goal: Optimal Coping  Outcome: Not Progressing  Goal: Optimal Functional Ability  Outcome: Not Progressing  Goal: Absence of Infection Signs and Symptoms  Outcome: Not Progressing  Goal: Improved Oral Intake  Outcome: Not Progressing  Goal: Optimal Pain Control and Function  Outcome: Not Progressing  Goal: Skin Health and Integrity  Outcome: Not Progressing  Goal: Optimal Wound Healing  Outcome: Not Progressing     Problem: Fall Injury Risk  Goal: Absence of Fall and Fall-Related Injury  Outcome: Not Progressing     Problem: Infection  Goal: Absence of Infection Signs and Symptoms  Outcome: Not Progressing     Problem: Skin Injury Risk Increased  Goal: Skin Health and Integrity  Outcome: Not Progressing     Problem: Mechanical Ventilation Invasive  Goal: Effective Communication  Outcome: Not Progressing  Goal: Optimal Device Function  Outcome: Not Progressing  Goal: Mechanical Ventilation Liberation  Outcome: Not Progressing  Goal: Optimal Nutrition Delivery  Outcome: Not Progressing  Goal: Absence of Device-Related Skin and Tissue Injury  Outcome: Not Progressing

## 2025-04-12 NOTE — PROGRESS NOTES
Tele-Infectious Diseases Follow-Up       Patient Name: River Sheehan Jr.  Patient : 1945  Age/Sex: 79 y.o. male   Room/Bed: 717/717 A  Admission Date/Time: 2025 11:55 PM    Date: 2025  Time: 8:16 AM    Assessment:     River Sheehan Jr. is a 79 y.o. male with:  Fever without clear source  Possible Tonic-clonic seizure  Status post intubation   Cystic nodule in the pancreatic body   Indeterminate hypodense cystic nodules in the liver     This 79-year-old male with a subacute decline in functional status, recent fever, and new-onset seizure with focal neurologic deficits (left gaze preference and hemiparesis) is now intubated and in the ICU. These findings raise strong concern for a central nervous system process, particularly HSV encephalitis, given the acute neurologic deterioration, seizure activity, and altered mental status. A lumbar puncture should be performed to assess for viral encephalitis, including HSV PCR.  We will recommend starting IV acyclovir pending results.    Plan:     We will need abdomen MRI at some point for further evaluation of the multiple nodule seen on the liver/pancreas  Obtain 2 sets of blood cultures today and said blood HSV PCR  May continue vancomycin/Zosyn for now  Recommend lumbar puncture to rule out CNS process.  Need to send cell count, protein, glucose and meningitis panel  Recommend starting IV acyclovir 10 mg/kg every 8 hours until we rule out HSV encephalitis    Thank you very much for allowing me to participate in the care of this patient.      ID will continue to follow. Please page with questions.    Zoltan Alonzo MD   Attending, Infectious Disease     Reason for follow-up:      Fever without a clear source    Summary:     A 79-year-old male presents for evaluation of generalized weakness and fatigue, which began approximately one month ago and has progressively worsened over the past week. He reports multiple episodes of near-syncope and  syncope but denies any associated head trauma. This morning, his wife became concerned when he was unable to get out of bed or walk; a home blood pressure reading was noted to be in the 60s/40s.     He reports poor oral intake over the past month with associated unintentional weight loss. He has been under the care of his primary physician and was recently found to be anemic, for which he was started on oral iron therapy. He denies melena, hematochezia, or other signs of overt bleeding. Three separate stool occult blood tests were negative. He denies fever, chest pain, shortness of breath, palpitations, vomiting, diarrhea, or urinary symptoms. At the time of evaluation, he reports feeling improved.     Upon presenting to the ED, patient's initial vital signs relatively stable.  He started spiking fever on 04/10 with temperature of 102.8° F. he had 2 sets of blood culture obtained which remained negative.  He was started on IV ceftriaxone.  He had CT chest obtained which showed small, subpleural dependent consolidative opacity at the right lower lobe may be related to atelectasis, scarring or pneumonia    On 04/11: Patient was found to be in bed with a leftward gaze, left hemiparesis, appearing confused and not following commands initially. .  He was taken to the CT and he appears to have tonic-clonic seizure on the way.  Ativan was given.  Patient was intubated and admitted to the ICU.  He was transitioned to vancomycin and Zosyn per ICU team.  No new blood cultures obtained.    Antimicrobial history:      Ceftriaxone 4/11   Vancomycin 4/11-  Zosyn 4/11-    24 hours events:      Had tonic-clonic seizure on his way to the CT scan  Multiple episode of fever    Subjective     Patient seen and examined, chart reviewed.     Review of Symptoms:     Unable to review due to patient's condition    Objective     Vital signs  Vitals:    04/12/25 0545 04/12/25 0600 04/12/25 0615 04/12/25 0630   BP: (!) 84/51 (!) 100/56 (!)  105/58 108/67   BP Location:  Left arm     Patient Position:  Lying     Pulse: 75 79 78 77   Resp: 20 18 18 18   Temp:  100 °F (37.8 °C)     TempSrc:  Core Bladder     SpO2: 98% 97% 97% 98%   Weight:       Height:          Temp (24hrs), Av.2 °F (38.4 °C), Min:99.1 °F (37.3 °C), Max:103.6 °F (39.8 °C)      Physical exam:  GEN: Awake, resting comfortably  EYES: EOMI, no scleral icterus  HENT: MMM. No oral lesions. Fair dentition.  NECK: Supple, no cervical lymphadenopathy or meningismus.   CARDIO: RRR, no murmur.  PULM/CHEST: CTAB. No increased work of breathing  ABD: Normal bowel sounds. Soft, not tender or distended. No HSM appreciated.  MSK: no obvious effusion, swelling, increased warmth, or erythema of major joints. No pedal edema.  SKIN: No rashes. No stigmata of endocarditis.  NEURO: AOx3. Speech fluent. Face symmetric.  PSYCH: Mood appropriate      Intake/Output Summary (Last 24 hours) at 2025 0816  Last data filed at 2025 0602  Gross per 24 hour   Intake 2397.26 ml   Output 850 ml   Net 1547.26 ml        Diagnostic Test     CBC, INR  Recent Labs   Lab 25  1415 25  1517 04/10/25  0010 25  0331 25  1903   WBC 6.43 7.47 9.18 10.76 15.31  15.31*   HGB 10.4* 10.5* 9.8* 10.5* 11.0*    350 330 331 263       BMP  Recent Labs   Lab 25  1517 04/10/25  0010 04/10/25  1650 25  0331 25  1703 25  2048 25  2135    135*  --  136  --   --  134*   K 4.5 4.1  --  4.2  --   --  3.1*    102  --  101  --   --  102   CO2 24 23  --  28  --   --  24   BUN 21.8 19.5  --  14.4  --   --  14.5   CREATININE 0.94 1.00  --  1.01  --   --  0.89   CALCIUM 10.4* 9.4  --  9.6  --   --  8.0*   CAION  --   --  1.19  --  1.09* 1.15  --    MG  --   --   --  1.70  --   --   --        Results for orders placed during the hospital encounter of 25    X-Ray Chest AP Portable    Narrative  EXAMINATION:  XR CHEST AP PORTABLE    CLINICAL HISTORY:  fever;,  .    COMPARISON:  April 8, 2025    FINDINGS:  No alveolar consolidation, effusion, or pneumothorax is seen.   The thoracic aorta is normal  cardiac silhouette, central pulmonary vessels and mediastinum are normal in size and are grossly unremarkable.   visualized osseous structures are grossly unremarkable.    Impression  No acute chest disease is identified.      Electronically signed by: Jose Corbin  Date:    04/10/2025  Time:    10:41    Recent Labs   Lab 04/10/25  0010 04/11/25  0331 04/11/25  1903   WBC 9.18 10.76 15.31  15.31*   HGB 9.8* 10.5* 11.0*   HCT 31.0* 33.5* 35.0*    331 263     Estimated Creatinine Clearance: 68.7 mL/min (based on SCr of 0.89 mg/dL).    Lab Results   Component Value Date    CREATININE 0.89 04/11/2025    CREATININE 1.01 04/11/2025    CREATININE 1.00 04/10/2025    ALKPHOS 56 04/11/2025    ALKPHOS 68 04/11/2025    ALKPHOS 71 04/10/2025        Microbiology and ID tests:     Microbiology Results (last 7 days)       Procedure Component Value Units Date/Time    Respiratory Culture [[id number]] Collected: 04/12/25 0031    Order Status: Completed Specimen: Sputum from Endotracheal Aspirate Updated: 04/12/25 0706     GRAM STAIN Quality 3+      Rare Gram positive cocci    Blood culture #1 **CANNOT BE ORDERED STAT** [[id number]]  (Normal) Collected: 04/10/25 0010    Order Status: Completed Specimen: Blood Updated: 04/12/25 0103     Blood Culture No Growth At 48 Hours    Blood culture #2 **CANNOT BE ORDERED STAT** [[id number]]  (Normal) Collected: 04/10/25 0010    Order Status: Completed Specimen: Blood Updated: 04/12/25 0103     Blood Culture No Growth At 48 Hours              Medications   Inpatient  Scheduled Meds:   aspirin  81 mg Oral Daily    levETIRAcetam (Keppra) IV (PEDS and ADULTS)  500 mg Intravenous Q12H    losartan  100 mg Oral Daily    montelukast  10 mg Oral QHS    mupirocin   Nasal BID    pantoprazole  40 mg Oral Daily    piperacillin-tazobactam (Zosyn) IV (PEDS and  ADULTS) (extended infusion is not appropriate)  4.5 g Intravenous Q8H    vancomycin 1,250 mg in D5W 250 mL IVPB (admixture device)  1,250 mg Intravenous Q18H     Continuous Infusions:   Amino acid 4.25% - dextrose 5% (CLINIMIX-E) solution (1L provides 42.5 gm AA, 50 gm CHO (170 kcal/L dextrose), Na 35, K 30, Mg 5, Ca 4.5, Acetate 70, Cl 39, Phos 15)   Intravenous Continuous        propofoL  0-50 mcg/kg/min (Dosing Weight) Intravenous Continuous 12.8 mL/hr at 04/12/25 0602 25 mcg/kg/min at 04/12/25 0602     PRN Meds:.  Current Facility-Administered Medications:     0.9% NaCl, , Intravenous, PRN    0.9% NaCl, , Intravenous, PRN    acetaminophen, 1,000 mg, Oral, Q6H PRN    albuterol-ipratropium, 3 mL, Nebulization, Q6H PRN    dextrose 50%, 12.5 g, Intravenous, PRN    dextrose 50%, 25 g, Intravenous, PRN    etomidate, , Intravenous, Code/trauma/sedation Med    glucagon (human recombinant), 1 mg, Intramuscular, PRN    glucose, 16 g, Oral, PRN    glucose, 24 g, Oral, PRN    hydrALAZINE, 10 mg, Intravenous, Q6H PRN    insulin aspart U-100, 0-5 Units, Subcutaneous, QID (AC + HS) PRN    promethazine, 12.5 mg, Oral, Q6H PRN    rocuronium, , Intravenous, Code/trauma/sedation Med    vancomycin - pharmacy to dose, , Intravenous, pharmacy to manage frequency    Home Meds  Current Outpatient Medications   Medication Instructions    albuterol (PROAIR HFA) 90 mcg/actuation inhaler 2 puffs, Inhalation, Every 6 hours PRN, Rescue    allopurinoL (ZYLOPRIM) 100 MG tablet TAKE 1 TABLET EVERY DAY    aspirin (ECOTRIN) 81 mg, Daily    atorvastatin (LIPITOR) 20 MG tablet TAKE 1 TABLET AT BEDTIME    blood sugar diagnostic (TRUE METRIX GLUCOSE TEST STRIP) Strp 1 strip, Misc.(Non-Drug; Combo Route), Daily    blood-glucose meter (TRUE METRIX AIR GLUCOSE METER) kit Test daily for DM II E11.9    busPIRone (BUSPAR) 10 MG tablet TAKE 1/2 TO 1 TABLET THREE TIMES DAILY    cinnamon bark 1,000 mg, Daily    citalopram (CELEXA) 20 mg, Oral    gabapentin  (NEURONTIN) 100 MG capsule TAKE 2 CAPSULES (200 MG TOTAL) THREE TIMES DAILY    glucosamine/chondr navarro A sod (OSTEO BI-FLEX ORAL) Daily    KRILL OIL ORAL 500 mg, Daily    losartan (COZAAR) 100 mg, Daily    metFORMIN (GLUCOPHAGE) 500 mg, Oral, 2 times daily with meals    midodrine (PROAMATINE) 2.5 mg, Oral, 2 times daily with meals    montelukast (SINGULAIR) 10 mg, Oral, Nightly    pantoprazole (PROTONIX) 40 MG tablet TAKE 1 TABLET EVERY DAY    pioglitazone (ACTOS) 15 mg, Oral, Daily    tamsulosin (FLOMAX) 0.4 mg, Daily       Imaging (personally reviewed):     XR NG/OG tube placement check, non-radiologist performed   Final Result      Optimal placement of the nasogastric tube.         Electronically signed by: Luciano Lomeli   Date:    04/11/2025   Time:    17:29      CTA Head and Neck (xpd)   Final Result      No large vessel occlusion or flow-limiting stenosis.         Electronically signed by: Stacey Gonzalez   Date:    04/11/2025   Time:    16:01      CT HEAD FOR CODE STROKE   Final Result      Chronic age-related changes.      Subtle area of slightly more prominent hypoattenuation in the subinsular region when compared the prior examination concerning for possible acute ischemia.  MRI correlation may be beneficial.      Sinusitis         Electronically signed by: Jayme Michael   Date:    04/11/2025   Time:    15:42      X-Ray Chest 1 View for Line/Tube Placement   Final Result      Insertion of endotracheal tube and nasogastric tube.      Slightly more confluent opacities in both bases might be related to poor inspiratory effort, however, infiltrates cannot be exclude         Electronically signed by: Jose Corbin   Date:    04/11/2025   Time:    15:15      Fl Modified Barium Swallow Speech   Final Result      CT Abdomen Pelvis With IV Contrast NO Oral Contrast   Final Result      1. Incompletely characterized cystic nodule in the pancreatic body which has slightly increased in size from the prior exam.  This  would be best characterized with MRI with and without IV contrast.   2. Indeterminate hypodense cystic nodules in the liver which can be evaluated on the above MRI.   3. Diverticulosis without diverticulitis.   4. Punctate nonobstructing caliceal renal stones bilaterally.   5. Minimal right lower lobe atelectasis.         Electronically signed by: Adalberto Torres MD   Date:    04/11/2025   Time:    11:54      CT Chest Without Contrast   Final Result      Small, subpleural dependent consolidative opacity at the right lower lobe may be related to atelectasis, scarring or pneumonia.         Electronically signed by: Lashell Minor   Date:    04/10/2025   Time:    16:46      X-Ray Chest AP Portable   Final Result      No acute chest disease is identified.         Electronically signed by: Jose Corbin   Date:    04/10/2025   Time:    10:41            4/12/2025 8:16 AM      The attending portion of this evaluation, treatment, and documentation was performed per Zoltan Alonzo MD via Telemedicine AudioVisual using the secure [employer] software platform with 2 way audio/video. The provider was located off-site and the patient is located in the hospital. The aforementioned video software was utilized to document the relevant history and physical exam.     Critical care time spent: >35 minutes

## 2025-04-12 NOTE — PT/OT/SLP PROGRESS
Occupational Therapy      Patient Name:  River Sheehan Jr.   MRN:  [id number]    Patient now upgraded to ICU, intubated/sedated. Per RN, currently on EEG. Will f/u 4/13 pending MD clearance as appropriate.     4/12/2025

## 2025-04-12 NOTE — NURSING
"  RAPID RESPONSE NURSE STROKE CODE NOTE    Admit Date: 2025  LOS: 2  Code Status: No Order   Date of Consult: 2025   : 1945  Age: 79 y.o.  Weight:   Wt Readings from Last 1 Encounters:   25 85.3 kg (188 lb 0.8 oz)     Sex: male  Race: White   Bed: [facility] A:   MRN: [id number]  Attending Physician: JERROD Artis MD  Time Rapid Response Team page Received: 1353  Time Rapid Response Team at Bedside: 1355  Time Rapid Response Team left Bedside: 1500      SITUATION    Notified by Rapid Response RT.  Called to evaluate the patient for Neuro    Events leading to rapid response call: Patient became lethargic and aphasic    ASSESSMENT    Last VS: BP (!) 136/55   Pulse 86   Temp (!) 101.7 °F (38.7 °C)   Resp 18   Ht 5' 5" (1.651 m)   Wt 85.3 kg (188 lb 0.8 oz)   SpO2 96%   BMI 31.29 kg/m²     24H Vital Sign Range:  Temp:  [99.1 °F (37.3 °C)-103.6 °F (39.8 °C)]   Pulse:  []   Resp:  [13-29]   BP: ()/(42-80)   SpO2:  [89 %-100 %]     NIH Stroke Scale    1a  Level of consciousness: 1=not alert but arousable by minor stimulation to obey, answer or respond   1b. LOC questions:  2=Answers neither task correctly   1c. LOC commands: 2=Answers neither task correctly   2.  Best Gaze: 2=forced deviation, or total gaze paresis not overcome by oculocephalic maneuver   3.  Visual: 0=No visual loss   4. Facial Palsy: 1=Minor paralysis (flattened nasolabial fold, asymmetric on smiling)   5a.  Motor left arm: 3=No effort against gravity, limb falls   5b.  Motor right arm: 0=No drift, limb holds 90 (or 45) degrees for full 10 seconds   6a. motor left leg: 3=No effort against gravity, limb falls   6b  Motor right le=No drift, limb holds 90 (or 45) degrees for full 10 seconds   7. Limb Ataxia: 0=Absent   8.  Sensory: 0=Normal; no sensory loss   9. Best Language:  3=Mute, global aphasia; no usable speech or auditory comprehension   10. Dysarthria: 2=Severe; patient speech is so slurred as to be " unintelligible in the absence of or our of proportion to any dysphagia, or is mute/anarthric   11. Extinction and Inattention: 0=No abnormality   12. Distal motor function: 0=Normal    Total:   18       Last know well time: 1300  Glucose result: 154    Time Stroke Code initiated: 1405  Stroke team Arrival time: 1410  Neurology provider you spoke with: Dr. Escamilla    Time arrived at CT: 1425  Time CT completed: 1525    Does the patient take any Blood Thinners? unknown    VAN positive or negative:       Thrombolytic decision: No      IR decision (Thrombectomy Candidate?): No      *Significant Event* Upon arrival to patient room for Rapid Response; patient was following commands on R side with no ability on the L; Patient gaze was deviated up to the Left; Code Fast called and patient transported to CT on tele box (Dr. Francine Britton, Derick CANNON At bedside on transport); O2 sats were 97% on 4 L oxymask with patient able to maintain airway; patient became unresponsive in hallway with seizure like activity; 2 mg of ativan was ordered by Dr. Escamilla; and given by house supervisor; Derick PEREZ RN; patient O2 saturation began decreasing; we were unable to initially insert oral airway due to patient clenching; began bagging the patient with jaw thrust maneuver; oral airway was then obtained with improvement of O2 saturation to 99% with ambu bag and patient was transported to Trauma 2 where ED physician, Dr. Mcdaniels, obtained an airway; patient was placed on the ventilator and stabilized; Patient was then transferred to CT where the CT/CTA was able to be obtained then transferred to ICU. Defer all further care to ICU team.       PHYSICIAN ESCALATION    Orders received and case discussed with Dr. Diaz     Disposition: Transfer to ICU bed

## 2025-04-12 NOTE — PROGRESS NOTES
Inpatient Nutrition Assessment    Admit Date: 4/9/2025   Total duration of encounter: 3 days   Patient Age: 79 y.o.    Nutrition Recommendation/Prescription     Start tube feeding at 30 ml/hr, advance by 20 ml/hr every 4 hours as tolerated:  Peptamen Intense VHP goal rate 70 ml/hr to provide  1400 kcal/d  130 g protein/d  1176 ml free water/d  calculations based on estimated 20 hour run time     Communication of Recommendations: reviewed with nurse    Nutrition Assessment     Malnutrition Assessment/Nutrition-Focused Physical Exam       Malnutrition Level: other (see comments) (Does not meet criteria) (04/12/25 3667)                                                        A minimum of two characteristics is recommended for diagnosis of either severe or non-severe malnutrition.    Chart Review    Reason Seen: continuous nutrition monitoring    Malnutrition Screening Tool Results   Have you recently lost weight without trying?: No  Have you been eating poorly because of a decreased appetite?: No   MST Score: 0   Diagnosis:  Hypoxemic respiratory failure status post intubation on 04/11  Focal tonic-clonic complex partial seizure leading to above  Chronic right insular ischemic stroke  Suspected community-acquired pneumonia  Sepsis secondary to above  Anemia    Relevant Medical History: carotid artery stenosis, CAD, diabetes mellitus, hyperlipidemia, hypertension, and anemia     Scheduled Medications:  acyclovir, 10 mg/kg (Ideal), Q8H  aspirin, 81 mg, Daily  levETIRAcetam (Keppra) IV (PEDS and ADULTS), 500 mg, Q12H  losartan, 100 mg, Daily  montelukast, 10 mg, QHS  mupirocin, , BID  pantoprazole, 40 mg, Daily  piperacillin-tazobactam (Zosyn) IV (PEDS and ADULTS) (extended infusion is not appropriate), 4.5 g, Q8H  vancomycin 1,250 mg in D5W 250 mL IVPB (admixture device), 1,250 mg, Q18H    Continuous Infusions:  Amino acid 4.25% - dextrose 5% (CLINIMIX-E) solution (1L provides 42.5 gm AA, 50 gm CHO (170 kcal/L dextrose),  Na 35, K 30, Mg 5, Ca 4.5, Acetate 70, Cl 39, Phos 15)  propofoL, Last Rate: 15 mcg/kg/min (04/12/25 1200)    PRN Medications:   0.9% NaCl, , PRN  0.9% NaCl, , PRN  acetaminophen, 1,000 mg, Q6H PRN  albuterol-ipratropium, 3 mL, Q6H PRN  dextrose 50%, 12.5 g, PRN  dextrose 50%, 25 g, PRN  etomidate, , Code/trauma/sedation Med  glucagon (human recombinant), 1 mg, PRN  glucose, 16 g, PRN  glucose, 24 g, PRN  hydrALAZINE, 10 mg, Q6H PRN  insulin aspart U-100, 0-5 Units, QID (AC + HS) PRN  promethazine, 12.5 mg, Q6H PRN  rocuronium, , Code/trauma/sedation Med  vancomycin - pharmacy to dose, , pharmacy to manage frequency    Calorie Containing IV Medications: Diprivan @ 7.7 ml/hr (provides 203 kcal/d)    Recent Labs   Lab 04/07/25  1415 04/08/25  1517 04/10/25  0010 04/11/25  0331 04/11/25  0804 04/11/25  1903 04/11/25  2135 04/12/25  0759 04/12/25  0857   NA  --  136 135* 136  --   --  134* 133*  --    K  --  4.5 4.1 4.2  --   --  3.1* 4.6  --    CALCIUM  --  10.4* 9.4 9.6  --   --  8.0* 8.0*  --    MG  --   --   --  1.70  --   --   --   --   --    CL  --  102 102 101  --   --  102 102  --    CO2  --  24 23 28  --   --  24 23  --    BUN  --  21.8 19.5 14.4  --   --  14.5 15.7  --    CREATININE  --  0.94 1.00 1.01  --   --  0.89 1.01  --    EGFRNORACEVR  --  >60 >60 >60  --   --  >60 >60  --    GLUCOSE  --  103 175* 95  --   --  159* 115  --    BILITOT  --  0.4 0.2 0.5  --   --  0.5 0.6  --    ALKPHOS  --  82 71 68  --   --  56 52  --    ALT  --  18 18 24  --   --  28 36  --    AST  --  16 17 26  --   --  29 44  --    ALBUMIN  --  3.9 3.8 4.1  --   --  3.3* 3.3*  --    HGBA1C  --   --   --   --  6.3  --   --   --   --    LIPASE  --   --  55  --   --   --   --   --   --    WBC 6.43 7.47 9.18 10.76  --  15.31  15.31*  --   --  11.02   HGB 10.4* 10.5* 9.8* 10.5*  --  11.0*  --   --  11.2*   HCT 33.2* 33.0* 31.0* 33.5*  --  35.0*  --   --  35.2*     Nutrition Orders:  Diet NPO  Amino acid 4.25% - dextrose 5% (CLINIMIX-E)  "solution (1L provides 42.5 gm AA, 50 gm CHO (170 kcal/L dextrose), Na 35, K 30, Mg 5, Ca 4.5, Acetate 70, Cl 39, Phos 15)      Appetite/Oral Intake: NPO/not applicable  Factors Affecting Nutritional Intake: NPO and on mechanical ventilation  Social Needs Impacting Access to Food: unable to assess at this time; will attempt on follow-up  Food/Rastafarian/Cultural Preferences: unable to obtain  Food Allergies: none reported  Last Bowel Movement: 04/10/25  Wound(s): no pressure injuries documented at this time     Comments    4/12/25 Patient evaluated due to orders for Clinimix E 4.25/5 at 75 ml/hr, nurse reports he is no longer receiving this, plans to start tube feeding, orders provided. He is on mechanical ventilation and receiving calories from propofol.  Addendum: Nurse reports propofol being increased from 15-20 mcg/kg/min (~200 kcal daily) to 40-50 mcg/kg/min (~500 kcal/daily), will modify tube feeding accordingly.      Anthropometrics    Height: 5' 5" (165.1 cm), Height Method: Stated  Last Weight: 85.3 kg (188 lb 0.8 oz) (04/11/25 2115), Weight Method: Bed Scale  BMI (Calculated): 31.3  BMI Classification: obese grade I (BMI 30-34.9)        Ideal Body Weight (IBW), Male: 136 lb     % Ideal Body Weight, Male (lb): 136.03 %                          Usual Weight Provided By: unable to obtain usual weight    Wt Readings from Last 5 Encounters:   04/11/25 85.3 kg (188 lb 0.8 oz)   04/08/25 83.9 kg (185 lb)   04/01/25 88.5 kg (195 lb)   03/26/25 88.5 kg (195 lb)   01/29/25 86.2 kg (190 lb)     Weight Change(s) Since Admission:   4/12 admission weight 83.9 kg stated, recent bed weight 85.3 kg documented  Wt Readings from Last 1 Encounters:   04/11/25 2115 85.3 kg (188 lb 0.8 oz)   04/09/25 2246 83.9 kg (185 lb)   Admit Weight: 83.9 kg (185 lb) (04/09/25 2246), Weight Method: Stated    Estimated Needs    Weight Used For Calorie Calculations: 85 kg (187 lb 6.3 oz)  Energy Calorie Requirements (kcal): 2,069.29  Energy " Need Method: [place] (modified)  Total Ve: 11.1 L/m, Temp (24hrs), Av.3 °F (38.5 °C), Min:99.1 °F (37.3 °C), Max:103.6 °F (39.8 °C)  Vtot (L/Min) for [place] Equation Calculation: 11.1; Max Temp for [place] Equation Calculation: 103.6 °F  Weight Used For Protein Calculations: 85 kg (187 lb 6.3 oz)  Protein Requirements: 119-136 g, 1.4-1.6 g/kg  Fluid Requirements (mL): 2,069.29, 1 ml/kcal  CHO Requirement: 207-258 g, 40-50% of kcal     Enteral Nutrition Patient not receiving enteral nutrition at this time.    Parenteral Nutrition Patient not receiving parenteral nutrition support at this time.    Evaluation of Received Nutrient Intake    Calories: not meeting estimated needs  Protein: not meeting estimated needs    Patient Education Not applicable.    Nutrition Diagnosis     PES: Inadequate energy intake related to inability to consume sufficient nutrients as evidenced by less than 80% needs met. (active)    PES: N/A           Nutrition Interventions     Intervention(s): collaboration with other providers  Intervention(s): Enteral nutrition      Goal: Meet greater than 80% of nutritional needs by follow-up. (new)  Goal: Tolerate enteral feeding at goal rate by follow-up. (new)    Nutrition Goals & Monitoring     Dietitian will monitor: energy intake, enteral nutrition intake, parenteral nutrition intake, weight, electrolyte/renal panel, beliefs/attitudes, glucose/endocrine profile, and gastrointestinal profile  Discharge planning: too early to determine; pending clinical course  Nutrition Risk/Follow-Up: high (follow-up in 1-4 days)   Please consult if re-assessment needed sooner.

## 2025-04-13 NOTE — PROGRESS NOTES
"[facility] - 7th Floor ICU  Pulmonary Critical Care Note    Patient Name: River Sheehan Jr.  MRN: [id number]  Admission Date: 4/9/2025  Hospital Length of Stay: 3 days  Code Status: No Order  Attending Provider: JERROD Artis MD  Primary Care Provider: Chio Villela MD     Subjective:     HPI:   Patient is a 79-year-old male with a past medical history of carotid artery stenosis, CAD, diabetes mellitus, hyperlipidemia, hypertension, and anemia who initially presented to the emergency department after a syncopal episode occurring at home.  Patient was accompanied by his wife at this time who stated that he was very physically active at home but did complain of shortness of breath at times.  She reported that he was having episodes of "black outs" over the last several weeks.  He has been following cardiology on outpatient basis with recent adjustments in his home medications.  Wife also reported fever at home.  Patient was initially admitted to the hospital medicine service for these issues and possible pneumonia present on initial imaging.  He was started on Rocephin and azithromycin for suspected CAP.  However, this morning patient was found to have left-sided deficits including leftward gaze, left hemiparesis, and confusion.  He was not following commands at this time which was a significant change from patient's baseline.  Patient was taken to CT scan STAT to evaluate for stroke.  Patient displayed seizure-like activity in route to CT scanner and he was administered Ativan 2 mg IV.  Patient desaturated after administration of Ativan and ultimately required rapid sequence intubation.  CT and MRI flare displaying right insular chronic stroke.  Patient was admitted to the ICU after this event.    Hospital Course/Significant events:  4/11/2025: Patient admitted to the ICU status post intubation    24 Hour Interval History:  Patient remains on continuous EEG.  Now on Versed for ongoing seizures " noted on the EEG.  Also remains on propofol at 50 mics per kilos per minute.  Requiring Levophed 0.02 mics per kilos per minute.  Tube feedings at 50 cc/hour.  May still be having seizures according to EEG.  Attempting to contact Dr. Dill.  ID is requesting an LP.    Past Medical History:   Diagnosis Date    Acid reflux     Adenomatous polyp of ascending colon 09/20/2021    Arthritis     Asymptomatic stenosis of left carotid artery     CAD (coronary artery disease)     Carotid artery stenosis     US 3/26/25 less than 50% bilateral    Colon polyps 02/20/2025    Transvers polyp benigne mucosa, Ascending Colon Tubular adenoma, Rectum hyperplastic    COVID-19 07/06/2022    Depression     Diabetes mellitus     Gout     Hearing loss     High cholesterol     HTN (hypertension)     Kidney stone     Macrocytic anemia     Osteoporosis     Seasonal allergies        Past Surgical History:   Procedure Laterality Date    CAROTID ENDARTERECTOMY Left 09/12/2024    Procedure: ENDARTERECTOMY-CAROTID;  Surgeon: Hany Pendleton MD;  Location: [facility];  Service: Peripheral Vascular;  Laterality: Left;    COLONOSCOPY W/ BIOPSIES  09/20/2021    Dr. Chun Smith    COLONOSCOPY W/ BIOPSIES  02/20/2025    CYSTOSCOPY      EXTRACAPSULAR EXTRACTION OF CATARACT      HERNIA REPAIR      LITHOTRIPSY  09/2023    RETROGRADE PYELOGRAPHY      WISDOM TOOTH EXTRACTION         Social History[1]        Current Outpatient Medications   Medication Instructions    albuterol (PROAIR HFA) 90 mcg/actuation inhaler 2 puffs, Inhalation, Every 6 hours PRN, Rescue    allopurinoL (ZYLOPRIM) 100 MG tablet TAKE 1 TABLET EVERY DAY    aspirin (ECOTRIN) 81 mg, Daily    atorvastatin (LIPITOR) 20 MG tablet TAKE 1 TABLET AT BEDTIME    blood sugar diagnostic (TRUE METRIX GLUCOSE TEST STRIP) Strp 1 strip, Misc.(Non-Drug; Combo Route), Daily    blood-glucose meter (TRUE METRIX AIR GLUCOSE METER) kit Test daily for DM II E11.9    busPIRone (BUSPAR) 10 MG tablet TAKE  1/2 TO 1 TABLET THREE TIMES DAILY    cinnamon bark 1,000 mg, Daily    citalopram (CELEXA) 20 mg, Oral    gabapentin (NEURONTIN) 100 MG capsule TAKE 2 CAPSULES (200 MG TOTAL) THREE TIMES DAILY    glucosamine/chondr navarro A sod (OSTEO BI-FLEX ORAL) Daily    KRILL OIL ORAL 500 mg, Daily    losartan (COZAAR) 100 mg, Daily    metFORMIN (GLUCOPHAGE) 500 mg, Oral, 2 times daily with meals    midodrine (PROAMATINE) 2.5 mg, Oral, 2 times daily with meals    montelukast (SINGULAIR) 10 mg, Oral, Nightly    pantoprazole (PROTONIX) 40 MG tablet TAKE 1 TABLET EVERY DAY    pioglitazone (ACTOS) 15 mg, Oral, Daily    tamsulosin (FLOMAX) 0.4 mg, Daily       Review of patient's allergies indicates:  No Known Allergies     Current Inpatient Medications   acyclovir  10 mg/kg (Ideal) Intravenous Q8H    aspirin  81 mg Per OG tube Daily    FOSphenytoin  100 mg PE Intravenous TID    levETIRAcetam (Keppra) IV (PEDS and ADULTS)  1,500 mg Intravenous BID    losartan  100 mg Per OG tube Daily    montelukast  10 mg Per OG tube QHS    mupirocin   Nasal BID    pantoprazole  40 mg Intravenous Daily    piperacillin-tazobactam (Zosyn) IV (PEDS and ADULTS) (extended infusion is not appropriate)  4.5 g Intravenous Q8H    vancomycin 1,250 mg in D5W 250 mL IVPB (admixture device)  1,250 mg Intravenous Q18H       Current Intravenous Infusions   midazolam  0-5 mg/hr Intravenous Continuous 5 mL/hr at 04/13/25 0359 5 mg/hr at 04/13/25 0359    NORepinephrine bitartrate-D5W  0-3 mcg/kg/min (Dosing Weight) Intravenous Continuous   Stopped at 04/13/25 0247    propofoL  0-50 mcg/kg/min (Dosing Weight) Intravenous Continuous 25.6 mL/hr at 04/13/25 0537 50 mcg/kg/min at 04/13/25 0537         Review of Systems   Unable to perform ROS: Intubated          Objective:       Intake/Output Summary (Last 24 hours) at 4/13/2025 0751  Last data filed at 4/13/2025 0537  Gross per 24 hour   Intake 3494.99 ml   Output 2330 ml   Net 1164.99 ml         Vital Signs (Most  Recent):  Temp: (!) 101.7 °F (38.7 °C) (04/13/25 0530)  Pulse: 83 (04/13/25 0645)  Resp: 12 (04/13/25 0645)  BP: (!) 122/45 (04/13/25 0645)  SpO2: 96 % (04/13/25 0645)  Body mass index is 31.29 kg/m².  Weight: 85.3 kg (188 lb 0.8 oz) Vital Signs (24h Range):  Temp:  [38.7 °F (3.7 °C)-103.6 °F (39.8 °C)] 101.7 °F (38.7 °C)  Pulse:  [] 83  Resp:  [0-32] 12  SpO2:  [73 %-99 %] 96 %  BP: ()/() 122/45     Physical Exam  Vitals reviewed.   Constitutional:       Comments: Patient intubated and sedated   HENT:      Head: Normocephalic and atraumatic.      Mouth/Throat:      Comments: ET tube in place  Eyes:      Comments: Pinpoint pupils bilaterally   Cardiovascular:      Rate and Rhythm: Normal rate and regular rhythm.      Heart sounds: No murmur heard.     No friction rub. No gallop.   Pulmonary:      Breath sounds: No wheezing, rhonchi or rales.   Abdominal:      General: There is no distension.      Palpations: Abdomen is soft.      Tenderness: There is no abdominal tenderness.   Musculoskeletal:      Right lower leg: No edema.      Left lower leg: No edema.   Neurological:      Comments: Unable to assess secondary to patient currently being intubated and sedated       Lines/Drains/Airways       Drain  Duration                  NG/OG Tube 04/11/25 1530 1 day         Urethral Catheter 04/11/25 1500 1 day              Airway  Duration                  Airway - Non-Surgical 04/11/25 1443 Endotracheal Tube 1 day              Peripheral Intravenous Line  Duration                  Peripheral IV - Single Lumen 18 G Left;Posterior Hand -- days         Peripheral IV - Single Lumen 04/11/25 2145 18 G 2 1/4 in Anterior;Right Upper Arm 1 day         Peripheral IV - Single Lumen 04/12/25 2250 18 G 2 1/4 in Anterior;Left Forearm <1 day                    Significant Labs:    Lab Results   Component Value Date    WBC 12.17 (H) 04/13/2025    WBC 12.17 04/13/2025    HGB 10.8 (L) 04/13/2025    HCT 33.6 (L) 04/13/2025     MCV 79.4 (L) 04/13/2025     04/13/2025           BMP  Lab Results   Component Value Date     (L) 04/13/2025    K 3.8 04/13/2025    CO2 22 (L) 04/13/2025    BUN 14.2 04/13/2025    CREATININE 0.98 04/13/2025    CALCIUM 8.4 (L) 04/13/2025    AGAP 12.0 04/13/2025    EGFRNONAA >60 06/09/2022         ABG  Recent Labs   Lab 04/11/25 2048   PH 7.490*   PO2 81.0   PCO2 38.0   HCO3 29.0*   POCBASEDEF 5.30*       Mechanical Ventilation Support:  Vent Mode: A/C (04/13/25 0545)  Ventilator Initiated: Yes (04/11/25 1445)  Set Rate: 18 BPM (04/13/25 0545)  Vt Set: 450 mL (04/13/25 0545)  PEEP/CPAP: 5 cmH20 (04/13/25 0545)  Oxygen Concentration (%): 30 (04/13/25 0545)  Peak Airway Pressure: 17 cmH20 (04/13/25 0545)  Total Ve: 7.5 L/m (04/13/25 0545)  F/VT Ratio<105 (RSBI): (!) 42.86 (04/13/25 0545)      Significant Imaging:  I have reviewed the pertinent imaging within the past 24 hours.    Assessment/Plan:     Assessment  Hypoxemic respiratory failure status post intubation on 04/11  Focal tonic-clonic complex partial seizure leading to above  Chronic right insular ischemic stroke  Suspected community-acquired pneumonia  Sepsis secondary to above  Anemia    Plan  Await recommendations from Neurology regarding need for phenobarbital.  Have consulted IR for LP.  Patient febrile but on antibiotics.  Adequate oxygenation with present ventilator settings.  Continue nutritional support.  Await further recommendations from consultants whose help is greatly appreciated.    DVT Prophylaxis: SCDs  GI Prophylaxis: Protonix     42 minutes of critical care was time spent personally by me on the following activities: development of treatment plan with patient or surrogate and bedside caregivers, discussions with consultants, evaluation of patient's response to treatment, examination of patient, ordering and performing treatments and interventions, ordering and review of laboratory studies, ordering and review of radiographic  studies, pulse oximetry, re-evaluation of patient's condition.  This critical care time did not overlap with that of any other provider or involve time for any procedures.     YAMINI Artis MD  Pulmonary Critical Care Medicine  [facility] - 7th Floor ICU  DOS: 04/13/2025        [1]   Social History  Socioeconomic History    Marital status:    Tobacco Use    Smoking status: Never     Passive exposure: Never    Smokeless tobacco: Never   Substance and Sexual Activity    Alcohol use: Not Currently     Alcohol/week: 1.0 standard drink of alcohol     Types: 1 Cans of beer per week    Drug use: Never    Sexual activity: Yes     Social Drivers of Health     Financial Resource Strain: Low Risk  (4/10/2025)    Overall Financial Resource Strain (CARDIA)     Difficulty of Paying Living Expenses: Not very hard   Food Insecurity: No Food Insecurity (4/10/2025)    Hunger Vital Sign     Worried About Running Out of Food in the Last Year: Never true     Ran Out of Food in the Last Year: Never true   Transportation Needs: No Transportation Needs (4/10/2025)    PRAPARE - Transportation     Lack of Transportation (Medical): No     Lack of Transportation (Non-Medical): No   Physical Activity: Inactive (4/10/2025)    Exercise Vital Sign     Days of Exercise per Week: 0 days     Minutes of Exercise per Session: 0 min   Stress: Stress Concern Present (4/10/2025)    [employer] [place] [employer] - Occupational Stress Questionnaire     Feeling of Stress : Rather much   Housing Stability: Low Risk  (4/10/2025)    Housing Stability Vital Sign     Unable to Pay for Housing in the Last Year: No     Homeless in the Last Year: No

## 2025-04-13 NOTE — PLAN OF CARE
Problem: Adult Inpatient Plan of Care  Goal: Plan of Care Review  Outcome: Progressing  Goal: Patient-Specific Goal (Individualized)  Outcome: Progressing  Goal: Absence of Hospital-Acquired Illness or Injury  Outcome: Progressing  Goal: Optimal Comfort and Wellbeing  Outcome: Progressing  Goal: Readiness for Transition of Care  Outcome: Progressing     Problem: Diabetes Comorbidity  Goal: Blood Glucose Level Within Targeted Range  Outcome: Progressing     Problem: Wound  Goal: Optimal Coping  Outcome: Progressing  Goal: Optimal Functional Ability  Outcome: Progressing  Goal: Absence of Infection Signs and Symptoms  Outcome: Progressing  Goal: Improved Oral Intake  Outcome: Progressing  Goal: Optimal Pain Control and Function  Outcome: Progressing  Goal: Skin Health and Integrity  Outcome: Progressing  Goal: Optimal Wound Healing  Outcome: Progressing     Problem: Fall Injury Risk  Goal: Absence of Fall and Fall-Related Injury  Outcome: Progressing     Problem: Infection  Goal: Absence of Infection Signs and Symptoms  Outcome: Progressing     Problem: Skin Injury Risk Increased  Goal: Skin Health and Integrity  Outcome: Progressing     Problem: Mechanical Ventilation Invasive  Goal: Effective Communication  Outcome: Progressing  Goal: Optimal Device Function  Outcome: Progressing  Goal: Mechanical Ventilation Liberation  Outcome: Progressing  Goal: Optimal Nutrition Delivery  Outcome: Progressing  Goal: Absence of Device-Related Skin and Tissue Injury  Outcome: Progressing  Goal: Absence of Ventilator-Induced Lung Injury  Outcome: Progressing     Problem: Artificial Airway  Goal: Effective Communication  Outcome: Progressing  Goal: Optimal Device Function  Outcome: Progressing  Goal: Absence of Device-Related Skin or Tissue Injury  Outcome: Progressing     Problem: Pneumonia  Goal: Fluid Balance  Outcome: Progressing  Goal: Resolution of Infection Signs and Symptoms  Outcome: Progressing  Goal: Effective  Oxygenation and Ventilation  Outcome: Progressing

## 2025-04-13 NOTE — CONSULTS
[facility]                       [address] [id number]    PATIENT NAME:       BALDOMERO DUNCAN   YOB: 1945  CSN:                [id number]   MRN:                [id number]  ADMIT DATE:         04/09/2025 23:55:00  PHYSICIAN:          Aquiles Dill MD                            CONSULTATION    DATE OF CONSULT:  04/12/2025 00:00:00    I was consulted for the patient with decreased level of consciousness and to   rule out seizure.  This patient is 79 years old, has multiple medical problems,   including carotid artery disease, coronary artery disease, diabetes,   hyperlipidemia, hypertension, and anemia, was presented to the emergency room   after multiple episodes of falling and syncope.  The patient was found to have   high fever.  Infectious Disease has been consulted, and the patient had a CT   scan of the brain, came back negative for any acute abnormality.  CTA came back   negative for any high-grade stenosis.  The patient was placed on antibiotic.  I   was consulted because the patient had some decreased level of consciousness and   also some weakness in the upper and lower extremity.  The patient's MRI of the   brain is still pending and had an EEG.  The EEG did show the patient has some   intermittent spike wave in the right hemisphere.  The patient was loaded with   Keppra and he is right now on 500 mg b.i.d. and monitor is still on, and I came   to the room.  The patient is intubated, so I looked at the monitor.  The patient   still has intermittent spike wave in the right hemisphere consistent with   intermittent epileptiform discharges.  I cannot get any history from the   patient, most of the history I am getting from the nursing staff and from the   chart.  The patient's neuro examination, both pupils are equal and reactive.  He   is placed on propofol, so my exam is limited.  He has a gag.  He  is breathing   over the vent and withdrawal to painful stimuli.    ASSESSMENT AND PLAN:  The patient has multiple episodes of syncope, could be   related to seizure.  EEG is positive for epileptiform discharges on the right   hemisphere, etiology unclear.  MRI of the brain is still pending.  I am going to   increase his Keppra of 1500 mg b.i.d. and then I will load him with   fosphenytoin at 1 g and he will continue on maintenance dose of fosphenytoin at   100 mg t.i.d.  I will suggest to raise his propofol to a maximum to see if we   can get that epileptiform discharges stopped on the EEG monitoring, and we will   keep him hooked to the EEG until we will see if these epileptiform discharges go   away or at least decreased in frequency.  If the epileptiform discharges still   evident on the monitor with increased propofol and with the fosphenytoin and   Keppra at high maintenance dose, then we will add phenobarbital as a 3rd   antiepileptic drug and we will go from there.  As I mentioned, we need to rule   out stroke in this patient.  MRI of the brain is pending and we will go from   there.  Spinal tap is also recommended to rule out any CNS infection, and   Infectious Disease as I mentioned in the case will follow the patient with you.        ______________________________  MD BEVERLY Sabillon/FREDRICK  DD:  04/12/2025  Time:  07:40PM  DT:  04/13/2025  Time:  12:03AM  Job #:  [id number]      CONSULTATION

## 2025-04-13 NOTE — NURSING
MRI called and informed that due to being intubated, abdominal MRI cannot be performed due to breathing instructions.

## 2025-04-13 NOTE — PT/OT/SLP PROGRESS
Discussion had with nursing staff. Pt is sedated, intubated, and currently under monitoring for seizure activity. PT to sign off, nursing to reconsult when pt becomes appropriate.

## 2025-04-13 NOTE — PLAN OF CARE
Problem: Adult Inpatient Plan of Care  Goal: Plan of Care Review  Outcome: Not Progressing  Goal: Patient-Specific Goal (Individualized)  Outcome: Not Progressing  Goal: Absence of Hospital-Acquired Illness or Injury  Outcome: Not Progressing  Goal: Optimal Comfort and Wellbeing  Outcome: Not Progressing  Goal: Readiness for Transition of Care  Outcome: Not Progressing     Problem: Diabetes Comorbidity  Goal: Blood Glucose Level Within Targeted Range  Outcome: Not Progressing     Problem: Wound  Goal: Optimal Coping  Outcome: Not Progressing  Goal: Optimal Functional Ability  Outcome: Not Progressing  Goal: Absence of Infection Signs and Symptoms  Outcome: Not Progressing  Goal: Improved Oral Intake  Outcome: Not Progressing  Goal: Optimal Pain Control and Function  Outcome: Not Progressing  Goal: Skin Health and Integrity  Outcome: Not Progressing  Goal: Optimal Wound Healing  Outcome: Not Progressing     Problem: Fall Injury Risk  Goal: Absence of Fall and Fall-Related Injury  Outcome: Not Progressing

## 2025-04-13 NOTE — PROGRESS NOTES
[facility]                       [address] [id number]    PATIENT NAME:       BALDOMERO DUNCAN   YOB: 1945  CSN:                [id number]   MRN:                [id number]  ADMIT DATE:         2025 23:55:00  PHYSICIAN:          Aquiles Dill MD                           TESTING    DATE OF SERVICE:  2025 00:00:00    STUDY PERFORMED:  This is a 24-hour EEG monitoring.    Study was done for seizure workup.    DESCRIPTION:  This electroencephalogram was done using standard 10/20 channel   using 21 channels.  The background activity is consistent of about 5-6 hertz.    During the study, there was intermittent spike and slow wave in the right   hemisphere.  There was some EKG artifact during the study too and ventilation   artifact.    CONCLUSION:    1. This 24-hour EEG monitoring is abnormal due to the presence of diffuse   slowing consistent with moderate cerebral dysfunction with nonspecific sign, can   be found in toxic metabolic anoxic brain injury.    2. The presence of intermittent spike wave in the right hemisphere consistent   with intermittent epileptiform discharges.  Clinical correlation suggested.        ______________________________  MD BEVERLY Sabillon/AQS  DD:  2025  Time:  08:51PM  DT:  2025  Time:  12:20AM  Job #:  [id number]       TESTING

## 2025-04-14 ENCOUNTER — ANESTHESIA EVENT (OUTPATIENT)
Dept: INTERVENTIONAL RADIOLOGY/VASCULAR | Facility: HOSPITAL | Age: 80
End: 2025-04-14
Payer: MEDICARE

## 2025-04-14 ENCOUNTER — TELEPHONE (OUTPATIENT)
Dept: PRIMARY CARE CLINIC | Facility: CLINIC | Age: 80
End: 2025-04-14
Payer: MEDICARE

## 2025-04-14 PROBLEM — R56.9 SEIZURES: Status: ACTIVE | Noted: 2025-01-01

## 2025-04-14 PROBLEM — I95.1 ORTHOSTATIC HYPOTENSION: Status: ACTIVE | Noted: 2025-01-01

## 2025-04-14 NOTE — LOPA/MORA/SWTA/AOC/AEB
[facility] ([facility])  Notification of Referral  [facility] Contact # [phone]        Thank you for the referral of this patient to determine suitability for organ, tissue, and eye donation.  A chart review has been conducted (date):2025 at (time) 9:55 AM.    ? Potential candidate for organ donation - [facility] following patient. Any changes in patients condition, discussion of withdrawing the vent or brain death exams, or family mention of donation immediately call [phone]. Refer all organ referrals within 1 hour of meeting the clinical triger of a patient with a neurological, anoxic, or life threatening injury and ONE of the following:    * GCS </= 8    * Loss of 2 or more brain stem reflexes    * Hypothermic Protocol Initiated    * Withdrawal of support discussion regardless of GCS    * Family mention of Donation    ? Potential for candidate for tissue and eye donation- call [facility] at [phone] within 2 hours of death for screening as a potential tissue and/or eye donor.      ? Potential candidate for eye donation - call [facility] at [phone] within 2 hours of death for screening as a potential eye donor.    ? NOT a candidate for organ/tissue/eye donation- call [facility] at [phone] within 2 hours of death to report the time of death.    ? Potential candidate for donation/ Referral Closed- Any changes in patients condition, GCS of 5 or less, discussion of withdrawing the vent, brain death exams, or family mention of donation immediately call [phone].      Screened by: Mohamud Sharif    [facility] Referral Number:  [id number]    Suitable for:  [x]Organ  [x] Tissue  [x] Eye  []Not suitable for Donation    Rule out Reason:     Patient Name: River Sheehan Jr.                   79 y.o. male  Patient MRN: [id number]  : 1945  DOD:  TOD:  Cause of Death:     [x]Vented Patient  [facility] representative to approach family if appropriate  []DNR status obtained Referral of critical care  patients when family initiates Do Not Resuscitate  []Cardiac Death   Clinical Support Center to approach family via telephone if appropriate  []Donor Registry  Patient is listed in the Donor Registry    Completed by: Mohamud Sharif

## 2025-04-14 NOTE — PROGRESS NOTES
[facility]                       [address] [id number]    PATIENT NAME:       BALDOMERO DUNCAN   YOB: 1945  CSN:                [id number]   MRN:                [id number]  ADMIT DATE:         2025 23:55:00  PHYSICIAN:          Aquiles Dill MD                           TESTING    DATE OF SERVICE:  2025 00:00:00    STUDY PERFORMED:  EEG monitoring.    INDICATION:  This study was done for seizure workup.    DESCRIPTION:  This 24-hour EEG monitoring was done using 10/20 systems, using 21   channels.  The background activity is consistent of about 5-6 hertz of moderate   amplitude.  During the study, there was some intermittent spike wave and slow   wave in the right hemisphere, mostly noticeable at the right parietal occipital   area that is intermittent throughout the study.  There was some EKG artifact,   also ventilation artifact throughout the study.    CONCLUSION:  This 24 hours EEG monitoring is abnormal due to the presence of:  1. Intermittent epileptiform discharges in the right hemisphere, mostly located   at the right parietal occipital area.  2. The presence of diffuse slowing consistent with moderate cerebral   dysfunction, nonspecific sign, can be found toxic metabolic anoxic brain injury.    Clinical correlation suggested.        ______________________________  MD BEVERLY Sabillon/FREDRICK  DD:  2025  Time:  05:34PM  DT:  2025  Time:  02:51AM  Job #:  [id number]       TESTING

## 2025-04-14 NOTE — PLAN OF CARE
Problem: Adult Inpatient Plan of Care  Goal: Plan of Care Review  Outcome: Progressing  Goal: Patient-Specific Goal (Individualized)  Outcome: Progressing  Goal: Absence of Hospital-Acquired Illness or Injury  Outcome: Progressing  Goal: Optimal Comfort and Wellbeing  Outcome: Progressing  Goal: Readiness for Transition of Care  Outcome: Progressing     Problem: Diabetes Comorbidity  Goal: Blood Glucose Level Within Targeted Range  Outcome: Progressing     Problem: Wound  Goal: Optimal Coping  Outcome: Progressing  Goal: Optimal Functional Ability  Outcome: Progressing  Goal: Absence of Infection Signs and Symptoms  Outcome: Progressing  Goal: Improved Oral Intake  Outcome: Progressing  Goal: Optimal Pain Control and Function  Outcome: Progressing  Goal: Skin Health and Integrity  Outcome: Progressing  Goal: Optimal Wound Healing  Outcome: Progressing     Problem: Fall Injury Risk  Goal: Absence of Fall and Fall-Related Injury  Outcome: Progressing     Problem: Infection  Goal: Absence of Infection Signs and Symptoms  Outcome: Progressing     Problem: Skin Injury Risk Increased  Goal: Skin Health and Integrity  Outcome: Progressing     Problem: Mechanical Ventilation Invasive  Goal: Effective Communication  Outcome: Progressing  Goal: Optimal Device Function  Outcome: Progressing  Goal: Mechanical Ventilation Liberation  Outcome: Progressing

## 2025-04-14 NOTE — ASSESSMENT & PLAN NOTE
In the setting of high fevers (improving)    - continue keppra 1.5 gm BID IV  - fosphenytoin discontinued  - on continuous EEG monitoring, await official reading  - MRI brain, abd/pelvis w w/o, and LP pending  - on prophylactic CNS antimicrobial coverage, ID following  - Further recommendations per MD

## 2025-04-14 NOTE — PROGRESS NOTES
"[facility] - 7th Floor ICU  Pulmonary Critical Care Note    Patient Name: River Sheehan Jr.  MRN: [id number]  Admission Date: 4/9/2025  Hospital Length of Stay: 4 days  Code Status: No Order  Attending Provider: JERROD Artis MD  Primary Care Provider: Chio Villela MD     Subjective:     HPI:   Patient is a 79-year-old male with a past medical history of carotid artery stenosis, CAD, diabetes mellitus, hyperlipidemia, hypertension, and anemia who initially presented to the emergency department after a syncopal episode occurring at home.  Patient was accompanied by his wife at this time who stated that he was very physically active at home but did complain of shortness of breath at times.  She reported that he was having episodes of "black outs" over the last several weeks.  He has been following cardiology on outpatient basis with recent adjustments in his home medications.  Wife also reported fever at home.  Patient was initially admitted to the hospital medicine service for these issues and possible pneumonia present on initial imaging.  He was started on Rocephin and azithromycin for suspected CAP.  However, this morning patient was found to have left-sided deficits including leftward gaze, left hemiparesis, and confusion.  He was not following commands at this time which was a significant change from patient's baseline.  Patient was taken to CT scan STAT to evaluate for stroke.  Patient displayed seizure-like activity in route to CT scanner and he was administered Ativan 2 mg IV.  Patient desaturated after administration of Ativan and ultimately required rapid sequence intubation.  CT and MRI flare displaying right insular chronic stroke.  Patient was admitted to the ICU after this event.    Hospital Course/Significant events:  4/11/2025: Patient admitted to the ICU status post intubation    24 Hour Interval History:  Patient remains sedated on propofol.  Off of norepi now.  LP pending, " possibly tomorrow.  Remains on anticonvulsants as well as empiric antimicrobials and acyclovir    Past Medical History:   Diagnosis Date    Acid reflux     Adenomatous polyp of ascending colon 09/20/2021    Arthritis     Asymptomatic stenosis of left carotid artery     CAD (coronary artery disease)     Carotid artery stenosis     US 3/26/25 less than 50% bilateral    Colon polyps 02/20/2025    Transvers polyp benigne mucosa, Ascending Colon Tubular adenoma, Rectum hyperplastic    COVID-19 07/06/2022    Depression     Diabetes mellitus     Gout     Hearing loss     High cholesterol     HTN (hypertension)     Kidney stone     Macrocytic anemia     Osteoporosis     Seasonal allergies        Past Surgical History:   Procedure Laterality Date    CAROTID ENDARTERECTOMY Left 09/12/2024    Procedure: ENDARTERECTOMY-CAROTID;  Surgeon: Hany Pendleton MD;  Location: [facility] OR;  Service: Peripheral Vascular;  Laterality: Left;    COLONOSCOPY W/ BIOPSIES  09/20/2021    Dr. Chun Smith    COLONOSCOPY W/ BIOPSIES  02/20/2025    CYSTOSCOPY      EXTRACAPSULAR EXTRACTION OF CATARACT      HERNIA REPAIR      LITHOTRIPSY  09/2023    RETROGRADE PYELOGRAPHY      WISDOM TOOTH EXTRACTION                 Current Outpatient Medications   Medication Instructions    albuterol (PROAIR HFA) 90 mcg/actuation inhaler 2 puffs, Inhalation, Every 6 hours PRN, Rescue    allopurinoL (ZYLOPRIM) 100 MG tablet TAKE 1 TABLET EVERY DAY    aspirin (ECOTRIN) 81 mg, Daily    atorvastatin (LIPITOR) 20 MG tablet TAKE 1 TABLET AT BEDTIME    blood sugar diagnostic (TRUE METRIX GLUCOSE TEST STRIP) Strp 1 strip, Misc.(Non-Drug; Combo Route), Daily    blood-glucose meter (TRUE METRIX AIR GLUCOSE METER) kit Test daily for DM II E11.9    busPIRone (BUSPAR) 10 MG tablet TAKE 1/2 TO 1 TABLET THREE TIMES DAILY    cinnamon bark 1,000 mg, Daily    citalopram (CELEXA) 20 mg, Oral    gabapentin (NEURONTIN) 100 MG capsule TAKE 2 CAPSULES (200 MG TOTAL) THREE TIMES  DAILY    glucosamine/chondr navarro A sod (OSTEO BI-FLEX ORAL) Daily    KRILL OIL ORAL 500 mg, Daily    losartan (COZAAR) 100 mg, Daily    metFORMIN (GLUCOPHAGE) 500 mg, Oral, 2 times daily with meals    midodrine (PROAMATINE) 2.5 mg, Oral, 2 times daily with meals    montelukast (SINGULAIR) 10 mg, Oral, Nightly    pantoprazole (PROTONIX) 40 MG tablet TAKE 1 TABLET EVERY DAY    pioglitazone (ACTOS) 15 mg, Oral, Daily    tamsulosin (FLOMAX) 0.4 mg, Daily       Review of patient's allergies indicates:  No Known Allergies     Current Inpatient Medications   acyclovir  10 mg/kg (Ideal) Intravenous Q8H    aspirin  81 mg Per OG tube Daily    levETIRAcetam (Keppra) IV (PEDS and ADULTS)  1,500 mg Intravenous BID    losartan  100 mg Per OG tube Daily    montelukast  10 mg Per OG tube QHS    mupirocin   Nasal BID    pantoprazole  40 mg Intravenous Daily    piperacillin-tazobactam (Zosyn) IV (PEDS and ADULTS) (extended infusion is not appropriate)  4.5 g Intravenous Q8H    vancomycin (VANCOCIN) 1,000 mg in D5W 250 mL IVPB (admixture device)  1,000 mg Intravenous Q12H       Current Intravenous Infusions   midazolam  0-5 mg/hr Intravenous Continuous 5 mL/hr at 04/13/25 2311 5 mg/hr at 04/13/25 2311    NORepinephrine bitartrate-D5W  0-3 mcg/kg/min (Dosing Weight) Intravenous Continuous   Stopped at 04/13/25 1601    propofoL  0-50 mcg/kg/min (Dosing Weight) Intravenous Continuous 25.6 mL/hr at 04/14/25 0911 50 mcg/kg/min at 04/14/25 0911                Objective:       Intake/Output Summary (Last 24 hours) at 4/14/2025 1015  Last data filed at 4/14/2025 0541  Gross per 24 hour   Intake 3110.89 ml   Output 1325 ml   Net 1785.89 ml         Vital Signs (Most Recent):  Temp: 98.3 °F (36.8 °C) (04/14/25 0800)  Pulse: 71 (04/14/25 0600)  Resp: 18 (04/14/25 0600)  BP: 90/60 (04/14/25 0600)  SpO2: 96 % (04/14/25 0617)  Body mass index is 31.29 kg/m².  Weight: 85.3 kg (188 lb 0.8 oz) Vital Signs (24h Range):  Temp:  [97.7 °F (36.5 °C)-100.4 °F  (38 °C)] 98.3 °F (36.8 °C)  Pulse:  [] 71  Resp:  [15-34] 18  SpO2:  [94 %-99 %] 96 %  BP: ()/() 90/60     Physical Exam  Vitals reviewed.   Constitutional:       Comments: Patient intubated and sedated   HENT:      Head: Normocephalic and atraumatic.      Mouth/Throat:      Comments: ET tube in place  Eyes:      Comments: Pinpoint pupils bilaterally   Cardiovascular:      Rate and Rhythm: Normal rate and regular rhythm.      Heart sounds: No murmur heard.     No friction rub. No gallop.   Pulmonary:      Breath sounds: No wheezing, rhonchi or rales.   Abdominal:      General: There is no distension.      Palpations: Abdomen is soft.      Tenderness: There is no abdominal tenderness.   Musculoskeletal:      Right lower leg: No edema.      Left lower leg: No edema.   Neurological:      Comments: Unable to assess secondary to patient currently being intubated and sedated       Lines/Drains/Airways       Drain  Duration                  NG/OG Tube 04/11/25 1530 2 days         Urethral Catheter 04/11/25 1500 2 days              Airway  Duration                  Airway - Non-Surgical 04/11/25 1443 Endotracheal Tube 2 days              Peripheral Intravenous Line  Duration                  Peripheral IV - Single Lumen 18 G Left;Posterior Hand -- days         Peripheral IV - Single Lumen 04/11/25 2145 18 G 2 1/4 in Anterior;Right Upper Arm 2 days         Peripheral IV - Single Lumen 04/12/25 2250 18 G 2 1/4 in Anterior;Left Forearm 1 day                    Significant Labs:    Lab Results   Component Value Date    WBC 8.14 04/14/2025    HGB 10.6 (L) 04/14/2025    HCT 34.9 (L) 04/14/2025    MCV 81.5 04/14/2025     04/14/2025           BMP  Lab Results   Component Value Date     (L) 04/14/2025    K 4.0 04/14/2025    CO2 21 (L) 04/14/2025    BUN 15.4 04/14/2025    CREATININE 0.67 (L) 04/14/2025    CALCIUM 8.5 (L) 04/14/2025    AGAP 12.0 04/14/2025    EGFRNONAA >60 06/09/2022         ABG  Recent  Labs   Lab 04/11/25 2048   PH 7.490*   PO2 81.0   PCO2 38.0   HCO3 29.0*   POCBASEDEF 5.30*       Mechanical Ventilation Support:  Vent Mode: A/C (04/14/25 0830)  Ventilator Initiated: Yes (04/11/25 1445)  Set Rate: 18 BPM (04/14/25 0830)  Vt Set: 450 mL (04/14/25 0830)  PEEP/CPAP: 5 cmH20 (04/14/25 0830)  Oxygen Concentration (%): 30 (04/14/25 0830)  Peak Airway Pressure: 19 cmH20 (04/14/25 0830)  Total Ve: 9.3 L/m (04/14/25 0830)  F/VT Ratio<105 (RSBI): (!) 42.86 (04/14/25 0432)      Significant Imaging:  I have reviewed the pertinent imaging within the past 24 hours.    Assessment/Plan:     Assessment  Hypoxemic respiratory failure status post intubation on 04/11  Focal tonic-clonic complex partial seizure leading to above  Chronic right insular ischemic stroke  Suspected community-acquired pneumonia  Sepsis secondary to above  Anemia    Plan  He looks as the LP maybe delayed till tomorrow due to the patient receiving tube feeds earlier today.  Continuing empiric antimicrobials.  Continuing Keppra.  Sedation was paused a short while ago and the patient is shivering uncontrollably with no improvement in neurologic status.  He will be re-sedated.  Repeat ABG    DVT Prophylaxis: SCDs  GI Prophylaxis: Protonix     33 minutes of critical care was time spent personally by me on the following activities: development of treatment plan with patient or surrogate and bedside caregivers, discussions with consultants, evaluation of patient's response to treatment, examination of patient, ordering and performing treatments and interventions, ordering and review of laboratory studies, ordering and review of radiographic studies, pulse oximetry, re-evaluation of patient's condition.  This critical care time did not overlap with that of any other provider or involve time for any procedures.     Florentin Traore MD  Pulmonary Critical Care Medicine  [facility] - 7th Floor ICU  DOS: 04/14/2025

## 2025-04-14 NOTE — SUBJECTIVE & OBJECTIVE
Subjective:     Interval History:   Neurologic exam remains poor, even with sedation paused. On continuous EEG monitoring, revealing for some electrographic seizure activity, however, no witnessed clinical seizures.  Pt also remains febrile.     Current Neurological Medications:     Current Medications[1]    Review of Systems   Unable to perform ROS: Intubated     Objective:     Vital Signs (Most Recent):  Temp: (!) 100.9 °F (38.3 °C) (04/14/25 1359)  Pulse: 100 (04/14/25 1345)  Resp: (!) 0 (04/14/25 1345)  BP: 109/75 (04/14/25 1345)  SpO2: 96 % (04/14/25 1355) Vital Signs (24h Range):  Temp:  [96.8 °F (36 °C)-100.9 °F (38.3 °C)] 100.9 °F (38.3 °C)  Pulse:  [] 100  Resp:  [0-34] 0  SpO2:  [92 %-100 %] 96 %  BP: ()/() 109/75     Weight: 85.3 kg (188 lb 0.8 oz)  Body mass index is 31.29 kg/m².     Physical Exam      Intubated, sedated   Comatose  no cough/gag reflex  PERRLA, +corneal reflex  No spontaneous movement to extremities  Does not withdrawal to deep pain stimuli in all extremities  No ankle clonus noted bilaterally  No Babinski noted    Significant Labs:   Recent Lab Results         04/14/25  0856   04/14/25  0340   04/13/25  2237        Albumin/Globulin Ratio   0.9         Albumin   2.9         ALP   63         ALT   40         Anion Gap   12.0         AST   59         Baso #   0.04         Basophil %   0.5         BILIRUBIN TOTAL   0.3         BUN   15.4         BUN/CREAT RATIO   23         Calcium   8.5         Chloride   101         CO2   21         Creatinine   0.67         eGFR   >60  Comment: Estimated GFR calculated using the CKD-EPI creatinine (2021) equation.         Eos #   0.40         Eos %   4.9         Globulin, Total   3.3         Glucose   143         Hematocrit   34.9         Hemoglobin   10.6         Immature Grans (Abs)   0.02         Immature Granulocytes   0.2         INR 1.3           Lymph #   1.33         LYMPH %   16.3         MCH   24.8         MCHC   30.4          MCV   81.5         Mono #   1.00         Mono %   12.3         MPV   9.5         Neut #   5.35         Neut %   65.8         nRBC   0.0         Platelet Count   275         Potassium   4.0         PROTEIN TOTAL   6.2         PT 16.2           PTT Heparin Monitor 29.7  Comment: For Minimal Heparin Infusion, the goal aPTT 64-85 seconds corresponds to an anti-Xa of 0.3-0.5.    For Low Intensity and High Intensity Heparin, the goal aPTT  seconds corresponds to an anti-Xa of 0.3-0.7           RBC   4.28         RDW   15.9         Sodium   134         Vancomycin-Trough     11.0       WBC   8.14                 Significant Imaging: I have reviewed all pertinent imaging results/findings within the past 24 hours.       [1]   Current Facility-Administered Medications   Medication Dose Route Frequency Provider Last Rate Last Admin    0.9% NaCl infusion   Intravenous PRN Laurence Mistry DO   Stopped at 04/12/25 0803    0.9% NaCl infusion   Intravenous PRN Laurence Mistry DO   Stopped at 04/14/25 0250    acetaminophen tablet 1,000 mg  1,000 mg Oral Q6H PRN Sydney hSine FNP   1,000 mg at 04/14/25 1359    acyclovir 620 mg in 0.9% NaCl 100 mL IVPB  10 mg/kg (Ideal) Intravenous Q8H Zoltan Alonzo MD   Stopped at 04/14/25 0941    albuterol-ipratropium 2.5 mg-0.5 mg/3 mL nebulizer solution 3 mL  3 mL Nebulization Q6H PRN Reyes, Thairy G, DO   3 mL at 04/10/25 0859    aspirin chewable tablet 81 mg  81 mg Per OG tube Daily JERROD Artis MD   81 mg at 04/14/25 0839    dextrose 50% injection 12.5 g  12.5 g Intravenous PRN Reyes, Thairy G, DO        dextrose 50% injection 25 g  25 g Intravenous PRN Reyes, Thairy G, DO        etomidate injection   Intravenous Code/trauma/sedation Med Antonio Mcdaniels MD   20 mg at 04/11/25 1440    glucagon (human recombinant) injection 1 mg  1 mg Intramuscular PRN Reyes, Thairy G, DO        glucose chewable tablet 16 g  16 g Oral PRN Reyes, Thairy G, DO        glucose chewable tablet 24  g  24 g Oral PRN Reyes, Thairy G, DO        hydrALAZINE injection 10 mg  10 mg Intravenous Q6H PRN Feliciano Maya MD   10 mg at 04/10/25 1600    insulin aspart U-100 injection 0-5 Units  0-5 Units Subcutaneous Q6H PRN JERROD Artis MD        levETIRAcetam in NaCl (iso-os) IVPB 1,500 mg  1,500 mg Intravenous BID Aquiles Walton MD   Stopped at 04/14/25 1103    LORazepam injection 2 mg  2 mg Intravenous Q15 Min PRN Sydney Hudson MD   2 mg at 04/14/25 1117    losartan tablet 100 mg  100 mg Per OG tube Daily JERROD Artis MD   100 mg at 04/12/25 0900    midazolam (PF) in 0.9 % NaCl 1 mg/mL infusion  0-5 mg/hr Intravenous Continuous Sydney Hudson MD 5 mL/hr at 04/14/25 1105 5 mg/hr at 04/14/25 1105    montelukast tablet 10 mg  10 mg Per OG tube QHS JERROD Artis MD   10 mg at 04/13/25 2058    mupirocin 2 % ointment   Nasal BID Chaka Diaz MD   Given at 04/14/25 0839    NORepinephrine 8 mg in dextrose 5% 250 mL infusion  0-3 mcg/kg/min (Dosing Weight) Intravenous Continuous Laurence Mistry DO   Stopped at 04/13/25 1601    pantoprazole injection 40 mg  40 mg Intravenous Daily JERROD Artis MD   40 mg at 04/14/25 0839    piperacillin-tazobactam (ZOSYN) 4.5 g in D5W 100 mL IVPB (MB+)  4.5 g Intravenous Q8H Hany Holbrook DO   Stopped at 04/14/25 1243    promethazine tablet 12.5 mg  12.5 mg Oral Q6H PRN Feliciano Maya MD   12.5 mg at 04/10/25 1350    propofol (DIPRIVAN) 10 mg/mL infusion  0-50 mcg/kg/min (Dosing Weight) Intravenous Continuous Chaka Diaz MD 20.5 mL/hr at 04/14/25 1355 40 mcg/kg/min at 04/14/25 1355    rocuronium injection   Intravenous Code/trauma/sedation Med Antonio Mcdaniels MD   100 mg at 04/11/25 1441    vancomycin (VANCOCIN) 1,000 mg in D5W 250 mL IVPB (admixture device)  1,000 mg Intravenous Q12H JERROD Artis MD   Stopped at 04/14/25 1227    vancomycin - pharmacy to dose   Intravenous pharmacy to manage frequency Hany Holbrook, DO          Patient

## 2025-04-14 NOTE — PT/OT/SLP PROGRESS
Occupational Therapy      Patient Name:  River Sheehan Jr.   MRN:  [id number]    OT evaluation orders received. Spoke to RN, patient remains intubated, sedated, and on EEG. Pt not appropriate for ADLs/mobility at this time; OT to d/c orders. Please re-consult as appropriate.     4/14/2025

## 2025-04-14 NOTE — PROGRESS NOTES
[facility] - 7th Floor ICU  Neurology  Progress Note    Patient Name: River Sheehan Jr.  MRN: [id number]  Admission Date: 4/9/2025  Hospital Length of Stay: 4 days  Code Status: No Order   Attending Provider: JERROD Artis MD  Primary Care Physician: Chio Villela MD   Principal Problem:Acute respiratory failure with hypoxia and hypercarbia    HPI:   No notes on file    Overview/Hospital Course:  No notes on file        Subjective:     Interval History:   Neurologic exam remains poor, even with sedation paused. On continuous EEG monitoring, revealing for some electrographic seizure activity, however, no witnessed clinical seizures.  Pt also remains febrile.     Current Neurological Medications:     Current Medications[1]    Review of Systems   Unable to perform ROS: Intubated     Objective:     Vital Signs (Most Recent):  Temp: (!) 100.9 °F (38.3 °C) (04/14/25 1359)  Pulse: 100 (04/14/25 1345)  Resp: (!) 0 (04/14/25 1345)  BP: 109/75 (04/14/25 1345)  SpO2: 96 % (04/14/25 1355) Vital Signs (24h Range):  Temp:  [96.8 °F (36 °C)-100.9 °F (38.3 °C)] 100.9 °F (38.3 °C)  Pulse:  [] 100  Resp:  [0-34] 0  SpO2:  [92 %-100 %] 96 %  BP: ()/() 109/75     Weight: 85.3 kg (188 lb 0.8 oz)  Body mass index is 31.29 kg/m².     Physical Exam      Intubated, sedated   Comatose  no cough/gag reflex  PERRLA, +corneal reflex  No spontaneous movement to extremities  Does not withdrawal to deep pain stimuli in all extremities  No ankle clonus noted bilaterally  No Babinski noted    Significant Labs:   Recent Lab Results         04/14/25  0856   04/14/25  0340   04/13/25 2237        Albumin/Globulin Ratio   0.9         Albumin   2.9         ALP   63         ALT   40         Anion Gap   12.0         AST   59         Baso #   0.04         Basophil %   0.5         BILIRUBIN TOTAL   0.3         BUN   15.4         BUN/CREAT RATIO   23         Calcium   8.5         Chloride   101         CO2   21          Creatinine   0.67         eGFR   >60  Comment: Estimated GFR calculated using the CKD-EPI creatinine (2021) equation.         Eos #   0.40         Eos %   4.9         Globulin, Total   3.3         Glucose   143         Hematocrit   34.9         Hemoglobin   10.6         Immature Grans (Abs)   0.02         Immature Granulocytes   0.2         INR 1.3           Lymph #   1.33         LYMPH %   16.3         MCH   24.8         MCHC   30.4         MCV   81.5         Mono #   1.00         Mono %   12.3         MPV   9.5         Neut #   5.35         Neut %   65.8         nRBC   0.0         Platelet Count   275         Potassium   4.0         PROTEIN TOTAL   6.2         PT 16.2           PTT Heparin Monitor 29.7  Comment: For Minimal Heparin Infusion, the goal aPTT 64-85 seconds corresponds to an anti-Xa of 0.3-0.5.    For Low Intensity and High Intensity Heparin, the goal aPTT  seconds corresponds to an anti-Xa of 0.3-0.7           RBC   4.28         RDW   15.9         Sodium   134         Vancomycin-Trough     11.0       WBC   8.14                 Significant Imaging: I have reviewed all pertinent imaging results/findings within the past 24 hours.    Assessment and Plan:     Seizures  In the setting of high fevers (improving)    - continue keppra 1.5 gm BID IV  - fosphenytoin discontinued  - on continuous EEG monitoring, await official reading  - MRI brain, abd/pelvis w w/o, and LP pending  - on prophylactic CNS antimicrobial coverage, ID following  - Further recommendations per MD          VTE Risk Mitigation (From admission, onward)      None            [facility]  [facility]  [facility] - 7th Floor ICU       [1]   Current Facility-Administered Medications   Medication Dose Route Frequency Provider Last Rate Last Admin    0.9% NaCl infusion   Intravenous PRN Laurence Mistry DO   Stopped at 04/12/25 0803    0.9% NaCl infusion   Intravenous PRN Laurence Mistry DO   Stopped at 04/14/25 0250     acetaminophen tablet 1,000 mg  1,000 mg Oral Q6H PRN Sydney Shine FNP   1,000 mg at 04/14/25 1359    acyclovir 620 mg in 0.9% NaCl 100 mL IVPB  10 mg/kg (Ideal) Intravenous Q8H Zoltan Alonzo MD   Stopped at 04/14/25 0941    albuterol-ipratropium 2.5 mg-0.5 mg/3 mL nebulizer solution 3 mL  3 mL Nebulization Q6H PRN Reyes, Thairy G, DO   3 mL at 04/10/25 0859    aspirin chewable tablet 81 mg  81 mg Per OG tube Daily JERROD Artis MD   81 mg at 04/14/25 0839    dextrose 50% injection 12.5 g  12.5 g Intravenous PRN Reyes, Thairy G, DO        dextrose 50% injection 25 g  25 g Intravenous PRN Reyes, Thairy G, DO        etomidate injection   Intravenous Code/trauma/sedation Med Antonio Mcdaniels MD   20 mg at 04/11/25 1440    glucagon (human recombinant) injection 1 mg  1 mg Intramuscular PRN Reyes, Thairy G, DO        glucose chewable tablet 16 g  16 g Oral PRN Reyes, Thairy G, DO        glucose chewable tablet 24 g  24 g Oral PRN Reyes, Thairy G, DO        hydrALAZINE injection 10 mg  10 mg Intravenous Q6H PRN Feliciano Maya MD   10 mg at 04/10/25 1600    insulin aspart U-100 injection 0-5 Units  0-5 Units Subcutaneous Q6H PRN JERROD Artis MD        levETIRAcetam in NaCl (iso-os) IVPB 1,500 mg  1,500 mg Intravenous BID Aquiles Walton MD   Stopped at 04/14/25 1103    LORazepam injection 2 mg  2 mg Intravenous Q15 Min PRN Sydney Hudson MD   2 mg at 04/14/25 1117    losartan tablet 100 mg  100 mg Per OG tube Daily JERROD Artis MD   100 mg at 04/12/25 0900    midazolam (PF) in 0.9 % NaCl 1 mg/mL infusion  0-5 mg/hr Intravenous Continuous Sydney Hudson MD 5 mL/hr at 04/14/25 1105 5 mg/hr at 04/14/25 1105    montelukast tablet 10 mg  10 mg Per OG tube QHS JERROD Artis MD   10 mg at 04/13/25 2058    mupirocin 2 % ointment   Nasal BID Chaka Diaz MD   Given at 04/14/25 0839    NORepinephrine 8 mg in dextrose 5% 250 mL infusion  0-3 mcg/kg/min (Dosing Weight) Intravenous Continuous  Laurence Mistry DO   Stopped at 04/13/25 1601    pantoprazole injection 40 mg  40 mg Intravenous Daily JERROD Artis MD   40 mg at 04/14/25 0839    piperacillin-tazobactam (ZOSYN) 4.5 g in D5W 100 mL IVPB (MB+)  4.5 g Intravenous Q8H Hany Holbrook DO   Stopped at 04/14/25 1243    promethazine tablet 12.5 mg  12.5 mg Oral Q6H PRN Feliciano Maya MD   12.5 mg at 04/10/25 1350    propofol (DIPRIVAN) 10 mg/mL infusion  0-50 mcg/kg/min (Dosing Weight) Intravenous Continuous Chaka Diaz MD 20.5 mL/hr at 04/14/25 1355 40 mcg/kg/min at 04/14/25 1355    rocuronium injection   Intravenous Code/trauma/sedation Med Antonio Mcdaniels MD   100 mg at 04/11/25 1441    vancomycin (VANCOCIN) 1,000 mg in D5W 250 mL IVPB (admixture device)  1,000 mg Intravenous Q12H JERROD Artis MD   Stopped at 04/14/25 1227    vancomycin - pharmacy to dose   Intravenous pharmacy to manage frequency Hany Holbrook DO

## 2025-04-14 NOTE — PROGRESS NOTES
Pharmacokinetic Assessment Follow Up: IV Vancomycin    Vancomycin serum concentration assessment(s):    The trough level was drawn correctly and can be used to guide therapy at this time. The measurement is below the desired definitive target range of 15 to 20 mcg/mL.    Vancomycin Regimen Plan:    Change regimen to Vancomycin 1000 mg IV every 12 hours with next serum trough concentration measured at 1000 prior to 4th dose on 04/15    Scheduled Administration Times    1100  2300    Drug levels (last 3 results):  Recent Labs   Lab Result Units 04/13/25  2237   Vancomycin Trough ug/ml 11.0*       Vancomycin Administrations:  vancomycin given in the last 96 hours                     vancomycin 1,250 mg in D5W 250 mL IVPB (admixture device) (mg) 1,250 mg New Bag 04/13/25 2311     1,250 mg New Bag  0523     1,250 mg New Bag 04/12/25 1253    vancomycin 750 mg in D5W 250 mL IVPB (admixture device) (mg) 750 mg New Bag 04/11/25 1949    vancomycin (VANCOCIN) 1,000 mg in D5W 250 mL IVPB (admixture device) (mg) 1,000 mg New Bag 04/11/25 1734                    Pharmacy will continue to follow and monitor vancomycin.    Please contact pharmacy at extension [phone] for questions regarding this assessment.    Thank you for the consult,   Tanesha Bowser       Patient brief summary:  River Sheehan Jr. is a 79 y.o. male initiated on antimicrobial therapy with IV Vancomycin for treatment of sepsis    The patient's current regimen is vancomycin 1000mg IV Q12 hours    Drug Allergies:   Review of patient's allergies indicates:  No Known Allergies    Actual Body Weight:  Wt Readings from Last 1 Encounters:   04/11/25 85.3 kg (188 lb 0.8 oz)       Renal Function:   Estimated Creatinine Clearance: 62.4 mL/min (based on SCr of 0.98 mg/dL).,     Dialysis Method (if applicable):  N/A    CBC (last 72 hours):  Recent Labs   Lab Result Units 04/11/25  0331 04/11/25  0804 04/11/25  1903 04/12/25  0857 04/13/25  0311   WBC x10(3)/mcL 10.76  --  15.31   15.31* 11.02 12.17  12.17*   Hgb g/dL 10.5*  --  11.0* 11.2* 10.8*   Hemoglobin A1c %  --  6.3  --   --   --    Hct % 33.5*  --  35.0* 35.2* 33.6*   Platelet x10(3)/mcL 331  --  263 285 235   Mono % % 15.3  --   --  13.2  --    Monocytes % %  --   --  13  --  8   Eos % % 0.5  --   --  1.2  --    Eosinophils % %  --   --   --   --  2   Basophil % % 0.6  --   --  0.5  --        Metabolic Panel (last 72 hours):  Recent Labs   Lab Result Units 04/11/25  0331 04/11/25  1703 04/11/25  2048 04/11/25  2135 04/12/25  0759 04/13/25  0312   Sodium mmol/L 136  --   --  134* 133* 135*   Sodium, Blood Gas mmol/L  --  135* 130*  --   --   --    Potassium mmol/L 4.2  --   --  3.1* 4.6 3.8   Potassium, Blood Gas mmol/L  --  3.8 3.4*  --   --   --    Chloride mmol/L 101  --   --  102 102 101   CO2 mmol/L 28  --   --  24 23 22*   Glucose mg/dL 95  --   --  159* 115 115   Blood Urea Nitrogen mg/dL 14.4  --   --  14.5 15.7 14.2   Creatinine mg/dL 1.01  --   --  0.89 1.01 0.98   Albumin g/dL 4.1  --   --  3.3* 3.3* 3.2*   Bilirubin Total mg/dL 0.5  --   --  0.5 0.6 0.4   ALP unit/L 68  --   --  56 52 53   AST unit/L 26  --   --  29 44 38   ALT unit/L 24  --   --  28 36 37   Magnesium Level mg/dL 1.70  --   --   --   --   --        Microbiologic Results:  Microbiology Results (last 7 days)       Procedure Component Value Units Date/Time    Blood Culture [[id number]]  (Normal) Collected: 04/12/25 0857    Order Status: Completed Specimen: Blood from Hand, Left Updated: 04/13/25 1303     Blood Culture No Growth At 24 Hours    Blood Culture [[id number]]  (Normal) Collected: 04/12/25 0857    Order Status: Completed Specimen: Blood from Hand, Right Updated: 04/13/25 1303     Blood Culture No Growth At 24 Hours    Respiratory Culture [[id number]]  (Abnormal) Collected: 04/12/25 0031    Order Status: Completed Specimen: Sputum from Endotracheal Aspirate Updated: 04/13/25 0645     Respiratory Culture Few Yeast     Comment: with normal respiratory  gerry        GRAM STAIN Quality 3+      Rare Gram positive cocci    Blood culture #1 **CANNOT BE ORDERED STAT** [[id number]]  (Normal) Collected: 04/10/25 0010    Order Status: Completed Specimen: Blood Updated: 04/13/25 0100     Blood Culture No Growth At 72 Hours    Blood culture #2 **CANNOT BE ORDERED STAT** [[id number]]  (Normal) Collected: 04/10/25 0010    Order Status: Completed Specimen: Blood Updated: 04/13/25 0100     Blood Culture No Growth At 72 Hours

## 2025-04-14 NOTE — PROGRESS NOTES
[facility]                       [address] [id number]    PATIENT NAME:       BALDOMERO DUNCAN   YOB: 1945  CSN:                [id number]   MRN:                [id number]  ADMIT DATE:         04/09/2025 23:55:00  PHYSICIAN:          Aquiles Dill MD                            PROGRESS NOTE    DATE:  04/13/2025 00:00:00    Came to the room, the patient is intubated.  We were consulted from Neurology   team for seizure management, so the spike and wave that the patient has in the   right hemisphere is improved today when I look at the screening at his room.  I    have an official reading yet.  Comparing to yesterday's study, the patient's   amount of spike and voltage of the spike wave on the right hemisphere has been   improving tremendously, so I loaded the patient yesterday with fosphenytoin 1 g   in order to have an intense dose of 100 mg t.i.d.  I also increased the Keppra   to 1500 mg b.i.d. and I increased the propofol to the maximum dose.  The patient   was given also Versed.  Since he still has some spikes, I recommended to load   him with phenobarbital 100 mg  x1 right now and then continue on 100 mg on   daily basis depending on his kidney clearance.  A spinal tap has been ordered   for the patient, but was attempted at the bedside, but could not do it by the   primary team, so they ordered it tomorrow, Monday through Anesthesia.  MRI of   the brain is reordered and is still pending.  I am not sure what triggered the   seizure on this patient.  He does not have a history of seizure.  We do not have   proof that he has stroke and CNS infection cannot be ruled out, awaiting on the   LP, Infectious Disease on the case.  We will follow the patient with you.  We   will continue on Keppra, fosphenytoin, and phenobarbital.        ______________________________  MD BEVERLY Sabillon/FREDRICK  DD:   04/13/2025  Time:  05:08PM  DT:  04/14/2025  Time:  02:35AM  Job #:  [id number]      PROGRESS NOTE

## 2025-04-15 ENCOUNTER — ANESTHESIA (OUTPATIENT)
Dept: INTERVENTIONAL RADIOLOGY/VASCULAR | Facility: HOSPITAL | Age: 80
End: 2025-04-15
Payer: MEDICARE

## 2025-04-15 PROBLEM — B10.09 ENCEPHALITIS DUE TO HERPES SIMPLEX VIRUS TYPE 1 (HSV-1): Status: ACTIVE | Noted: 2025-01-01

## 2025-04-15 PROBLEM — R56.9 SEIZURES: Status: RESOLVED | Noted: 2025-04-14 | Resolved: 2025-04-15

## 2025-04-15 PROCEDURE — 25000003 PHARM REV CODE 250: Performed by: NURSE ANESTHETIST, CERTIFIED REGISTERED

## 2025-04-15 PROCEDURE — 63600175 PHARM REV CODE 636 W HCPCS: Performed by: NURSE ANESTHETIST, CERTIFIED REGISTERED

## 2025-04-15 RX ORDER — PHENYLEPHRINE HCL IN 0.9% NACL 1 MG/10 ML
SYRINGE (ML) INTRAVENOUS
Status: DISCONTINUED | OUTPATIENT
Start: 2025-04-15 | End: 2025-04-15

## 2025-04-15 RX ORDER — PROPOFOL 10 MG/ML
VIAL (ML) INTRAVENOUS
Status: DISCONTINUED | OUTPATIENT
Start: 2025-04-15 | End: 2025-04-15

## 2025-04-15 RX ADMIN — Medication 100 MCG: at 08:04

## 2025-04-15 RX ADMIN — PROPOFOL 100 MG: 10 INJECTION, EMULSION INTRAVENOUS at 09:04

## 2025-04-15 RX ADMIN — SODIUM CHLORIDE, SODIUM GLUCONATE, SODIUM ACETATE, POTASSIUM CHLORIDE AND MAGNESIUM CHLORIDE: 526; 502; 368; 37; 30 INJECTION, SOLUTION INTRAVENOUS at 08:04

## 2025-04-15 NOTE — PROCEDURES
Date of Service: 04/14/2025       Patient Name: River Sheehan Jr.  MRN: [id number]   Location: [address] [place] [address] A              Recording 4/13 @10:14; 24 h    Electroencephalogram Procedure Note      Indications: Diagnostic, seizures     Activation Procedures:  none     EEG Description: recording begins in burst suppression, background is discontinuous, asymmetric. As the recording continues, background becomes more continuous. Intermittent low amplitude right posterior epileptiform discharges occurring about 1 hz, generalized slowing. Significant overriding artifact.     Impression: abnormal EEG due to right sided epileptiform discharges, no clinical seizures or definitive subclinical seizures appreciated. Clinical correlation advised.     A non-epileptic routine EEG does not rule out a past seizure or the presence of epilepsy. Clinical correlation is advised.       Drew Menjivar MD  Adult Neurology     -----------------------------------------------------------------     Technical Description   International 10-20 electrode placement was used. The record was obtained with the patient intubated, sedated. The record is of adequate technical quality for purposes of interpretation.

## 2025-04-15 NOTE — SUBJECTIVE & OBJECTIVE
Subjective:     Interval History:   Neurologic exam remains poor and unchanged. No clinical seizures overnight or this AM.   Pt underwent LP yesterday, results significant for HSV encephalitis.   MRI brain w w/o results consistent with encephalitis.  Remains on continuous EEG monitoring, no signs of electrographic seizures during exam.     Current Neurological Medications:     Current Medications[1]    Review of Systems   Unable to perform ROS: Intubated     Objective:     Vital Signs (Most Recent):  Temp: 96.8 °F (36 °C) (04/15/25 0916)  Pulse: 93 (04/15/25 1134)  Resp: 18 (04/15/25 1134)  BP: (!) 154/83 (04/15/25 1000)  SpO2: 97 % (04/15/25 1134) Vital Signs (24h Range):  Temp:  [96.8 °F (36 °C)-100.9 °F (38.3 °C)] 96.8 °F (36 °C)  Pulse:  [] 93  Resp:  [0-51] 18  SpO2:  [94 %-99 %] 97 %  BP: (106-154)/(56-86) 154/83     Weight: 85.3 kg (188 lb 0.8 oz)  Body mass index is 31.29 kg/m².     Physical Exam       Intubated, sedated   Comatose  no cough/gag reflex  PERRLA, +corneal reflex  No spontaneous movement to extremities  Does not withdrawal to deep pain stimuli in all extremities  No ankle clonus noted bilaterally  No Babinski noted    Significant Labs:   Recent Lab Results         04/15/25  1036   04/15/25  1002   04/15/25  0850   04/15/25  0513        Albumin/Globulin Ratio       0.7       Albumin       2.6       ALP       61       Allens Test   Yes           ALT       39       Anion Gap       8.0       Appear CSF     Clear         AST       55       Baso #       0.06       Basophil %       0.6       BILIRUBIN TOTAL       0.3       BUN       14.5       BUN/CREAT RATIO       21       Calcium       8.5       Calcium Level Ionized   1.16           Chloride       99       CO2       24       COLOR CSF     Colorless         Creatinine       0.69       Cryptococcus neoformans/gattii     Not Detected         Cytomegalovirus (CMV)     Not Detected         Drawn by   [facility] CRT           eGFR       >60  Comment:  Estimated GFR calculated using the CKD-EPI creatinine (2021) equation.       Enterovirus (EV)     Not Detected         Eos #       0.44       Eos %       4.0       Escherichia coli K1     Not Detected         FIO2, Blood gas   40.0           Globulin, Total       3.6       Glucose       159       Glucose CSF     81         Haemophilus influenzae     Not Detected         Hematocrit       31.6       Hemoglobin       10.0       HSV-1     Detected         HSV-2     Not Detected         Human Herpesvirus 6 (HHV-6)     Not Detected         Human Parechovirus (HPEV)     Not Detected         Immature Grans (Abs)       0.03       Immature Granulocytes       0.3       SHERRY INK     Negative         Listeria monocytogenes     Not Detected         Lymph #       1.34       LYMPH %       12.3       Lymphocyte % CSF     99         MCH       24.9       MCHC       31.6       MCV       78.8       Mech Vt   450           MODE   AC           Mono #       1.57       Mono %       14.4       Monocyte % CSF     1         MPV       9.3       Neisseria meningitidis     Not Detected         Neut #       7.43       Neut %       68.4       nRBC       0.0       O2 Hb, Blood Gas   95.8           PEEP   5.0           Platelet Count       231       Base Excess, Blood gas   3.60           CO Hgb   1.9           POC HCO3   28.5           Met Hgb   1.3           POC PCO2   44.0           POC PH   7.420           POC PO2   115.0           Potassium       3.7       Potassium, Blood Gas   3.4           Protein CSF      136.6         PROTEIN TOTAL       6.2       RBC       4.01       RBC, CSF     20         RDW       16.0       Mech RR   18           Sample site   Right Radial Artery           Sample Type   Arterial Blood           sO2, Blood gas   98.6           Sodium       131       Sodium, Blood Gas   132           Streptococcus agalactiae (Group B)     Not Detected         Streptococcus pneumoniae     Not Detected         TOC2, Blood gas   29.9            THb, Blood gas   9.8           Total Nucleated Cells CSF     393         Tube # CSF     3         Vancomycin-Trough 12.8             Varicella zoster Virus (VZV)     Not Detected         Volume, CSF     1         WBC       10.87               Significant Imaging:  MRI brain w w/o:  Impression:     Cortical/subcortical edema in the bilateral mesial temporal lobes and insula, with cortical restricted diffusion in the right insula.  Findings may represent postictal changes versus a nonspecific encephalitis.     I have reviewed all pertinent imaging results/findings within the past 24 hours.         [1]   Current Facility-Administered Medications   Medication Dose Route Frequency Provider Last Rate Last Admin    0.9% NaCl infusion   Intravenous PRN Laurence Mistry DO   Stopped at 04/12/25 0803    0.9% NaCl infusion   Intravenous PRN Laurence Mistry DO   Stopped at 04/14/25 0250    acetaminophen tablet 1,000 mg  1,000 mg Oral Q6H PRN Sydney Shine, FNP   1,000 mg at 04/14/25 1359    acyclovir 620 mg in 0.9% NaCl 100 mL IVPB  10 mg/kg (Ideal) Intravenous Q8H Zoltan Alonzo MD   Stopped at 04/15/25 1101    albuterol-ipratropium 2.5 mg-0.5 mg/3 mL nebulizer solution 3 mL  3 mL Nebulization Q6H PRN Reyes, Thairy G, DO   3 mL at 04/10/25 0859    aspirin chewable tablet 81 mg  81 mg Per OG tube Daily JERROD Artis MD   81 mg at 04/15/25 0959    dextrose 50% injection 12.5 g  12.5 g Intravenous PRN Reyes, Thairy G, DO        dextrose 50% injection 25 g  25 g Intravenous PRN Reyes, Thairy G, DO        etomidate injection   Intravenous Code/trauma/sedation Med Antonio Mcdaniels MD   20 mg at 04/11/25 1440    glucagon (human recombinant) injection 1 mg  1 mg Intramuscular PRN Reyes, Thairy G, DO        glucose chewable tablet 16 g  16 g Oral PRN Reyes, Thairy G, DO        glucose chewable tablet 24 g  24 g Oral PRN Reyes, Thairy G, DO        hydrALAZINE injection 10 mg  10 mg Intravenous Q6H PRN Feliciano Maya,  MD   10 mg at 04/10/25 1600    insulin aspart U-100 injection 0-5 Units  0-5 Units Subcutaneous Q6H PRN JERROD Artis MD        levETIRAcetam in NaCl (iso-os) IVPB 1,500 mg  1,500 mg Intravenous BID Aquiles Walton MD   Stopped at 04/14/25 2136    LORazepam injection 2 mg  2 mg Intravenous Q15 Min PRN Sydney Hudson MD   2 mg at 04/15/25 0101    losartan tablet 100 mg  100 mg Per OG tube Daily JERROD Artis MD   100 mg at 04/15/25 0959    midazolam (PF) in 0.9 % NaCl 1 mg/mL infusion  0-5 mg/hr Intravenous Continuous Sydney Hudson MD 5 mL/hr at 04/15/25 1217 5 mg/hr at 04/15/25 1217    montelukast tablet 10 mg  10 mg Per OG tube QHS JERROD Artis MD   10 mg at 04/14/25 2102    mupirocin 2 % ointment   Nasal BID Chaka Diaz MD   Given at 04/15/25 1000    NORepinephrine 8 mg in dextrose 5% 250 mL infusion  0-3 mcg/kg/min (Dosing Weight) Intravenous Continuous Laurence Mistry DO   Stopped at 04/14/25 1118    pantoprazole injection 40 mg  40 mg Intravenous Daily JERROD Artis MD   40 mg at 04/15/25 0959    piperacillin-tazobactam (ZOSYN) 4.5 g in D5W 100 mL IVPB (MB+)  4.5 g Intravenous Q8H Hany Holbrook DO 25 mL/hr at 04/15/25 1217 Rate Verify at 04/15/25 1217    promethazine tablet 12.5 mg  12.5 mg Oral Q6H PRN Feliciano Maya MD   12.5 mg at 04/10/25 1350    propofol (DIPRIVAN) 10 mg/mL infusion  0-50 mcg/kg/min (Dosing Weight) Intravenous Continuous Chaka Diaz MD 17.9 mL/hr at 04/15/25 1217 35 mcg/kg/min at 04/15/25 1217    rocuronium injection   Intravenous Code/trauma/sedation Med Antonio Mcdaniels MD   100 mg at 04/11/25 1441    vancomycin - pharmacy to dose   Intravenous pharmacy to manage frequency Hany Holbrook DO        vancomycin 1,250 mg in D5W 250 mL IVPB (admixture device)  1,250 mg Intravenous Q12H Apple Marrero MD

## 2025-04-15 NOTE — PROGRESS NOTES
[facility] - 7th Floor ICU  Neurology  Progress Note    Patient Name: River Sheehan Jr.  MRN: [id number]  Admission Date: 4/9/2025  Hospital Length of Stay: 5 days  Code Status: No Order   Attending Provider: JERROD Artis MD  Primary Care Physician: Chio Villela MD   Principal Problem:Acute respiratory failure with hypoxia and hypercarbia    HPI:   No notes on file    Overview/Hospital Course:  No notes on file        Subjective:     Interval History:   Neurologic exam remains poor and unchanged. No clinical seizures overnight or this AM.   Pt underwent LP yesterday, results significant for HSV encephalitis.   MRI brain w w/o results consistent with encephalitis.  Remains on continuous EEG monitoring, no signs of electrographic seizures during exam.     Current Neurological Medications:     Current Medications[1]    Review of Systems   Unable to perform ROS: Intubated     Objective:     Vital Signs (Most Recent):  Temp: 96.8 °F (36 °C) (04/15/25 0916)  Pulse: 93 (04/15/25 1134)  Resp: 18 (04/15/25 1134)  BP: (!) 154/83 (04/15/25 1000)  SpO2: 97 % (04/15/25 1134) Vital Signs (24h Range):  Temp:  [96.8 °F (36 °C)-100.9 °F (38.3 °C)] 96.8 °F (36 °C)  Pulse:  [] 93  Resp:  [0-51] 18  SpO2:  [94 %-99 %] 97 %  BP: (106-154)/(56-86) 154/83     Weight: 85.3 kg (188 lb 0.8 oz)  Body mass index is 31.29 kg/m².     Physical Exam       Intubated, sedated   Comatose  no cough/gag reflex  PERRLA, +corneal reflex  No spontaneous movement to extremities  Does not withdrawal to deep pain stimuli in all extremities  No ankle clonus noted bilaterally  No Babinski noted    Significant Labs:   Recent Lab Results         04/15/25  1036   04/15/25  1002   04/15/25  0850   04/15/25  0513        Albumin/Globulin Ratio       0.7       Albumin       2.6       ALP       61       Allens Test   Yes           ALT       39       Anion Gap       8.0       Appear CSF     Clear         AST       55       Baso #        0.06       Basophil %       0.6       BILIRUBIN TOTAL       0.3       BUN       14.5       BUN/CREAT RATIO       21       Calcium       8.5       Calcium Level Ionized   1.16           Chloride       99       CO2       24       COLOR CSF     Colorless         Creatinine       0.69       Cryptococcus neoformans/gattii     Not Detected         Cytomegalovirus (CMV)     Not Detected         Drawn by   [facility] CRT           eGFR       >60  Comment: Estimated GFR calculated using the CKD-EPI creatinine (2021) equation.       Enterovirus (EV)     Not Detected         Eos #       0.44       Eos %       4.0       Escherichia coli K1     Not Detected         FIO2, Blood gas   40.0           Globulin, Total       3.6       Glucose       159       Glucose CSF     81         Haemophilus influenzae     Not Detected         Hematocrit       31.6       Hemoglobin       10.0       HSV-1     Detected         HSV-2     Not Detected         Human Herpesvirus 6 (HHV-6)     Not Detected         Human Parechovirus (HPEV)     Not Detected         Immature Grans (Abs)       0.03       Immature Granulocytes       0.3       SHERRY INK     Negative         Listeria monocytogenes     Not Detected         Lymph #       1.34       LYMPH %       12.3       Lymphocyte % CSF     99         MCH       24.9       MCHC       31.6       MCV       78.8       Mech Vt   450           MODE   AC           Mono #       1.57       Mono %       14.4       Monocyte % CSF     1         MPV       9.3       Neisseria meningitidis     Not Detected         Neut #       7.43       Neut %       68.4       nRBC       0.0       O2 Hb, Blood Gas   95.8           PEEP   5.0           Platelet Count       231       Base Excess, Blood gas   3.60           CO Hgb   1.9           POC HCO3   28.5           Met Hgb   1.3           POC PCO2   44.0           POC PH   7.420           POC PO2   115.0           Potassium       3.7       Potassium, Blood Gas   3.4           Protein CSF       136.6         PROTEIN TOTAL       6.2       RBC       4.01       RBC, CSF     20         RDW       16.0       Mech RR   18           Sample site   Right Radial Artery           Sample Type   Arterial Blood           sO2, Blood gas   98.6           Sodium       131       Sodium, Blood Gas   132           Streptococcus agalactiae (Group B)     Not Detected         Streptococcus pneumoniae     Not Detected         TOC2, Blood gas   29.9           THb, Blood gas   9.8           Total Nucleated Cells CSF     393         Tube # CSF     3         Vancomycin-Trough 12.8             Varicella zoster Virus (VZV)     Not Detected         Volume, CSF     1         WBC       10.87               Significant Imaging:  MRI brain w w/o:  Impression:     Cortical/subcortical edema in the bilateral mesial temporal lobes and insula, with cortical restricted diffusion in the right insula.  Findings may represent postictal changes versus a nonspecific encephalitis.     I have reviewed all pertinent imaging results/findings within the past 24 hours.    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)  - continue keppra 1.5 gm BID IV  - on continuous EEG monitoring  - Management antivirals per ID  - Further recommendations per MD        VTE Risk Mitigation (From admission, onward)      None            KRISTEN Mullins-BC  Neurology  [facility] - 7th Floor ICU       [1]   Current Facility-Administered Medications   Medication Dose Route Frequency Provider Last Rate Last Admin    0.9% NaCl infusion   Intravenous PRN Laurence Mistry DO   Stopped at 04/12/25 0803    0.9% NaCl infusion   Intravenous PRN Laurence Mistry DO   Stopped at 04/14/25 0250    acetaminophen tablet 1,000 mg  1,000 mg Oral Q6H PRN Sydney Shine FNP   1,000 mg at 04/14/25 1359    acyclovir 620 mg in 0.9% NaCl 100 mL IVPB  10 mg/kg (Ideal) Intravenous Q8H Zoltan Alonzo MD   Stopped at 04/15/25 1101     albuterol-ipratropium 2.5 mg-0.5 mg/3 mL nebulizer solution 3 mL  3 mL Nebulization Q6H PRN ReyesStephanie medeiros G, DO   3 mL at 04/10/25 0859    aspirin chewable tablet 81 mg  81 mg Per OG tube Daily JERROD Artis MD   81 mg at 04/15/25 0959    dextrose 50% injection 12.5 g  12.5 g Intravenous PRN ReyesJonatan medeirosry G, DO        dextrose 50% injection 25 g  25 g Intravenous PRN ReyesJonatan medeirosry G, DO        etomidate injection   Intravenous Code/trauma/sedation Med Antonio Mcdaniels MD   20 mg at 04/11/25 1440    glucagon (human recombinant) injection 1 mg  1 mg Intramuscular PRN Reyes, Thairy G, DO        glucose chewable tablet 16 g  16 g Oral PRN Reyes, Thairy G, DO        glucose chewable tablet 24 g  24 g Oral PRN Reyes, Thairy G, DO        hydrALAZINE injection 10 mg  10 mg Intravenous Q6H PRN Feliciano Maya MD   10 mg at 04/10/25 1600    insulin aspart U-100 injection 0-5 Units  0-5 Units Subcutaneous Q6H PRN JERROD Artis MD        levETIRAcetam in NaCl (iso-os) IVPB 1,500 mg  1,500 mg Intravenous BID Aquiles Walton MD   Stopped at 04/14/25 2136    LORazepam injection 2 mg  2 mg Intravenous Q15 Min PRN Sydney Hudson MD   2 mg at 04/15/25 0101    losartan tablet 100 mg  100 mg Per OG tube Daily JERROD Artis MD   100 mg at 04/15/25 0959    midazolam (PF) in 0.9 % NaCl 1 mg/mL infusion  0-5 mg/hr Intravenous Continuous Sydney Hudson MD 5 mL/hr at 04/15/25 1217 5 mg/hr at 04/15/25 1217    montelukast tablet 10 mg  10 mg Per OG tube QHS JERROD Artis MD   10 mg at 04/14/25 2102    mupirocin 2 % ointment   Nasal BID Chaka Diaz MD   Given at 04/15/25 1000    NORepinephrine 8 mg in dextrose 5% 250 mL infusion  0-3 mcg/kg/min (Dosing Weight) Intravenous Continuous Laurence Mistry DO   Stopped at 04/14/25 1118    pantoprazole injection 40 mg  40 mg Intravenous Daily JERROD Artis MD   40 mg at 04/15/25 0959    piperacillin-tazobactam (ZOSYN) 4.5 g in D5W 100 mL IVPB (MB+)  4.5 g Intravenous Q8H  Hany Holbrook DO 25 mL/hr at 04/15/25 1217 Rate Verify at 04/15/25 1217    promethazine tablet 12.5 mg  12.5 mg Oral Q6H PRN Feliciano Maya MD   12.5 mg at 04/10/25 1350    propofol (DIPRIVAN) 10 mg/mL infusion  0-50 mcg/kg/min (Dosing Weight) Intravenous Continuous Chaka Diaz MD 17.9 mL/hr at 04/15/25 1217 35 mcg/kg/min at 04/15/25 1217    rocuronium injection   Intravenous Code/trauma/sedation Med Antonio Mcdaniels MD   100 mg at 04/11/25 1441    vancomycin - pharmacy to dose   Intravenous pharmacy to manage frequency Hany Holbrook DO        vancomycin 1,250 mg in D5W 250 mL IVPB (admixture device)  1,250 mg Intravenous Q12H Apple Marrero MD

## 2025-04-15 NOTE — PROGRESS NOTES
Progress Note                                                           Infectious disease   Admit Date: 4/9/2025    SUBJECTIVE:     Follow-up For:  Acute respiratory failure with hypoxia and hypercarbia    HPI/Interval history:  Patient still with intermittent fever, T-max 100.9°; he underwent lumbar puncture this morning; CSF opening pressure was on the high side.      OBJECTIVE:     Vital Signs Range (Last 24H):  Temp:  [96.8 °F (36 °C)-99.5 °F (37.5 °C)]   Pulse:  []   Resp:  [0-51]   BP: (104-154)/(56-86)   SpO2:  [95 %-99 %]     Physical Exam:  Constitutional:  Sedated on the vent   HEENT:  ET tube in-situ  Cardiovascular: regular rate and rhythm, S1, S2 normal, no murmur  Pulmonary: no crepitations or wheezing heard  Gastrointestinal: non-distended, bowel sounds normal  Muscular/Skeletal: Lower extremities without edema  Skin: No rashes    Laboratory:  CBC:   Recent Labs   Lab 04/15/25  0513   WBC 10.87   RBC 4.01*   HGB 10.0*   HCT 31.6*      MCV 78.8*   MCH 24.9*   MCHC 31.6*     BMP:   Recent Labs   Lab 04/11/25  0331 04/11/25  2135 04/15/25  0513      < > 131*   K 4.2   < > 3.7      < > 99   CO2 28   < > 24   BUN 14.4   < > 14.5   CREATININE 1.01   < > 0.69*   CALCIUM 9.6   < > 8.5*   MG 1.70  --   --     < > = values in this interval not displayed.     CMP:   Recent Labs   Lab 04/15/25  0513   CALCIUM 8.5*   ALBUMIN 2.6*   *   K 3.7   CO2 24   CL 99   BUN 14.5   CREATININE 0.69*   ALKPHOS 61   ALT 39   AST 55*   BILITOT 0.3     Microbiology Results (last 7 days)       Procedure Component Value Units Date/Time    Cryptococcal antigen, CSF [[id number]]  (Normal) Collected: 04/15/25 0850    Order Status: Completed Specimen: CSF (Spinal Fluid) from CSF Tap, Tube 1 Updated: 04/15/25 1616     Cryptococcal Antigen, CSF Negative    Cerebrospinal Fluid Culture [[id number]] Collected: 04/15/25 0850    Order Status: Completed Specimen: CSF (Spinal Fluid) from CSF Tap, Tube 3 Updated:  04/15/25 1609     GRAM STAIN No WBCs, No bacteria seen    Blood Culture [[id number]]  (Normal) Collected: 04/12/25 0857    Order Status: Completed Specimen: Blood from Hand, Left Updated: 04/15/25 1300     Blood Culture No Growth At 72 Hours    Blood Culture [[id number]]  (Normal) Collected: 04/12/25 0857    Order Status: Completed Specimen: Blood from Hand, Right Updated: 04/15/25 1300     Blood Culture No Growth At 72 Hours    Geovanna Ink Prep CSF [[id number]] Collected: 04/15/25 0850    Order Status: Completed Specimen: CSF (Spinal Fluid) from Cerebrospinal Fluid Updated: 04/15/25 1013     GEOVANNA INK PREP CSF ([facility]) Negative    Fungal Culture [[id number]] Collected: 04/15/25 0850    Order Status: Sent Specimen: CSF (Spinal Fluid) from Lumbar Updated: 04/15/25 0914    Cerebrospinal Fluid Culture [[id number]] Collected: 04/15/25 0850    Order Status: Canceled Specimen: CSF (Spinal Fluid) from Cerebrospinal Fluid Updated: 04/15/25 0913    Blood culture #1 **CANNOT BE ORDERED STAT** [[id number]]  (Normal) Collected: 04/10/25 0010    Order Status: Completed Specimen: Blood Updated: 04/15/25 0105     Blood Culture No Growth at 5 days    Blood culture #2 **CANNOT BE ORDERED STAT** [[id number]]  (Normal) Collected: 04/10/25 0010    Order Status: Completed Specimen: Blood Updated: 04/15/25 0105     Blood Culture No Growth at 5 days    Respiratory Culture [[id number]]  (Abnormal) Collected: 04/12/25 0031    Order Status: Completed Specimen: Sputum from Endotracheal Aspirate Updated: 04/14/25 1326     Respiratory Culture Few Yeast     Comment: with normal respiratory gerry        GRAM STAIN Quality 3+      Rare Gram positive cocci            Labs: I personally reviewed and interpreted the above lab results.    Diagnostic Results:      ASSESSMENT/PLAN:     Active Hospital Problems    Diagnosis  POA    *Acute respiratory failure with hypoxia and hypercarbia [J96.01, J96.02]  Yes    Encephalitis due to herpes simplex virus type 1  (HSV-1) [B10.09]  Unknown    Orthostatic hypotension [I95.1]  Unknown    Obstructive sleep apnea [G47.33]  Yes    Primary hypertension [I10]  Yes     Amlodipine stopped by Dr Figueroa in December.       Type 2 diabetes mellitus with stage 2 chronic kidney disease, without long-term current use of insulin [E11.22, N18.2]  Yes      Resolved Hospital Problems    Diagnosis Date Resolved POA    Seizures [R56.9] 04/15/2025 Unknown       ASSESSMENT:  Herpes simplex virus 1 encephalitis  Hypoxemic respiratory failure mechanical ventilation as of 4/1.  No evidence of pneumonia (normal CT lungs on admission, negative sputum bacterial culture).     PLAN:  Discontinue vancomycin and Zosyn  Continue acyclovir with close monitoring renal function.  Patient will need antiviral therapy for at least 21 days.  Monitor renal function closely on the above antimicrobials  Follow-up finalized results of blood cultures from 4/12     Prognosis guarded.  ID will continue to follow.  Discussed with family at bedside - yes, wife  Discussed with other physician/provider - yes, critical care Dr. Traore

## 2025-04-15 NOTE — PLAN OF CARE
Problem: Adult Inpatient Plan of Care  Goal: Plan of Care Review  Outcome: Progressing  Goal: Patient-Specific Goal (Individualized)  Outcome: Progressing  Goal: Absence of Hospital-Acquired Illness or Injury  Outcome: Progressing  Goal: Optimal Comfort and Wellbeing  Outcome: Progressing  Goal: Readiness for Transition of Care  Outcome: Progressing     Problem: Diabetes Comorbidity  Goal: Blood Glucose Level Within Targeted Range  Outcome: Progressing     Problem: Wound  Goal: Optimal Functional Ability  Outcome: Progressing  Goal: Absence of Infection Signs and Symptoms  Outcome: Progressing  Goal: Optimal Pain Control and Function  Outcome: Progressing  Goal: Skin Health and Integrity  Outcome: Progressing     Problem: Fall Injury Risk  Goal: Absence of Fall and Fall-Related Injury  Outcome: Progressing     Problem: Infection  Goal: Absence of Infection Signs and Symptoms  Outcome: Progressing     Problem: Skin Injury Risk Increased  Goal: Skin Health and Integrity  Outcome: Progressing     Problem: Mechanical Ventilation Invasive  Goal: Effective Communication  Outcome: Not Progressing  Goal: Optimal Device Function  Outcome: Progressing  Goal: Mechanical Ventilation Liberation  Outcome: Not Progressing  Goal: Optimal Nutrition Delivery  Outcome: Progressing  Goal: Absence of Device-Related Skin and Tissue Injury  Outcome: Progressing  Goal: Absence of Ventilator-Induced Lung Injury  Outcome: Progressing

## 2025-04-15 NOTE — ANESTHESIA POSTPROCEDURE EVALUATION
Anesthesia Post Evaluation    Patient: River Sheehan Jr.    Procedure(s) Performed: * No procedures listed *    Final Anesthesia Type: general      Patient location during evaluation: ICU  Patient participation: No - Unable to Participate, Intubation  Level of consciousness: sedated  Post-procedure vital signs: reviewed and stable  Pain management: adequate  Airway patency: patent    PONV status at discharge: No PONV  Anesthetic complications: no      Cardiovascular status: hemodynamically stable  Respiratory status: ETT and ventilator  Hydration status: euvolemic  Follow-up not needed (While intubated post anesthesia).              Vitals Value Taken Time   /83 04/15/25 10:01   Temp 36 °C (96.8 °F) 04/15/25 09:16   Pulse 80 04/15/25 10:09   Resp 18 04/15/25 10:09   SpO2 98 % 04/15/25 10:09   Vitals shown include unfiled device data.      No case tracking events are documented in the log.      Pain/Georges Score: Pain Rating Prior to Med Admin: 0 (4/14/2025  1:59 PM)

## 2025-04-15 NOTE — ANESTHESIA PREPROCEDURE EVALUATION
04/14/2025  River Sheehan Jr. is a 79 y.o., male, who presents for the following:    Date/Time: 04/15/25 0800   Scheduled providers: Arden Berrios MD; Dabadie, Virginia G, CRNA   Procedure: IR LUMBAR PUNCTURE DIAGNOSTIC WITH IMAGING   Indications: LP to rule out CNS process per ID   Location: [facility] - Interventional Radiology     Assessment:      River Sheehan Jr. is a 79 y.o. male with:  Fever without clear source  Possible Tonic-clonic seizure  Status post intubation (Hypoxemic respiratory failure -suspected community-acquired pneumonia  )  Cystic nodule in the pancreatic body   Indeterminate hypodense cystic nodules in the liver      This 79-year-old male with a subacute decline in functional status, recent fever, and new-onset seizure with focal neurologic deficits (left gaze preference and hemiparesis) is now intubated and in the ICU. These findings raise strong concern for a central nervous system process, particularly HSV encephalitis, given the acute neurologic deterioration, seizure activity, and altered mental status. A lumbar puncture should be performed to assess for viral encephalitis, including HSV PCR.  We will recommend starting IV acyclovir pending results.     Plan:      We will need abdomen MRI at some point for further evaluation of the multiple nodule seen on the liver/pancreas  Obtain 2 sets of blood cultures today and said blood HSV PCR  May continue vancomycin/Zosyn for now  Recommend lumbar puncture to rule out CNS process.  Need to send cell count, protein, glucose and meningitis panel  Recommend starting IV acyclovir 10 mg/kg every 8 hours until we rule out HSV encephalitis    Past Medical History:   Diagnosis Date    Acid reflux      Adenomatous polyp of ascending colon 09/20/2021    Arthritis      Asymptomatic stenosis of left carotid artery      CAD  (coronary artery disease)      Carotid artery stenosis        3/26/25 less than 50% bilateral    Colon polyps 02/20/2025     Transvers polyp benigne mucosa, Ascending Colon Tubular adenoma, Rectum hyperplastic    COVID-19 07/06/2022    Depression      Diabetes mellitus      Gout      Hearing loss      High cholesterol       HTN (hypertension)      Kidney stone      Macrocytic anemia      Osteoporosis      Seasonal allergies            Pre-op Assessment    I have reviewed the Patient Summary Reports.     I have reviewed the Nursing Notes. I have reviewed the NPO Status.   I have reviewed the Medications.     Review of Systems  Anesthesia Hx:  No problems with previous Anesthesia             Denies Family Hx of Anesthesia complications.    Denies Personal Hx of Anesthesia complications.                    Cardiovascular:     Hypertension   CAD               CAD  CECELIA  LBBB                           Pulmonary:        Sleep Apnea                Renal/:  Chronic Renal Disease   CKD 2             Hepatic/GI:     GERD                Musculoskeletal:  Arthritis               Neurological:           CVA -  left-sided deficits including leftward gaze, left hemiparesis, and confusion                            Endocrine:  Diabetes, type 2           Psych:    depression                Physical Exam  General: Unconscious    Airway:  Mallampati: unable to assess   Mouth Opening: Normal  TM Distance: Normal  Tongue: Normal  Neck: Girth Increased  Pre-Existing Airway: Oral Endotracheal tube    Chest/Lungs:  Normal Respiratory Rate    Heart:  Rate: Normal  Rhythm: Regular Rhythm        Anesthesia Plan  Type of Anesthesia, risks & benefits discussed:    Anesthesia Type: Gen ETT  Intra-op Monitoring Plan: Standard ASA Monitors  Post Op Pain Control Plan:   (medical reason for not using multimodal pain management)  Induction:  IV  Airway Plan: , Post-Induction  Informed Consent: Informed consent signed with the Patient  representative and all parties understand the risks and agree with anesthesia plan.  All questions answered. Patient consented to blood products? No  ASA Score: 4  Day of Surgery Review of History & Physical: H&P Update referred to the surgeon/provider.  Anesthesia Plan Notes: Vasopressors prn, organ perfusion support  Telephone consent per son    Ready For Surgery From Anesthesia Perspective.     .

## 2025-04-15 NOTE — PROGRESS NOTES
Pharmacokinetic Assessment Follow Up: IV Vancomycin    Vancomycin serum concentration assessment(s):  The trough level was drawn correctly and can be used to guide therapy at this time. The measurement is below the desired definitive target range of 15 to 20 mcg/mL.    Vancomycin Regimen Plan:  Change regimen to Vancomycin 1250 mg IV every 12 hours with next serum trough concentration measured at 1100 prior to 3rd dose on 04/16    Drug levels (last 3 results):  Recent Labs   Lab Result Units 04/13/25  2237 04/15/25  1036   Vancomycin Trough ug/ml 11.0* 12.8*       Vancomycin Administrations:  vancomycin given in the last 96 hours                     vancomycin (VANCOCIN) 1,000 mg in D5W 250 mL IVPB (admixture device) (mg) 1,000 mg New Bag 04/14/25 2208     1,000 mg New Bag  1057    vancomycin 1,250 mg in D5W 250 mL IVPB (admixture device) (mg) 1,250 mg New Bag 04/13/25 2311     1,250 mg New Bag  0523     1,250 mg New Bag 04/12/25 1253    vancomycin 750 mg in D5W 250 mL IVPB (admixture device) (mg) 750 mg New Bag 04/11/25 1949    vancomycin (VANCOCIN) 1,000 mg in D5W 250 mL IVPB (admixture device) (mg) 1,000 mg New Bag 04/11/25 1734                    Pharmacy will continue to follow and monitor vancomycin.    Please contact pharmacy at extension [phone] for questions regarding this assessment.    Thank you for the consult,   Flower Miugel       Patient brief summary:  River Sheehan Jr. is a 79 y.o. male initiated on antimicrobial therapy with IV Vancomycin for treatment of sepsis    Drug Allergies:   Review of patient's allergies indicates:  No Known Allergies    Actual Body Weight:  Wt Readings from Last 1 Encounters:   04/11/25 85.3 kg (188 lb 0.8 oz)       Renal Function:   Estimated Creatinine Clearance: 88.6 mL/min (A) (based on SCr of 0.69 mg/dL (L)).,     Dialysis Method (if applicable):  N/A    CBC (last 72 hours):  Recent Labs   Lab Result Units 04/13/25  0311 04/14/25  0340 04/15/25  0513   WBC x10(3)/mcL  12.17  12.17* 8.14 10.87   Hgb g/dL 10.8* 10.6* 10.0*   Hct % 33.6* 34.9* 31.6*   Platelet x10(3)/mcL 235 275 231   Mono % %  --  12.3 14.4   Monocytes % % 8  --   --    Eos % %  --  4.9 4.0   Eosinophils % % 2  --   --    Basophil % %  --  0.5 0.6       Metabolic Panel (last 72 hours):  Recent Labs   Lab Result Units 04/13/25  0312 04/14/25  0340 04/15/25  0513 04/15/25  0850 04/15/25  1002   Sodium mmol/L 135* 134* 131*  --   --    Sodium, Blood Gas mmol/L  --   --   --   --  132*   Potassium mmol/L 3.8 4.0 3.7  --   --    Potassium, Blood Gas mmol/L  --   --   --   --  3.4*   Chloride mmol/L 101 101 99  --   --    CO2 mmol/L 22* 21* 24  --   --    Glucose mg/dL 115 143* 159*  --   --    Glucose CSF  mg/dL  --   --   --  81*  --    Blood Urea Nitrogen mg/dL 14.2 15.4 14.5  --   --    Creatinine mg/dL 0.98 0.67* 0.69*  --   --    Albumin g/dL 3.2* 2.9* 2.6*  --   --    Bilirubin Total mg/dL 0.4 0.3 0.3  --   --    ALP unit/L 53 63 61  --   --    AST unit/L 38 59* 55*  --   --    ALT unit/L 37 40 39  --   --        Microbiologic Results:  Microbiology Results (last 7 days)       Procedure Component Value Units Date/Time    Geovanna Ink Prep CSF [[id number]] Collected: 04/15/25 0850    Order Status: Completed Specimen: CSF (Spinal Fluid) from Cerebrospinal Fluid Updated: 04/15/25 1013     GEOVANNA INK PREP CSF ([facility]) Negative    Cryptococcal antigen, CSF [[id number]] Collected: 04/15/25 0850    Order Status: Sent Specimen: CSF (Spinal Fluid) from CSF Tap, Tube 1 Updated: 04/15/25 0914    Cerebrospinal Fluid Culture [[id number]] Collected: 04/15/25 0850    Order Status: Sent Specimen: CSF (Spinal Fluid) from CSF Tap, Tube 3 Updated: 04/15/25 0914    Fungal Culture [[id number]] Collected: 04/15/25 0850    Order Status: Sent Specimen: CSF (Spinal Fluid) from Lumbar Updated: 04/15/25 0914    Cerebrospinal Fluid Culture [[id number]] Collected: 04/15/25 0850    Order Status: Canceled Specimen: CSF (Spinal Fluid) from  Cerebrospinal Fluid Updated: 04/15/25 0913    Blood culture #1 **CANNOT BE ORDERED STAT** [[id number]]  (Normal) Collected: 04/10/25 0010    Order Status: Completed Specimen: Blood Updated: 04/15/25 0105     Blood Culture No Growth at 5 days    Blood culture #2 **CANNOT BE ORDERED STAT** [[id number]]  (Normal) Collected: 04/10/25 0010    Order Status: Completed Specimen: Blood Updated: 04/15/25 0105     Blood Culture No Growth at 5 days    Respiratory Culture [[id number]]  (Abnormal) Collected: 04/12/25 0031    Order Status: Completed Specimen: Sputum from Endotracheal Aspirate Updated: 04/14/25 1326     Respiratory Culture Few Yeast     Comment: with normal respiratory gerry        GRAM STAIN Quality 3+      Rare Gram positive cocci    Blood Culture [[id number]]  (Normal) Collected: 04/12/25 0857    Order Status: Completed Specimen: Blood from Hand, Left Updated: 04/14/25 1300     Blood Culture No Growth At 48 Hours    Blood Culture [[id number]]  (Normal) Collected: 04/12/25 0857    Order Status: Completed Specimen: Blood from Hand, Right Updated: 04/14/25 1300     Blood Culture No Growth At 48 Hours

## 2025-04-15 NOTE — TRANSFER OF CARE
"Anesthesia Transfer of Care Note    Patient: River Sheehan Jr.    Procedure(s) Performed: * No procedures listed *    Patient location: ICU    Anesthesia Type: general    Transport from OR: Continuous ECG monitoring in transport. Continuous SpO2 monitoring in transport. Continuos invasive BP monitoring in transport. Transported from OR intubated on 100% O2  with adequate ventilation controlled by transport ventilator    Post pain: adequate analgesia    Post assessment: no apparent anesthetic complications    Post vital signs: stable    Level of consciousness: sedated    Nausea/Vomiting: no nausea/vomiting    Complications: none    Transfer of care protocol was followedComments: Case complete, ICU nurse and resp transported pt back to icu room. VSS OETT to portable vent transport monitors on       Last vitals: Visit Vitals  /79   Pulse 71   Temp 36 °C (96.8 °F)   Resp 20   Ht 5' 5" (1.651 m)   Wt 85.3 kg (188 lb 0.8 oz)   SpO2 99%   BMI 31.29 kg/m²     "

## 2025-04-15 NOTE — PROGRESS NOTES
"[facility] - 7th Floor ICU  Pulmonary Critical Care Note    Patient Name: River Sheehan Jr.  MRN: [id number]  Admission Date: 4/9/2025  Hospital Length of Stay: 5 days  Code Status: No Order  Attending Provider: JERROD Artis MD  Primary Care Provider: Chio Villela MD     Subjective:     HPI:   Patient is a 79-year-old male with a past medical history of carotid artery stenosis, CAD, diabetes mellitus, hyperlipidemia, hypertension, and anemia who initially presented to the emergency department after a syncopal episode occurring at home.  Patient was accompanied by his wife at this time who stated that he was very physically active at home but did complain of shortness of breath at times.  She reported that he was having episodes of "black outs" over the last several weeks.  He has been following cardiology on outpatient basis with recent adjustments in his home medications.  Wife also reported fever at home.  Patient was initially admitted to the hospital medicine service for these issues and possible pneumonia present on initial imaging.  He was started on Rocephin and azithromycin for suspected CAP.  However, this morning patient was found to have left-sided deficits including leftward gaze, left hemiparesis, and confusion.  He was not following commands at this time which was a significant change from patient's baseline.  Patient was taken to CT scan STAT to evaluate for stroke.  Patient displayed seizure-like activity in route to CT scanner and he was administered Ativan 2 mg IV.  Patient desaturated after administration of Ativan and ultimately required rapid sequence intubation.  CT and MRI flare displaying right insular chronic stroke.  Patient was admitted to the ICU after this event.    Hospital Course/Significant events:  4/11/2025: Patient admitted to the ICU status post intubation    24 Hour Interval History:  Patient remains intubated sedated and mechanically ventilated.  " Continuous EEG still underway.  Per Neurology, no overt seizure activity or subclinical seizures appreciated but there are some epileptiform discharges noted from the right.  ID notes reviewed.  Patient to go for LP this morning.    Past Medical History:   Diagnosis Date    Acid reflux     Adenomatous polyp of ascending colon 09/20/2021    Arthritis     Asymptomatic stenosis of left carotid artery     CAD (coronary artery disease)     Carotid artery stenosis     US 3/26/25 less than 50% bilateral    Colon polyps 02/20/2025    Transvers polyp benigne mucosa, Ascending Colon Tubular adenoma, Rectum hyperplastic    COVID-19 07/06/2022    Depression     Diabetes mellitus     Gout     Hearing loss     High cholesterol     HTN (hypertension)     Kidney stone     Macrocytic anemia     Osteoporosis     Seasonal allergies        Past Surgical History:   Procedure Laterality Date    CAROTID ENDARTERECTOMY Left 09/12/2024    Procedure: ENDARTERECTOMY-CAROTID;  Surgeon: Hany Pendleton MD;  Location: [facility];  Service: Peripheral Vascular;  Laterality: Left;    COLONOSCOPY W/ BIOPSIES  09/20/2021    Dr. Chun Smith    COLONOSCOPY W/ BIOPSIES  02/20/2025    CYSTOSCOPY      EXTRACAPSULAR EXTRACTION OF CATARACT      HERNIA REPAIR      LITHOTRIPSY  09/2023    RETROGRADE PYELOGRAPHY      WISDOM TOOTH EXTRACTION                 Current Outpatient Medications   Medication Instructions    albuterol (PROAIR HFA) 90 mcg/actuation inhaler 2 puffs, Inhalation, Every 6 hours PRN, Rescue    allopurinoL (ZYLOPRIM) 100 MG tablet TAKE 1 TABLET EVERY DAY    aspirin (ECOTRIN) 81 mg, Daily    atorvastatin (LIPITOR) 20 MG tablet TAKE 1 TABLET AT BEDTIME    blood sugar diagnostic (TRUE METRIX GLUCOSE TEST STRIP) Strp 1 strip, Misc.(Non-Drug; Combo Route), Daily    blood-glucose meter (TRUE METRIX AIR GLUCOSE METER) kit Test daily for DM II E11.9    busPIRone (BUSPAR) 10 MG tablet TAKE 1/2 TO 1 TABLET THREE TIMES DAILY    cinnamon bark  1,000 mg, Daily    citalopram (CELEXA) 20 mg, Oral    gabapentin (NEURONTIN) 100 MG capsule TAKE 2 CAPSULES (200 MG TOTAL) THREE TIMES DAILY    glucosamine/chondr navarro A sod (OSTEO BI-FLEX ORAL) Daily    KRILL OIL ORAL 500 mg, Daily    losartan (COZAAR) 100 mg, Daily    metFORMIN (GLUCOPHAGE) 500 mg, Oral, 2 times daily with meals    midodrine (PROAMATINE) 2.5 mg, Oral, 2 times daily with meals    montelukast (SINGULAIR) 10 mg, Oral, Nightly    pantoprazole (PROTONIX) 40 MG tablet TAKE 1 TABLET EVERY DAY    pioglitazone (ACTOS) 15 mg, Oral, Daily    tamsulosin (FLOMAX) 0.4 mg, Daily       Review of patient's allergies indicates:  No Known Allergies     Current Inpatient Medications   acyclovir  10 mg/kg (Ideal) Intravenous Q8H    aspirin  81 mg Per OG tube Daily    levETIRAcetam (Keppra) IV (PEDS and ADULTS)  1,500 mg Intravenous BID    losartan  100 mg Per OG tube Daily    montelukast  10 mg Per OG tube QHS    mupirocin   Nasal BID    pantoprazole  40 mg Intravenous Daily    piperacillin-tazobactam (Zosyn) IV (PEDS and ADULTS) (extended infusion is not appropriate)  4.5 g Intravenous Q8H    vancomycin (VANCOCIN) 1,000 mg in D5W 250 mL IVPB (admixture device)  1,000 mg Intravenous Q12H       Current Intravenous Infusions   midazolam  0-5 mg/hr Intravenous Continuous 5 mL/hr at 04/15/25 0000 5 mg/hr at 04/15/25 0000    NORepinephrine bitartrate-D5W  0-3 mcg/kg/min (Dosing Weight) Intravenous Continuous   Stopped at 04/14/25 1118    propofoL  0-50 mcg/kg/min (Dosing Weight) Intravenous Continuous 23 mL/hr at 04/15/25 0608 45 mcg/kg/min at 04/15/25 0608                Objective:       Intake/Output Summary (Last 24 hours) at 4/15/2025 0806  Last data filed at 4/15/2025 0608  Gross per 24 hour   Intake 2656.96 ml   Output 1285 ml   Net 1371.96 ml         Vital Signs (Most Recent):  Temp: 99.5 °F (37.5 °C) (04/15/25 0400)  Pulse: 89 (04/15/25 0700)  Resp: 18 (04/15/25 0700)  BP: 129/78 (04/15/25 0700)  SpO2: 98 % (04/15/25  0700)  Body mass index is 31.29 kg/m².  Weight: 85.3 kg (188 lb 0.8 oz) Vital Signs (24h Range):  Temp:  [97.3 °F (36.3 °C)-100.9 °F (38.3 °C)] 99.5 °F (37.5 °C)  Pulse:  [] 89  Resp:  [0-51] 18  SpO2:  [92 %-100 %] 98 %  BP: ()/() 129/78     Physical Exam  Vitals reviewed.   Constitutional:       Comments: Patient intubated and sedated, continuous EEG underwent   HENT:      Head: Normocephalic and atraumatic.      Mouth/Throat:      Comments: ET tube in place  Eyes:      Comments: Pinpoint pupils bilaterally   Cardiovascular:      Rate and Rhythm: Normal rate and regular rhythm.      Heart sounds: No murmur heard.     No friction rub. No gallop.   Pulmonary:      Breath sounds: No wheezing, rhonchi or rales.   Abdominal:      General: There is no distension.      Palpations: Abdomen is soft.      Tenderness: There is no abdominal tenderness.   Musculoskeletal:      Right lower leg: No edema.      Left lower leg: No edema.   Skin:     General: Skin is warm.   Neurological:      Comments: Unable to assess secondary to patient currently being intubated and sedated       Lines/Drains/Airways       Drain  Duration                  NG/OG Tube 04/11/25 1530 3 days         Urethral Catheter 04/11/25 1500 3 days              Airway  Duration                  Airway - Non-Surgical 04/11/25 1443 Endotracheal Tube 3 days              Peripheral Intravenous Line  Duration                  Peripheral IV - Single Lumen 18 G Left;Posterior Hand -- days         Peripheral IV - Single Lumen 04/11/25 2145 18 G 2 1/4 in Anterior;Right Upper Arm 3 days         Peripheral IV - Single Lumen 04/12/25 2250 18 G 2 1/4 in Anterior;Left Forearm 2 days                    Significant Labs:    Lab Results   Component Value Date    WBC 10.87 04/15/2025    HGB 10.0 (L) 04/15/2025    HCT 31.6 (L) 04/15/2025    MCV 78.8 (L) 04/15/2025     04/15/2025           BMP  Lab Results   Component Value Date     (L) 04/15/2025     K 3.7 04/15/2025    CO2 24 04/15/2025    BUN 14.5 04/15/2025    CREATININE 0.69 (L) 04/15/2025    CALCIUM 8.5 (L) 04/15/2025    AGAP 8.0 04/15/2025    EGFRNONAA >60 06/09/2022         ABG  Recent Labs   Lab 04/11/25 2048   PH 7.490*   PO2 81.0   PCO2 38.0   HCO3 29.0*   POCBASEDEF 5.30*       Mechanical Ventilation Support:  Vent Mode: A/C (04/15/25 0601)  Ventilator Initiated: Yes (04/11/25 1445)  Set Rate: 18 BPM (04/15/25 0601)  Vt Set: 450 mL (04/15/25 0601)  PEEP/CPAP: 5 cmH20 (04/15/25 0601)  Oxygen Concentration (%): 40 (04/15/25 0601)  Peak Airway Pressure: 20 cmH20 (04/15/25 0601)  Total Ve: 7.4 L/m (04/15/25 0601)  F/VT Ratio<105 (RSBI): (!) 42.86 (04/15/25 0601)      Significant Imaging:  I have reviewed the pertinent imaging within the past 24 hours.    Assessment/Plan:     Assessment  Hypoxemic respiratory failure status post intubation on 04/11  Focal tonic-clonic complex partial seizure leading to above  Chronic right insular ischemic stroke  Suspected community-acquired pneumonia, although respiratory culture and PCR are negative      Plan  Patient to go for LP this morning.  Continuing antimicrobials and antivirals.  Continue EEG until directed otherwise by Neurology.  Continue anticonvulsants.  Repeat ABG pending.  No new culture growth.  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  Prognosis guarded    DVT Prophylaxis: SCDs  GI Prophylaxis: Protonix     31 minutes of critical care was time spent personally by me on the following activities: development of treatment plan with patient or surrogate and bedside caregivers, discussions with consultants, evaluation of patient's response to treatment, examination of patient, ordering and performing treatments and interventions, ordering and review of laboratory studies, ordering and review of radiographic studies, pulse oximetry, re-evaluation of patient's condition.  This critical care time did not  overlap with that of any other provider or involve time for any procedures.     Florentin Traore MD  Pulmonary Critical Care Medicine  [facility] - 7th Floor ICU  DOS: 04/15/2025

## 2025-04-15 NOTE — PROGRESS NOTES
Progress Note                                                           Infectious disease   Admit Date: 4/9/2025    SUBJECTIVE:     Follow-up For:  Acute respiratory failure with hypoxia and hypercarbia    HPI/Interval history:  Patient is sedated on the vent, still with intermittent fevers, T-max 100.9°.  LP postponed till tomorrow due to patient getting tube feeds this morning.      OBJECTIVE:     Vital Signs Range (Last 24H):  Temp:  [96.8 °F (36 °C)-100.9 °F (38.3 °C)]   Pulse:  []   Resp:  [0-33]   BP: ()/()   SpO2:  [92 %-100 %]     Physical Exam:  Constitutional:  Sedated on vent   HEENT:  ET tube in-situ  Cardiovascular: regular rate and rhythm, S1, S2 normal, no murmur  Pulmonary: normal respiratory effort, no crepitations or wheezing heard  Gastrointestinal: non-distended, bowel sounds normal; non-tender, no masses or organomegaly appreciated  Muscular/Skeletal: Lower extremities without edema  Skin: No rashes    Laboratory:  CBC:   Recent Labs   Lab 04/14/25  0340   WBC 8.14   RBC 4.28*   HGB 10.6*   HCT 34.9*      MCV 81.5   MCH 24.8*   MCHC 30.4*     BMP:   Recent Labs   Lab 04/11/25  0331 04/11/25  2135 04/14/25  0340      < > 134*   K 4.2   < > 4.0      < > 101   CO2 28   < > 21*   BUN 14.4   < > 15.4   CREATININE 1.01   < > 0.67*   CALCIUM 9.6   < > 8.5*   MG 1.70  --   --     < > = values in this interval not displayed.     CMP:   Recent Labs   Lab 04/14/25  0340   CALCIUM 8.5*   ALBUMIN 2.9*   *   K 4.0   CO2 21*      BUN 15.4   CREATININE 0.67*   ALKPHOS 63   ALT 40   AST 59*   BILITOT 0.3     Microbiology Results (last 7 days)       Procedure Component Value Units Date/Time    Respiratory Culture [[id number]]  (Abnormal) Collected: 04/12/25 0031    Order Status: Completed Specimen: Sputum from Endotracheal Aspirate Updated: 04/14/25 1326     Respiratory Culture Few Yeast     Comment: with normal respiratory gerry        GRAM STAIN Quality 3+       Rare Gram positive cocci    Blood Culture [[id number]]  (Normal) Collected: 04/12/25 0857    Order Status: Completed Specimen: Blood from Hand, Left Updated: 04/14/25 1300     Blood Culture No Growth At 48 Hours    Blood Culture [[id number]]  (Normal) Collected: 04/12/25 0857    Order Status: Completed Specimen: Blood from Hand, Right Updated: 04/14/25 1300     Blood Culture No Growth At 48 Hours    Cerebrospinal Fluid Culture [[id number]]     Order Status: Sent Specimen: CSF (Spinal Fluid) from CSF Tap, Tube 3     Fungal Culture [[id number]]     Order Status: Sent Specimen: CSF (Spinal Fluid) from Lumbar     Geovanna Ink Prep CSF [[id number]]     Order Status: Sent Specimen: CSF (Spinal Fluid) from Cerebrospinal Fluid     Cerebrospinal Fluid Culture [[id number]]     Order Status: Sent Specimen: CSF (Spinal Fluid) from Cerebrospinal Fluid     Cryptococcal antigen, CSF [[id number]]     Order Status: Sent Specimen: CSF (Spinal Fluid) from CSF Tap, Tube 1     Blood culture #1 **CANNOT BE ORDERED STAT** [[id number]]  (Normal) Collected: 04/10/25 0010    Order Status: Completed Specimen: Blood Updated: 04/14/25 0102     Blood Culture No Growth At 96 Hours    Blood culture #2 **CANNOT BE ORDERED STAT** [[id number]]  (Normal) Collected: 04/10/25 0010    Order Status: Completed Specimen: Blood Updated: 04/14/25 0102     Blood Culture No Growth At 96 Hours            Labs: I personally reviewed and interpreted the above lab results.    Diagnostic Results:      ASSESSMENT/PLAN:     Active Hospital Problems    Diagnosis  POA    *Acute respiratory failure with hypoxia and hypercarbia [J96.01, J96.02]  Yes    Orthostatic hypotension [I95.1]  Unknown    Seizures [R56.9]  Unknown    Obstructive sleep apnea [G47.33]  Yes    Primary hypertension [I10]  Yes     Amlodipine stopped by Dr Figueroa in December.       Type 2 diabetes mellitus with stage 2 chronic kidney disease, without long-term current use of insulin [E11.22, N18.2]  Yes       Resolved Hospital Problems   No resolved problems to display.       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  Hypoxemic respiratory failure mechanical ventilation as of 4/1.He was  was suspected to have pneumonia but sputum culture positive for yeast only.    PLAN:  Continue current empiric antimicrobials (acyclovir, vancomycin, Zosyn) pending lumbar puncture hopefully tomorrow.  Goal vancomycin trough level is 15-20 (was subtherapeutic yesterday-appreciate pharmacy assistance with increasing dose today).  Monitor renal function closely on the above antimicrobials  Follow-up finalized results of blood cultures from 4/12    ID will continue to follow.

## 2025-04-15 NOTE — ASSESSMENT & PLAN NOTE
- continue keppra 1.5 gm BID IV  - on continuous EEG monitoring  - Management antivirals per ID  - Further recommendations per MD

## 2025-04-16 NOTE — PLAN OF CARE
Problem: Adult Inpatient Plan of Care  Goal: Plan of Care Review  Outcome: Progressing  Goal: Patient-Specific Goal (Individualized)  Outcome: Progressing  Goal: Absence of Hospital-Acquired Illness or Injury  Outcome: Progressing  Goal: Optimal Comfort and Wellbeing  Outcome: Progressing  Goal: Readiness for Transition of Care  Outcome: Progressing     Problem: Diabetes Comorbidity  Goal: Blood Glucose Level Within Targeted Range  Outcome: Progressing     Problem: Wound  Goal: Optimal Coping  Outcome: Progressing  Goal: Optimal Functional Ability  Outcome: Progressing  Goal: Absence of Infection Signs and Symptoms  Outcome: Progressing  Goal: Improved Oral Intake  Outcome: Progressing  Goal: Optimal Pain Control and Function  Outcome: Progressing  Goal: Skin Health and Integrity  Outcome: Progressing  Goal: Optimal Wound Healing  Outcome: Progressing     Problem: Fall Injury Risk  Goal: Absence of Fall and Fall-Related Injury  Outcome: Progressing     Problem: Infection  Goal: Absence of Infection Signs and Symptoms  Outcome: Progressing     Problem: Skin Injury Risk Increased  Goal: Skin Health and Integrity  Outcome: Progressing     Problem: Mechanical Ventilation Invasive  Goal: Effective Communication  Outcome: Progressing  Goal: Optimal Device Function  Outcome: Progressing  Goal: Mechanical Ventilation Liberation  Outcome: Progressing  Goal: Optimal Nutrition Delivery  Outcome: Progressing  Goal: Absence of Device-Related Skin and Tissue Injury  Outcome: Progressing  Goal: Absence of Ventilator-Induced Lung Injury  Outcome: Progressing     Problem: Pneumonia  Goal: Fluid Balance  Outcome: Progressing  Goal: Resolution of Infection Signs and Symptoms  Outcome: Progressing  Goal: Effective Oxygenation and Ventilation  Outcome: Progressing     Problem: Artificial Airway  Goal: Effective Communication  Outcome: Progressing  Goal: Optimal Device Function  Outcome: Progressing  Goal: Absence of Device-Related  Skin or Tissue Injury  Outcome: Progressing

## 2025-04-16 NOTE — ASSESSMENT & PLAN NOTE
- continue keppra 1.5 gm BID IV  - will discontinue continuous EEG monitoring  - Management antivirals per ID  - attempt to wean sedation as tolerated  - Further recommendations per MD

## 2025-04-16 NOTE — PROGRESS NOTES
Inpatient Nutrition Assessment    Admit Date: 4/9/2025   Total duration of encounter: 7 days   Patient Age: 79 y.o.    Nutrition Recommendation/Prescription     Continue current tube feeding:  Peptamen Intense VHP goal rate 70 ml/hr to provide  1400 kcal  78% needs, 108% with propofol calories considered  130 g protein  100% needs  105 g carbohydrate 58% needs  1176 ml free water 65% needs  calculations based on estimated 20 hour run time     Communication of Recommendations: reviewed with nurse and reviewed with family    Nutrition Assessment     Malnutrition Assessment/Nutrition-Focused Physical Exam       Malnutrition Level: other (see comments) (Does not meet criteria) (04/12/25 8249)                                                        A minimum of two characteristics is recommended for diagnosis of either severe or non-severe malnutrition.    Chart Review    Reason Seen: follow-up    Malnutrition Screening Tool Results   Have you recently lost weight without trying?: No  Have you been eating poorly because of a decreased appetite?: No   MST Score: 0   Diagnosis:  Hypoxemic respiratory failure status post intubation on 04/11  Focal tonic-clonic complex partial seizure leading to above  Chronic right insular ischemic stroke  Suspected community-acquired pneumonia  Sepsis secondary to above  Anemia    Relevant Medical History: carotid artery stenosis, CAD, diabetes mellitus, hyperlipidemia, hypertension, and anemia     Scheduled Medications:  acyclovir, 10 mg/kg (Ideal), Q8H  aspirin, 81 mg, Daily  levETIRAcetam (Keppra) IV (PEDS and ADULTS), 1,500 mg, BID  losartan, 100 mg, Daily  montelukast, 10 mg, QHS  pantoprazole, 40 mg, Daily    Continuous Infusions:  midazolam, Last Rate: 5 mg/hr (04/16/25 1200)  NORepinephrine bitartrate-D5W, Last Rate: Stopped (04/14/25 1118)  propofoL, Last Rate: 40 mcg/kg/min (04/16/25 1200)    PRN Medications:   0.9% NaCl, , PRN  0.9% NaCl, , PRN  acetaminophen, 1,000 mg, Q6H  PRN  albuterol-ipratropium, 3 mL, Q6H PRN  dextrose 50%, 12.5 g, PRN  dextrose 50%, 25 g, PRN  etomidate, , Code/trauma/sedation Med  glucagon (human recombinant), 1 mg, PRN  glucose, 16 g, PRN  glucose, 24 g, PRN  hydrALAZINE, 10 mg, Q6H PRN  insulin aspart U-100, 0-5 Units, Q6H PRN  lorazepam, 2 mg, Q15 Min PRN  promethazine, 12.5 mg, Q6H PRN  rocuronium, , Code/trauma/sedation Med    Calorie Containing IV Medications: Diprivan @ 20.5 ml/hr (provides 541 kcal/d)    Recent Labs   Lab 04/10/25  0010 04/11/25  0331 04/11/25  0804 04/11/25  1903 04/11/25  2135 04/12/25  0759 04/12/25  0857 04/13/25  0311 04/13/25  0312 04/14/25  0340 04/15/25  0513 04/16/25  0338   * 136  --   --  134* 133*  --   --  135* 134* 131* 133*   K 4.1 4.2  --   --  3.1* 4.6  --   --  3.8 4.0 3.7 4.2   CALCIUM 9.4 9.6  --   --  8.0* 8.0*  --   --  8.4* 8.5* 8.5* 8.2*   MG  --  1.70  --   --   --   --   --   --   --   --   --   --     101  --   --  102 102  --   --  101 101 99 102   CO2 23 28  --   --  24 23  --   --  22* 21* 24 22*   BUN 19.5 14.4  --   --  14.5 15.7  --   --  14.2 15.4 14.5 14.7   CREATININE 1.00 1.01  --   --  0.89 1.01  --   --  0.98 0.67* 0.69* 0.74   EGFRNORACEVR >60 >60  --   --  >60 >60  --   --  >60 >60 >60 >60   GLUCOSE 175* 95  --   --  159* 115  --   --  115 143* 159* 165*   BILITOT 0.2 0.5  --   --  0.5 0.6  --   --  0.4 0.3 0.3 0.2   ALKPHOS 71 68  --   --  56 52  --   --  53 63 61 71   ALT 18 24  --   --  28 36  --   --  37 40 39 59*   AST 17 26  --   --  29 44  --   --  38 59* 55* 66*   ALBUMIN 3.8 4.1  --   --  3.3* 3.3*  --   --  3.2* 2.9* 2.6* 2.6*   HGBA1C  --   --  6.3  --   --   --   --   --   --   --   --   --    LIPASE 55  --   --   --   --   --   --   --   --   --   --   --    WBC 9.18 10.76  --  15.31  15.31*  --   --  11.02 12.17  12.17*  --  8.14 10.87 9.58   HGB 9.8* 10.5*  --  11.0*  --   --  11.2* 10.8*  --  10.6* 10.0* 9.6*   HCT 31.0* 33.5*  --  35.0*  --   --  35.2* 33.6*  --   "34.9* 31.6* 29.8*     Nutrition Orders:  Diet NPO  Tube Feedings/Formulas 70; 1,400; Peptamen Intense VHP; OG (start at 30 ml/hr and advance by 20 ml/hr every 4 hours as tolerated); 30; Every 4 hours    Appetite/Oral Intake: NPO/not applicable  Factors Affecting Nutritional Intake: NPO and on mechanical ventilation  Social Needs Impacting Access to Food: unable to assess at this time; will attempt on follow-up  Food/Holiness/Cultural Preferences: unable to obtain  Food Allergies: none reported  Last Bowel Movement: 04/16/25  Wound(s): no pressure injuries documented at this time     Comments    4/12/25 Patient evaluated due to orders for Clinimix E 4.25/5 at 75 ml/hr, nurse reports he is no longer receiving this, plans to start tube feeding, orders provided. He is on mechanical ventilation and receiving calories from propofol.  Addendum: Nurse reports propofol being increased from 15-20 mcg/kg/min (~200 kcal daily) to 40-50 mcg/kg/min (~500 kcal/daily), will modify tube feeding accordingly.    4/16/25 Patient remains on ventilator, receiving calories from propofol, tube feeding at goal.    Anthropometrics    Height: 5' 5" (165.1 cm), Height Method: Stated  Last Weight: 85.3 kg (188 lb 0.8 oz) (04/11/25 2115), Weight Method: Bed Scale  BMI (Calculated): 31.3  BMI Classification: obese grade I (BMI 30-34.9)        Ideal Body Weight (IBW), Male: 136 lb     % Ideal Body Weight, Male (lb): 136.03 %                          Usual Weight Provided By: unable to obtain usual weight    Wt Readings from Last 5 Encounters:   04/11/25 85.3 kg (188 lb 0.8 oz)   04/08/25 83.9 kg (185 lb)   04/01/25 88.5 kg (195 lb)   03/26/25 88.5 kg (195 lb)   01/29/25 86.2 kg (190 lb)     Weight Change(s) Since Admission:   4/16 no updated weights available   4/12 admission weight 83.9 kg stated, recent bed weight 85.3 kg documented  Wt Readings from Last 1 Encounters:   04/11/25 2115 85.3 kg (188 lb 0.8 oz)   04/09/25 9786 83.9 kg (185 lb) "   Admit Weight: 83.9 kg (185 lb) (25 2246), Weight Method: Stated    Estimated Needs    Weight Used For Calorie Calculations: 85 kg (187 lb 6.3 oz)  Energy Calorie Requirements (kcal): 1,800.38  Energy Need Method: [place] (modified)  Total Ve: 7.1 L/m, Temp (24hrs), Av.6 °F (37.6 °C), Min:98.7 °F (37.1 °C), Max:100.5 °F (38.1 °C)  Vtot (L/Min) for [place] Equation Calculation: 7.1; Max Temp for [place] Equation Calculation: 103.6 °F  Weight Used For Protein Calculations: 85 kg (187 lb 6.3 oz)  Protein Requirements: 119-136 g, 1.4-1.6 g/kg  Fluid Requirements (mL): 1,800.38  CHO Requirement: 180-203 g, 40-45% of kcal     Enteral Nutrition     Formula: Peptamen Intense VHP  Rate/Volume: 70 ml/hr  Water Flushes: 30 ml every 4 hours  Additives/Modulars: none at this time  Route: orogastric tube  Method: continuous  Total Nutrition Provided by Tube Feeding, Additives, and Flushes:  Calories Provided  1400 kcal/d, 78% needs   Protein Provided  130 g/d, 100% needs   Fluid Provided  1326 ml/d, 74% needs   Continuous feeding calculations based on estimated 20 hr/d run time unless otherwise stated.    Parenteral Nutrition Patient not receiving parenteral nutrition support at this time.    Evaluation of Received Nutrient Intake    Calories: meeting estimated needs  Protein: meeting estimated needs    Patient Education Not applicable.    Nutrition Diagnosis     PES: Inadequate energy intake related to inability to consume sufficient nutrients as evidenced by less than 80% needs met. (resolved)    PES: N/A           Nutrition Interventions     Intervention(s): collaboration with other providers  Intervention(s): Enteral nutrition      Goal: Meet greater than 80% of nutritional needs by follow-up. (goal met)  Goal: Tolerate enteral feeding at goal rate by follow-up. (goal met)    Nutrition Goals & Monitoring     Dietitian will monitor: energy intake, enteral nutrition intake, parenteral nutrition intake,  weight, electrolyte/renal panel, beliefs/attitudes, glucose/endocrine profile, and gastrointestinal profile  Discharge planning: too early to determine; pending clinical course  Nutrition Risk/Follow-Up: high (follow-up in 1-4 days)   Please consult if re-assessment needed sooner.

## 2025-04-16 NOTE — PROGRESS NOTES
"[facility]  Pulmonary Critical Care Note    Patient Name: River Sheehan Jr.  MRN: [id number]  Admission Date: 4/9/2025  Hospital Length of Stay: 6 days  Code Status: No Order  Attending Provider: JERROD Artis MD  Primary Care Provider: Chio Villela MD     Subjective:     HPI:   Patient is a 79-year-old male with a past medical history of carotid artery stenosis, CAD, diabetes mellitus, hyperlipidemia, hypertension, and anemia who initially presented to the emergency department after a syncopal episode occurring at home.  Patient was accompanied by his wife at this time who stated that he was very physically active at home but did complain of shortness of breath at times.  She reported that he was having episodes of "black outs" over the last several weeks.  He has been following cardiology on outpatient basis with recent adjustments in his home medications.  Wife also reported fever at home.  Patient was initially admitted to the hospital medicine service for these issues and possible pneumonia present on initial imaging.  He was started on Rocephin and azithromycin for suspected CAP.  However, this morning patient was found to have left-sided deficits including leftward gaze, left hemiparesis, and confusion.  He was not following commands at this time which was a significant change from patient's baseline.  Patient was taken to CT scan STAT to evaluate for stroke.  Patient displayed seizure-like activity in route to CT scanner and he was administered Ativan 2 mg IV.  Patient desaturated after administration of Ativan and ultimately required rapid sequence intubation.  CT and MRI flare displaying right insular chronic stroke.  Patient was admitted to the ICU after this event.    Hospital Course/Significant events:  4/11/2025: Patient admitted to the ICU status post intubation    24 Hour Interval History:  Lumbar puncture done yesterday.  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  " Patient already on acyclovir.  He remains intubated and sedated.  He does have breakthrough periods where he becomes quite tachypneic.    Past Medical History:   Diagnosis Date    Acid reflux     Adenomatous polyp of ascending colon 09/20/2021    Arthritis     Asymptomatic stenosis of left carotid artery     CAD (coronary artery disease)     Carotid artery stenosis      3/26/25 less than 50% bilateral    Colon polyps 02/20/2025    Transvers polyp benigne mucosa, Ascending Colon Tubular adenoma, Rectum hyperplastic    COVID-19 07/06/2022    Depression     Diabetes mellitus     Gout     Hearing loss     High cholesterol     HTN (hypertension)     Kidney stone     Macrocytic anemia     Osteoporosis     Seasonal allergies        Past Surgical History:   Procedure Laterality Date    CAROTID ENDARTERECTOMY Left 09/12/2024    Procedure: ENDARTERECTOMY-CAROTID;  Surgeon: Hany Pendleton MD;  Location: [facility];  Service: Peripheral Vascular;  Laterality: Left;    COLONOSCOPY W/ BIOPSIES  09/20/2021    Dr. Chun Smith    COLONOSCOPY W/ BIOPSIES  02/20/2025    CYSTOSCOPY      EXTRACAPSULAR EXTRACTION OF CATARACT      HERNIA REPAIR      LITHOTRIPSY  09/2023    RETROGRADE PYELOGRAPHY      WISDOM TOOTH EXTRACTION                 Current Outpatient Medications   Medication Instructions    albuterol (PROAIR HFA) 90 mcg/actuation inhaler 2 puffs, Inhalation, Every 6 hours PRN, Rescue    allopurinoL (ZYLOPRIM) 100 MG tablet TAKE 1 TABLET EVERY DAY    aspirin (ECOTRIN) 81 mg, Daily    atorvastatin (LIPITOR) 20 MG tablet TAKE 1 TABLET AT BEDTIME    blood sugar diagnostic (TRUE METRIX GLUCOSE TEST STRIP) Strp 1 strip, Misc.(Non-Drug; Combo Route), Daily    blood-glucose meter (TRUE METRIX AIR GLUCOSE METER) kit Test daily for DM II E11.9    busPIRone (BUSPAR) 10 MG tablet TAKE 1/2 TO 1 TABLET THREE TIMES DAILY    cinnamon bark 1,000 mg, Daily    citalopram (CELEXA) 20 mg, Oral    gabapentin (NEURONTIN) 100 MG capsule TAKE 2  CAPSULES (200 MG TOTAL) THREE TIMES DAILY    glucosamine/chondr navarro A sod (OSTEO BI-FLEX ORAL) Daily    KRILL OIL ORAL 500 mg, Daily    losartan (COZAAR) 100 mg, Daily    metFORMIN (GLUCOPHAGE) 500 mg, Oral, 2 times daily with meals    midodrine (PROAMATINE) 2.5 mg, Oral, 2 times daily with meals    montelukast (SINGULAIR) 10 mg, Oral, Nightly    pantoprazole (PROTONIX) 40 MG tablet TAKE 1 TABLET EVERY DAY    pioglitazone (ACTOS) 15 mg, Oral, Daily    tamsulosin (FLOMAX) 0.4 mg, Daily       Review of patient's allergies indicates:  No Known Allergies     Current Inpatient Medications   acyclovir  10 mg/kg (Ideal) Intravenous Q8H    aspirin  81 mg Per OG tube Daily    levETIRAcetam (Keppra) IV (PEDS and ADULTS)  1,500 mg Intravenous BID    losartan  100 mg Per OG tube Daily    montelukast  10 mg Per OG tube QHS    pantoprazole  40 mg Intravenous Daily       Current Intravenous Infusions   midazolam  0-5 mg/hr Intravenous Continuous 5 mL/hr at 04/16/25 0813 5 mg/hr at 04/16/25 0813    NORepinephrine bitartrate-D5W  0-3 mcg/kg/min (Dosing Weight) Intravenous Continuous   Stopped at 04/14/25 1118    propofoL  0-50 mcg/kg/min (Dosing Weight) Intravenous Continuous 25.6 mL/hr at 04/16/25 0947 50 mcg/kg/min at 04/16/25 0947                Objective:       Intake/Output Summary (Last 24 hours) at 4/16/2025 0954  Last data filed at 4/16/2025 0620  Gross per 24 hour   Intake 2568.16 ml   Output 1625 ml   Net 943.16 ml         Vital Signs (Most Recent):  Temp: 98.8 °F (37.1 °C) (04/16/25 0400)  Pulse: 95 (04/16/25 0600)  Resp: 20 (04/16/25 0600)  BP: 130/65 (04/16/25 0815)  SpO2: 100 % (04/16/25 0746)  Body mass index is 31.29 kg/m².  Weight: 85.3 kg (188 lb 0.8 oz) Vital Signs (24h Range):  Temp:  [97.7 °F (36.5 °C)-100.5 °F (38.1 °C)] 98.8 °F (37.1 °C)  Pulse:  [] 95  Resp:  [18-38] 20  SpO2:  [84 %-100 %] 100 %  BP: ()/(57-90) 130/65     Physical Exam  Vitals reviewed.   Constitutional:       Comments: Patient  intubated and sedated, continuous EEG still hooked up but likely to be discontinued shortly   HENT:      Head: Normocephalic and atraumatic.      Mouth/Throat:      Comments: ET tube in place  Eyes:      Comments: Pinpoint pupils bilaterally   Cardiovascular:      Rate and Rhythm: Normal rate and regular rhythm.      Heart sounds: No murmur heard.     No friction rub. No gallop.   Pulmonary:      Breath sounds: No wheezing, rhonchi or rales.   Abdominal:      General: There is no distension.      Palpations: Abdomen is soft.      Tenderness: There is no abdominal tenderness.   Musculoskeletal:      Right lower leg: No edema.      Left lower leg: No edema.   Skin:     General: Skin is warm.   Neurological:      Comments: Unable to assess secondary to patient currently being intubated and sedated       Lines/Drains/Airways       Drain  Duration                  NG/OG Tube 04/11/25 1530 4 days         Urethral Catheter 04/11/25 1500 4 days              Airway  Duration                  Airway - Non-Surgical 04/11/25 1443 Endotracheal Tube 4 days              Peripheral Intravenous Line  Duration                  Peripheral IV - Single Lumen 18 G Left;Posterior Hand -- days         Peripheral IV - Single Lumen 04/11/25 2145 18 G 2 1/4 in Anterior;Right Upper Arm 4 days         Peripheral IV - Single Lumen 04/12/25 2250 18 G 2 1/4 in Anterior;Left Forearm 3 days                    Significant Labs:    Lab Results   Component Value Date    WBC 9.58 04/16/2025    HGB 9.6 (L) 04/16/2025    HCT 29.8 (L) 04/16/2025    MCV 79.0 (L) 04/16/2025     04/16/2025           BMP  Lab Results   Component Value Date     (L) 04/16/2025    K 4.2 04/16/2025    CO2 22 (L) 04/16/2025    BUN 14.7 04/16/2025    CREATININE 0.74 04/16/2025    CALCIUM 8.2 (L) 04/16/2025    AGAP 9.0 04/16/2025    EGFRNONAA >60 06/09/2022         ABG  Recent Labs   Lab 04/15/25  1002   PH 7.420   PO2 115.0*   PCO2 44.0   HCO3 28.5*   POCBASEDEF 3.60*        Mechanical Ventilation Support:  Vent Mode: A/C (04/16/25 0746)  Ventilator Initiated: Yes (04/11/25 1445)  Set Rate: 18 BPM (04/16/25 0746)  Vt Set: 450 mL (04/16/25 0746)  PEEP/CPAP: 5 cmH20 (04/16/25 0746)  Oxygen Concentration (%): 40 (04/16/25 0746)  Peak Airway Pressure: 29 cmH20 (04/16/25 0746)  Total Ve: 13.5 L/m (04/16/25 0746)  F/VT Ratio<105 (RSBI): (!) 94.44 (04/15/25 2212)      Significant Imaging:  I have reviewed the pertinent imaging within the past 24 hours.    Assessment/Plan:     Assessment  Herpes simplex encephalitis   Hypoxemic respiratory failure status post intubation on 04/11        Plan    Continue acyclovir  Continue mechanical ventilatory support  ABG done yesterday shows appropriate acid-base status  Anticonvulsants as per Neurology  Adjust sedation as per patient's needs  Patient will need 21 days of acyclovir therapy          DVT Prophylaxis: SCDs  GI Prophylaxis: Protonix     31 minutes of critical care was time spent personally by me on the following activities: development of treatment plan with patient or surrogate and bedside caregivers, discussions with consultants, evaluation of patient's response to treatment, examination of patient, ordering and performing treatments and interventions, ordering and review of laboratory studies, ordering and review of radiographic studies, pulse oximetry, re-evaluation of patient's condition.  This critical care time did not overlap with that of any other provider or involve time for any procedures.     Florentin Traore MD  Pulmonary Critical Care Medicine  [facility] - 7th Floor ICU  DOS: 04/16/2025

## 2025-04-16 NOTE — PROCEDURES
Date of Service: 04/16/2025       Patient Name: River Sheehan Jr.  MRN: [id number]   Location: [address] [place] [address] A              Recording 4/14 @ 15:44; 29 h    Electroencephalogram Procedure Note      Indications: Diagnostic, seizures     Activation Procedures: none     EEG Description: background is mostly symmetric, diffuse mixed frequency slowing, right temporal spike discharges are becoming less frequent, occurring perhaps 1-2 per epoch, with lower amplitude as well. Towards the end of the study, there appeared to be emergence of triphasic activity which can be seen in states of encephalopathy or sedation.     Impression: abnormal EEG due to diffuse slowing with intermittent low amplitude right temporal epileptiform discharges, overall stable and improved compared to previous studies. No clinical or subclinical seizures appreciated.      A non-epileptic routine EEG does not rule out a past seizure or the presence of epilepsy. Clinical correlation is advised.       Drew Menjivar MD  Adult Neurology     -----------------------------------------------------------------     Technical Description   International 10-20 electrode placement was used. The record was obtained with the patient intubated, sedated. The record is of adequate technical quality for purposes of interpretation.

## 2025-04-16 NOTE — SUBJECTIVE & OBJECTIVE
Subjective:     Interval History:   Neurologic exam remains unchanged.  EEG improving with less frequent R temporal spikes and is also w/o witnessed clinical seizures overnight or this a.m..    Current Neurological Medications:     Current Medications[1]    Review of Systems   Unable to perform ROS: Intubated     Objective:     Vital Signs (Most Recent):  Temp: 98.8 °F (37.1 °C) (04/16/25 0400)  Pulse: 95 (04/16/25 0600)  Resp: 20 (04/16/25 0600)  BP: 130/65 (04/16/25 0815)  SpO2: 100 % (04/16/25 0746) Vital Signs (24h Range):  Temp:  [97.7 °F (36.5 °C)-100.5 °F (38.1 °C)] 98.8 °F (37.1 °C)  Pulse:  [] 95  Resp:  [18-38] 20  SpO2:  [84 %-100 %] 100 %  BP: ()/(57-90) 130/65     Weight: 85.3 kg (188 lb 0.8 oz)  Body mass index is 31.29 kg/m².     Physical Exam     Intubated, sedated   Comatose  no cough/gag reflex  PERRLA, +corneal reflex  No spontaneous movement to extremities  Does not withdraw to deep pain stimuli in all extremities  No ankle clonus noted bilaterally  No Babinski noted       Significant Labs:   Recent Lab Results         04/16/25  0338   04/15/25  1036        Albumin/Globulin Ratio 0.8         Albumin 2.6         ALP 71         ALT 59         Anion Gap 9.0         AST 66         Baso # 0.06         Basophil % 0.6         BILIRUBIN TOTAL 0.2         BUN 14.7         BUN/CREAT RATIO 20         Calcium 8.2         Chloride 102         CO2 22         Creatinine 0.74         eGFR >60  Comment: Estimated GFR calculated using the CKD-EPI creatinine (2021) equation.         Eos # 0.31         Eos % 3.2         Globulin, Total 3.1         Glucose 165         Hematocrit 29.8         Hemoglobin 9.6         Immature Grans (Abs) 0.04         Immature Granulocytes 0.4         Lymph # 1.48         LYMPH % 15.4         MCH 25.5         MCHC 32.2         MCV 79.0         Mono # 1.15         Mono % 12.0         MPV 9.2         Neut # 6.54         Neut % 68.4         nRBC 0.0         Platelet Count  247         Potassium 4.2         PROTEIN TOTAL 5.7         RBC 3.77         RDW 16.5         Sodium 133         Vancomycin-Trough   12.8       WBC 9.58                 Significant Imaging: I have reviewed all pertinent imaging results/findings within the past 24 hours.       [1]   Current Facility-Administered Medications   Medication Dose Route Frequency Provider Last Rate Last Admin    0.9% NaCl infusion   Intravenous PRN Laurence Mistry DO   Stopped at 04/12/25 0803    0.9% NaCl infusion   Intravenous PRN Laurence Mistry DO   Stopped at 04/14/25 0250    acetaminophen tablet 1,000 mg  1,000 mg Oral Q6H PRN Sydney Shine, FNANA   1,000 mg at 04/14/25 1359    acyclovir 620 mg in 0.9% NaCl 100 mL IVPB  10 mg/kg (Ideal) Intravenous Q8H Zoltan Alonzo  mL/hr at 04/16/25 0945 620 mg at 04/16/25 0945    albuterol-ipratropium 2.5 mg-0.5 mg/3 mL nebulizer solution 3 mL  3 mL Nebulization Q6H PRN Reyes, Thairy G, DO   3 mL at 04/10/25 0859    aspirin chewable tablet 81 mg  81 mg Per OG tube Daily Drew Menjivar MD   81 mg at 04/16/25 0815    dextrose 50% injection 12.5 g  12.5 g Intravenous PRN Reyes, Thairy G, DO        dextrose 50% injection 25 g  25 g Intravenous PRN Reyes, Thairy G, DO        etomidate injection   Intravenous Code/trauma/sedation Med EnedeliaAntonio forde MD   20 mg at 04/11/25 1440    glucagon (human recombinant) injection 1 mg  1 mg Intramuscular PRN Reyes, Thairy G, DO        glucose chewable tablet 16 g  16 g Oral PRN Reyes, Thairy G, DO        glucose chewable tablet 24 g  24 g Oral PRN Reyes, Thairy G, DO        hydrALAZINE injection 10 mg  10 mg Intravenous Q6H PRN Feliciano Maya MD   10 mg at 04/10/25 1600    insulin aspart U-100 injection 0-5 Units  0-5 Units Subcutaneous Q6H PRN JERROD Artis MD        levETIRAcetam in NaCl (iso-os) IVPB 1,500 mg  1,500 mg Intravenous BID Aquiles Watlon  mL/hr at 04/16/25 0807 1,500 mg at 04/16/25 0807    LORazepam  injection 2 mg  2 mg Intravenous Q15 Min PRN Sydney Hudson MD   2 mg at 04/15/25 0101    losartan tablet 100 mg  100 mg Per OG tube Daily JERROD Artis MD   100 mg at 04/16/25 0815    midazolam (PF) in 0.9 % NaCl 1 mg/mL infusion  0-5 mg/hr Intravenous Continuous Sydney Hudson MD 5 mL/hr at 04/16/25 0813 5 mg/hr at 04/16/25 0813    montelukast tablet 10 mg  10 mg Per OG tube QHS JERROD Artis MD   10 mg at 04/15/25 2043    NORepinephrine 8 mg in dextrose 5% 250 mL infusion  0-3 mcg/kg/min (Dosing Weight) Intravenous Continuous Laurence Mistry,    Stopped at 04/14/25 1118    pantoprazole injection 40 mg  40 mg Intravenous Daily JERROD Artis MD   40 mg at 04/16/25 0815    promethazine tablet 12.5 mg  12.5 mg Oral Q6H PRN Feliciano Maya MD   12.5 mg at 04/10/25 1350    propofol (DIPRIVAN) 10 mg/mL infusion  0-50 mcg/kg/min (Dosing Weight) Intravenous Continuous Chaka Diaz MD 25.6 mL/hr at 04/16/25 0947 50 mcg/kg/min at 04/16/25 0947    rocuronium injection   Intravenous Code/trauma/sedation Antonio Dewey MD   100 mg at 04/11/25 1441

## 2025-04-16 NOTE — PROGRESS NOTES
[facility] - 7th Floor ICU  Neurology  Progress Note    Patient Name: River Sheehan Jr.  MRN: [id number]  Admission Date: 4/9/2025  Hospital Length of Stay: 6 days  Code Status: No Order   Attending Provider: JERROD Artis MD  Primary Care Physician: Chio Villela MD   Principal Problem:Acute respiratory failure with hypoxia and hypercarbia    HPI:   No notes on file    Overview/Hospital Course:  No notes on file        Subjective:     Interval History:   Neurologic exam remains unchanged.  EEG improving with less frequent R temporal spikes and is also w/o witnessed clinical seizures overnight or this a.m..    Current Neurological Medications:     Current Medications[1]    Review of Systems   Unable to perform ROS: Intubated     Objective:     Vital Signs (Most Recent):  Temp: 98.8 °F (37.1 °C) (04/16/25 0400)  Pulse: 95 (04/16/25 0600)  Resp: 20 (04/16/25 0600)  BP: 130/65 (04/16/25 0815)  SpO2: 100 % (04/16/25 0746) Vital Signs (24h Range):  Temp:  [97.7 °F (36.5 °C)-100.5 °F (38.1 °C)] 98.8 °F (37.1 °C)  Pulse:  [] 95  Resp:  [18-38] 20  SpO2:  [84 %-100 %] 100 %  BP: ()/(57-90) 130/65     Weight: 85.3 kg (188 lb 0.8 oz)  Body mass index is 31.29 kg/m².     Physical Exam     Intubated, sedated   Comatose  no cough/gag reflex  PERRLA, +corneal reflex  No spontaneous movement to extremities  Does not withdraw to deep pain stimuli in all extremities  No ankle clonus noted bilaterally  No Babinski noted       Significant Labs:   Recent Lab Results         04/16/25  0338   04/15/25  1036        Albumin/Globulin Ratio 0.8         Albumin 2.6         ALP 71         ALT 59         Anion Gap 9.0         AST 66         Baso # 0.06         Basophil % 0.6         BILIRUBIN TOTAL 0.2         BUN 14.7         BUN/CREAT RATIO 20         Calcium 8.2         Chloride 102         CO2 22         Creatinine 0.74         eGFR >60  Comment: Estimated GFR calculated using the CKD-EPI creatinine (2021)  equation.         Eos # 0.31         Eos % 3.2         Globulin, Total 3.1         Glucose 165         Hematocrit 29.8         Hemoglobin 9.6         Immature Grans (Abs) 0.04         Immature Granulocytes 0.4         Lymph # 1.48         LYMPH % 15.4         MCH 25.5         MCHC 32.2         MCV 79.0         Mono # 1.15         Mono % 12.0         MPV 9.2         Neut # 6.54         Neut % 68.4         nRBC 0.0         Platelet Count 247         Potassium 4.2         PROTEIN TOTAL 5.7         RBC 3.77         RDW 16.5         Sodium 133         Vancomycin-Trough   12.8       WBC 9.58                 Significant Imaging: I have reviewed all pertinent imaging results/findings within the past 24 hours.    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)  - continue keppra 1.5 gm BID IV  - will discontinue continuous EEG monitoring  - Management antivirals per ID  - attempt to wean sedation as tolerated  - Further recommendations per MD        VTE Risk Mitigation (From admission, onward)      None            KRISTEN Mullins-BC  Neurology  [facility] - 7th Floor ICU       [1]   Current Facility-Administered Medications   Medication Dose Route Frequency Provider Last Rate Last Admin    0.9% NaCl infusion   Intravenous PRN Laurence Mistry DO   Stopped at 04/12/25 0803    0.9% NaCl infusion   Intravenous PRN Laurence Mistry DO   Stopped at 04/14/25 0250    acetaminophen tablet 1,000 mg  1,000 mg Oral Q6H PRN Sydney Shine FNP   1,000 mg at 04/14/25 1359    acyclovir 620 mg in 0.9% NaCl 100 mL IVPB  10 mg/kg (Ideal) Intravenous Q8H Zoltan Alonzo  mL/hr at 04/16/25 0945 620 mg at 04/16/25 0945    albuterol-ipratropium 2.5 mg-0.5 mg/3 mL nebulizer solution 3 mL  3 mL Nebulization Q6H PRN Reyes, Thairy G, DO   3 mL at 04/10/25 0859    aspirin chewable tablet 81 mg  81 mg Per OG tube Daily Drew Menjivar MD   81 mg at 04/16/25 0815    dextrose 50%  injection 12.5 g  12.5 g Intravenous PRN Reyes Thairy G, DO        dextrose 50% injection 25 g  25 g Intravenous PRN Reyes, Thairy G, DO        etomidate injection   Intravenous Code/trauma/sedation Med Antonio Mcdaniels MD   20 mg at 04/11/25 1440    glucagon (human recombinant) injection 1 mg  1 mg Intramuscular PRN Reyes, Thairy G, DO        glucose chewable tablet 16 g  16 g Oral PRN Reyes, Thairy G, DO        glucose chewable tablet 24 g  24 g Oral PRN Reyes, Thairy G, DO        hydrALAZINE injection 10 mg  10 mg Intravenous Q6H PRN Feliciano Maya MD   10 mg at 04/10/25 1600    insulin aspart U-100 injection 0-5 Units  0-5 Units Subcutaneous Q6H PRN JERROD Artis MD        levETIRAcetam in NaCl (iso-os) IVPB 1,500 mg  1,500 mg Intravenous BID SudhakarbdAquiles isabel  mL/hr at 04/16/25 0807 1,500 mg at 04/16/25 0807    LORazepam injection 2 mg  2 mg Intravenous Q15 Min PRN Sydney Hudson MD   2 mg at 04/15/25 0101    losartan tablet 100 mg  100 mg Per OG tube Daily JERROD Artis MD   100 mg at 04/16/25 0815    midazolam (PF) in 0.9 % NaCl 1 mg/mL infusion  0-5 mg/hr Intravenous Continuous Sydney Hudson MD 5 mL/hr at 04/16/25 0813 5 mg/hr at 04/16/25 0813    montelukast tablet 10 mg  10 mg Per OG tube QHS JERROD Artis MD   10 mg at 04/15/25 2043    NORepinephrine 8 mg in dextrose 5% 250 mL infusion  0-3 mcg/kg/min (Dosing Weight) Intravenous Continuous Laurence Mistry DO   Stopped at 04/14/25 1118    pantoprazole injection 40 mg  40 mg Intravenous Daily JERROD Artis MD   40 mg at 04/16/25 0815    promethazine tablet 12.5 mg  12.5 mg Oral Q6H PRN Feliciano Maya MD   12.5 mg at 04/10/25 1350    propofol (DIPRIVAN) 10 mg/mL infusion  0-50 mcg/kg/min (Dosing Weight) Intravenous Continuous Chaka Diaz MD 25.6 mL/hr at 04/16/25 0947 50 mcg/kg/min at 04/16/25 0947    rocuronium injection   Intravenous Code/trauma/sedation Antonio Dewey MD   100 mg at 04/11/25 1441

## 2025-04-17 NOTE — PROGRESS NOTES
"[facility] - 7th Floor ICU  Pulmonary Critical Care Note    Patient Name: River Sheehan Jr.  MRN: [id number]  Admission Date: 4/9/2025  Hospital Length of Stay: 7 days  Code Status: No Order  Attending Provider: JERROD Artis MD  Primary Care Provider: Chio Villela MD     Subjective:     HPI:   Patient is a 79-year-old male with a past medical history of carotid artery stenosis, CAD, diabetes mellitus, hyperlipidemia, hypertension, and anemia who initially presented to the emergency department after a syncopal episode occurring at home.  Patient was accompanied by his wife at this time who stated that he was very physically active at home but did complain of shortness of breath at times.  She reported that he was having episodes of "black outs" over the last several weeks.  He has been following cardiology on outpatient basis with recent adjustments in his home medications.  Wife also reported fever at home.  Patient was initially admitted to the hospital medicine service for these issues and possible pneumonia present on initial imaging.  He was started on Rocephin and azithromycin for suspected CAP.  However, this morning patient was found to have left-sided deficits including leftward gaze, left hemiparesis, and confusion.  He was not following commands at this time which was a significant change from patient's baseline.  Patient was taken to CT scan STAT to evaluate for stroke.  Patient displayed seizure-like activity in route to CT scanner and he was administered Ativan 2 mg IV.  Patient desaturated after administration of Ativan and ultimately required rapid sequence intubation.  CT and MRI flare displaying right insular chronic stroke.  Patient was admitted to the ICU after this event.    Hospital Course/Significant events:  4/11/2025: Patient admitted to the ICU status post intubation    24 Hour Interval History:  No events overnight. He remains intubated and sedated.  He does have " breakthrough periods where he becomes quite tachypneic. 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  Labs this AM with up trending liver enzymes, likely medication induced, continue to follow.     Past Medical History:   Diagnosis Date    Acid reflux     Adenomatous polyp of ascending colon 09/20/2021    Arthritis     Asymptomatic stenosis of left carotid artery     CAD (coronary artery disease)     Carotid artery stenosis     US 3/26/25 less than 50% bilateral    Colon polyps 02/20/2025    Transvers polyp benigne mucosa, Ascending Colon Tubular adenoma, Rectum hyperplastic    COVID-19 07/06/2022    Depression     Diabetes mellitus     Gout     Hearing loss     High cholesterol     HTN (hypertension)     Kidney stone     Macrocytic anemia     Osteoporosis     Seasonal allergies        Past Surgical History:   Procedure Laterality Date    CAROTID ENDARTERECTOMY Left 09/12/2024    Procedure: ENDARTERECTOMY-CAROTID;  Surgeon: Hany Pendleton MD;  Location: [facility];  Service: Peripheral Vascular;  Laterality: Left;    COLONOSCOPY W/ BIOPSIES  09/20/2021    Dr. Chun Smith    COLONOSCOPY W/ BIOPSIES  02/20/2025    CYSTOSCOPY      EXTRACAPSULAR EXTRACTION OF CATARACT      HERNIA REPAIR      LITHOTRIPSY  09/2023    RETROGRADE PYELOGRAPHY      WISDOM TOOTH EXTRACTION                 Current Outpatient Medications   Medication Instructions    albuterol (PROAIR HFA) 90 mcg/actuation inhaler 2 puffs, Inhalation, Every 6 hours PRN, Rescue    allopurinoL (ZYLOPRIM) 100 MG tablet TAKE 1 TABLET EVERY DAY    aspirin (ECOTRIN) 81 mg, Daily    atorvastatin (LIPITOR) 20 MG tablet TAKE 1 TABLET AT BEDTIME    blood sugar diagnostic (TRUE METRIX GLUCOSE TEST STRIP) Strp 1 strip, Misc.(Non-Drug; Combo Route), Daily    blood-glucose meter (TRUE METRIX AIR GLUCOSE METER) kit Test daily for DM II E11.9    busPIRone (BUSPAR) 10 MG tablet TAKE 1/2 TO 1 TABLET THREE TIMES DAILY    cinnamon bark 1,000 mg, Daily     citalopram (CELEXA) 20 mg, Oral    gabapentin (NEURONTIN) 100 MG capsule TAKE 2 CAPSULES (200 MG TOTAL) THREE TIMES DAILY    glucosamine/chondr navarro A sod (OSTEO BI-FLEX ORAL) Daily    KRILL OIL ORAL 500 mg, Daily    losartan (COZAAR) 100 mg, Daily    metFORMIN (GLUCOPHAGE) 500 mg, Oral, 2 times daily with meals    midodrine (PROAMATINE) 2.5 mg, Oral, 2 times daily with meals    montelukast (SINGULAIR) 10 mg, Oral, Nightly    pantoprazole (PROTONIX) 40 MG tablet TAKE 1 TABLET EVERY DAY    pioglitazone (ACTOS) 15 mg, Oral, Daily    tamsulosin (FLOMAX) 0.4 mg, Daily       Review of patient's allergies indicates:  No Known Allergies     Current Inpatient Medications   acyclovir  10 mg/kg (Ideal) Intravenous Q8H    aspirin  81 mg Per OG tube Daily    levETIRAcetam (Keppra) IV (PEDS and ADULTS)  1,500 mg Intravenous BID    losartan  100 mg Per OG tube Daily    montelukast  10 mg Per OG tube QHS    pantoprazole  40 mg Intravenous Daily       Current Intravenous Infusions   midazolam  0-5 mg/hr Intravenous Continuous 5 mL/hr at 04/17/25 0808 5 mg/hr at 04/17/25 0808    NORepinephrine bitartrate-D5W  0-3 mcg/kg/min (Dosing Weight) Intravenous Continuous   Stopped at 04/14/25 1118    propofoL  0-50 mcg/kg/min (Dosing Weight) Intravenous Continuous 17.9 mL/hr at 04/17/25 0809 35 mcg/kg/min at 04/17/25 0809                Objective:       Intake/Output Summary (Last 24 hours) at 4/17/2025 0819  Last data filed at 4/17/2025 0636  Gross per 24 hour   Intake 1963.29 ml   Output 2310 ml   Net -346.71 ml         Vital Signs (Most Recent):  Temp: (!) 100.7 °F (38.2 °C) (04/17/25 0400)  Pulse: 87 (04/17/25 0600)  Resp: 19 (04/17/25 0600)  BP: (!) 143/72 (04/17/25 0806)  SpO2: 96 % (04/17/25 0600)  Body mass index is 31.29 kg/m².  Weight: 85.3 kg (188 lb 0.8 oz) Vital Signs (24h Range):  Temp:  [98.3 °F (36.8 °C)-101 °F (38.3 °C)] 100.7 °F (38.2 °C)  Pulse:  [] 87  Resp:  [18-53] 19  SpO2:  [93 %-100 %] 96 %  BP: ()/(43-93)  143/72     Physical Exam  Vitals reviewed.   Constitutional:       Comments: Patient intubated and sedated   HENT:      Head: Normocephalic and atraumatic.      Mouth/Throat:      Comments: ET tube in place  Eyes:      Comments: Pinpoint pupils bilaterally   Cardiovascular:      Rate and Rhythm: Normal rate and regular rhythm.      Heart sounds: No murmur heard.     No friction rub. No gallop.   Pulmonary:      Breath sounds: No wheezing, rhonchi or rales.   Abdominal:      General: There is no distension.      Palpations: Abdomen is soft.      Tenderness: There is no abdominal tenderness.   Musculoskeletal:      Right lower leg: No edema.      Left lower leg: No edema.   Skin:     General: Skin is warm.   Neurological:      Comments: Unable to assess secondary to patient currently being intubated and sedated       Lines/Drains/Airways       Drain  Duration                  NG/OG Tube 04/11/25 1530 5 days         Urethral Catheter 04/11/25 1500 5 days         Rectal Tube 04/16/25 1200 rectal tube w/ balloon (indicate number of mLs) <1 day              Airway  Duration                  Airway - Non-Surgical 04/11/25 1443 Endotracheal Tube 5 days              Peripheral Intravenous Line  Duration                  Peripheral IV - Single Lumen 18 G Left;Posterior Hand -- days         Peripheral IV - Single Lumen 04/11/25 2145 18 G 2 1/4 in Anterior;Right Upper Arm 5 days         Peripheral IV - Single Lumen 04/12/25 2250 18 G 2 1/4 in Anterior;Left Forearm 4 days                    Significant Labs:    Lab Results   Component Value Date    WBC 9.68 04/17/2025    HGB 10.0 (L) 04/17/2025    HCT 33.0 (L) 04/17/2025    MCV 82.7 04/17/2025     04/17/2025           BMP  Lab Results   Component Value Date     04/17/2025    K 4.1 04/17/2025    CO2 26 04/17/2025    BUN 19.3 04/17/2025    CREATININE 0.75 04/17/2025    CALCIUM 9.1 04/17/2025    AGAP 8.0 04/17/2025    EGFRNONAA >60 06/09/2022         ABG  Recent Labs    Lab 04/17/25 0442   PH 7.450   PO2 73.0*   PCO2 41.0   HCO3 28.5*   POCBASEDEF 4.10*       Mechanical Ventilation Support:  Vent Mode: A/C (04/17/25 0459)  Ventilator Initiated: Yes (04/11/25 1445)  Set Rate: 18 BPM (04/17/25 0459)  Vt Set: 450 mL (04/17/25 0459)  PEEP/CPAP: 5 cmH20 (04/17/25 0459)  Oxygen Concentration (%): 30 (04/17/25 0459)  Peak Airway Pressure: 25 cmH20 (04/17/25 0459)  Total Ve: 9.4 L/m (04/17/25 0459)  F/VT Ratio<105 (RSBI): (!) 39.22 (04/17/25 0300)      Significant Imaging:  I have reviewed the pertinent imaging within the past 24 hours.    Assessment/Plan:     Assessment  Herpes simplex encephalitis   Hypoxemic respiratory failure status post intubation on 04/11    Plan    Continue acyclovir  Continue mechanical ventilatory support  ABG done today shows appropriate acid-base status  Anticonvulsants as per Neurology  Adjust sedation as per patient's needs; currently on Versed and propofol   Patient will need 21 days of acyclovir therapy  Mild transaminitis is likely medication induced, continue to follow    DVT Prophylaxis: SCDs  GI Prophylaxis: Protonix     31 minutes of critical care was time spent personally by me on the following activities: development of treatment plan with patient or surrogate and bedside caregivers, discussions with consultants, evaluation of patient's response to treatment, examination of patient, ordering and performing treatments and interventions, ordering and review of laboratory studies, ordering and review of radiographic studies, pulse oximetry, re-evaluation of patient's condition.  This critical care time did not overlap with that of any other provider or involve time for any procedures.     Hany Holbrook,   Pulmonary Critical Care Medicine  [facility] - 7th Floor ICU  DOS: 04/17/2025

## 2025-04-17 NOTE — PROGRESS NOTES
Inpatient Nutrition Assessment    Admit Date: 4/9/2025   Total duration of encounter: 8 days   Patient Age: 79 y.o.    Nutrition Recommendation/Prescription     Continue current tube feeding:  Peptamen Intense VHP goal rate 70 ml/hr to provide  1400 kcal  78% needs, 108% with propofol calories considered  130 g protein  100% needs  105 g carbohydrate 58% needs  1176 ml free water 65% needs  calculations based on estimated 20 hour run time     Communication of Recommendations: reviewed with nurse and reviewed with family    Nutrition Assessment     Malnutrition Assessment/Nutrition-Focused Physical Exam       Malnutrition Level: other (see comments) (Does not meet criteria) (04/12/25 8726)                                                        A minimum of two characteristics is recommended for diagnosis of either severe or non-severe malnutrition.    Chart Review    Reason Seen: follow-up    Malnutrition Screening Tool Results   Have you recently lost weight without trying?: No  Have you been eating poorly because of a decreased appetite?: No   MST Score: 0   Diagnosis:  Hypoxemic respiratory failure status post intubation on 04/11  Focal tonic-clonic complex partial seizure leading to above  Chronic right insular ischemic stroke  Suspected community-acquired pneumonia  Sepsis secondary to above  Anemia    Relevant Medical History: carotid artery stenosis, CAD, diabetes mellitus, hyperlipidemia, hypertension, and anemia     Scheduled Medications:  acyclovir, 10 mg/kg (Ideal), Q8H  aspirin, 81 mg, Daily  levETIRAcetam (Keppra) IV (PEDS and ADULTS), 1,500 mg, BID  losartan, 100 mg, Daily  montelukast, 10 mg, QHS  pantoprazole, 40 mg, Daily    Continuous Infusions:  midazolam, Last Rate: 5 mg/hr (04/17/25 0808)  NORepinephrine bitartrate-D5W, Last Rate: Stopped (04/14/25 1118)  propofoL, Last Rate: 35 mcg/kg/min (04/17/25 1015)    PRN Medications:   0.9% NaCl, , PRN  0.9% NaCl, , PRN  acetaminophen, 1,000 mg, Q6H  PRN  albuterol-ipratropium, 3 mL, Q6H PRN  dextrose 50%, 12.5 g, PRN  dextrose 50%, 25 g, PRN  etomidate, , Code/trauma/sedation Med  fentaNYL, 50 mcg, Q1H PRN  glucagon (human recombinant), 1 mg, PRN  glucose, 16 g, PRN  glucose, 24 g, PRN  hydrALAZINE, 10 mg, Q6H PRN  insulin aspart U-100, 0-5 Units, Q6H PRN  lorazepam, 2 mg, Q15 Min PRN  promethazine, 12.5 mg, Q6H PRN  rocuronium, , Code/trauma/sedation Med    Calorie Containing IV Medications: Diprivan @ 20.5 ml/hr (provides 541 kcal/d)    Recent Labs   Lab 04/11/25  0331 04/11/25  0804 04/11/25  1903 04/11/25  2135 04/12/25  0759 04/12/25  0857 04/13/25  0311 04/13/25  0312 04/14/25  0340 04/15/25  0513 04/16/25  0338 04/17/25  0405     --   --  134* 133*  --   --  135* 134* 131* 133* 137   K 4.2  --   --  3.1* 4.6  --   --  3.8 4.0 3.7 4.2 4.1   CALCIUM 9.6  --   --  8.0* 8.0*  --   --  8.4* 8.5* 8.5* 8.2* 9.1   MG 1.70  --   --   --   --   --   --   --   --   --   --   --      --   --  102 102  --   --  101 101 99 102 103   CO2 28  --   --  24 23  --   --  22* 21* 24 22* 26   BUN 14.4  --   --  14.5 15.7  --   --  14.2 15.4 14.5 14.7 19.3   CREATININE 1.01  --   --  0.89 1.01  --   --  0.98 0.67* 0.69* 0.74 0.75   EGFRNORACEVR >60  --   --  >60 >60  --   --  >60 >60 >60 >60 >60   GLUCOSE 95  --   --  159* 115  --   --  115 143* 159* 165* 165*   BILITOT 0.5  --   --  0.5 0.6  --   --  0.4 0.3 0.3 0.2 0.2   ALKPHOS 68  --   --  56 52  --   --  53 63 61 71 86   ALT 24  --   --  28 36  --   --  37 40 39 59* 93*   AST 26  --   --  29 44  --   --  38 59* 55* 66* 71*   ALBUMIN 4.1  --   --  3.3* 3.3*  --   --  3.2* 2.9* 2.6* 2.6* 2.6*   HGBA1C  --  6.3  --   --   --   --   --   --   --   --   --   --    WBC 10.76  --  15.31  15.31*  --   --  11.02 12.17  12.17*  --  8.14 10.87 9.58 9.68   HGB 10.5*  --  11.0*  --   --  11.2* 10.8*  --  10.6* 10.0* 9.6* 10.0*   HCT 33.5*  --  35.0*  --   --  35.2* 33.6*  --  34.9* 31.6* 29.8* 33.0*     Nutrition  "Orders:  Diet NPO  Tube Feedings/Formulas 70; 1,400; Peptamen Intense VHP; OG (start at 30 ml/hr and advance by 20 ml/hr every 4 hours as tolerated); 30; Every 4 hours    Appetite/Oral Intake: NPO/not applicable  Factors Affecting Nutritional Intake: NPO and on mechanical ventilation  Social Needs Impacting Access to Food: unable to assess at this time; will attempt on follow-up  Food/Yazidi/Cultural Preferences: unable to obtain  Food Allergies: none reported  Last Bowel Movement: 04/17/25  Wound(s): no pressure injuries documented at this time     Comments    4/12/25 Patient evaluated due to orders for Clinimix E 4.25/5 at 75 ml/hr, nurse reports he is no longer receiving this, plans to start tube feeding, orders provided. He is on mechanical ventilation and receiving calories from propofol.  Addendum: Nurse reports propofol being increased from 15-20 mcg/kg/min (~200 kcal daily) to 40-50 mcg/kg/min (~500 kcal/daily), will modify tube feeding accordingly.    4/16/25 Patient remains on ventilator, receiving calories from propofol, tube feeding at goal.    4/17/25 Tube feeding and ventilator continue.    Anthropometrics    Height: 5' 5" (165.1 cm), Height Method: Stated  Last Weight: 85.3 kg (188 lb 0.8 oz) (04/11/25 2115), Weight Method: Bed Scale  BMI (Calculated): 31.3  BMI Classification: obese grade I (BMI 30-34.9)        Ideal Body Weight (IBW), Male: 136 lb     % Ideal Body Weight, Male (lb): 136.03 %                          Usual Weight Provided By: unable to obtain usual weight    Wt Readings from Last 5 Encounters:   04/11/25 85.3 kg (188 lb 0.8 oz)   04/08/25 83.9 kg (185 lb)   04/01/25 88.5 kg (195 lb)   03/26/25 88.5 kg (195 lb)   01/29/25 86.2 kg (190 lb)     Weight Change(s) Since Admission:   4/16 no updated weights available   4/12 admission weight 83.9 kg stated, recent bed weight 85.3 kg documented  Wt Readings from Last 1 Encounters:   04/11/25 2115 85.3 kg (188 lb 0.8 oz)   04/09/25 9408 " 83.9 kg (185 lb)   Admit Weight: 83.9 kg (185 lb) (25 2246), Weight Method: Stated    Estimated Needs    Weight Used For Calorie Calculations: 85 kg (187 lb 6.3 oz)  Energy Calorie Requirements (kcal): 1,800.38  Energy Need Method: [place] (modified)  Total Ve: 8.7 L/m, Temp (24hrs), Av.6 °F (37.6 °C), Min:98.3 °F (36.8 °C), Max:101 °F (38.3 °C)  Vtot (L/Min) for [place] Equation Calculation: 7.1; Max Temp for [place] Equation Calculation: 103.6 °F  Weight Used For Protein Calculations: 85 kg (187 lb 6.3 oz)  Protein Requirements: 119-136 g, 1.4-1.6 g/kg  Fluid Requirements (mL): 1,800.38  CHO Requirement: 180-203 g, 40-45% of kcal     Enteral Nutrition     Formula: Peptamen Intense VHP  Rate/Volume: 70 ml/hr  Water Flushes: 30 ml every 4 hours  Additives/Modulars: none at this time  Route: orogastric tube  Method: continuous  Total Nutrition Provided by Tube Feeding, Additives, and Flushes:  Calories Provided  1400 kcal/d, 78% needs   Protein Provided  130 g/d, 100% needs   Fluid Provided  1326 ml/d, 74% needs   Continuous feeding calculations based on estimated 20 hr/d run time unless otherwise stated.    Parenteral Nutrition Patient not receiving parenteral nutrition support at this time.    Evaluation of Received Nutrient Intake    Calories: meeting estimated needs  Protein: meeting estimated needs    Patient Education Not applicable.    Nutrition Diagnosis     PES: Inadequate energy intake related to inability to consume sufficient nutrients as evidenced by less than 80% needs met. (resolved)    PES: N/A           Nutrition Interventions     Intervention(s): collaboration with other providers  Intervention(s): Enteral nutrition      Goal: Meet greater than 80% of nutritional needs by follow-up. (goal met)  Goal: Tolerate enteral feeding at goal rate by follow-up. (goal met)    Nutrition Goals & Monitoring     Dietitian will monitor: energy intake, enteral nutrition intake, parenteral  nutrition intake, weight, electrolyte/renal panel, beliefs/attitudes, glucose/endocrine profile, and gastrointestinal profile  Discharge planning: too early to determine; pending clinical course  Nutrition Risk/Follow-Up: high (follow-up in 1-4 days)   Please consult if re-assessment needed sooner.

## 2025-04-18 NOTE — PROGRESS NOTES
Progress Note                                                           Infectious disease   Admit Date: 4/9/2025    SUBJECTIVE:     Follow-up For:  Acute respiratory failure with hypoxia and hypercarbia    HPI/Interval history:  Patient is still with intermittent fevers, T-max 101°.  Remains unresponsive, when sedation less and he gets tachypneic and a synchronous with the vent.  No significant pulmonary secretions.      OBJECTIVE:     Vital Signs Range (Last 24H):  Temp:  [98.6 °F (37 °C)-101 °F (38.3 °C)]   Pulse:  []   Resp:  [18-53]   BP: ()/(54-95)   SpO2:  [93 %-98 %]     Physical Exam:  Constitutional:  Sedated on the vent   HEENT:  ET tube in-situ without significant secretions  Cardiovascular: regular rate and rhythm, S1, S2 normal, no murmur  Pulmonary: normal respiratory effort, no crepitations or wheezing heard  Gastrointestinal: non-distended, bowel sounds normal  Muscular/Skeletal: Lower extremities without edema  Skin: No rashes    Laboratory:  CBC:   Recent Labs   Lab 04/17/25  0405   WBC 9.68   RBC 3.99*   HGB 10.0*   HCT 33.0*      MCV 82.7   MCH 25.1*   MCHC 30.3*     BMP:   Recent Labs   Lab 04/11/25  0331 04/11/25  2135 04/17/25  0405      < > 137   K 4.2   < > 4.1      < > 103   CO2 28   < > 26   BUN 14.4   < > 19.3   CREATININE 1.01   < > 0.75   CALCIUM 9.6   < > 9.1   MG 1.70  --   --     < > = values in this interval not displayed.     CMP:   Recent Labs   Lab 04/17/25  0405   CALCIUM 9.1   ALBUMIN 2.6*      K 4.1   CO2 26      BUN 19.3   CREATININE 0.75   ALKPHOS 86   ALT 93*   AST 71*   BILITOT 0.2     Microbiology Results (last 7 days)       Procedure Component Value Units Date/Time    Blood Culture [[id number]]  (Normal) Collected: 04/12/25 0857    Order Status: Completed Specimen: Blood from Hand, Left Updated: 04/17/25 1300     Blood Culture No Growth at 5 days    Blood Culture [[id number]]  (Normal) Collected: 04/12/25 0857    Order Status:  Completed Specimen: Blood from Hand, Right Updated: 04/17/25 1300     Blood Culture No Growth at 5 days    Cerebrospinal Fluid Culture [[id number]] Collected: 04/15/25 0850    Order Status: Completed Specimen: CSF (Spinal Fluid) from CSF Tap, Tube 3 Updated: 04/17/25 0850     CULTURE, CSF No Growth At 48 Hours     GRAM STAIN No WBCs, No bacteria seen    Cryptococcal antigen, CSF [[id number]]  (Normal) Collected: 04/15/25 0850    Order Status: Completed Specimen: CSF (Spinal Fluid) from CSF Tap, Tube 1 Updated: 04/15/25 1616     Cryptococcal Antigen, CSF Negative    Geovanna Ink Prep CSF [[id number]] Collected: 04/15/25 0850    Order Status: Completed Specimen: CSF (Spinal Fluid) from Cerebrospinal Fluid Updated: 04/15/25 1013     GEOVANNA INK PREP CSF ([facility]) Negative    Fungal Culture [[id number]] Collected: 04/15/25 0850    Order Status: Sent Specimen: CSF (Spinal Fluid) from Lumbar Updated: 04/15/25 0914    Cerebrospinal Fluid Culture [[id number]] Collected: 04/15/25 0850    Order Status: Canceled Specimen: CSF (Spinal Fluid) from Cerebrospinal Fluid Updated: 04/15/25 0913    Blood culture #1 **CANNOT BE ORDERED STAT** [[id number]]  (Normal) Collected: 04/10/25 0010    Order Status: Completed Specimen: Blood Updated: 04/15/25 0105     Blood Culture No Growth at 5 days    Blood culture #2 **CANNOT BE ORDERED STAT** [[id number]]  (Normal) Collected: 04/10/25 0010    Order Status: Completed Specimen: Blood Updated: 04/15/25 0105     Blood Culture No Growth at 5 days    Respiratory Culture [[id number]]  (Abnormal) Collected: 04/12/25 0031    Order Status: Completed Specimen: Sputum from Endotracheal Aspirate Updated: 04/14/25 1326     Respiratory Culture Few Yeast     Comment: with normal respiratory gerry        GRAM STAIN Quality 3+      Rare Gram positive cocci            Labs: I personally reviewed and interpreted the above lab results.    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       Type 2 diabetes mellitus with stage 2 chronic kidney disease, without long-term current use of insulin [E11.22, N18.2]  Yes      Resolved Hospital Problems    Diagnosis Date Resolved POA    Seizures [R56.9] 04/15/2025 Unknown       ASSESSMENT:  Herpes simplex virus 1 encephalitis, severe  Hypoxemic respiratory failure mechanical ventilation as of 4/1.  No evidence of pneumonia (normal CT lungs on admission, negative sputum bacterial culture).     PLAN:  Continue acyclovir with close monitoring renal function.  Patient will need antiviral therapy for at least 21 days.  Plan is for 21 days of treatment.  Supportive care      Prognosis remains guarded.  ID will continue to follow.  Discussed with family at bedside - yes, wife and daughter

## 2025-04-18 NOTE — PROGRESS NOTES
"[facility] - 7th Floor ICU  Pulmonary Critical Care Note    Patient Name: River Sheehan Jr.  MRN: [id number]  Admission Date: 4/9/2025  Hospital Length of Stay: 8 days  Code Status: No Order  Attending Provider: JERROD Artis MD  Primary Care Provider: Chio Villela MD     Subjective:     HPI:   Patient is a 79-year-old male with a past medical history of carotid artery stenosis, CAD, diabetes mellitus, hyperlipidemia, hypertension, and anemia who initially presented to the emergency department after a syncopal episode occurring at home.  Patient was accompanied by his wife at this time who stated that he was very physically active at home but did complain of shortness of breath at times.  She reported that he was having episodes of "black outs" over the last several weeks.  He has been following cardiology on outpatient basis with recent adjustments in his home medications.  Wife also reported fever at home.  Patient was initially admitted to the hospital medicine service for these issues and possible pneumonia present on initial imaging.  He was started on Rocephin and azithromycin for suspected CAP.  However, this morning patient was found to have left-sided deficits including leftward gaze, left hemiparesis, and confusion.  He was not following commands at this time which was a significant change from patient's baseline.  Patient was taken to CT scan STAT to evaluate for stroke.  Patient displayed seizure-like activity in route to CT scanner and he was administered Ativan 2 mg IV.  Patient desaturated after administration of Ativan and ultimately required rapid sequence intubation.  CT and MRI flare displaying right insular chronic stroke.  Patient was admitted to the ICU after this event.    Hospital Course/Significant events:  4/11/2025: Patient admitted to the ICU status post intubation    24 Hour Interval History:  No events overnight. He remains intubated and sedated.  He does have " breakthrough periods where he becomes quite tachypneic. 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     Past Medical History:   Diagnosis Date    Acid reflux     Adenomatous polyp of ascending colon 09/20/2021    Arthritis     Asymptomatic stenosis of left carotid artery     CAD (coronary artery disease)     Carotid artery stenosis     US 3/26/25 less than 50% bilateral    Colon polyps 02/20/2025    Transvers polyp benigne mucosa, Ascending Colon Tubular adenoma, Rectum hyperplastic    COVID-19 07/06/2022    Depression     Diabetes mellitus     Gout     Hearing loss     High cholesterol     HTN (hypertension)     Kidney stone     Macrocytic anemia     Osteoporosis     Seasonal allergies        Past Surgical History:   Procedure Laterality Date    CAROTID ENDARTERECTOMY Left 09/12/2024    Procedure: ENDARTERECTOMY-CAROTID;  Surgeon: Hany Pendleton MD;  Location: [facility] OR;  Service: Peripheral Vascular;  Laterality: Left;    COLONOSCOPY W/ BIOPSIES  09/20/2021    Dr. Chun Smith    COLONOSCOPY W/ BIOPSIES  02/20/2025    CYSTOSCOPY      EXTRACAPSULAR EXTRACTION OF CATARACT      HERNIA REPAIR      LITHOTRIPSY  09/2023    RETROGRADE PYELOGRAPHY      WISDOM TOOTH EXTRACTION                 Current Outpatient Medications   Medication Instructions    albuterol (PROAIR HFA) 90 mcg/actuation inhaler 2 puffs, Inhalation, Every 6 hours PRN, Rescue    allopurinoL (ZYLOPRIM) 100 MG tablet TAKE 1 TABLET EVERY DAY    aspirin (ECOTRIN) 81 mg, Daily    atorvastatin (LIPITOR) 20 MG tablet TAKE 1 TABLET AT BEDTIME    blood sugar diagnostic (TRUE METRIX GLUCOSE TEST STRIP) Strp 1 strip, Misc.(Non-Drug; Combo Route), Daily    blood-glucose meter (TRUE METRIX AIR GLUCOSE METER) kit Test daily for DM II E11.9    busPIRone (BUSPAR) 10 MG tablet TAKE 1/2 TO 1 TABLET THREE TIMES DAILY    cinnamon bark 1,000 mg, Daily    citalopram (CELEXA) 20 mg, Oral    gabapentin (NEURONTIN) 100 MG capsule TAKE 2 CAPSULES (200  MG TOTAL) THREE TIMES DAILY    glucosamine/chondr navarro A sod (OSTEO BI-FLEX ORAL) Daily    KRILL OIL ORAL 500 mg, Daily    losartan (COZAAR) 100 mg, Daily    metFORMIN (GLUCOPHAGE) 500 mg, Oral, 2 times daily with meals    midodrine (PROAMATINE) 2.5 mg, Oral, 2 times daily with meals    montelukast (SINGULAIR) 10 mg, Oral, Nightly    pantoprazole (PROTONIX) 40 MG tablet TAKE 1 TABLET EVERY DAY    pioglitazone (ACTOS) 15 mg, Oral, Daily    tamsulosin (FLOMAX) 0.4 mg, Daily       Review of patient's allergies indicates:  No Known Allergies     Current Inpatient Medications   acyclovir  10 mg/kg (Ideal) Intravenous Q8H    aspirin  81 mg Per OG tube Daily    levETIRAcetam (Keppra) IV (PEDS and ADULTS)  1,500 mg Intravenous BID    losartan  100 mg Per OG tube Daily    montelukast  10 mg Per OG tube QHS    pantoprazole  40 mg Intravenous Daily       Current Intravenous Infusions   midazolam  0-5 mg/hr Intravenous Continuous 5 mL/hr at 04/18/25 0549 5 mg/hr at 04/18/25 0549    NORepinephrine bitartrate-D5W  0-3 mcg/kg/min (Dosing Weight) Intravenous Continuous   Stopped at 04/14/25 1118    propofoL  0-50 mcg/kg/min (Dosing Weight) Intravenous Continuous 25.6 mL/hr at 04/18/25 0815 50 mcg/kg/min at 04/18/25 0815                Objective:       Intake/Output Summary (Last 24 hours) at 4/18/2025 0916  Last data filed at 4/18/2025 0549  Gross per 24 hour   Intake 3501.68 ml   Output 2400 ml   Net 1101.68 ml         Vital Signs (Most Recent):  Temp: 100.1 °F (37.8 °C) (04/18/25 0400)  Pulse: 84 (04/18/25 0615)  Resp: 20 (04/18/25 0615)  BP: 139/83 (04/18/25 0817)  SpO2: 99 % (04/18/25 0732)  Body mass index is 31.29 kg/m².  Weight: 85.3 kg (188 lb 0.8 oz) Vital Signs (24h Range):  Temp:  [99 °F (37.2 °C)-102.4 °F (39.1 °C)] 100.1 °F (37.8 °C)  Pulse:  [] 84  Resp:  [19-51] 20  SpO2:  [93 %-99 %] 99 %  BP: ()/(57-97) 139/83     Physical Exam  Vitals reviewed.   Constitutional:       Comments: Patient intubated and  sedated   HENT:      Head: Normocephalic and atraumatic.      Mouth/Throat:      Comments: ET tube in place  Cardiovascular:      Rate and Rhythm: Normal rate and regular rhythm.      Heart sounds: No murmur heard.     No friction rub. No gallop.   Pulmonary:      Breath sounds: No wheezing, rhonchi or rales.   Abdominal:      General: There is no distension.      Palpations: Abdomen is soft.      Tenderness: There is no abdominal tenderness.   Musculoskeletal:      Right lower leg: No edema.      Left lower leg: No edema.   Skin:     General: Skin is warm.   Neurological:      Comments: Unable to assess secondary to patient currently being intubated and sedated       Lines/Drains/Airways       Drain  Duration                  NG/OG Tube 04/11/25 1530 6 days         Urethral Catheter 04/11/25 1500 6 days         Rectal Tube 04/16/25 1200 rectal tube w/ balloon (indicate number of mLs) 1 day              Airway  Duration                  Airway - Non-Surgical 04/11/25 1443 Endotracheal Tube 6 days              Peripheral Intravenous Line  Duration                  Peripheral IV - Single Lumen 18 G Left;Posterior Hand -- days         Peripheral IV - Single Lumen 04/11/25 2145 18 G 2 1/4 in Anterior;Right Upper Arm 6 days         Peripheral IV - Single Lumen 04/12/25 2250 18 G 2 1/4 in Anterior;Left Forearm 5 days                    Significant Labs:    Lab Results   Component Value Date    WBC 9.54 04/18/2025    HGB 8.7 (L) 04/18/2025    HCT 28.0 (L) 04/18/2025    MCV 81.2 04/18/2025     04/18/2025           BMP  Lab Results   Component Value Date     (L) 04/18/2025    K 4.1 04/18/2025    CO2 25 04/18/2025    BUN 21.2 04/18/2025    CREATININE 0.71 (L) 04/18/2025    CALCIUM 8.8 04/18/2025    AGAP 7.0 04/18/2025    EGFRNONAA >60 06/09/2022         ABG  Recent Labs   Lab 04/17/25  0442   PH 7.450   PO2 73.0*   PCO2 41.0   HCO3 28.5*   POCBASEDEF 4.10*       Mechanical Ventilation Support:  Vent Mode: A/C  (04/18/25 0732)  Ventilator Initiated: Yes (04/11/25 1445)  Set Rate: 18 BPM (04/18/25 0732)  Vt Set: 450 mL (04/18/25 0732)  PEEP/CPAP: 5 cmH20 (04/18/25 0732)  Oxygen Concentration (%): 30 (04/18/25 0732)  Peak Airway Pressure: 26 cmH20 (04/18/25 0732)  Total Ve: 7.9 L/m (04/18/25 0732)  F/VT Ratio<105 (RSBI): (!) 60 (04/18/25 0540)      Significant Imaging:  I have reviewed the pertinent imaging within the past 24 hours.  CT report is similar right edema and bilateral anterior temporal lobe edema.    Assessment/Plan:     Assessment  Herpes simplex encephalitis   Hypoxemic respiratory failure status post intubation on 04/11    Plan  Continue acyclovir  Continue mechanical ventilatory support  Anticonvulsants as per Neurology  Adjust sedation as per patient's needs; currently on Versed and propofol  Mild transaminitis  continue to follow  Enteral TF's    DVT Prophylaxis: SCDs  GI Prophylaxis: Protonix     31 minutes of critical care was time spent personally by me on the following activities: development of treatment plan with patient or surrogate and bedside caregivers, discussions with consultants, evaluation of patient's response to treatment, examination of patient, ordering and performing treatments and interventions, ordering and review of laboratory studies, ordering and review of radiographic studies, pulse oximetry, re-evaluation of patient's condition.  This critical care time did not overlap with that of any other provider or involve time for any procedures.     Sarthak Jones MD  Pulmonary Critical Care Medicine  [facility] - 7th Floor ICU  DOS: 04/18/2025

## 2025-04-18 NOTE — PROGRESS NOTES
Neurology  Progress Note    Patient Name: River Sheehan Jr.  Admission Date: 4/9/2025  Hospital Length of Stay: 8 days  Code Status: No Order   Attending Provider: JERROD Artis MD  Principal Problem:Acute respiratory failure with hypoxia and hypercarbia    Subjective:     Interval History: febrile to 102.4    Current Neurological Medications: keppra 1500 mg BID    Objective:     Vital Signs (Most Recent):  Temp: 100.1 °F (37.8 °C) (04/18/25 0400)  Pulse: 84 (04/18/25 0615)  Resp: 20 (04/18/25 0615)  BP: 139/83 (04/18/25 0817)  SpO2: 96 % (04/18/25 1005) Vital Signs (24h Range):  Temp:  [99 °F (37.2 °C)-102.4 °F (39.1 °C)] 100.1 °F (37.8 °C)  Pulse:  [] 84  Resp:  [19-51] 20  SpO2:  [93 %-99 %] 96 %  BP: ()/(57-97) 139/83     Weight: 85.3 kg (188 lb 0.8 oz)    Physical Exam    Neurological Exam  Intubated, sedated  Pupils are midline, equal and reactive  Tone WNL   Significant Labs: All pertinent lab results from the past 24 hours have been reviewed.    Significant Imaging: I have reviewed and interpreted all pertinent imaging results/findings within the past 24 hours.    Assessment and Plan:   Assessment: 80 y/o man admitted to ICU with HSV1 encephalitis with associated seizures. Remains intubated due to difficulty weaning sedation. Continues to spike fevers despite acyclovir.    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) [B10.09] 04/15/2025 Unknown    Orthostatic hypotension [I95.1] 04/14/2025 Unknown    Obstructive sleep apnea [G47.33] 10/22/2024 Yes    Primary hypertension [I10] 06/30/2022 Yes    Type 2 diabetes mellitus with stage 2 chronic kidney disease, without long-term current use of insulin [E11.22, N18.2]  Yes      Problems Resolved During this Admission:    Diagnosis Date Noted Date Resolved POA    Seizures [R56.9]  04/14/2025 04/15/2025 Unknown       Plan:  - continue keppra 1500 mg BID  - continue attempts to wean sedation for best exam  - consider palliative care consult if patient not making progress in the coming days as this is a fairly severe illness and given his age recovery may be challenging especially since he appears to continue with fevers on day 10 from initial presentation despite therapeutic dose of antiviral therapy     We will sign off but remain available for any questions.     Drew Menjivar MD  Neurology, Neurocritical Care

## 2025-04-18 NOTE — PLAN OF CARE
Problem: Adult Inpatient Plan of Care  Goal: Plan of Care Review  Outcome: Progressing  Goal: Absence of Hospital-Acquired Illness or Injury  Outcome: Progressing  Goal: Optimal Comfort and Wellbeing  Outcome: Progressing  Goal: Readiness for Transition of Care  Outcome: Progressing     Problem: Diabetes Comorbidity  Goal: Blood Glucose Level Within Targeted Range  Outcome: Progressing     Problem: Wound  Goal: Optimal Coping  Outcome: Progressing  Goal: Optimal Functional Ability  Outcome: Progressing  Goal: Absence of Infection Signs and Symptoms  Outcome: Progressing  Goal: Improved Oral Intake  Outcome: Progressing  Goal: Optimal Pain Control and Function  Outcome: Progressing  Goal: Skin Health and Integrity  Outcome: Progressing  Goal: Optimal Wound Healing  Outcome: Progressing     Problem: Fall Injury Risk  Goal: Absence of Fall and Fall-Related Injury  Outcome: Progressing     Problem: Infection  Goal: Absence of Infection Signs and Symptoms  Outcome: Progressing     Problem: Skin Injury Risk Increased  Goal: Skin Health and Integrity  Outcome: Progressing     Problem: Mechanical Ventilation Invasive  Goal: Effective Communication  Outcome: Progressing  Goal: Optimal Device Function  Outcome: Progressing  Goal: Mechanical Ventilation Liberation  Outcome: Progressing  Goal: Optimal Nutrition Delivery  Outcome: Progressing  Goal: Absence of Device-Related Skin and Tissue Injury  Outcome: Progressing  Goal: Absence of Ventilator-Induced Lung Injury  Outcome: Progressing     Problem: Artificial Airway  Goal: Effective Communication  Outcome: Progressing  Goal: Optimal Device Function  Outcome: Progressing  Goal: Absence of Device-Related Skin or Tissue Injury  Outcome: Progressing     Problem: Pneumonia  Goal: Fluid Balance  Outcome: Progressing  Goal: Resolution of Infection Signs and Symptoms  Outcome: Progressing  Goal: Effective Oxygenation and Ventilation  Outcome: Progressing

## 2025-04-19 NOTE — PROGRESS NOTES
"[facility] - 7th Floor ICU  Pulmonary Critical Care Note    Patient Name: River Sheehan Jr.  MRN: [id number]  Admission Date: 4/9/2025  Hospital Length of Stay: 9 days  Code Status: No Order  Attending Provider: JERROD Artis MD  Primary Care Provider: Chio Villela MD     Subjective:     HPI:   Patient is a 79-year-old male with a past medical history of carotid artery stenosis, CAD, diabetes mellitus, hyperlipidemia, hypertension, and anemia who initially presented to the emergency department after a syncopal episode occurring at home.  Patient was accompanied by his wife at this time who stated that he was very physically active at home but did complain of shortness of breath at times.  She reported that he was having episodes of "black outs" over the last several weeks.  He has been following cardiology on outpatient basis with recent adjustments in his home medications.  Wife also reported fever at home.  Patient was initially admitted to the hospital medicine service for these issues and possible pneumonia present on initial imaging.  He was started on Rocephin and azithromycin for suspected CAP.  However, this morning patient was found to have left-sided deficits including leftward gaze, left hemiparesis, and confusion.  He was not following commands at this time which was a significant change from patient's baseline.  Patient was taken to CT scan STAT to evaluate for stroke.  Patient displayed seizure-like activity in route to CT scanner and he was administered Ativan 2 mg IV.  Patient desaturated after administration of Ativan and ultimately required rapid sequence intubation.  CT and MRI flare displaying right insular chronic stroke.  Patient was admitted to the ICU after this event.    Hospital Course/Significant events:  4/11/2025: Patient admitted to the ICU status post intubation    24 Hour Interval History:  No events overnight. He remains intubated and sedated.  On Versed and " propofol and occasionally agitated.  Current ventilator settings are an AC of 18 and 30% FiO2.  Tolerating tube feeds.  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     Past Medical History:   Diagnosis Date    Acid reflux     Adenomatous polyp of ascending colon 09/20/2021    Arthritis     Asymptomatic stenosis of left carotid artery     CAD (coronary artery disease)     Carotid artery stenosis      3/26/25 less than 50% bilateral    Colon polyps 02/20/2025    Transvers polyp benigne mucosa, Ascending Colon Tubular adenoma, Rectum hyperplastic    COVID-19 07/06/2022    Depression     Diabetes mellitus     Gout     Hearing loss     High cholesterol     HTN (hypertension)     Kidney stone     Macrocytic anemia     Osteoporosis     Seasonal allergies        Past Surgical History:   Procedure Laterality Date    CAROTID ENDARTERECTOMY Left 09/12/2024    Procedure: ENDARTERECTOMY-CAROTID;  Surgeon: Hany Pendleton MD;  Location: [facility];  Service: Peripheral Vascular;  Laterality: Left;    COLONOSCOPY W/ BIOPSIES  09/20/2021    Dr. Chun Simth    COLONOSCOPY W/ BIOPSIES  02/20/2025    CYSTOSCOPY      EXTRACAPSULAR EXTRACTION OF CATARACT      HERNIA REPAIR      LITHOTRIPSY  09/2023    RETROGRADE PYELOGRAPHY      WISDOM TOOTH EXTRACTION                 Current Outpatient Medications   Medication Instructions    albuterol (PROAIR HFA) 90 mcg/actuation inhaler 2 puffs, Inhalation, Every 6 hours PRN, Rescue    allopurinoL (ZYLOPRIM) 100 MG tablet TAKE 1 TABLET EVERY DAY    aspirin (ECOTRIN) 81 mg, Daily    atorvastatin (LIPITOR) 20 MG tablet TAKE 1 TABLET AT BEDTIME    blood sugar diagnostic (TRUE METRIX GLUCOSE TEST STRIP) Strp 1 strip, Misc.(Non-Drug; Combo Route), Daily    blood-glucose meter (TRUE METRIX AIR GLUCOSE METER) kit Test daily for DM II E11.9    busPIRone (BUSPAR) 10 MG tablet TAKE 1/2 TO 1 TABLET THREE TIMES DAILY    cinnamon bark 1,000 mg, Daily    citalopram (CELEXA) 20 mg,  Oral    gabapentin (NEURONTIN) 100 MG capsule TAKE 2 CAPSULES (200 MG TOTAL) THREE TIMES DAILY    glucosamine/chondr navarro A sod (OSTEO BI-FLEX ORAL) Daily    KRILL OIL ORAL 500 mg, Daily    losartan (COZAAR) 100 mg, Daily    metFORMIN (GLUCOPHAGE) 500 mg, Oral, 2 times daily with meals    midodrine (PROAMATINE) 2.5 mg, Oral, 2 times daily with meals    montelukast (SINGULAIR) 10 mg, Oral, Nightly    pantoprazole (PROTONIX) 40 MG tablet TAKE 1 TABLET EVERY DAY    pioglitazone (ACTOS) 15 mg, Oral, Daily    tamsulosin (FLOMAX) 0.4 mg, Daily       Review of patient's allergies indicates:  No Known Allergies     Current Inpatient Medications   acyclovir  10 mg/kg (Ideal) Intravenous Q8H    aspirin  81 mg Per OG tube Daily    levETIRAcetam (Keppra) IV (PEDS and ADULTS)  1,500 mg Intravenous BID    losartan  100 mg Per OG tube Daily    montelukast  10 mg Per OG tube QHS    pantoprazole  40 mg Intravenous Daily       Current Intravenous Infusions   midazolam  0-5 mg/hr Intravenous Continuous 5 mL/hr at 04/19/25 0800 5 mg/hr at 04/19/25 0800    NORepinephrine bitartrate-D5W  0-3 mcg/kg/min (Dosing Weight) Intravenous Continuous   Stopped at 04/14/25 1118    propofoL  0-50 mcg/kg/min (Dosing Weight) Intravenous Continuous 25.6 mL/hr at 04/19/25 0800 50 mcg/kg/min at 04/19/25 0800                Objective:       Intake/Output Summary (Last 24 hours) at 4/19/2025 0951  Last data filed at 4/19/2025 0800  Gross per 24 hour   Intake 3141.63 ml   Output 3500 ml   Net -358.37 ml         Vital Signs (Most Recent):  Temp: 99.8 °F (37.7 °C) (04/19/25 0717)  Pulse: 94 (04/19/25 0802)  Resp: (!) 30 (04/19/25 0802)  BP: (!) 168/82 (04/19/25 0800)  SpO2: (!) 94 % (04/19/25 0802)  Body mass index is 31.29 kg/m².  Weight: 85.3 kg (188 lb 0.8 oz) Vital Signs (24h Range):  Temp:  [99.5 °F (37.5 °C)-101.1 °F (38.4 °C)] 99.8 °F (37.7 °C)  Pulse:  [] 94  Resp:  [19-42] 30  SpO2:  [92 %-100 %] 94 %  BP: (106-172)/(53-97) 168/82     Physical  Exam  Vitals reviewed.   Constitutional:       Comments: Patient intubated and sedated   HENT:      Head: Normocephalic and atraumatic.      Mouth/Throat:      Comments: ET tube in place  Cardiovascular:      Rate and Rhythm: Normal rate and regular rhythm.      Heart sounds: No murmur heard.     No friction rub. No gallop.   Pulmonary:      Breath sounds: No wheezing, rhonchi or rales.   Abdominal:      General: There is no distension.      Palpations: Abdomen is soft.      Tenderness: There is no abdominal tenderness.   Musculoskeletal:      Right lower leg: No edema.      Left lower leg: No edema.   Skin:     General: Skin is warm.   Neurological:      Comments: Unable to assess secondary to patient currently being intubated and sedated       Lines/Drains/Airways       Drain  Duration                  NG/OG Tube 04/11/25 1530 7 days         Urethral Catheter 04/11/25 1500 7 days         Rectal Tube 04/16/25 1200 rectal tube w/ balloon (indicate number of mLs) 2 days              Airway  Duration                  Airway - Non-Surgical 04/11/25 1443 Endotracheal Tube 7 days              Peripheral Intravenous Line  Duration                  Peripheral IV - Single Lumen 18 G Left;Posterior Hand -- days         Peripheral IV - Single Lumen 04/11/25 2145 18 G 2 1/4 in Anterior;Right Upper Arm 7 days         Peripheral IV - Single Lumen 04/12/25 2250 18 G 2 1/4 in Anterior;Left Forearm 6 days                    Significant Labs:    Lab Results   Component Value Date    WBC 10.70 04/19/2025    HGB 9.6 (L) 04/19/2025    HCT 30.0 (L) 04/19/2025    MCV 80.0 04/19/2025     04/19/2025           BMP  Lab Results   Component Value Date     (L) 04/19/2025    K 4.4 04/19/2025    CO2 25 04/19/2025    BUN 21.7 04/19/2025    CREATININE 0.69 (L) 04/19/2025    CALCIUM 8.9 04/19/2025    AGAP 6.0 04/19/2025    EGFRNONAA >60 06/09/2022         ABG  Recent Labs   Lab 04/18/25  0948   PH 7.450   PO2 76.0*   PCO2 42.0   HCO3  29.2*   POCBASEDEF 4.70       Mechanical Ventilation Support:  Vent Mode: A/C (04/19/25 0534)  Ventilator Initiated: Yes (04/11/25 1445)  Set Rate: 18 BPM (04/19/25 0534)  Vt Set: 450 mL (04/19/25 0534)  PEEP/CPAP: 5 cmH20 (04/19/25 0534)  Oxygen Concentration (%): 30 (04/19/25 0534)  Peak Airway Pressure: 30 cmH20 (04/19/25 0534)  Total Ve: 10.7 L/m (04/19/25 0534)  F/VT Ratio<105 (RSBI): (!) 51.92 (04/19/25 0534)      Significant Imaging:  I have reviewed the pertinent imaging within the past 24 hours.  CT report is similar right edema and bilateral anterior temporal lobe edema.    Assessment/Plan:     Assessment  Herpes simplex encephalitis  Hypoxemic respiratory failure status post intubation on 04/11    Plan  Continue acyclovir  Continue mechanical ventilatory support  Anticonvulsants as per Neurology  Adjust sedation as per patient's needs; currently on Versed and propofol  Mild transaminitis  continue to follow  Enteral TF's    DVT Prophylaxis: SCDs  GI Prophylaxis: Protonix     31 minutes of critical care was time spent personally by me on the following activities: development of treatment plan with patient or surrogate and bedside caregivers, discussions with consultants, evaluation of patient's response to treatment, examination of patient, ordering and performing treatments and interventions, ordering and review of laboratory studies, ordering and review of radiographic studies, pulse oximetry, re-evaluation of patient's condition.  This critical care time did not overlap with that of any other provider or involve time for any procedures.     Sarthak Jones MD  Pulmonary Critical Care Medicine  [facility] - 7th Floor ICU  DOS: 04/19/2025

## 2025-04-19 NOTE — PROGRESS NOTES
Progress Note                                                           Infectious disease   Admit Date: 4/9/2025    SUBJECTIVE:     Follow-up For:  Acute respiratory failure with hypoxia and hypercarbia    HPI/Interval history:  Patient doing more less the same, still with intermittent fever, T-max 101.1°.  He remains sedated because any time the sedation is decreased he gets extremely tachypneic and agitated.      OBJECTIVE:     Vital Signs Range (Last 24H):  Temp:  [99.5 °F (37.5 °C)-101.1 °F (38.4 °C)]   Pulse:  []   Resp:  [19-42]   BP: (106-172)/(53-97)   SpO2:  [92 %-100 %]     Physical Exam:  Constitutional:  Sedated on the vent   HEENT:  ET tube in-situ  Cardiovascular: regular rate and rhythm, S1, S2 normal, no murmur  Pulmonary: normal respiratory effort, no crepitations or wheezing heard  Gastrointestinal: non-distended, bowel sounds normal  Muscular/Skeletal: Lower extremities without significant edema  Skin: No rashes    Laboratory:  CBC:   Recent Labs   Lab 04/19/25  0804   WBC 10.70   RBC 3.75*   HGB 9.6*   HCT 30.0*      MCV 80.0   MCH 25.6*   MCHC 32.0*     BMP:   Recent Labs   Lab 04/19/25  0804   *   K 4.4      CO2 25   BUN 21.7   CREATININE 0.69*   CALCIUM 8.9     CMP:   Recent Labs   Lab 04/19/25  0804   CALCIUM 8.9   ALBUMIN 2.4*   *   K 4.4   CO2 25      BUN 21.7   CREATININE 0.69*   ALKPHOS 122   *   *   BILITOT 0.2     Microbiology Results (last 7 days)       Procedure Component Value Units Date/Time    Cerebrospinal Fluid Culture [[id number]] Collected: 04/15/25 0850    Order Status: Completed Specimen: CSF (Spinal Fluid) from CSF Tap, Tube 3 Updated: 04/19/25 0813     CULTURE, CSF No growth at 4 days     GRAM STAIN No WBCs, No bacteria seen    Blood Culture [[id number]]  (Normal) Collected: 04/12/25 0857    Order Status: Completed Specimen: Blood from Hand, Left Updated: 04/17/25 1300     Blood Culture No Growth at 5 days    Blood Culture  [[id number]]  (Normal) Collected: 04/12/25 0857    Order Status: Completed Specimen: Blood from Hand, Right Updated: 04/17/25 1300     Blood Culture No Growth at 5 days    Cryptococcal antigen, CSF [[id number]]  (Normal) Collected: 04/15/25 0850    Order Status: Completed Specimen: CSF (Spinal Fluid) from CSF Tap, Tube 1 Updated: 04/15/25 1616     Cryptococcal Antigen, CSF Negative    Geovanna Ink Prep CSF [[id number]] Collected: 04/15/25 0850    Order Status: Completed Specimen: CSF (Spinal Fluid) from Cerebrospinal Fluid Updated: 04/15/25 1013     GEOVANNA INK PREP CSF ([facility]) Negative    Fungal Culture [[id number]] Collected: 04/15/25 0850    Order Status: Sent Specimen: CSF (Spinal Fluid) from Lumbar Updated: 04/15/25 0914    Cerebrospinal Fluid Culture [[id number]] Collected: 04/15/25 0850    Order Status: Canceled Specimen: CSF (Spinal Fluid) from Cerebrospinal Fluid Updated: 04/15/25 0913    Blood culture #1 **CANNOT BE ORDERED STAT** [[id number]]  (Normal) Collected: 04/10/25 0010    Order Status: Completed Specimen: Blood Updated: 04/15/25 0105     Blood Culture No Growth at 5 days    Blood culture #2 **CANNOT BE ORDERED STAT** [[id number]]  (Normal) Collected: 04/10/25 0010    Order Status: Completed Specimen: Blood Updated: 04/15/25 0105     Blood Culture No Growth at 5 days    Respiratory Culture [[id number]]  (Abnormal) Collected: 04/12/25 0031    Order Status: Completed Specimen: Sputum from Endotracheal Aspirate Updated: 04/14/25 1326     Respiratory Culture Few Yeast     Comment: with normal respiratory gerry        GRAM STAIN Quality 3+      Rare Gram positive cocci            Labs: I personally reviewed and interpreted the above lab results.    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       Type 2 diabetes mellitus with stage 2 chronic kidney disease, without long-term current use of insulin [E11.22, N18.2]  Yes      Resolved Hospital Problems    Diagnosis Date Resolved POA    Seizures [R56.9] 04/15/2025 Unknown       ASSESSMENT:  Herpes simplex virus 1 encephalitis, severe, no clinical improvement yet  Daily fever, likely due to his encephalitis.  No worsening leukocytosis or features of any other focus of infection.  Hypoxemic respiratory failure mechanical ventilation as of 4/1 without evidence of pneumonia (normal CT lungs on admission, negative sputum bacterial culture).  Mild transaminitis - probably drug-induced, acyclovir versus Keppra.       PLAN:  Complete 21 days of acyclovir (started 4/12 so 2 more weeks remaining) with continued close monitoring of renal function.    Supportive care      Prognosis remains guarded.  ID will sign off now.  Please call again p.r.n..    Discussed with family at bedside - yes, wife and daughter

## 2025-04-20 NOTE — PROGRESS NOTES
"[facility] - 7th Floor ICU  Pulmonary Critical Care Note    Patient Name: River Sheehan Jr.  MRN: [id number]  Admission Date: 4/9/2025  Hospital Length of Stay: 10 days  Code Status: No Order  Attending Provider: JERROD Artis MD  Primary Care Provider: Chio Villela MD     Subjective:     HPI:   Patient is a 79-year-old male with a past medical history of carotid artery stenosis, CAD, diabetes mellitus, hyperlipidemia, hypertension, and anemia who initially presented to the emergency department after a syncopal episode occurring at home.  Patient was accompanied by his wife at this time who stated that he was very physically active at home but did complain of shortness of breath at times.  She reported that he was having episodes of "black outs" over the last several weeks.  He has been following cardiology on outpatient basis with recent adjustments in his home medications.  Wife also reported fever at home.  Patient was initially admitted to the hospital medicine service for these issues and possible pneumonia present on initial imaging.  He was started on Rocephin and azithromycin for suspected CAP.  However, this morning patient was found to have left-sided deficits including leftward gaze, left hemiparesis, and confusion.  He was not following commands at this time which was a significant change from patient's baseline.  Patient was taken to CT scan STAT to evaluate for stroke.  Patient displayed seizure-like activity in route to CT scanner and he was administered Ativan 2 mg IV.  Patient desaturated after administration of Ativan and ultimately required rapid sequence intubation.  CT and MRI flare displaying right insular chronic stroke.  Patient was admitted to the ICU after this event.    Hospital Course/Significant events:  4/11/2025: Patient admitted to the ICU status post intubation    24 Hour Interval History:  No events overnight. He remains intubated and sedated.  On Versed and " propofol and occasionally agitated.  Current ventilator settings are an AC of 18 and 30% FiO2.  Tolerating tube feeds.  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     Past Medical History:   Diagnosis Date    Acid reflux     Adenomatous polyp of ascending colon 09/20/2021    Arthritis     Asymptomatic stenosis of left carotid artery     CAD (coronary artery disease)     Carotid artery stenosis      3/26/25 less than 50% bilateral    Colon polyps 02/20/2025    Transvers polyp benigne mucosa, Ascending Colon Tubular adenoma, Rectum hyperplastic    COVID-19 07/06/2022    Depression     Diabetes mellitus     Gout     Hearing loss     High cholesterol     HTN (hypertension)     Kidney stone     Macrocytic anemia     Osteoporosis     Seasonal allergies        Past Surgical History:   Procedure Laterality Date    CAROTID ENDARTERECTOMY Left 09/12/2024    Procedure: ENDARTERECTOMY-CAROTID;  Surgeon: Hany Pendleton MD;  Location: [facility];  Service: Peripheral Vascular;  Laterality: Left;    COLONOSCOPY W/ BIOPSIES  09/20/2021    Dr. Chun Smith    COLONOSCOPY W/ BIOPSIES  02/20/2025    CYSTOSCOPY      EXTRACAPSULAR EXTRACTION OF CATARACT      HERNIA REPAIR      LITHOTRIPSY  09/2023    RETROGRADE PYELOGRAPHY      WISDOM TOOTH EXTRACTION                 Current Outpatient Medications   Medication Instructions    albuterol (PROAIR HFA) 90 mcg/actuation inhaler 2 puffs, Inhalation, Every 6 hours PRN, Rescue    allopurinoL (ZYLOPRIM) 100 MG tablet TAKE 1 TABLET EVERY DAY    aspirin (ECOTRIN) 81 mg, Daily    atorvastatin (LIPITOR) 20 MG tablet TAKE 1 TABLET AT BEDTIME    blood sugar diagnostic (TRUE METRIX GLUCOSE TEST STRIP) Strp 1 strip, Misc.(Non-Drug; Combo Route), Daily    blood-glucose meter (TRUE METRIX AIR GLUCOSE METER) kit Test daily for DM II E11.9    busPIRone (BUSPAR) 10 MG tablet TAKE 1/2 TO 1 TABLET THREE TIMES DAILY    cinnamon bark 1,000 mg, Daily    citalopram (CELEXA) 20 mg,  Oral    gabapentin (NEURONTIN) 100 MG capsule TAKE 2 CAPSULES (200 MG TOTAL) THREE TIMES DAILY    glucosamine/chondr navarro A sod (OSTEO BI-FLEX ORAL) Daily    KRILL OIL ORAL 500 mg, Daily    losartan (COZAAR) 100 mg, Daily    metFORMIN (GLUCOPHAGE) 500 mg, Oral, 2 times daily with meals    midodrine (PROAMATINE) 2.5 mg, Oral, 2 times daily with meals    montelukast (SINGULAIR) 10 mg, Oral, Nightly    pantoprazole (PROTONIX) 40 MG tablet TAKE 1 TABLET EVERY DAY    pioglitazone (ACTOS) 15 mg, Oral, Daily    tamsulosin (FLOMAX) 0.4 mg, Daily       Review of patient's allergies indicates:  No Known Allergies     Current Inpatient Medications   acyclovir  10 mg/kg (Ideal) Intravenous Q8H    aspirin  81 mg Per OG tube Daily    enoxaparin  40 mg Subcutaneous Daily    levETIRAcetam (Keppra) IV (PEDS and ADULTS)  1,500 mg Intravenous BID    losartan  100 mg Per OG tube Daily    montelukast  10 mg Per OG tube QHS    pantoprazole  40 mg Intravenous Daily       Current Intravenous Infusions   midazolam  0-5 mg/hr Intravenous Continuous 5 mL/hr at 04/20/25 0800 5 mg/hr at 04/20/25 0800    NORepinephrine bitartrate-D5W  0-3 mcg/kg/min (Dosing Weight) Intravenous Continuous   Stopped at 04/14/25 1118    propofoL  0-50 mcg/kg/min (Dosing Weight) Intravenous Continuous 20.5 mL/hr at 04/20/25 0800 40 mcg/kg/min at 04/20/25 0800                Objective:       Intake/Output Summary (Last 24 hours) at 4/20/2025 0924  Last data filed at 4/20/2025 0800  Gross per 24 hour   Intake 2209.28 ml   Output 2675 ml   Net -465.72 ml         Vital Signs (Most Recent):  Temp: 99 °F (37.2 °C) (04/20/25 0800)  Pulse: 89 (04/20/25 0800)  Resp: (!) 25 (04/20/25 0800)  BP: 114/63 (04/20/25 0800)  SpO2: 96 % (04/20/25 0800)  Body mass index is 31.29 kg/m².  Weight: 85.3 kg (188 lb 0.8 oz) Vital Signs (24h Range):  Temp:  [99 °F (37.2 °C)-101.4 °F (38.6 °C)] 99 °F (37.2 °C)  Pulse:  [] 89  Resp:  [20-42] 25  SpO2:  [90 %-100 %] 96 %  BP:  (105-219)/() 114/63     Physical Exam  Vitals reviewed.   Constitutional:       Comments: Patient intubated and sedated   HENT:      Head: Normocephalic and atraumatic.      Mouth/Throat:      Comments: ET tube in place  Cardiovascular:      Rate and Rhythm: Normal rate and regular rhythm.      Heart sounds: No murmur heard.     No friction rub. No gallop.   Pulmonary:      Breath sounds: No wheezing, rhonchi or rales.   Abdominal:      General: There is no distension.      Palpations: Abdomen is soft.      Tenderness: There is no abdominal tenderness.   Musculoskeletal:      Right lower leg: No edema.      Left lower leg: No edema.   Skin:     General: Skin is warm.   Neurological:      Comments: Unable to assess secondary to patient currently being intubated and sedated       Lines/Drains/Airways       Drain  Duration                  NG/OG Tube 04/11/25 1530 8 days         Urethral Catheter 04/11/25 1500 8 days         Rectal Tube 04/16/25 1200 rectal tube w/ balloon (indicate number of mLs) 3 days              Airway  Duration                  Airway - Non-Surgical 04/19/25 2135 <1 day              Peripheral Intravenous Line  Duration                  Peripheral IV - Single Lumen 18 G Left;Posterior Hand -- days         Peripheral IV - Single Lumen 04/11/25 2145 18 G 2 1/4 in Anterior;Right Upper Arm 8 days         Peripheral IV - Single Lumen 04/12/25 2250 18 G 2 1/4 in Anterior;Left Forearm 7 days                    Significant Labs:    Lab Results   Component Value Date    WBC 9.82 04/20/2025    HGB 9.7 (L) 04/20/2025    HCT 30.6 (L) 04/20/2025    MCV 79.3 (L) 04/20/2025     (H) 04/20/2025           BMP  Lab Results   Component Value Date     (L) 04/20/2025    K 4.6 04/20/2025    CO2 26 04/20/2025    BUN 19.4 04/20/2025    CREATININE 0.61 (L) 04/20/2025    CALCIUM 8.7 (L) 04/20/2025    AGAP 5.0 04/20/2025    EGFRNONAA >60 06/09/2022         ABG  Recent Labs   Lab 04/18/25  0948   PH 7.450    PO2 76.0*   PCO2 42.0   HCO3 29.2*   POCBASEDEF 4.70       Mechanical Ventilation Support:  Vent Mode: A/C (04/20/25 0522)  Ventilator Initiated: Yes (04/11/25 1445)  Set Rate: 18 BPM (04/20/25 0522)  Vt Set: 450 mL (04/20/25 0522)  PEEP/CPAP: 5 cmH20 (04/20/25 0522)  Oxygen Concentration (%): 35 (04/20/25 0522)  Peak Airway Pressure: 18 cmH20 (04/20/25 0522)  Total Ve: 12.4 L/m (04/20/25 0522)  F/VT Ratio<105 (RSBI): (!) 67.44 (04/20/25 0522)      Significant Imaging:  I have reviewed the pertinent imaging within the past 24 hours.  CT report is similar right edema and bilateral anterior temporal lobe edema.    Assessment/Plan:     Assessment  Herpes simplex encephalitis  Hypoxemic respiratory failure status post intubation on 04/11    Plan  Continue acyclovir.  Monitor renal function  Continue mechanical ventilatory support  Anticonvulsants as per Neurology  Adjust sedation as per patient's needs; currently on Versed and propofol  Mild transaminitis continue to follow  Enteral TF's  Likely will need to engage palliative Medicine or discussion with family about long-term plans if there is no neurologic recovery    DVT Prophylaxis: SCDs  GI Prophylaxis: Protonix     31 minutes of critical care was time spent personally by me on the following activities: development of treatment plan with patient or surrogate and bedside caregivers, discussions with consultants, evaluation of patient's response to treatment, examination of patient, ordering and performing treatments and interventions, ordering and review of laboratory studies, ordering and review of radiographic studies, pulse oximetry, re-evaluation of patient's condition.  This critical care time did not overlap with that of any other provider or involve time for any procedures.     Sarthak Jones MD  Pulmonary Critical Care Medicine  [facility] - 7th Floor ICU  DOS: 04/20/2025

## 2025-04-20 NOTE — ANESTHESIA PROCEDURE NOTES
Ad Hoc Intubation    Date/Time: 4/19/2025 9:34 PM    Performed by: Cholo Kunz CRNA  Authorized by: Cholo Kunz CRNA    Indications:  Airway protection and respiratory failure  Diagnosis:  Respiratory Failure  Patient Location:  ICU  Timeout:  4/19/2025 9:30 PM  Procedure Start Time:  4/19/2025 9:33 AM  Procedure End Time:  4/19/2025 9:34 AM  Staff:     Other Anesthesia Staff:  Omi Gray DO    Anesthesiologist Present: Yes    Intubation:     Induction:  Intravenous    Intubated:  Postinduction    Mask Ventilation:  Not attempted    Attempts:  1    Attempted By:  CRNA    Method of Intubation:  Video laryngoscopy    Blade:  Braun 3    Laryngeal View Grade: Grade I - full view of chords      Difficult Airway Encountered?: No      Complications:  None    Airway Device:  Oral endotracheal tube    Airway Device Size:  8.0    Style/Cuff Inflation:  Cuffed    Tube secured:  24    Secured at:  The lips    Placement Verified By:  Colorimetric ETCO2 device and Fiber optic visualization    Complicating Factors:  None    Findings Post-Intubation:  BS equal bilateral and atraumatic/condition of teeth unchanged  Notes:      Called to ICU by Nursing Supervisor. Dr. Gray at bedside. Pt currently intubated and on Vent. Suspected self extubation,  ETT had migrated to 15cm at the lips. O2Sat 90%. Audible air leaking from mouth. Smooth induction with 50 mg Propofol from the gtt line. 100mg Succinylcholine. Easy intubation without issues. +ETCO2 x 6 breaths. Connected to vent per RT and ETT secured per RT.

## 2025-04-20 NOTE — ANESTHESIA PREPROCEDURE EVALUATION
04/20/2025  River Sheehan Jr. is a 79 y.o., male, who presents for the following:    Date/Time: 04/15/25 0800   Scheduled providers: Arden Berrios MD; Dabadie, Virginia G, CRNA   Procedure: IR LUMBAR PUNCTURE DIAGNOSTIC WITH IMAGING   Indications: LP to rule out CNS process per ID   Location: [facility] - Interventional Radiology     Assessment:      River Sheehan Jr. is a 79 y.o. male with:  Fever without clear source  Possible Tonic-clonic seizure  Status post intubation (Hypoxemic respiratory failure -suspected community-acquired pneumonia  )  Cystic nodule in the pancreatic body   Indeterminate hypodense cystic nodules in the liver      This 79-year-old male with a subacute decline in functional status, recent fever, and new-onset seizure with focal neurologic deficits (left gaze preference and hemiparesis) is now intubated and in the ICU. These findings raise strong concern for a central nervous system process, particularly HSV encephalitis, given the acute neurologic deterioration, seizure activity, and altered mental status. A lumbar puncture should be performed to assess for viral encephalitis, including HSV PCR.  We will recommend starting IV acyclovir pending results.     Plan:      We will need abdomen MRI at some point for further evaluation of the multiple nodule seen on the liver/pancreas  Obtain 2 sets of blood cultures today and said blood HSV PCR  May continue vancomycin/Zosyn for now  Recommend lumbar puncture to rule out CNS process.  Need to send cell count, protein, glucose and meningitis panel  Recommend starting IV acyclovir 10 mg/kg every 8 hours until we rule out HSV encephalitis    Past Medical History:   Diagnosis Date    Acid reflux      Adenomatous polyp of ascending colon 09/20/2021    Arthritis      Asymptomatic stenosis of left carotid artery      CAD  (coronary artery disease)      Carotid artery stenosis        3/26/25 less than 50% bilateral    Colon polyps 02/20/2025     Transvers polyp benigne mucosa, Ascending Colon Tubular adenoma, Rectum hyperplastic    COVID-19 07/06/2022    Depression      Diabetes mellitus      Gout      Hearing loss      High cholesterol       HTN (hypertension)      Kidney stone      Macrocytic anemia      Osteoporosis      Seasonal allergies            Pre-op Assessment    I have reviewed the Patient Summary Reports.     I have reviewed the Nursing Notes. I have reviewed the NPO Status.   I have reviewed the Medications.     Review of Systems  Anesthesia Hx:  No problems with previous Anesthesia             Denies Family Hx of Anesthesia complications.    Denies Personal Hx of Anesthesia complications.                    Cardiovascular:     Hypertension   CAD               CAD  CECELIA  LBBB                           Pulmonary:        Sleep Apnea                Renal/:  Chronic Renal Disease   CKD 2             Hepatic/GI:     GERD                Musculoskeletal:  Arthritis               Neurological:           CVA -  left-sided deficits including leftward gaze, left hemiparesis, and confusion                            Endocrine:  Diabetes, type 2           Psych:    depression                Physical Exam  General: Unconscious    Airway:  Mallampati: unable to assess   Mouth Opening: Normal  TM Distance: Normal  Tongue: Normal  Neck: Girth Increased  Pre-Existing Airway: Oral Endotracheal tube    Chest/Lungs:  Normal Respiratory Rate    Heart:  Rate: Normal  Rhythm: Regular Rhythm        Anesthesia Plan  Type of Anesthesia, risks & benefits discussed:    Anesthesia Type: Gen ETT  Intra-op Monitoring Plan: Standard ASA Monitors  Post Op Pain Control Plan:   (medical reason for not using multimodal pain management)  Induction:  IV  Airway Plan: , Post-Induction  Informed Consent: Informed consent signed with the Patient  representative and all parties understand the risks and agree with anesthesia plan.  All questions answered. Patient consented to blood products? No  ASA Score: 4  Day of Surgery Review of History & Physical: H&P Update referred to the surgeon/provider.  Anesthesia Plan Notes: Vasopressors prn, organ perfusion support  Telephone consent per son    Ready For Surgery From Anesthesia Perspective.     .

## 2025-04-20 NOTE — ANESTHESIA POSTPROCEDURE EVALUATION
Anesthesia Post Evaluation    Patient: River Sheehan Jr.    Procedure(s) Performed: * No procedures listed *    Final Anesthesia Type: general      Patient location during evaluation: ICU  Patient participation: No - Unable to Participate, Intubation  Level of consciousness: sedated  Post-procedure vital signs: reviewed and stable  Pain management: adequate  Airway patency: patent    PONV status at discharge: No PONV  Anesthetic complications: no      Cardiovascular status: blood pressure returned to baseline  Respiratory status: ventilator and ETT  Hydration status: euvolemic  Follow-up needed               Vitals Value Taken Time   /74 04/20/25 00:31   Temp 37.7 °C (99.9 °F) 04/19/25 16:00   Pulse 80 04/20/25 00:32   Resp 23 04/20/25 00:32   SpO2 92 % 04/20/25 00:32   Vitals shown include unfiled device data.      No case tracking events are documented in the log.      Pain/Georges Score: Pain Rating Prior to Med Admin: 0 (4/19/2025  4:07 AM)  Pain Rating Post Med Admin: 0 (4/19/2025  4:36 AM)

## 2025-04-21 NOTE — CONSULTS
Inpatient consult to Palliative Care  Consult performed by: Sarah Yi FNP  Consult ordered by: JERROD Artis MD      Patient Name: River Sheehan Jr.   MRN: [id number]   Admission Date: 4/9/2025   Hospital Length of Stay: 11   Attending Provider: JERROD Artis MD   Consulting Provider: Sarah CARLTON  Reason for Consult: Goals of Care  Primary Care Physician: Chio Villela MD     Principal Problem: Acute respiratory failure with hypoxia and hypercarbia     Patient information was obtained from relative(s) and ER records.      Final diagnoses:  [R55] Syncope  [I95.1] Orthostatic hypotension (Primary)  [R55] Syncope, unspecified syncope type  [R50.9] Fever, unspecified fever cause  [E87.20] Lactic acidosis  [R53.81] Malaise  [D64.9] Chronic anemia     Assessment/Plan:     I reviewed the patient and family's understanding of the seriousness of the illness and its expected prognosis. We discussed the patient's goals of care and treatment preferences.  I clarified current code status. I identified the surrogate decision maker or health care POA.  I answered all questions and we formulated a plan including recommendations for symptom management and how to best achieve goals of care.  Advance Care Planning     Date: 04/21/2025    [facility]  I engaged the family in a voluntary conversation about advance care planning and we specifically addressed what the goals of care would be moving forward, in light of the patient's change in clinical status, specifically current condition.  We did specifically address the patient's likely prognosis, which is poor.  We explored the patient's values and preferences for future care.  The family endorses that what is most important right now is to focus on curative/life-prolongation (regardless of treatment burdens)    Accordingly, we have decided that the best plan to meet the patient's goals includes continuing with treatment    This discussion occurred on a fully  voluntary basis with the verbal consent of the patient and/or family.          Met with family--introduced service and discussed patient's history and current condition state of health and had been very active, working in his shop and taking care of the property.  Family informed that patient's daughter who had special needs and was cared for by patient and wife  last year, which has been significant stress for family.      Discussed conversations with medical team.  Wife tearful and asked how long patient could remain on life support.  She states that she and patient never completed living will but she knows what his wishes would be as they had discussed that in the past.  Discussed that decisions are not imminent and that medical team would like to give patient more opportunity for improvement.  Discussed that time period is different for different patients and that patient can remain on ventilator for limited time before family needs to make decisions to continue current care or seek comfort options.    Informed that code status discussion may be beneficial at this time.  Offered support and informed that Palliative team would continue to follow.        History of Present Illness:     Patient is a 79-year-old male with PMH of she AS, CAD, dm, HLD, HTN, anemia who initially presented to the ED after syncopal episode occurring at home.  Patient was reported to be accompanied by his wife at the time who stated he was very physically active but did complain of shortness of breath at times.  She reports that patient was having episodes of blackouts over the last several weeks.  He has been followed by Cardiology on outpatient basis with recent adjustments in his home medications.  Patient was initially admitted to Hospital Medicine Service for these issues and possible pneumonia present on initial imaging for which he was started on antibiotic therapy for suspected CPAP..  However on the morning of  patient  was found to have left-sided deficits including leftward gaze, left hemiparesis and confusion and unable to follow commands.  Patient was taken to CT stand stat to evaluate for stroke and began displaying seizure-like activity in route to CT scanner for which he received Ativan.  Patient desaturated after administration and was subsequently intubated and admitted to the ICU.      Neurology consulted and recommended EEG which resulted no convincing subclinical seizure activity.  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   Patient has been unable to ventilator weaning due to severe tachypnea.  Palliative Medicine consulted for discussion of goals of care.      Active Ambulatory Problems     Diagnosis Date Noted    Primary osteoarthritis involving multiple joints 06/30/2022    Calculus of kidney 06/30/2022    Recurrent major depressive disorder, in partial remission 06/30/2022    Asymptomatic stenosis of left carotid artery 06/30/2022    Gastroesophageal reflux disease 06/30/2022    Idiopathic chronic gout of multiple sites without tophus 06/30/2022    Hearing loss 06/30/2022    Hypercholesterolemia 06/30/2022    Primary hypertension 06/30/2022    Macrocytic anemia 06/30/2022    Persistent proteinuria 06/30/2022    CKD (chronic kidney disease) stage 2, GFR 60-89 ml/min 06/30/2022    Type 2 diabetes mellitus with stage 2 chronic kidney disease, without long-term current use of insulin     Obstructive sleep apnea 10/22/2024    Left bundle branch block (LBBB) 10/20/2015    Primary hyperparathyroidism 11/21/2024    Hypercalcemia 12/19/2024    Hypophosphatemia 12/19/2024    Nephrolithiasis 12/19/2024     Resolved Ambulatory Problems     Diagnosis Date Noted    Osteoporosis 06/30/2022    Uncontrolled type 2 diabetes mellitus with hyperglycemia 11/14/2022     Past Medical History:   Diagnosis Date    Acid reflux     Adenomatous polyp of ascending colon 09/20/2021    Arthritis     CAD  (coronary artery disease)     Carotid artery stenosis     Colon polyps 02/20/2025    COVID-19 07/06/2022    Depression     Diabetes mellitus     Gout     High cholesterol     HTN (hypertension)     Kidney stone     Seasonal allergies         Past Surgical History:   Procedure Laterality Date    CAROTID ENDARTERECTOMY Left 09/12/2024    Procedure: ENDARTERECTOMY-CAROTID;  Surgeon: Hany Pendleton MD;  Location: [facility];  Service: Peripheral Vascular;  Laterality: Left;    COLONOSCOPY W/ BIOPSIES  09/20/2021    Dr. Chun Smith    COLONOSCOPY W/ BIOPSIES  02/20/2025    CYSTOSCOPY      EXTRACAPSULAR EXTRACTION OF CATARACT      HERNIA REPAIR      LITHOTRIPSY  09/2023    RETROGRADE PYELOGRAPHY      WISDOM TOOTH EXTRACTION          Review of patient's allergies indicates:  No Known Allergies     Current Medications[1]       Current Facility-Administered Medications:     0.9% NaCl, , Intravenous, PRN    0.9% NaCl, , Intravenous, PRN    acetaminophen, 1,000 mg, Per OG tube, Q6H PRN    albuterol-ipratropium, 3 mL, Nebulization, Q6H PRN    dextrose 50%, 12.5 g, Intravenous, PRN    dextrose 50%, 25 g, Intravenous, PRN    etomidate, , Intravenous, Code/trauma/sedation Med    fentaNYL, 50 mcg, Intravenous, Q1H PRN    glucagon (human recombinant), 1 mg, Intramuscular, PRN    hydrALAZINE, 10 mg, Intravenous, Q6H PRN    insulin aspart U-100, 0-10 Units, Subcutaneous, Q6H PRN    lorazepam, 2 mg, Intravenous, Q15 Min PRN    promethazine, 12.5 mg, Oral, Q6H PRN    rocuronium, , Intravenous, Code/trauma/sedation Med     Family History   Problem Relation Name Age of Onset    Bladder Cancer Mother      Hypertension Sister      Asthma Sister      Diabetes Sister      Lung cancer Sister      Hypertension Brother      Diabetes Brother      Coronary artery disease Brother      Atrial fibrillation Brother      Esophageal cancer Brother      Kidney cancer Brother      Hypertension Brother      Coronary artery disease Brother       "Diabetes Brother      Progressive Supranuclear Palsy Brother      Coronary artery disease Brother      Pancreatic cancer Brother      Colon polyps Brother      Heart murmur Brother          Review of Systems   Unable to perform ROS: Intubated            Objective:   /80 (BP Location: Left arm, Patient Position: Lying)   Pulse 100   Temp (!) 100.4 °F (38 °C) (Core Esophageal)   Resp 20   Ht 5' 5" (1.651 m)   Wt 85.3 kg (188 lb 0.8 oz)   SpO2 96%   BMI 31.29 kg/m²      Physical Exam  Constitutional:       Appearance: He is ill-appearing.   HENT:      Head: Normocephalic.   Eyes:      Pupils: Pupils are equal, round, and reactive to light.   Cardiovascular:      Rate and Rhythm: Normal rate.   Pulmonary:      Comments: mechanical  Abdominal:      Palpations: Abdomen is soft.   Skin:     General: Skin is warm.   Neurological:      Comments: sedated             Review of Symptoms      Symptom Assessment (ESAS 0-10 Scale)  Pain:  0  Dyspnea:  0  Anxiety:  0  Nausea:  0  Depression:  0  Anorexia:  0  Fatigue:  0  Insomnia:  0  Restlessness:  0  Agitation:  0         Bowel Management Plan (BMP):  Yes      Performance Status:  20    Living Arrangements:  Lives with spouse    Psychosocial/Cultural:   See Palliative Psychosocial Note: Yes  Patient is  with one daughter.  He is retired   **Primary  to Follow**  Palliative Care  Consult: No    Spiritual:  F - Gala and Belief:  Spiritism      Advance Care Planning   Advance Directives:     Decision Making:  Family answered questions  Goals of Care: The family endorses that what is most important right now is to focus on curative/life-prolongation (regardless of treatment burdens)    Accordingly, we have decided that the best plan to meet the patient's goals includes continuing with treatment          PAINAD: NA    Caregiver burden formerly assessed: Yes        > 50% of 70 min of encounter was spent in chart review, face " to face discussion of goals of care, symptom assessment, coordination of care and emotional support.     Sarah Yi FNP, [facility]  Palliative Medicine  [facility]           [1]   Current Facility-Administered Medications:     0.9% NaCl infusion, , Intravenous, PRN, Laurence Mistry DO, Stopped at 04/12/25 0803    0.9% NaCl infusion, , Intravenous, PRN, Laurence Mistry DO, Stopped at 04/14/25 0250    acetaminophen tablet 1,000 mg, 1,000 mg, Per OG tube, Q6H PRN, JERORD Artis MD, 1,000 mg at 04/20/25 2107    acyclovir 620 mg in 0.9% NaCl 100 mL IVPB, 10 mg/kg (Ideal), Intravenous, Q8H, Zoltan Alonzo MD, Stopped at 04/21/25 0154    albuterol-ipratropium 2.5 mg-0.5 mg/3 mL nebulizer solution 3 mL, 3 mL, Nebulization, Q6H PRN, Reyes, Thairy G, DO, 3 mL at 04/10/25 0859    aspirin chewable tablet 81 mg, 81 mg, Per OG tube, Daily, Drew Menjivar MD, 81 mg at 04/20/25 0825    dextrose 50% injection 12.5 g, 12.5 g, Intravenous, PRN, ReyesJonatanry G, DO    dextrose 50% injection 25 g, 25 g, Intravenous, PRN, Reyes, Thairy G, DO    enoxaparin injection 40 mg, 40 mg, Subcutaneous, Daily, Sarthak Jones MD, 40 mg at 04/20/25 1627    etomidate injection, , Intravenous, Code/trauma/sedation Med, Antonio Mcdaniels MD, 20 mg at 04/11/25 1440    fentaNYL 2500 mcg in 0.9% sodium chloride 250 mL infusion premix, 0-250 mcg/hr, Intravenous, Continuous, Brigido Canela MD, Last Rate: 25 mL/hr at 04/21/25 0633, 250 mcg/hr at 04/21/25 0633    fentaNYL injection 50 mcg, 50 mcg, Intravenous, Q1H PRN, Florentin Traore MD, 50 mcg at 04/21/25 0025    glucagon (human recombinant) injection 1 mg, 1 mg, Intramuscular, PRN, Reyes, Thairy G, DO    hydrALAZINE injection 10 mg, 10 mg, Intravenous, Q6H PRN, Feliciano Maya MD, 10 mg at 04/10/25 1600    insulin aspart U-100 injection 0-10 Units, 0-10 Units, Subcutaneous, Q6H PRN, Brigido Canela MD, 4 Units at 04/20/25 2313    levETIRAcetam in NaCl (iso-os) IVPB  1,500 mg, 1,500 mg, Intravenous, BID, Aquiles Walton MD, Stopped at 04/20/25 2138    LORazepam injection 2 mg, 2 mg, Intravenous, Q15 Min PRN, Sydney Hudson MD, 2 mg at 04/15/25 0101    losartan tablet 100 mg, 100 mg, Per OG tube, Daily, JERROD Artis MD, 100 mg at 04/20/25 0825    midazolam (PF) in 0.9 % NaCl 1 mg/mL infusion, 0-5 mg/hr, Intravenous, Continuous, JERROD Artis MD, Last Rate: 5 mL/hr at 04/21/25 0430, 5 mg/hr at 04/21/25 0430    montelukast tablet 10 mg, 10 mg, Per OG tube, QHS, JERROD Artis MD, 10 mg at 04/20/25 2107    NORepinephrine 8 mg in dextrose 5% 250 mL infusion, 0-3 mcg/kg/min (Dosing Weight), Intravenous, Continuous, Laurence Mitsry DO, Stopped at 04/14/25 1118    pantoprazole injection 40 mg, 40 mg, Intravenous, Daily, JERROD Artis MD, 40 mg at 04/20/25 0826    promethazine tablet 12.5 mg, 12.5 mg, Oral, Q6H PRN, Feliciano Maya MD, 12.5 mg at 04/10/25 1350    propofol (DIPRIVAN) 10 mg/mL infusion, 0-50 mcg/kg/min (Dosing Weight), Intravenous, Continuous, Chaka Diaz MD, Last Rate: 25.6 mL/hr at 04/21/25 0419, 50 mcg/kg/min at 04/21/25 0419    rocuronium injection, , Intravenous, Code/trauma/sedation Med, Antonio Mcdaniels MD, 100 mg at 04/11/25 1441

## 2025-04-21 NOTE — PROGRESS NOTES
"[facility] - 7th Floor ICU  Pulmonary Critical Care Note    Patient Name: River Sheehan Jr.  MRN: [id number]  Admission Date: 4/9/2025  Hospital Length of Stay: 11 days  Code Status: No Order  Attending Provider: JERROD Artis MD  Primary Care Provider: Chio Villela MD     Subjective:     HPI:   Patient is a 79-year-old male with a past medical history of carotid artery stenosis, CAD, diabetes mellitus, hyperlipidemia, hypertension, and anemia who initially presented to the emergency department after a syncopal episode occurring at home.  Patient was accompanied by his wife at this time who stated that he was very physically active at home but did complain of shortness of breath at times.  She reported that he was having episodes of "black outs" over the last several weeks.  He has been following cardiology on outpatient basis with recent adjustments in his home medications.  Wife also reported fever at home.  Patient was initially admitted to the hospital medicine service for these issues and possible pneumonia present on initial imaging.  He was started on Rocephin and azithromycin for suspected CAP.  However, this morning patient was found to have left-sided deficits including leftward gaze, left hemiparesis, and confusion.  He was not following commands at this time which was a significant change from patient's baseline.  Patient was taken to CT scan STAT to evaluate for stroke.  Patient displayed seizure-like activity in route to CT scanner and he was administered Ativan 2 mg IV.  Patient desaturated after administration of Ativan and ultimately required rapid sequence intubation.  CT and MRI flare displaying right insular chronic stroke.  Patient was admitted to the ICU after this event.    Hospital Course/Significant events:  4/11/2025: Patient admitted to the ICU status post intubation    24 Hour Interval History:  No events overnight. He remains intubated and sedated.  On Versed and " propofol and occasionally agitated.  Current ventilator settings are an AC of 18 and 60% FiO2.  Tolerating tube feeds.  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     Past Medical History:   Diagnosis Date    Acid reflux     Adenomatous polyp of ascending colon 09/20/2021    Arthritis     Asymptomatic stenosis of left carotid artery     CAD (coronary artery disease)     Carotid artery stenosis      3/26/25 less than 50% bilateral    Colon polyps 02/20/2025    Transvers polyp benigne mucosa, Ascending Colon Tubular adenoma, Rectum hyperplastic    COVID-19 07/06/2022    Depression     Diabetes mellitus     Gout     Hearing loss     High cholesterol     HTN (hypertension)     Kidney stone     Macrocytic anemia     Osteoporosis     Seasonal allergies        Past Surgical History:   Procedure Laterality Date    CAROTID ENDARTERECTOMY Left 09/12/2024    Procedure: ENDARTERECTOMY-CAROTID;  Surgeon: Hany Pendleton MD;  Location: [facility];  Service: Peripheral Vascular;  Laterality: Left;    COLONOSCOPY W/ BIOPSIES  09/20/2021    Dr. Chun Smith    COLONOSCOPY W/ BIOPSIES  02/20/2025    CYSTOSCOPY      EXTRACAPSULAR EXTRACTION OF CATARACT      HERNIA REPAIR      LITHOTRIPSY  09/2023    RETROGRADE PYELOGRAPHY      WISDOM TOOTH EXTRACTION                 Current Outpatient Medications   Medication Instructions    albuterol (PROAIR HFA) 90 mcg/actuation inhaler 2 puffs, Inhalation, Every 6 hours PRN, Rescue    allopurinoL (ZYLOPRIM) 100 MG tablet TAKE 1 TABLET EVERY DAY    aspirin (ECOTRIN) 81 mg, Daily    atorvastatin (LIPITOR) 20 MG tablet TAKE 1 TABLET AT BEDTIME    blood sugar diagnostic (TRUE METRIX GLUCOSE TEST STRIP) Strp 1 strip, Misc.(Non-Drug; Combo Route), Daily    blood-glucose meter (TRUE METRIX AIR GLUCOSE METER) kit Test daily for DM II E11.9    busPIRone (BUSPAR) 10 MG tablet TAKE 1/2 TO 1 TABLET THREE TIMES DAILY    cinnamon bark 1,000 mg, Daily    citalopram (CELEXA) 20 mg,  Oral    gabapentin (NEURONTIN) 100 MG capsule TAKE 2 CAPSULES (200 MG TOTAL) THREE TIMES DAILY    glucosamine/chondr navarro A sod (OSTEO BI-FLEX ORAL) Daily    KRILL OIL ORAL 500 mg, Daily    losartan (COZAAR) 100 mg, Daily    metFORMIN (GLUCOPHAGE) 500 mg, Oral, 2 times daily with meals    midodrine (PROAMATINE) 2.5 mg, Oral, 2 times daily with meals    montelukast (SINGULAIR) 10 mg, Oral, Nightly    pantoprazole (PROTONIX) 40 MG tablet TAKE 1 TABLET EVERY DAY    pioglitazone (ACTOS) 15 mg, Oral, Daily    tamsulosin (FLOMAX) 0.4 mg, Daily       Review of patient's allergies indicates:  No Known Allergies     Current Inpatient Medications   acyclovir  10 mg/kg (Ideal) Intravenous Q8H    aspirin  81 mg Per OG tube Daily    enoxaparin  40 mg Subcutaneous Daily    levETIRAcetam (Keppra) IV (PEDS and ADULTS)  1,500 mg Intravenous BID    losartan  100 mg Per OG tube Daily    montelukast  10 mg Per OG tube QHS    pantoprazole  40 mg Intravenous Daily       Current Intravenous Infusions   fentanyl  0-250 mcg/hr Intravenous Continuous 25 mL/hr at 04/21/25 0633 250 mcg/hr at 04/21/25 0633    midazolam  0-5 mg/hr Intravenous Continuous 5 mL/hr at 04/21/25 0430 5 mg/hr at 04/21/25 0430    NORepinephrine bitartrate-D5W  0-3 mcg/kg/min (Dosing Weight) Intravenous Continuous   Stopped at 04/14/25 1118    propofoL  0-50 mcg/kg/min (Dosing Weight) Intravenous Continuous 25.6 mL/hr at 04/21/25 0816 50 mcg/kg/min at 04/21/25 0816                Objective:       Intake/Output Summary (Last 24 hours) at 4/21/2025 0938  Last data filed at 4/21/2025 0600  Gross per 24 hour   Intake 3340.17 ml   Output 1870 ml   Net 1470.17 ml         Vital Signs (Most Recent):  Temp: (!) 100.4 °F (38 °C) (04/21/25 0600)  Pulse: 100 (04/21/25 0630)  Resp: 20 (04/21/25 0500)  BP: 126/89 (04/21/25 0813)  SpO2: 96 % (04/21/25 0630)  Body mass index is 31.29 kg/m².  Weight: 85.3 kg (188 lb 0.8 oz) Vital Signs (24h Range):  Temp:  [98.9 °F (37.2 °C)-103.2 °F (39.6  °C)] 100.4 °F (38 °C)  Pulse:  [] 100  Resp:  [20-51] 20  SpO2:  [89 %-99 %] 96 %  BP: ()/(50-95) 126/89     Physical Exam  Vitals reviewed.   Constitutional:       Comments: Patient intubated and sedated   HENT:      Head: Normocephalic and atraumatic.      Mouth/Throat:      Comments: ET tube in place  Cardiovascular:      Rate and Rhythm: Normal rate and regular rhythm.      Heart sounds: No murmur heard.     No friction rub. No gallop.   Pulmonary:      Breath sounds: No wheezing, rhonchi or rales.   Abdominal:      General: There is no distension.      Palpations: Abdomen is soft.      Tenderness: There is no abdominal tenderness.   Musculoskeletal:      Right lower leg: No edema.      Left lower leg: No edema.   Skin:     General: Skin is warm.   Neurological:      Comments: Unable to assess secondary to patient currently being intubated and sedated       Lines/Drains/Airways       Drain  Duration                  NG/OG Tube 04/11/25 1530 9 days         Urethral Catheter 04/11/25 1500 9 days         Rectal Tube 04/16/25 1200 rectal tube w/ balloon (indicate number of mLs) 4 days              Airway  Duration                  Airway - Non-Surgical 04/19/25 2135 1 day              Peripheral Intravenous Line  Duration                  Peripheral IV - Single Lumen 18 G Left;Posterior Hand -- days         Peripheral IV - Single Lumen 04/11/25 2145 18 G 2 1/4 in Anterior;Right Upper Arm 9 days         Peripheral IV - Single Lumen 04/12/25 2250 18 G 2 1/4 in Anterior;Left Forearm 8 days                    Significant Labs:    Lab Results   Component Value Date    WBC 9.82 04/20/2025    HGB 9.7 (L) 04/20/2025    HCT 30.6 (L) 04/20/2025    MCV 79.3 (L) 04/20/2025     (H) 04/20/2025           BMP  Lab Results   Component Value Date     (L) 04/21/2025    K 5.3 (H) 04/21/2025    CO2 24 04/21/2025    BUN 22.4 04/21/2025    CREATININE 0.73 04/21/2025    CALCIUM 8.7 (L) 04/21/2025    AGAP 7.0  04/21/2025    EGFRNONAA >60 06/09/2022         ABG  Recent Labs   Lab 04/21/25 0619   PH 7.400   PO2 105.0*   PCO2 42.0   HCO3 26.0   POCBASEDEF 1.00       Mechanical Ventilation Support:  Vent Mode: A/C (04/21/25 0600)  Ventilator Initiated: Yes (04/11/25 1445)  Set Rate: 18 BPM (04/21/25 0600)  Vt Set: 450 mL (04/21/25 0600)  PEEP/CPAP: 5 cmH20 (04/21/25 0600)  Oxygen Concentration (%): 60 (04/21/25 0630)  Peak Airway Pressure: 22 cmH20 (04/21/25 0600)  Total Ve: 8.3 L/m (04/21/25 0600)  F/VT Ratio<105 (RSBI): (!) 87.8 (04/21/25 0235)      Significant Imaging:  I have reviewed the pertinent imaging within the past 24 hours.  CT report is similar right edema and bilateral anterior temporal lobe edema.    Assessment/Plan:     Assessment  Herpes simplex encephalitis  Hypoxemic respiratory failure status post intubation on 04/11    Plan  Continue acyclovir.  Monitor renal function  Continue mechanical ventilatory support  Anticonvulsants as per Neurology  Adjust sedation as per patient's needs; currently on Versed and propofol  Mild transaminitis continue to follow  Enteral TF's  Likely will need to engage palliative Medicine or discussion with family about long-term plans if there is no neurologic recovery    DVT Prophylaxis: SCDs  GI Prophylaxis: Protonix     31 minutes of critical care was time spent personally by me on the following activities: development of treatment plan with patient or surrogate and bedside caregivers, discussions with consultants, evaluation of patient's response to treatment, examination of patient, ordering and performing treatments and interventions, ordering and review of laboratory studies, ordering and review of radiographic studies, pulse oximetry, re-evaluation of patient's condition.  This critical care time did not overlap with that of any other provider or involve time for any procedures.     Sarthak Jones MD  Pulmonary Critical Care Medicine  [facility] - 7th  Floor ICU  DOS: 04/21/2025

## 2025-04-21 NOTE — PROGRESS NOTES
Inpatient Nutrition Assessment    Admit Date: 4/9/2025   Total duration of encounter: 12 days   Patient Age: 79 y.o.    Nutrition Recommendation/Prescription     Continue current tube feeding:  Peptamen Intense VHP goal rate 70 ml/hr to provide  1400 kcal  66% needs, 92% with propofol calories considered  130 g protein  100% needs  105 g carbohydrate 50% needs  1176 ml free water 55% needs  calculations based on estimated 20 hour run time     Communication of Recommendations: reviewed with nurse    Nutrition Assessment     Malnutrition Assessment/Nutrition-Focused Physical Exam       Malnutrition Level: other (see comments) (Does not meet criteria) (04/12/25 6140)                                                        A minimum of two characteristics is recommended for diagnosis of either severe or non-severe malnutrition.    Chart Review    Reason Seen: follow-up    Malnutrition Screening Tool Results   Have you recently lost weight without trying?: No  Have you been eating poorly because of a decreased appetite?: No   MST Score: 0   Diagnosis:  Hypoxemic respiratory failure status post intubation on 04/11  Focal tonic-clonic complex partial seizure leading to above  Chronic right insular ischemic stroke  Suspected community-acquired pneumonia  Sepsis secondary to above  Anemia    Relevant Medical History: carotid artery stenosis, CAD, diabetes mellitus, hyperlipidemia, hypertension, and anemia     Scheduled Medications:  acyclovir, 10 mg/kg (Ideal), Q8H  aspirin, 81 mg, Daily  enoxaparin, 40 mg, Daily  levETIRAcetam (Keppra) IV (PEDS and ADULTS), 1,500 mg, BID  losartan, 100 mg, Daily  montelukast, 10 mg, QHS  pantoprazole, 40 mg, Daily    Continuous Infusions:  fentanyl, Last Rate: 250 mcg/hr (04/21/25 0633)  midazolam, Last Rate: 5 mg/hr (04/21/25 0430)  NORepinephrine bitartrate-D5W, Last Rate: Stopped (04/14/25 1118)  propofoL, Last Rate: 40 mcg/kg/min (04/21/25 1136)    PRN Medications:   0.9% NaCl, ,  PRN  0.9% NaCl, , PRN  acetaminophen, 1,000 mg, Q6H PRN  albuterol-ipratropium, 3 mL, Q6H PRN  dextrose 50%, 12.5 g, PRN  dextrose 50%, 25 g, PRN  etomidate, , Code/trauma/sedation Med  fentaNYL, 50 mcg, Q1H PRN  glucagon (human recombinant), 1 mg, PRN  hydrALAZINE, 10 mg, Q6H PRN  insulin aspart U-100, 0-10 Units, Q6H PRN  lorazepam, 2 mg, Q15 Min PRN  promethazine, 12.5 mg, Q6H PRN  rocuronium, , Code/trauma/sedation Med    Calorie Containing IV Medications: Diprivan @ 20.5 ml/hr (provides 541 kcal/d)    Recent Labs   Lab 04/15/25  0513 04/16/25  0338 04/17/25  0405 04/18/25  0439 04/19/25  0804 04/20/25  0454 04/21/25  0747   * 133* 137 133* 134* 133* 133*   K 3.7 4.2 4.1 4.1 4.4 4.6 5.3*   CALCIUM 8.5* 8.2* 9.1 8.8 8.9 8.7* 8.7*   CL 99 102 103 101 103 102 102   CO2 24 22* 26 25 25 26 24   BUN 14.5 14.7 19.3 21.2 21.7 19.4 22.4   CREATININE 0.69* 0.74 0.75 0.71* 0.69* 0.61* 0.73   EGFRNORACEVR >60 >60 >60 >60 >60 >60 >60   GLUCOSE 159* 165* 165* 186* 197* 147* 148*   BILITOT 0.3 0.2 0.2 0.2 0.2 0.3 0.4   ALKPHOS 61 71 86 91 122 139 165*   ALT 39 59* 93* 115* 186* 283* 304*   AST 55* 66* 71* 78* 106* 138* 115*   ALBUMIN 2.6* 2.6* 2.6* 2.3* 2.4* 2.3* 2.3*   WBC 10.87 9.58 9.68 9.54 10.70 9.82  --    HGB 10.0* 9.6* 10.0* 8.7* 9.6* 9.7*  --    HCT 31.6* 29.8* 33.0* 28.0* 30.0* 30.6*  --      Nutrition Orders:  Diet NPO  Tube Feedings/Formulas 70; 1,400; Peptamen Intense VHP; OG (start at 30 ml/hr and advance by 20 ml/hr every 4 hours as tolerated); 30; Every 4 hours    Appetite/Oral Intake: NPO/not applicable  Factors Affecting Nutritional Intake: NPO and on mechanical ventilation  Social Needs Impacting Access to Food: unable to assess at this time; will attempt on follow-up  Food/Worship/Cultural Preferences: unable to obtain  Food Allergies: none reported  Last Bowel Movement: 04/18/25  Wound(s): no pressure injuries documented at this time     Comments    4/12/25 Patient evaluated due to orders for  "Clinimix E 4. at 75 ml/hr, nurse reports he is no longer receiving this, plans to start tube feeding, orders provided. He is on mechanical ventilation and receiving calories from propofol.  Addendum: Nurse reports propofol being increased from 15-20 mcg/kg/min (~200 kcal daily) to 40-50 mcg/kg/min (~500 kcal/daily), will modify tube feeding accordingly.    25 Patient remains on ventilator, receiving calories from propofol, tube feeding at goal.    25 Tube feeding and ventilator continue.    25 Tube feeding and ventilator continue, remains on propofol.    Anthropometrics    Height: 5' 5" (165.1 cm), Height Method: Stated  Last Weight: 85.3 kg (188 lb 0.8 oz) (25), Weight Method: Bed Scale  BMI (Calculated): 31.3  BMI Classification: obese grade I (BMI 30-34.9)        Ideal Body Weight (IBW), Male: 136 lb     % Ideal Body Weight, Male (lb): 136.03 %                          Usual Weight Provided By: unable to obtain usual weight    Wt Readings from Last 5 Encounters:   25 85.3 kg (188 lb 0.8 oz)   25 83.9 kg (185 lb)   25 88.5 kg (195 lb)   25 88.5 kg (195 lb)   25 86.2 kg (190 lb)     Weight Change(s) Since Admission:    no updated weights available    admission weight 83.9 kg stated, recent bed weight 85.3 kg documented  Wt Readings from Last 1 Encounters:   25 85.3 kg (188 lb 0.8 oz)   25 83.9 kg (185 lb)   Admit Weight: 83.9 kg (185 lb) (25), Weight Method: Stated    Estimated Needs    Weight Used For Calorie Calculations: 85 kg (187 lb 6.3 oz)  Energy Calorie Requirements (kcal): 2,120.8  Energy Need Method: [place] (modified)  Total Ve: 12.2 L/m, Temp (24hrs), Av °F (38.3 °C), Min:98.9 °F (37.2 °C), Max:103.2 °F (39.6 °C)  Vtot (L/Min) for [place] Equation Calculation: 12.2; Max Temp for [place] Equation Calculation: 103.2 °F  Weight Used For Protein Calculations: 85 kg (187 lb 6.3 oz)  Protein " Requirements: 119-136 g, 1.4-1.6 g/kg  Fluid Requirements (mL): 2,120.8  CHO Requirement: 212-238 g, 40-45% of kcal     Enteral Nutrition     Formula: Peptamen Intense VHP  Rate/Volume: 70 ml/hr  Water Flushes: 30 ml every 4 hours  Additives/Modulars: none at this time  Route: orogastric tube  Method: continuous  Total Nutrition Provided by Tube Feeding, Additives, and Flushes:  Calories Provided  1400 kcal/d, 66% needs   Protein Provided  130 g/d, 100% needs   Fluid Provided  1326 ml/d, 63% needs   Continuous feeding calculations based on estimated 20 hr/d run time unless otherwise stated.    Parenteral Nutrition Patient not receiving parenteral nutrition support at this time.    Evaluation of Received Nutrient Intake    Calories: meeting estimated needs  Protein: meeting estimated needs    Patient Education Not applicable.    Nutrition Diagnosis     PES: Inadequate energy intake related to inability to consume sufficient nutrients as evidenced by less than 80% needs met. (resolved)    PES: N/A           Nutrition Interventions     Intervention(s): collaboration with other providers  Intervention(s): Enteral nutrition      Goal: Meet greater than 80% of nutritional needs by follow-up. (goal met)  Goal: Tolerate enteral feeding at goal rate by follow-up. (goal met)    Nutrition Goals & Monitoring     Dietitian will monitor: energy intake, enteral nutrition intake, parenteral nutrition intake, weight, electrolyte/renal panel, beliefs/attitudes, glucose/endocrine profile, and gastrointestinal profile  Discharge planning: too early to determine; pending clinical course  Nutrition Risk/Follow-Up: high (follow-up in 1-4 days)   Please consult if re-assessment needed sooner.

## 2025-04-22 NOTE — PLAN OF CARE
Patient intubated and on ventilator. Palliative working with family on goals of care. CM following

## 2025-04-22 NOTE — PLAN OF CARE
Problem: Adult Inpatient Plan of Care  Goal: Plan of Care Review  Outcome: Progressing  Goal: Patient-Specific Goal (Individualized)  Outcome: Progressing  Goal: Absence of Hospital-Acquired Illness or Injury  Outcome: Progressing  Goal: Optimal Comfort and Wellbeing  Outcome: Progressing  Goal: Readiness for Transition of Care  Outcome: Progressing     Problem: Diabetes Comorbidity  Goal: Blood Glucose Level Within Targeted Range  Outcome: Progressing     Problem: Wound  Goal: Optimal Coping  Outcome: Progressing  Goal: Optimal Functional Ability  Outcome: Progressing  Goal: Absence of Infection Signs and Symptoms  Outcome: Progressing  Goal: Optimal Pain Control and Function  Outcome: Progressing  Goal: Skin Health and Integrity  Outcome: Progressing  Goal: Optimal Wound Healing  Outcome: Progressing     Problem: Fall Injury Risk  Goal: Absence of Fall and Fall-Related Injury  Outcome: Progressing     Problem: Infection  Goal: Absence of Infection Signs and Symptoms  Outcome: Progressing     Problem: Skin Injury Risk Increased  Goal: Skin Health and Integrity  Outcome: Progressing     Problem: Mechanical Ventilation Invasive  Goal: Effective Communication  Outcome: Progressing  Goal: Optimal Device Function  Outcome: Progressing  Goal: Mechanical Ventilation Liberation  Outcome: Progressing  Goal: Optimal Nutrition Delivery  Outcome: Progressing  Goal: Absence of Device-Related Skin and Tissue Injury  Outcome: Progressing  Goal: Absence of Ventilator-Induced Lung Injury  Outcome: Progressing     Problem: Artificial Airway  Goal: Effective Communication  Outcome: Progressing  Goal: Optimal Device Function  Outcome: Progressing  Goal: Absence of Device-Related Skin or Tissue Injury  Outcome: Progressing     Problem: Pneumonia  Goal: Fluid Balance  Outcome: Progressing  Goal: Resolution of Infection Signs and Symptoms  Outcome: Progressing  Goal: Effective Oxygenation and Ventilation  Outcome: Progressing      Problem: Coping Ineffective  Goal: Effective Coping  Outcome: Progressing     Problem: Wound  Goal: Improved Oral Intake  Outcome: Unable to Meet

## 2025-04-22 NOTE — PROCEDURES
"River Sheehan Jr. is a 79 y.o. male patient.    Temp: 97.5 °F (36.4 °C) (04/21/25 1945)  Pulse: 90 (04/21/25 2100)  Resp: (!) 22 (04/21/25 1800)  BP: 133/69 (04/21/25 2030)  SpO2: 96 % (04/21/25 2100)  Weight: 85.3 kg (188 lb 0.8 oz) (04/11/25 2115)  Height: 5' 5" (165.1 cm) (04/09/25 2246)    PICC  Date/Time: 4/21/2025 9:00 PM  Consent Done: Yes  Time out: Immediately prior to procedure a time out was called to verify the correct patient, procedure, equipment, support staff and site/side marked as required  Indications: med administration  Anesthesia: local infiltration  Local anesthetic: lidocaine 1% without epinephrine  Anesthetic Total (mL): 3  Preparation: skin prepped with ChloraPrep  Skin prep agent dried: skin prep agent completely dried prior to procedure  Sterile barriers: all five maximum sterile barriers used - cap, mask, sterile gown, sterile gloves, and large sterile sheet  Hand hygiene: hand hygiene performed prior to central venous catheter insertion  Location details: left basilic  Catheter type: triple lumen  Catheter size: 5 Fr  Catheter Length: 44cm    Ultrasound guidance: yes  Vessel Caliber: medium and patent, compressibility normal  Needle advanced into vessel with real time Ultrasound guidance.  Guidewire confirmed in vessel.  Image recorded and saved.  Sterile sheath used.  Number of attempts: 1  Post-procedure: blood return through all ports and sterile dressing applied    Assessment: placement verified by x-ray and successful placement  Complications: none          Name viki gonzales  4/21/2025    "

## 2025-04-22 NOTE — PROGRESS NOTES
"[facility] - 7th Floor ICU  Pulmonary Critical Care Note    Patient Name: River Sheehan Jr.  MRN: [id number]  Admission Date: 4/9/2025  Hospital Length of Stay: 12 days  Code Status: No Order  Attending Provider: JERROD Artis MD  Primary Care Provider: Chio Villela MD     Subjective:     HPI:   Patient is a 79-year-old male with a past medical history of carotid artery stenosis, CAD, diabetes mellitus, hyperlipidemia, hypertension, and anemia who initially presented to the emergency department after a syncopal episode occurring at home.  Patient was accompanied by his wife at this time who stated that he was very physically active at home but did complain of shortness of breath at times.  She reported that he was having episodes of "black outs" over the last several weeks.  He has been following cardiology on outpatient basis with recent adjustments in his home medications.  Wife also reported fever at home.  Patient was initially admitted to the hospital medicine service for these issues and possible pneumonia present on initial imaging.  He was started on Rocephin and azithromycin for suspected CAP.  However, this morning patient was found to have left-sided deficits including leftward gaze, left hemiparesis, and confusion.  He was not following commands at this time which was a significant change from patient's baseline.  Patient was taken to CT scan STAT to evaluate for stroke.  Patient displayed seizure-like activity in route to CT scanner and he was administered Ativan 2 mg IV.  Patient desaturated after administration of Ativan and ultimately required rapid sequence intubation.  CT and MRI flare displaying right insular chronic stroke.  Patient was admitted to the ICU after this event.    Hospital Course/Significant events:  4/11/2025: Patient admitted to the ICU status post intubation    24 Hour Interval History:  No events overnight. He remains intubated and sedated.  On Versed and " propofol and occasionally agitated.  Current ventilator settings are an AC of 18 and 40% FiO2.  Tolerating tube feeds.  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     Past Medical History:   Diagnosis Date    Acid reflux     Adenomatous polyp of ascending colon 09/20/2021    Arthritis     Asymptomatic stenosis of left carotid artery     CAD (coronary artery disease)     Carotid artery stenosis      3/26/25 less than 50% bilateral    Colon polyps 02/20/2025    Transvers polyp benigne mucosa, Ascending Colon Tubular adenoma, Rectum hyperplastic    COVID-19 07/06/2022    Depression     Diabetes mellitus     Gout     Hearing loss     High cholesterol     HTN (hypertension)     Kidney stone     Macrocytic anemia     Osteoporosis     Seasonal allergies        Past Surgical History:   Procedure Laterality Date    CAROTID ENDARTERECTOMY Left 09/12/2024    Procedure: ENDARTERECTOMY-CAROTID;  Surgeon: Hany Pendleton MD;  Location: [facility];  Service: Peripheral Vascular;  Laterality: Left;    COLONOSCOPY W/ BIOPSIES  09/20/2021    Dr. Chun Smith    COLONOSCOPY W/ BIOPSIES  02/20/2025    CYSTOSCOPY      EXTRACAPSULAR EXTRACTION OF CATARACT      HERNIA REPAIR      LITHOTRIPSY  09/2023    RETROGRADE PYELOGRAPHY      WISDOM TOOTH EXTRACTION                 Current Outpatient Medications   Medication Instructions    albuterol (PROAIR HFA) 90 mcg/actuation inhaler 2 puffs, Inhalation, Every 6 hours PRN, Rescue    allopurinoL (ZYLOPRIM) 100 MG tablet TAKE 1 TABLET EVERY DAY    aspirin (ECOTRIN) 81 mg, Daily    atorvastatin (LIPITOR) 20 MG tablet TAKE 1 TABLET AT BEDTIME    blood sugar diagnostic (TRUE METRIX GLUCOSE TEST STRIP) Strp 1 strip, Misc.(Non-Drug; Combo Route), Daily    blood-glucose meter (TRUE METRIX AIR GLUCOSE METER) kit Test daily for DM II E11.9    busPIRone (BUSPAR) 10 MG tablet TAKE 1/2 TO 1 TABLET THREE TIMES DAILY    cinnamon bark 1,000 mg, Daily    citalopram (CELEXA) 20 mg,  Oral    gabapentin (NEURONTIN) 100 MG capsule TAKE 2 CAPSULES (200 MG TOTAL) THREE TIMES DAILY    glucosamine/chondr navarro A sod (OSTEO BI-FLEX ORAL) Daily    KRILL OIL ORAL 500 mg, Daily    losartan (COZAAR) 100 mg, Daily    metFORMIN (GLUCOPHAGE) 500 mg, Oral, 2 times daily with meals    midodrine (PROAMATINE) 2.5 mg, Oral, 2 times daily with meals    montelukast (SINGULAIR) 10 mg, Oral, Nightly    pantoprazole (PROTONIX) 40 MG tablet TAKE 1 TABLET EVERY DAY    pioglitazone (ACTOS) 15 mg, Oral, Daily    tamsulosin (FLOMAX) 0.4 mg, Daily       Review of patient's allergies indicates:  No Known Allergies     Current Inpatient Medications   acyclovir  10 mg/kg (Ideal) Intravenous Q8H    aspirin  81 mg Per OG tube Daily    enoxaparin  40 mg Subcutaneous Daily    levETIRAcetam (Keppra) IV (PEDS and ADULTS)  1,500 mg Intravenous BID    losartan  100 mg Per OG tube Daily    montelukast  10 mg Per OG tube QHS    pantoprazole  40 mg Intravenous Daily       Current Intravenous Infusions   fentanyl  0-250 mcg/hr Intravenous Continuous 25 mL/hr at 04/22/25 0537 250 mcg/hr at 04/22/25 0537    midazolam  0-5 mg/hr Intravenous Continuous 5 mL/hr at 04/22/25 0537 5 mg/hr at 04/22/25 0537    NORepinephrine bitartrate-D5W  0-3 mcg/kg/min (Dosing Weight) Intravenous Continuous 3.2 mL/hr at 04/22/25 0537 0.02 mcg/kg/min at 04/22/25 0537    propofoL  0-50 mcg/kg/min (Dosing Weight) Intravenous Continuous 25.6 mL/hr at 04/22/25 0555 50 mcg/kg/min at 04/22/25 0555                Objective:       Intake/Output Summary (Last 24 hours) at 4/22/2025 0934  Last data filed at 4/22/2025 0537  Gross per 24 hour   Intake 3286.9 ml   Output 2200 ml   Net 1086.9 ml         Vital Signs (Most Recent):  Temp: 100.1 °F (37.8 °C) (04/22/25 0530)  Pulse: 95 (04/22/25 0530)  Resp: (!) 22 (04/21/25 1800)  BP: (!) 100/56 (04/22/25 0530)  SpO2: 97 % (04/22/25 0530)  Body mass index is 31.29 kg/m².  Weight: 85.3 kg (188 lb 0.8 oz) Vital Signs (24h  Range):  Temp:  [97.5 °F (36.4 °C)-102.6 °F (39.2 °C)] 100.1 °F (37.8 °C)  Pulse:  [] 95  Resp:  [18-35] 22  SpO2:  [89 %-100 %] 97 %  BP: ()/() 100/56     Physical Exam  Vitals reviewed.   Constitutional:       Comments: Patient intubated and sedated   HENT:      Head: Normocephalic and atraumatic.      Mouth/Throat:      Comments: ET tube in place  Cardiovascular:      Rate and Rhythm: Normal rate and regular rhythm.      Heart sounds: No murmur heard.     No friction rub. No gallop.   Pulmonary:      Breath sounds: No wheezing, rhonchi or rales.   Abdominal:      General: There is no distension.      Palpations: Abdomen is soft.      Tenderness: There is no abdominal tenderness.   Musculoskeletal:      Right lower leg: No edema.      Left lower leg: No edema.   Skin:     General: Skin is warm.   Neurological:      Comments: Unable to assess secondary to patient currently being intubated and sedated       Lines/Drains/Airways       Peripherally Inserted Central Catheter Line  Duration             PICC Triple Lumen 04/21/25 2100 left basilic <1 day              Drain  Duration                  NG/OG Tube 04/11/25 1530 10 days         Urethral Catheter 04/11/25 1500 10 days         Rectal Tube 04/16/25 1200 rectal tube w/ balloon (indicate number of mLs) 5 days              Airway  Duration                  Airway - Non-Surgical 04/19/25 2135 2 days              Peripheral Intravenous Line  Duration                  Peripheral IV - Single Lumen 18 G Left;Posterior Hand -- days         Peripheral IV - Single Lumen 04/12/25 2250 18 G 2 1/4 in Anterior;Left Forearm 9 days                    Significant Labs:    Lab Results   Component Value Date    WBC 9.82 04/20/2025    HGB 9.7 (L) 04/20/2025    HCT 30.6 (L) 04/20/2025    MCV 79.3 (L) 04/20/2025     (H) 04/20/2025           BMP  Lab Results   Component Value Date     (L) 04/21/2025    K 5.3 (H) 04/21/2025    CO2 24 04/21/2025    BUN 22.4  04/21/2025    CREATININE 0.73 04/21/2025    CALCIUM 8.7 (L) 04/21/2025    AGAP 7.0 04/21/2025    EGFRNONAA >60 06/09/2022         ABG  Recent Labs   Lab 04/21/25 0619   PH 7.400   PO2 105.0*   PCO2 42.0   HCO3 26.0   POCBASEDEF 1.00       Mechanical Ventilation Support:  Vent Mode: A/C (04/22/25 0515)  Ventilator Initiated: Yes (04/11/25 1445)  Set Rate: 18 BPM (04/22/25 0515)  Vt Set: 450 mL (04/22/25 0515)  PEEP/CPAP: 5 cmH20 (04/22/25 0515)  Oxygen Concentration (%): 50 (04/22/25 0515)  Peak Airway Pressure: 22 cmH20 (04/22/25 0515)  Total Ve: 9.6 L/m (04/22/25 0515)  F/VT Ratio<105 (RSBI): (!) 52.5 (04/21/25 1705)      Significant Imaging:  I have reviewed the pertinent imaging within the past 24 hours.  No new imaging    Assessment/Plan:     Assessment  Herpes simplex encephalitis.  Acyclovir initiated on April 12th  Hypoxemic respiratory failure status post intubation on 04/11    Plan  Continue acyclovir.  Monitor renal function  Continue mechanical ventilatory support  Anticonvulsants as per Neurology  Adjust sedation as per patient's needs; currently on Versed and propofol  Mild transaminitis continue to follow  Enteral TF's  Palliative Medicine has talked to family about long-term plans.  Current plan is to continue treatment but unlikely they would wish trach and PEG if there is no signs of neurologic recovery.    DVT Prophylaxis: SCDs  GI Prophylaxis: Protonix     33 minutes of critical care was time spent personally by me on the following activities: development of treatment plan with patient or surrogate and bedside caregivers, discussions with consultants, evaluation of patient's response to treatment, examination of patient, ordering and performing treatments and interventions, ordering and review of laboratory studies, ordering and review of radiographic studies, pulse oximetry, re-evaluation of patient's condition.  This critical care time did not overlap with that of any other provider or involve  time for any procedures.     Sarthak Jones MD  Pulmonary Critical Care Medicine  [facility] - 7th Floor ICU  DOS: 04/22/2025

## 2025-04-23 NOTE — PROGRESS NOTES
"[facility] - 7th Floor ICU  Pulmonary Critical Care Note    Patient Name: River Sheehan Jr.  MRN: [id number]  Admission Date: 4/9/2025  Hospital Length of Stay: 13 days  Code Status: No Order  Attending Provider: JERROD Artis MD  Primary Care Provider: Chio Villela MD     Subjective:     HPI:   Patient is a 79-year-old male with a past medical history of carotid artery stenosis, CAD, diabetes mellitus, hyperlipidemia, hypertension, and anemia who initially presented to the emergency department after a syncopal episode occurring at home.  Patient was accompanied by his wife at this time who stated that he was very physically active at home but did complain of shortness of breath at times.  She reported that he was having episodes of "black outs" over the last several weeks.  He has been following cardiology on outpatient basis with recent adjustments in his home medications.  Wife also reported fever at home.  Patient was initially admitted to the hospital medicine service for these issues and possible pneumonia present on initial imaging.  He was started on Rocephin and azithromycin for suspected CAP.  However, this morning patient was found to have left-sided deficits including leftward gaze, left hemiparesis, and confusion.  He was not following commands at this time which was a significant change from patient's baseline.  Patient was taken to CT scan STAT to evaluate for stroke.  Patient displayed seizure-like activity in route to CT scanner and he was administered Ativan 2 mg IV.  Patient desaturated after administration of Ativan and ultimately required rapid sequence intubation.  CT and MRI flare displaying right insular chronic stroke.  Patient was admitted to the ICU after this event.    Hospital Course/Significant events:  4/11/2025: Patient admitted to the ICU status post intubation    24 Hour Interval History:  Patient with no acute events overnight. Patient had another febrile " episode this AM with Tmax 102.1F. Intake 2400 and output 1800 ml over the last 24 hours. He remains mechanically intubated and sedated with Fentanyl, Versed, and Propofol. He remains on minimal Levophed for vasopressor support likely due to multiple sedation medications. 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 Continuing to have goals of care discussions with family due to no change in neurologic examination.     Past Medical History:   Diagnosis Date    Acid reflux     Adenomatous polyp of ascending colon 09/20/2021    Arthritis     Asymptomatic stenosis of left carotid artery     CAD (coronary artery disease)     Carotid artery stenosis      3/26/25 less than 50% bilateral    Colon polyps 02/20/2025    Transvers polyp benigne mucosa, Ascending Colon Tubular adenoma, Rectum hyperplastic    COVID-19 07/06/2022    Depression     Diabetes mellitus     Gout     Hearing loss     High cholesterol     HTN (hypertension)     Kidney stone     Macrocytic anemia     Osteoporosis     Seasonal allergies        Past Surgical History:   Procedure Laterality Date    CAROTID ENDARTERECTOMY Left 09/12/2024    Procedure: ENDARTERECTOMY-CAROTID;  Surgeon: Hany Pendleton MD;  Location: [facility];  Service: Peripheral Vascular;  Laterality: Left;    COLONOSCOPY W/ BIOPSIES  09/20/2021    Dr. Chun Smith    COLONOSCOPY W/ BIOPSIES  02/20/2025    CYSTOSCOPY      EXTRACAPSULAR EXTRACTION OF CATARACT      HERNIA REPAIR      LITHOTRIPSY  09/2023    RETROGRADE PYELOGRAPHY      WISDOM TOOTH EXTRACTION                 Current Outpatient Medications   Medication Instructions    albuterol (PROAIR HFA) 90 mcg/actuation inhaler 2 puffs, Inhalation, Every 6 hours PRN, Rescue    allopurinoL (ZYLOPRIM) 100 MG tablet TAKE 1 TABLET EVERY DAY    aspirin (ECOTRIN) 81 mg, Daily    atorvastatin (LIPITOR) 20 MG tablet TAKE 1 TABLET AT BEDTIME    blood sugar diagnostic (TRUE METRIX  GLUCOSE TEST STRIP) Strp 1 strip, Misc.(Non-Drug; Combo Route), Daily    blood-glucose meter (TRUE METRIX AIR GLUCOSE METER) kit Test daily for DM II E11.9    busPIRone (BUSPAR) 10 MG tablet TAKE 1/2 TO 1 TABLET THREE TIMES DAILY    cinnamon bark 1,000 mg, Daily    citalopram (CELEXA) 20 mg, Oral    gabapentin (NEURONTIN) 100 MG capsule TAKE 2 CAPSULES (200 MG TOTAL) THREE TIMES DAILY    glucosamine/chondr navarro A sod (OSTEO BI-FLEX ORAL) Daily    KRILL OIL ORAL 500 mg, Daily    losartan (COZAAR) 100 mg, Daily    metFORMIN (GLUCOPHAGE) 500 mg, Oral, 2 times daily with meals    midodrine (PROAMATINE) 2.5 mg, Oral, 2 times daily with meals    montelukast (SINGULAIR) 10 mg, Oral, Nightly    pantoprazole (PROTONIX) 40 MG tablet TAKE 1 TABLET EVERY DAY    pioglitazone (ACTOS) 15 mg, Oral, Daily    tamsulosin (FLOMAX) 0.4 mg, Daily       Review of patient's allergies indicates:  No Known Allergies     Current Inpatient Medications   acyclovir  10 mg/kg (Ideal) Intravenous Q8H    aspirin  81 mg Per OG tube Daily    enoxaparin  40 mg Subcutaneous Daily    levETIRAcetam (Keppra) IV (PEDS and ADULTS)  1,500 mg Intravenous BID    losartan  100 mg Per OG tube Daily    montelukast  10 mg Per OG tube QHS    pantoprazole  40 mg Intravenous Daily       Current Intravenous Infusions   fentanyl  0-250 mcg/hr Intravenous Continuous 25 mL/hr at 04/23/25 0915 250 mcg/hr at 04/23/25 0915    midazolam  0-5 mg/hr Intravenous Continuous 5 mL/hr at 04/23/25 0915 5 mg/hr at 04/23/25 0915    NORepinephrine bitartrate-D5W  0-3 mcg/kg/min (Dosing Weight) Intravenous Continuous 1.6 mL/hr at 04/23/25 0915 0.01 mcg/kg/min at 04/23/25 0915    propofoL  0-50 mcg/kg/min (Dosing Weight) Intravenous Continuous 25.6 mL/hr at 04/23/25 0915 50 mcg/kg/min at 04/23/25 0915                Objective:       Intake/Output Summary (Last 24 hours) at 4/23/2025 0936  Last data filed at 4/23/2025 0915  Gross per 24 hour   Intake 2743.31 ml   Output 2075 ml   Net 668.31  ml         Vital Signs (Most Recent):  Temp: (!) 102.1 °F (38.9 °C) (04/23/25 0800)  Pulse: (!) 113 (04/23/25 0915)  Resp: (!) 33 (04/23/25 0915)  BP: (!) 113/57 (04/23/25 0900)  SpO2: (!) 94 % (04/23/25 0915)  Body mass index is 31.29 kg/m².  Weight: 85.3 kg (188 lb 0.8 oz) Vital Signs (24h Range):  Temp:  [97.5 °F (36.4 °C)-102.1 °F (38.9 °C)] 102.1 °F (38.9 °C)  Pulse:  [] 113  Resp:  [15-38] 33  SpO2:  [90 %-100 %] 94 %  BP: ()/(49-84) 113/57     Physical Exam  Vitals reviewed.   Constitutional:       Comments: Patient intubated and sedated   HENT:      Head: Normocephalic and atraumatic.      Mouth/Throat:      Comments: ET tube in place  Cardiovascular:      Rate and Rhythm: Normal rate and regular rhythm.      Heart sounds: No murmur heard.     No friction rub. No gallop.   Pulmonary:      Breath sounds: No wheezing, rhonchi or rales.   Abdominal:      General: There is no distension.      Palpations: Abdomen is soft.      Tenderness: There is no abdominal tenderness.   Musculoskeletal:      Right lower leg: No edema.      Left lower leg: No edema.   Skin:     General: Skin is warm.   Neurological:      Comments: Unable to assess secondary to patient currently being intubated and sedated       Lines/Drains/Airways       Peripherally Inserted Central Catheter Line  Duration             PICC Triple Lumen 04/21/25 2100 left basilic 1 day              Drain  Duration                  NG/OG Tube 04/11/25 1530 11 days         Urethral Catheter 04/11/25 1500 11 days              Airway  Duration                  Airway - Non-Surgical 04/19/25 2135 3 days              Peripheral Intravenous Line  Duration                  Peripheral IV - Single Lumen 18 G Left;Posterior Hand -- days         Peripheral IV - Single Lumen 04/12/25 2250 18 G 2 1/4 in Anterior;Left Forearm 10 days                    Significant Labs:    Lab Results   Component Value Date    WBC 9.82 04/20/2025    HGB 9.7 (L) 04/20/2025    HCT  30.6 (L) 04/20/2025    MCV 79.3 (L) 04/20/2025     (H) 04/20/2025           BMP  Lab Results   Component Value Date     (L) 04/21/2025    K 5.3 (H) 04/21/2025    CO2 24 04/21/2025    BUN 22.4 04/21/2025    CREATININE 0.73 04/21/2025    CALCIUM 8.7 (L) 04/21/2025    AGAP 7.0 04/21/2025    EGFRNONAA >60 06/09/2022         ABG  Recent Labs   Lab 04/21/25 0619   PH 7.400   PO2 105.0*   PCO2 42.0   HCO3 26.0   POCBASEDEF 1.00       Mechanical Ventilation Support:  Vent Mode: A/C (04/23/25 0524)  Ventilator Initiated: Yes (04/11/25 1445)  Set Rate: 18 BPM (04/23/25 0524)  Vt Set: 450 mL (04/23/25 0524)  PEEP/CPAP: 5 cmH20 (04/23/25 0524)  Oxygen Concentration (%): 50 (04/23/25 0800)  Peak Airway Pressure: 19 cmH20 (04/23/25 0524)  Total Ve: 11.6 L/m (04/23/25 0524)  F/VT Ratio<105 (RSBI): (!) 48.21 (04/23/25 0524)      Significant Imaging:  I have reviewed the pertinent imaging within the past 24 hours.  No new imaging    Assessment/Plan:     Assessment  Herpes simplex encephalitis.  Acyclovir initiated on April 12th  Hypoxemic respiratory failure status post intubation on 04/11    Plan  Continue acyclovir with end date 05/03/25.  Monitor renal function  Continue mechanical ventilatory support  Anticonvulsants as per Neurology  Adjust sedation as per patient's needs; currently on Versed and propofol  Mild transaminitis continue to follow  Enteral TF's  Palliative Medicine has talked to family about long-term plans.  Current plan is to continue treatment but unlikely they would wish trach and PEG if there is no signs of neurologic recovery.    DVT Prophylaxis: SCDs  GI Prophylaxis: Protonix     33 minutes of critical care was time spent personally by me on the following activities: development of treatment plan with patient or surrogate and bedside caregivers, discussions with consultants, evaluation of patient's response to treatment, examination of patient, ordering and performing treatments and  interventions, ordering and review of laboratory studies, ordering and review of radiographic studies, pulse oximetry, re-evaluation of patient's condition.  This critical care time did not overlap with that of any other provider or involve time for any procedures.     Jona Nazario MD  Pulmonary Critical Care Medicine  [facility] - 7th Floor ICU  DOS: 04/23/2025

## 2025-04-24 NOTE — CONSULTS
Consults    Patient Name: River Sheehan Jr.   MRN: [id number]   Admission Date: 4/9/2025   Hospital Length of Stay: 14   Attending Provider: JERROD Artis MD   Consulting Provider: Sarah CARLTON  Reason for Consult: Goals of Care  Primary Care Physician:  Chio Villela MD     Principal Problem: Acute respiratory failure with hypoxia and hypercarbia       Final diagnoses:  [R55] Syncope  [I95.1] Orthostatic hypotension (Primary)  [R55] Syncope, unspecified syncope type  [R50.9] Fever, unspecified fever cause  [E87.20] Lactic acidosis  [R53.81] Malaise  [D64.9] Chronic anemia      Assessment/Plan:     I reviewed the patient and family's understanding of the seriousness of the illness and its expected prognosis. We discussed the patient's goals of care and treatment preferences.        Advance Care Planning     Date: 04/24/2025    [facility]  I engaged the family in a voluntary conversation about advance care planning and we specifically addressed what the goals of care would be moving forward, in light of the patient's change in clinical status, specifically current condition.  We did specifically address the patient's likely prognosis, which is poor.  We explored the patient's values and preferences for future care.  The family endorses that what is most important right now is to focus on curative/life-prolongation (regardless of treatment burdens)    Accordingly, we have decided that the best plan to meet the patient's goals includes continuing with treatment    This discussion occurred on a fully voluntary basis with the verbal consent of the patient and/or family.       Spoke with the patient's daughter who states that they are waiting most likely until Monday to make any further decisions concerning patient's care to give him the opportunity for any improvement.  Daughter states that she feels that patient's condition will remain the same, but would like to wait to monitor and improvement in patient's  mentation.  Offered support and encouraged to call for any questions or concerns.        Interval History:     Patient remains obtunded on mechanical ventilation.  Remains on antiviral therapy and low-dose Levophed.  Palliative Medicine continuing to follow for assistance.      Active Ambulatory Problems     Diagnosis Date Noted    Primary osteoarthritis involving multiple joints 06/30/2022    Calculus of kidney 06/30/2022    Recurrent major depressive disorder, in partial remission 06/30/2022    Asymptomatic stenosis of left carotid artery 06/30/2022    Gastroesophageal reflux disease 06/30/2022    Idiopathic chronic gout of multiple sites without tophus 06/30/2022    Hearing loss 06/30/2022    Hypercholesterolemia 06/30/2022    Primary hypertension 06/30/2022    Macrocytic anemia 06/30/2022    Persistent proteinuria 06/30/2022    CKD (chronic kidney disease) stage 2, GFR 60-89 ml/min 06/30/2022    Type 2 diabetes mellitus with stage 2 chronic kidney disease, without long-term current use of insulin     Obstructive sleep apnea 10/22/2024    Left bundle branch block (LBBB) 10/20/2015    Primary hyperparathyroidism 11/21/2024    Hypercalcemia 12/19/2024    Hypophosphatemia 12/19/2024    Nephrolithiasis 12/19/2024     Resolved Ambulatory Problems     Diagnosis Date Noted    Osteoporosis 06/30/2022    Uncontrolled type 2 diabetes mellitus with hyperglycemia 11/14/2022     Past Medical History:   Diagnosis Date    Acid reflux     Adenomatous polyp of ascending colon 09/20/2021    Arthritis     CAD (coronary artery disease)     Carotid artery stenosis     Colon polyps 02/20/2025    COVID-19 07/06/2022    Depression     Diabetes mellitus     Gout     High cholesterol     HTN (hypertension)     Kidney stone     Seasonal allergies         Past Surgical History:   Procedure Laterality Date    CAROTID ENDARTERECTOMY Left 09/12/2024    Procedure: ENDARTERECTOMY-CAROTID;  Surgeon: Hany Pendleton MD;  Location: [facility] OR;   Service: Peripheral Vascular;  Laterality: Left;    COLONOSCOPY W/ BIOPSIES  09/20/2021    Dr. Chun Smith    COLONOSCOPY W/ BIOPSIES  02/20/2025    CYSTOSCOPY      EXTRACAPSULAR EXTRACTION OF CATARACT      HERNIA REPAIR      LITHOTRIPSY  09/2023    RETROGRADE PYELOGRAPHY      WISDOM TOOTH EXTRACTION          Review of patient's allergies indicates:  No Known Allergies     Current Medications[1]       Current Facility-Administered Medications:     0.9% NaCl, , Intravenous, PRN    0.9% NaCl, , Intravenous, PRN    acetaminophen, 1,000 mg, Per OG tube, Q6H PRN    albuterol-ipratropium, 3 mL, Nebulization, Q6H PRN    dextrose 50%, 12.5 g, Intravenous, PRN    dextrose 50%, 25 g, Intravenous, PRN    etomidate, , Intravenous, Code/trauma/sedation Med    fentaNYL, 50 mcg, Intravenous, Q1H PRN    glucagon (human recombinant), 1 mg, Intramuscular, PRN    hydrALAZINE, 10 mg, Intravenous, Q6H PRN    insulin aspart U-100, 0-10 Units, Subcutaneous, Q6H PRN    lorazepam, 2 mg, Intravenous, Q15 Min PRN    promethazine, 12.5 mg, Oral, Q6H PRN    rocuronium, , Intravenous, Code/trauma/sedation Med    Flushing PICC/Midline Protocol, , , Until Discontinued **AND** sodium chloride 0.9%, 10 mL, Intravenous, Q12H PRN     Family History   Problem Relation Name Age of Onset    Bladder Cancer Mother      Hypertension Sister      Asthma Sister      Diabetes Sister      Lung cancer Sister      Hypertension Brother      Diabetes Brother      Coronary artery disease Brother      Atrial fibrillation Brother      Esophageal cancer Brother      Kidney cancer Brother      Hypertension Brother      Coronary artery disease Brother      Diabetes Brother      Progressive Supranuclear Palsy Brother      Coronary artery disease Brother      Pancreatic cancer Brother      Colon polyps Brother      Heart murmur Brother            Review of Systems   Unable to perform ROS: Intubated            Objective:   /70   Pulse 67   Temp 96.1 °F (35.6  "°C)   Resp (!) 22   Ht 5' 5" (1.651 m)   Wt 85.3 kg (188 lb 0.8 oz)   SpO2 98%   BMI 31.29 kg/m²      Physical Exam   Constitutional: He appears ill.   HENT:   Head: Normocephalic.   Cardiovascular: Normal rate. Pulmonary:      Comments: mechanical    Neurological:   obtunded   Skin: Skin is warm.            Review of Symptoms      Symptom Assessment (ESAS 0-10 Scale)  Pain:  0  Dyspnea:  0  Anxiety:  0  Nausea:  0  Depression:  0  Anorexia:  0  Fatigue:  0  Insomnia:  0  Restlessness:  0  Agitation:  0         Bowel Management Plan (BMP):  Yes      Psychosocial/Cultural:   See Palliative Psychosocial Note: Yes  **Primary  to Follow**  Palliative Care  Consult: No      Advance Care Planning   Advance Care Planning        PAINAD: NA    Caregiver burden formerly assessed: Yes      No results displayed because visit has over 200 results.               > 50% of 25 min of encounter was spent in chart review, face to face discussion of goals of care, symptom assessment, coordination of care and emotional support.    Sarah Yi [facility], [facility]  Palliative Medicine  [facility]           [1]   Current Facility-Administered Medications:     0.9% NaCl infusion, , Intravenous, PRN, Laurence Mistry DO, Stopped at 04/12/25 0803    0.9% NaCl infusion, , Intravenous, PRN, Laurence Mistry DO, Stopped at 04/14/25 0250    acetaminophen tablet 1,000 mg, 1,000 mg, Per OG tube, Q6H PRN, JERROD Artis MD, 1,000 mg at 04/24/25 0400    acyclovir 620 mg in 0.9% NaCl 100 mL IVPB, 10 mg/kg (Ideal), Intravenous, Q8H, Zoltan Alonzo MD, Stopped at 04/24/25 0136    albuterol-ipratropium 2.5 mg-0.5 mg/3 mL nebulizer solution 3 mL, 3 mL, Nebulization, Q6H PRN, Reyes, Thairy G, DO, 3 mL at 04/10/25 0859    aspirin chewable tablet 81 mg, 81 mg, Per OG tube, Daily, Drew Menjivar MD, 81 mg at 04/23/25 0851    dextrose 50% injection 12.5 g, 12.5 g, Intravenous, PRN, Reyes, Thairy G, DO    " dextrose 50% injection 25 g, 25 g, Intravenous, PRN, Reyes, Thairy G, DO    enoxaparin injection 40 mg, 40 mg, Subcutaneous, Daily, Sarthak Jones MD, 40 mg at 04/23/25 1702    etomidate injection, , Intravenous, Code/trauma/sedation Med, Antonio Mcdaniels MD, 20 mg at 04/11/25 1440    fentaNYL 2500 mcg in 0.9% sodium chloride 250 mL infusion premix, 0-250 mcg/hr, Intravenous, Continuous, Brigido Canela MD, Last Rate: 25 mL/hr at 04/24/25 0821, 250 mcg/hr at 04/24/25 0821    fentaNYL injection 50 mcg, 50 mcg, Intravenous, Q1H PRN, Florentin Traore MD, 50 mcg at 04/21/25 0025    glucagon (human recombinant) injection 1 mg, 1 mg, Intramuscular, PRN, Reyes, Thairy G, DO    hydrALAZINE injection 10 mg, 10 mg, Intravenous, Q6H PRN, Feliciano Maya MD, 10 mg at 04/10/25 1600    insulin aspart U-100 injection 0-10 Units, 0-10 Units, Subcutaneous, Q6H PRN, Brigido Canela MD, 2 Units at 04/24/25 0542    insulin glargine U-100 (Lantus) injection 8 Units, 8 Units, Subcutaneous, QHS, Anna Babin MD, 8 Units at 04/23/25 2300    levETIRAcetam in NaCl (iso-os) IVPB 1,500 mg, 1,500 mg, Intravenous, BID, Aquiles Walton MD, Stopped at 04/23/25 2219    LORazepam injection 2 mg, 2 mg, Intravenous, Q15 Min PRN, Sydney Hudson MD, 2 mg at 04/24/25 0303    losartan tablet 100 mg, 100 mg, Per OG tube, Daily, JERROD Artis MD, 100 mg at 04/23/25 0851    midazolam (PF) in 0.9 % NaCl 1 mg/mL infusion, 0-5 mg/hr, Intravenous, Continuous, JERROD Artis MD, Last Rate: 5 mL/hr at 04/24/25 0821, 5 mg/hr at 04/24/25 0821    montelukast tablet 10 mg, 10 mg, Per OG tube, QHS, JERROD Artis MD, 10 mg at 04/23/25 2148    NORepinephrine 8 mg in dextrose 5% 250 mL infusion, 0-3 mcg/kg/min (Dosing Weight), Intravenous, Continuous, Laurence Mistry, , Last Rate: 12.8 mL/hr at 04/24/25 0821, 0.08 mcg/kg/min at 04/24/25 0821    pantoprazole injection 40 mg, 40 mg, Intravenous, Daily, JERROD Artis MD, 40 mg at  04/23/25 0851    promethazine tablet 12.5 mg, 12.5 mg, Oral, Q6H PRN, Feliciano Maya MD, 12.5 mg at 04/10/25 1350    propofol (DIPRIVAN) 10 mg/mL infusion, 0-50 mcg/kg/min (Dosing Weight), Intravenous, Continuous, Chaka Diaz MD, Last Rate: 25.6 mL/hr at 04/24/25 0821, 50 mcg/kg/min at 04/24/25 0821    rocuronium injection, , Intravenous, Code/trauma/sedation Med, Antonio Mcdaniels MD, 100 mg at 04/11/25 1441    Flushing PICC/Midline Protocol, , , Until Discontinued **AND** sodium chloride 0.9% flush 10 mL, 10 mL, Intravenous, Q12H PRN, JERROD Artis MD

## 2025-04-24 NOTE — PROGRESS NOTES
Inpatient Nutrition Assessment    Admit Date: 4/9/2025   Total duration of encounter: 15 days   Patient Age: 79 y.o.    Nutrition Recommendation/Prescription     Continue current tube feeding:  Peptamen Intense VHP goal rate 70 ml/hr to provide  1400 kcal  66% needs, 92% with propofol calories considered  130 g protein  100% needs  105 g carbohydrate 50% needs  1176 ml free water 55% needs  calculations based on estimated 20 hour run time     Communication of Recommendations: reviewed with nurse    Nutrition Assessment     Malnutrition Assessment/Nutrition-Focused Physical Exam       Malnutrition Level: other (see comments) (Does not meet criteria) (04/12/25 4409)                                                        A minimum of two characteristics is recommended for diagnosis of either severe or non-severe malnutrition.    Chart Review    Reason Seen: follow-up    Malnutrition Screening Tool Results   Have you recently lost weight without trying?: No  Have you been eating poorly because of a decreased appetite?: No   MST Score: 0   Diagnosis:  Herpes simplex encephalitis  Hypoxemic respiratory failure status post intubation on 4/11    Relevant Medical History: carotid artery stenosis, CAD, diabetes mellitus, hyperlipidemia, hypertension, and anemia     Scheduled Medications:  acyclovir, 10 mg/kg (Ideal), Q8H  aspirin, 81 mg, Daily  enoxaparin, 40 mg, Daily  insulin glargine U-100, 8 Units, QHS  levETIRAcetam (Keppra) IV (PEDS and ADULTS), 1,500 mg, BID  losartan, 100 mg, Daily  montelukast, 10 mg, QHS  pantoprazole, 40 mg, Daily    Continuous Infusions:  fentanyl, Last Rate: 250 mcg/hr (04/24/25 1213)  midazolam, Last Rate: 5 mg/hr (04/24/25 1213)  NORepinephrine bitartrate-D5W, Last Rate: 0.03 mcg/kg/min (04/24/25 1213)  propofoL, Last Rate: 50 mcg/kg/min (04/24/25 1213)    PRN Medications:   0.9% NaCl, , PRN  0.9% NaCl, , PRN  acetaminophen, 1,000 mg, Q6H PRN  albuterol-ipratropium, 3 mL, Q6H PRN  dextrose 50%,  12.5 g, PRN  dextrose 50%, 25 g, PRN  etomidate, , Code/trauma/sedation Med  fentaNYL, 50 mcg, Q1H PRN  glucagon (human recombinant), 1 mg, PRN  hydrALAZINE, 10 mg, Q6H PRN  insulin aspart U-100, 0-10 Units, Q6H PRN  lorazepam, 2 mg, Q15 Min PRN  promethazine, 12.5 mg, Q6H PRN  rocuronium, , Code/trauma/sedation Med  sodium chloride 0.9%, 10 mL, Q12H PRN    Calorie Containing IV Medications: Diprivan @ 20.5 ml/hr (provides 541 kcal/d)    Recent Labs   Lab 04/18/25  0439 04/19/25  0804 04/20/25  0454 04/21/25  0747 04/23/25  2357   * 134* 133* 133* 133*   K 4.1 4.4 4.6 5.3* 4.1   CALCIUM 8.8 8.9 8.7* 8.7* 9.7    103 102 102 104   CO2 25 25 26 24 23   BUN 21.2 21.7 19.4 22.4 29.3*   CREATININE 0.71* 0.69* 0.61* 0.73 0.78   EGFRNORACEVR >60 >60 >60 >60 >60   GLUCOSE 186* 197* 147* 148* 241*   BILITOT 0.2 0.2 0.3 0.4  --    ALKPHOS 91 122 139 165*  --    * 186* 283* 304*  --    AST 78* 106* 138* 115*  --    ALBUMIN 2.3* 2.4* 2.3* 2.3*  --    WBC 9.54 10.70 9.82  --   --    HGB 8.7* 9.6* 9.7*  --   --    HCT 28.0* 30.0* 30.6*  --   --      Nutrition Orders:  Diet NPO  Tube Feedings/Formulas 70; 1,400; Peptamen Intense VHP; OG (start at 30 ml/hr and advance by 20 ml/hr every 4 hours as tolerated); 30; Every 4 hours    Appetite/Oral Intake: NPO/not applicable  Factors Affecting Nutritional Intake: NPO and on mechanical ventilation  Social Needs Impacting Access to Food: unable to assess at this time; will attempt on follow-up  Food/Church/Cultural Preferences: unable to obtain  Food Allergies: none reported  Last Bowel Movement: 04/21/25  Wound(s): no pressure injuries documented at this time     Comments    4/12/25 Patient evaluated due to orders for Clinimix E 4.25/5 at 75 ml/hr, nurse reports he is no longer receiving this, plans to start tube feeding, orders provided. He is on mechanical ventilation and receiving calories from propofol.  Addendum: Nurse reports propofol being increased from 15-20  "mcg/kg/min (~200 kcal daily) to 40-50 mcg/kg/min (~500 kcal/daily), will modify tube feeding accordingly.    25 Patient remains on ventilator, receiving calories from propofol, tube feeding at goal.    25 Tube feeding and ventilator continue.    25 Tube feeding and ventilator continue, remains on propofol.    25 Tube feeding, ventilator, propofol continues.    Anthropometrics    Height: 5' 5" (165.1 cm), Height Method: Stated  Last Weight: 85.3 kg (188 lb 0.8 oz) (25), Weight Method: Bed Scale  BMI (Calculated): 31.3  BMI Classification: obese grade I (BMI 30-34.9)        Ideal Body Weight (IBW), Male: 136 lb     % Ideal Body Weight, Male (lb): 136.03 %                          Usual Weight Provided By: unable to obtain usual weight    Wt Readings from Last 5 Encounters:   25 85.3 kg (188 lb 0.8 oz)   25 83.9 kg (185 lb)   25 88.5 kg (195 lb)   25 88.5 kg (195 lb)   25 86.2 kg (190 lb)     Weight Change(s) Since Admission:    no updated weights available    admission weight 83.9 kg stated, recent bed weight 85.3 kg documented  Wt Readings from Last 1 Encounters:   25 85.3 kg (188 lb 0.8 oz)   25 83.9 kg (185 lb)   Admit Weight: 83.9 kg (185 lb) (25), Weight Method: Stated    Estimated Needs    Weight Used For Calorie Calculations: 85 kg (187 lb 6.3 oz)  Energy Calorie Requirements (kcal): 2,120.8  Energy Need Method: [place] (modified)  Total Ve: 14.8 L/m, Temp (24hrs), Av °F (37.8 °C), Min:96.1 °F (35.6 °C), Max:103.1 °F (39.5 °C)  Vtot (L/Min) for [place] Equation Calculation: 12.2; Max Temp for [place] Equation Calculation: 103.2 °F  Weight Used For Protein Calculations: 85 kg (187 lb 6.3 oz)  Protein Requirements: 119-136 g, 1.4-1.6 g/kg  Fluid Requirements (mL): 2,120.8  CHO Requirement: 212-238 g, 40-45% of kcal     Enteral Nutrition     Formula: Peptamen Intense VHP  Rate/Volume: 70 ml/hr  Water " Flushes: 30 ml every 4 hours  Additives/Modulars: none at this time  Route: orogastric tube  Method: continuous  Total Nutrition Provided by Tube Feeding, Additives, and Flushes:  Calories Provided  1400 kcal/d, 66% needs   Protein Provided  130 g/d, 100% needs   Fluid Provided  1326 ml/d, 63% needs   Continuous feeding calculations based on estimated 20 hr/d run time unless otherwise stated.    Parenteral Nutrition Patient not receiving parenteral nutrition support at this time.    Evaluation of Received Nutrient Intake    Calories: meeting estimated needs  Protein: meeting estimated needs    Patient Education Not applicable.    Nutrition Diagnosis     PES: Inadequate energy intake related to inability to consume sufficient nutrients as evidenced by less than 80% needs met. (resolved)    PES: N/A           Nutrition Interventions     Intervention(s): collaboration with other providers  Intervention(s): Enteral nutrition      Goal: Meet greater than 80% of nutritional needs by follow-up. (goal met)  Goal: Tolerate enteral feeding at goal rate by follow-up. (goal met)    Nutrition Goals & Monitoring     Dietitian will monitor: energy intake, enteral nutrition intake, parenteral nutrition intake, weight, electrolyte/renal panel, beliefs/attitudes, glucose/endocrine profile, and gastrointestinal profile  Discharge planning: too early to determine; pending clinical course  Nutrition Risk/Follow-Up: high (follow-up in 1-4 days)   Please consult if re-assessment needed sooner.

## 2025-04-24 NOTE — PROGRESS NOTES
ID brief Note:    Message received to re-evaluate patient for persistent fevers since admission. Tmax 102.7, normal wbc. Remains on Acyclovir- day 13 now.   Blood cx In progress today  CXR from today unchanged    I added Procalcitonin, CRP and ESR   Will follow up on cx in progress, and above labs  Will see the patient in the am.

## 2025-04-24 NOTE — PROGRESS NOTES
"[facility] - 7th Floor ICU  Pulmonary Critical Care Note    Patient Name: River Sheehan Jr.  MRN: [id number]  Admission Date: 4/9/2025  Hospital Length of Stay: 14 days  Code Status: No Order  Attending Provider: JERROD Artis MD  Primary Care Provider: Chio Villela MD     Subjective:     HPI:   Patient is a 79-year-old male with a past medical history of carotid artery stenosis, CAD, diabetes mellitus, hyperlipidemia, hypertension, and anemia who initially presented to the emergency department after a syncopal episode occurring at home.  Patient was accompanied by his wife at this time who stated that he was very physically active at home but did complain of shortness of breath at times.  She reported that he was having episodes of "black outs" over the last several weeks.  He has been following cardiology on outpatient basis with recent adjustments in his home medications.  Wife also reported fever at home.  Patient was initially admitted to the hospital medicine service for these issues and possible pneumonia present on initial imaging.  He was started on Rocephin and azithromycin for suspected CAP.  However, this morning patient was found to have left-sided deficits including leftward gaze, left hemiparesis, and confusion.  He was not following commands at this time which was a significant change from patient's baseline.  Patient was taken to CT scan STAT to evaluate for stroke.  Patient displayed seizure-like activity in route to CT scanner and he was administered Ativan 2 mg IV.  Patient desaturated after administration of Ativan and ultimately required rapid sequence intubation.  CT and MRI flare displaying right insular chronic stroke.  Patient was admitted to the ICU after this event.    Hospital Course/Significant events:  4/11/2025: Patient admitted to the ICU status post intubation    24 Hour Interval History:  Patient continues to have febrile episodes with T-max 103.1° over the " last 24 hours.  Intake 3800 and output 2100 mL over the last 24 hours.  Neurologic exam remains unchanged with only minimal withdrawal to painful sensation in lower extremity.  Patient remains mechanically intubated with settings 18/450/5/80%.  He remains sedated on fentanyl, Versed, and propofol.  Patient remains on vasopressor support with Levophed.  Palliative Care has been consulted with continuation of goals of care discussion with family.    Past Medical History:   Diagnosis Date    Acid reflux     Adenomatous polyp of ascending colon 09/20/2021    Arthritis     Asymptomatic stenosis of left carotid artery     CAD (coronary artery disease)     Carotid artery stenosis     US 3/26/25 less than 50% bilateral    Colon polyps 02/20/2025    Transvers polyp benigne mucosa, Ascending Colon Tubular adenoma, Rectum hyperplastic    COVID-19 07/06/2022    Depression     Diabetes mellitus     Gout     Hearing loss     High cholesterol     HTN (hypertension)     Kidney stone     Macrocytic anemia     Osteoporosis     Seasonal allergies        Past Surgical History:   Procedure Laterality Date    CAROTID ENDARTERECTOMY Left 09/12/2024    Procedure: ENDARTERECTOMY-CAROTID;  Surgeon: Hany Pendleton MD;  Location: [facility];  Service: Peripheral Vascular;  Laterality: Left;    COLONOSCOPY W/ BIOPSIES  09/20/2021    Dr. Chun Smith    COLONOSCOPY W/ BIOPSIES  02/20/2025    CYSTOSCOPY      EXTRACAPSULAR EXTRACTION OF CATARACT      HERNIA REPAIR      LITHOTRIPSY  09/2023    RETROGRADE PYELOGRAPHY      WISDOM TOOTH EXTRACTION                 Current Outpatient Medications   Medication Instructions    albuterol (PROAIR HFA) 90 mcg/actuation inhaler 2 puffs, Inhalation, Every 6 hours PRN, Rescue    allopurinoL (ZYLOPRIM) 100 MG tablet TAKE 1 TABLET EVERY DAY    aspirin (ECOTRIN) 81 mg, Daily    atorvastatin (LIPITOR) 20 MG tablet TAKE 1 TABLET AT BEDTIME    blood sugar diagnostic (TRUE METRIX GLUCOSE TEST STRIP) Strp 1  strip, Misc.(Non-Drug; Combo Route), Daily    blood-glucose meter (TRUE METRIX AIR GLUCOSE METER) kit Test daily for DM II E11.9    busPIRone (BUSPAR) 10 MG tablet TAKE 1/2 TO 1 TABLET THREE TIMES DAILY    cinnamon bark 1,000 mg, Daily    citalopram (CELEXA) 20 mg, Oral    gabapentin (NEURONTIN) 100 MG capsule TAKE 2 CAPSULES (200 MG TOTAL) THREE TIMES DAILY    glucosamine/chondr navarro A sod (OSTEO BI-FLEX ORAL) Daily    KRILL OIL ORAL 500 mg, Daily    losartan (COZAAR) 100 mg, Daily    metFORMIN (GLUCOPHAGE) 500 mg, Oral, 2 times daily with meals    midodrine (PROAMATINE) 2.5 mg, Oral, 2 times daily with meals    montelukast (SINGULAIR) 10 mg, Oral, Nightly    pantoprazole (PROTONIX) 40 MG tablet TAKE 1 TABLET EVERY DAY    pioglitazone (ACTOS) 15 mg, Oral, Daily    tamsulosin (FLOMAX) 0.4 mg, Daily       Review of patient's allergies indicates:  No Known Allergies     Current Inpatient Medications   acyclovir  10 mg/kg (Ideal) Intravenous Q8H    aspirin  81 mg Per OG tube Daily    enoxaparin  40 mg Subcutaneous Daily    insulin glargine U-100  8 Units Subcutaneous QHS    levETIRAcetam (Keppra) IV (PEDS and ADULTS)  1,500 mg Intravenous BID    losartan  100 mg Per OG tube Daily    montelukast  10 mg Per OG tube QHS    pantoprazole  40 mg Intravenous Daily       Current Intravenous Infusions   fentanyl  0-250 mcg/hr Intravenous Continuous 25 mL/hr at 04/24/25 0836 250 mcg/hr at 04/24/25 0836    midazolam  0-5 mg/hr Intravenous Continuous 5 mL/hr at 04/24/25 0821 5 mg/hr at 04/24/25 0821    NORepinephrine bitartrate-D5W  0-3 mcg/kg/min (Dosing Weight) Intravenous Continuous 12.8 mL/hr at 04/24/25 0821 0.08 mcg/kg/min at 04/24/25 0821    propofoL  0-50 mcg/kg/min (Dosing Weight) Intravenous Continuous 25.6 mL/hr at 04/24/25 0834 50 mcg/kg/min at 04/24/25 0834                Objective:       Intake/Output Summary (Last 24 hours) at 4/24/2025 0932  Last data filed at 4/24/2025 0821  Gross per 24 hour   Intake 3662.67 ml    Output 2100 ml   Net 1562.67 ml         Vital Signs (Most Recent):  Temp: 96.1 °F (35.6 °C) (04/24/25 0815)  Pulse: 67 (04/24/25 0815)  Resp: (!) 22 (04/24/25 0815)  BP: 128/70 (04/24/25 0815)  SpO2: 98 % (04/24/25 0815)  Body mass index is 31.29 kg/m².  Weight: 85.3 kg (188 lb 0.8 oz) Vital Signs (24h Range):  Temp:  [96.1 °F (35.6 °C)-103.1 °F (39.5 °C)] 96.1 °F (35.6 °C)  Pulse:  [] 67  Resp:  [19-41] 22  SpO2:  [92 %-100 %] 98 %  BP: ()/(44-92) 128/70     Physical Exam  Vitals reviewed.   Constitutional:       Comments: Patient intubated and sedated   HENT:      Head: Normocephalic and atraumatic.      Mouth/Throat:      Comments: ET tube in place  Cardiovascular:      Rate and Rhythm: Normal rate and regular rhythm.      Heart sounds: No murmur heard.     No friction rub. No gallop.   Pulmonary:      Breath sounds: No wheezing, rhonchi or rales.   Abdominal:      General: There is no distension.      Palpations: Abdomen is soft.      Tenderness: There is no abdominal tenderness.   Musculoskeletal:      Right lower leg: No edema.      Left lower leg: No edema.   Skin:     General: Skin is warm.   Neurological:      Comments: Unable to assess secondary to patient currently being intubated and sedated       Lines/Drains/Airways       Peripherally Inserted Central Catheter Line  Duration             PICC Triple Lumen 04/21/25 2100 left basilic 2 days              Drain  Duration                  NG/OG Tube 04/11/25 1530 12 days         Urethral Catheter 04/11/25 1500 12 days              Airway  Duration                  Airway - Non-Surgical 04/19/25 2135 4 days              Peripheral Intravenous Line  Duration                  Peripheral IV - Single Lumen 18 G Left;Posterior Hand -- days         Peripheral IV - Single Lumen 04/12/25 2250 18 G 2 1/4 in Anterior;Left Forearm 11 days                    Significant Labs:    Lab Results   Component Value Date    WBC 9.82 04/20/2025    HGB 9.7 (L)  04/20/2025    HCT 30.6 (L) 04/20/2025    MCV 79.3 (L) 04/20/2025     (H) 04/20/2025           BMP  Lab Results   Component Value Date     (L) 04/23/2025    K 4.1 04/23/2025    CO2 23 04/23/2025    BUN 29.3 (H) 04/23/2025    CREATININE 0.78 04/23/2025    CALCIUM 9.7 04/23/2025    AGAP 6.0 04/23/2025    EGFRNONAA >60 06/09/2022         ABG  Recent Labs   Lab 04/21/25 0619   PH 7.400   PO2 105.0*   PCO2 42.0   HCO3 26.0   POCBASEDEF 1.00       Mechanical Ventilation Support:  Vent Mode: A/C (04/24/25 0512)  Ventilator Initiated: Yes (04/11/25 1445)  Set Rate: 18 BPM (04/24/25 0512)  Vt Set: 450 mL (04/24/25 0512)  PEEP/CPAP: 8 cmH20 (04/24/25 0512)  Oxygen Concentration (%): 100 (04/24/25 0800)  Peak Airway Pressure: 14 cmH20 (04/24/25 0512)  Total Ve: 14.8 L/m (04/24/25 0512)  F/VT Ratio<105 (RSBI): (!) 55.56 (04/24/25 0304)      Significant Imaging:  I have reviewed the pertinent imaging within the past 24 hours.  No new imaging    Assessment/Plan:     Assessment  Herpes simplex encephalitis.  Acyclovir initiated on April 12th  Hypoxemic respiratory failure status post intubation on 04/11    Plan  Continue acyclovir with end date 05/03/25.  Monitor renal function  Continue mechanical ventilatory support  Anticonvulsants as per Neurology  Adjust sedation as per patient's needs; currently on fentanyl, Versed and propofol  Enteral TF's  Consider re-evaluation by Infectious Disease with continuation of febrile episodes despite appropriate antiviral therapy  Palliative Medicine has talked to family about long-term plans.  Current plan is to continue treatment but unlikely they would wish trach and PEG if there is no signs of neurologic recovery.    DVT Prophylaxis: SCDs  GI Prophylaxis: Protonix     33 minutes of critical care was time spent personally by me on the following activities: development of treatment plan with patient or surrogate and bedside caregivers, discussions with consultants, evaluation of  patient's response to treatment, examination of patient, ordering and performing treatments and interventions, ordering and review of laboratory studies, ordering and review of radiographic studies, pulse oximetry, re-evaluation of patient's condition.  This critical care time did not overlap with that of any other provider or involve time for any procedures.     Jona Nazario MD  Pulmonary Critical Care Medicine  [facility] - 7th Floor ICU  DOS: 04/24/2025

## 2025-04-25 NOTE — PLAN OF CARE
Problem: Adult Inpatient Plan of Care  Goal: Plan of Care Review  Outcome: Progressing  Goal: Patient-Specific Goal (Individualized)  Outcome: Progressing  Goal: Absence of Hospital-Acquired Illness or Injury  Outcome: Progressing  Goal: Optimal Comfort and Wellbeing  Outcome: Progressing  Goal: Readiness for Transition of Care  Outcome: Progressing     Problem: Diabetes Comorbidity  Goal: Blood Glucose Level Within Targeted Range  Outcome: Progressing     Problem: Wound  Goal: Optimal Coping  Outcome: Progressing  Goal: Optimal Functional Ability  Outcome: Progressing  Goal: Absence of Infection Signs and Symptoms  Outcome: Progressing  Goal: Improved Oral Intake  Outcome: Progressing  Goal: Optimal Pain Control and Function  Outcome: Progressing  Goal: Skin Health and Integrity  Outcome: Progressing  Goal: Optimal Wound Healing  Outcome: Progressing     Problem: Fall Injury Risk  Goal: Absence of Fall and Fall-Related Injury  Outcome: Progressing     Problem: Infection  Goal: Absence of Infection Signs and Symptoms  Outcome: Progressing     Problem: Skin Injury Risk Increased  Goal: Skin Health and Integrity  Outcome: Progressing

## 2025-04-25 NOTE — PROGRESS NOTES
"[facility] - 7th Floor ICU  Pulmonary Critical Care Note    Patient Name: River Sheehan Jr.  MRN: [id number]  Admission Date: 4/9/2025  Hospital Length of Stay: 15 days  Code Status: No Order  Attending Provider: JERROD Artis MD  Primary Care Provider: Chio Villela MD     Subjective:     HPI:   Patient is a 79-year-old male with a past medical history of carotid artery stenosis, CAD, diabetes mellitus, hyperlipidemia, hypertension, and anemia who initially presented to the emergency department after a syncopal episode occurring at home.  Patient was accompanied by his wife at this time who stated that he was very physically active at home but did complain of shortness of breath at times.  She reported that he was having episodes of "black outs" over the last several weeks.  He has been following cardiology on outpatient basis with recent adjustments in his home medications.  Wife also reported fever at home.  Patient was initially admitted to the hospital medicine service for these issues and possible pneumonia present on initial imaging.  He was started on Rocephin and azithromycin for suspected CAP.  However, this morning patient was found to have left-sided deficits including leftward gaze, left hemiparesis, and confusion.  He was not following commands at this time which was a significant change from patient's baseline.  Patient was taken to CT scan STAT to evaluate for stroke.  Patient displayed seizure-like activity in route to CT scanner and he was administered Ativan 2 mg IV.  Patient desaturated after administration of Ativan and ultimately required rapid sequence intubation.  CT and MRI flare displaying right insular chronic stroke.  Patient was admitted to the ICU after this event.      Hospital Course/Significant events:  4/11/2025: Patient admitted to the ICU status post intubation      24 Hour Interval History:  Last fever 2130 last night, temperature at that time 100.6 F. no " further fevers into this morning.  Neurologic status largely unchanged, tachypnea but no eye opening or command following with sedation wean per nursing staff.      Review of systems unobtainable due to intubation, sedation.      Past Medical History:   Diagnosis Date    Acid reflux     Adenomatous polyp of ascending colon 09/20/2021    Arthritis     Asymptomatic stenosis of left carotid artery     CAD (coronary artery disease)     Carotid artery stenosis     US 3/26/25 less than 50% bilateral    Colon polyps 02/20/2025    Transvers polyp benigne mucosa, Ascending Colon Tubular adenoma, Rectum hyperplastic    COVID-19 07/06/2022    Depression     Diabetes mellitus     Gout     Hearing loss     High cholesterol     HTN (hypertension)     Kidney stone     Macrocytic anemia     Osteoporosis     Seasonal allergies        Past Surgical History:   Procedure Laterality Date    CAROTID ENDARTERECTOMY Left 09/12/2024    Procedure: ENDARTERECTOMY-CAROTID;  Surgeon: Hany Pendleton MD;  Location: [facility];  Service: Peripheral Vascular;  Laterality: Left;    COLONOSCOPY W/ BIOPSIES  09/20/2021    Dr. Chun Smith    COLONOSCOPY W/ BIOPSIES  02/20/2025    CYSTOSCOPY      EXTRACAPSULAR EXTRACTION OF CATARACT      HERNIA REPAIR      LITHOTRIPSY  09/2023    RETROGRADE PYELOGRAPHY      WISDOM TOOTH EXTRACTION         Current Outpatient Medications   Medication Instructions    albuterol (PROAIR HFA) 90 mcg/actuation inhaler 2 puffs, Inhalation, Every 6 hours PRN, Rescue    allopurinoL (ZYLOPRIM) 100 MG tablet TAKE 1 TABLET EVERY DAY    aspirin (ECOTRIN) 81 mg, Daily    atorvastatin (LIPITOR) 20 MG tablet TAKE 1 TABLET AT BEDTIME    blood sugar diagnostic (TRUE METRIX GLUCOSE TEST STRIP) Strp 1 strip, Misc.(Non-Drug; Combo Route), Daily    blood-glucose meter (TRUE METRIX AIR GLUCOSE METER) kit Test daily for DM II E11.9    busPIRone (BUSPAR) 10 MG tablet TAKE 1/2 TO 1 TABLET THREE TIMES DAILY    cinnamon bark 1,000 mg, Daily     citalopram (CELEXA) 20 mg, Oral    gabapentin (NEURONTIN) 100 MG capsule TAKE 2 CAPSULES (200 MG TOTAL) THREE TIMES DAILY    glucosamine/chondr navarro A sod (OSTEO BI-FLEX ORAL) Daily    KRILL OIL ORAL 500 mg, Daily    losartan (COZAAR) 100 mg, Daily    metFORMIN (GLUCOPHAGE) 500 mg, Oral, 2 times daily with meals    midodrine (PROAMATINE) 2.5 mg, Oral, 2 times daily with meals    montelukast (SINGULAIR) 10 mg, Oral, Nightly    pantoprazole (PROTONIX) 40 MG tablet TAKE 1 TABLET EVERY DAY    pioglitazone (ACTOS) 15 mg, Oral, Daily    tamsulosin (FLOMAX) 0.4 mg, Daily       Review of patient's allergies indicates:  No Known Allergies     Current Inpatient Medications   acyclovir  10 mg/kg (Ideal) Intravenous Q8H    aspirin  81 mg Per OG tube Daily    enoxaparin  40 mg Subcutaneous Daily    insulin glargine U-100  8 Units Subcutaneous QHS    levETIRAcetam (Keppra) IV (PEDS and ADULTS)  1,500 mg Intravenous BID    losartan  100 mg Per OG tube Daily    montelukast  10 mg Per OG tube QHS    pantoprazole  40 mg Intravenous Daily       Current Intravenous Infusions   fentanyl  0-250 mcg/hr Intravenous Continuous 25 mL/hr at 04/25/25 0559 250 mcg/hr at 04/25/25 0559    midazolam  0-5 mg/hr Intravenous Continuous 5 mL/hr at 04/25/25 0524 5 mg/hr at 04/25/25 0524    NORepinephrine bitartrate-D5W  0-3 mcg/kg/min (Dosing Weight) Intravenous Continuous 3.2 mL/hr at 04/25/25 0524 0.02 mcg/kg/min at 04/25/25 0524    propofoL  0-50 mcg/kg/min (Dosing Weight) Intravenous Continuous 25.6 mL/hr at 04/25/25 0826 50 mcg/kg/min at 04/25/25 0826       Objective:       Intake/Output Summary (Last 24 hours) at 4/25/2025 0912  Last data filed at 4/25/2025 0800  Gross per 24 hour   Intake 3549.94 ml   Output 1750 ml   Net 1799.94 ml         Vital Signs (Most Recent):  Temp: 97.5 °F (36.4 °C) (04/25/25 0845)  Pulse: 86 (04/25/25 0845)  Resp: (!) 25 (04/25/25 0845)  BP: (!) 131/55 (04/25/25 0831)  SpO2: 95 % (04/25/25 0845)  Body mass index is  31.29 kg/m².  Weight: 85.3 kg (188 lb 0.8 oz) Vital Signs (24h Range):  Temp:  [92.5 °F (33.6 °C)-101.7 °F (38.7 °C)] 97.5 °F (36.4 °C)  Pulse:  [] 86  Resp:  [0-27] 25  SpO2:  [91 %-100 %] 95 %  BP: ()/(43-81) 131/55         Physical exam:  Gen- intubated, sedated, unresponsive  HENT- ATNC, MMM, ETT in place  CV- RRR  Resp- faint bibasilar crackles with mildly reduced right basilar breath sounds; oxygen saturations 94% on 60% FiO2   MSK- WWP, trace edema to the level of the hips  Neuro- sedated, unresponsive to verbal or painful stimuli  Psych- unable to assess due to neurologic status      Lines/Drains/Airways       Peripherally Inserted Central Catheter Line  Duration             PICC Triple Lumen 04/21/25 2100 left basilic 3 days              Drain  Duration                  NG/OG Tube 04/11/25 1530 13 days         Urethral Catheter 04/11/25 1500 13 days              Airway  Duration                  Airway - Non-Surgical 04/19/25 2135 5 days              Peripheral Intravenous Line  Duration                  Peripheral IV - Single Lumen 18 G Left;Posterior Hand -- days         Peripheral IV - Single Lumen 04/12/25 2250 18 G 2 1/4 in Anterior;Left Forearm 12 days                    Significant Labs:  Lab Results   Component Value Date    WBC 9.82 04/20/2025    HGB 9.7 (L) 04/20/2025    HCT 30.6 (L) 04/20/2025    MCV 79.3 (L) 04/20/2025     (H) 04/20/2025       BMP  Lab Results   Component Value Date     (L) 04/23/2025    K 4.1 04/23/2025    CO2 23 04/23/2025    BUN 29.3 (H) 04/23/2025    CREATININE 0.78 04/23/2025    CALCIUM 9.7 04/23/2025    AGAP 6.0 04/23/2025    EGFRNONAA >60 06/09/2022       ABG  Recent Labs   Lab 04/25/25  0537   PH 7.350   PO2 78.0*   PCO2 46.0*   HCO3 25.4   POCBASEDEF -0.30       Mechanical Ventilation Support:  Vent Mode: A/C (04/25/25 0439)  Ventilator Initiated: Yes (04/11/25 1445)  Set Rate: 18 BPM (04/25/25 0439)  Vt Set: 450 mL (04/25/25 0439)  PEEP/CPAP: 8  cmH20 (04/25/25 0439)  Oxygen Concentration (%): 60 (04/25/25 0439)  Peak Airway Pressure: 24 cmH20 (04/25/25 0439)  Total Ve: 9.8 L/m (04/25/25 0439)  F/VT Ratio<105 (RSBI): (!) 53.66 (04/25/25 0048)        Assessment/Plan:     Assessment  Herpes simplex encephalitis.  Acyclovir initiated on April 12th  Hypoxemic respiratory failure status post intubation on 04/11    Plan  Continue acyclovir with end date 05/03/25  Continue mechanical ventilatory support, inappropriate for extubation at this time given poor mental status  Anticonvulsants as per Neurology  Continue sedation vacations daily, reported tachypnea without reliable command following  Infectious disease service to re-evaluate today, blood and respiratory cultures pending, continues to have fevers, last 100.6 F overnight  Continue enteral feeds  Palliative care service following, family planning for likely withdrawal of life-sustaining treatment in the near future if significant progress is not document      DVT Prophylaxis: SCDs  GI Prophylaxis: Protonix       I spent 33 minutes providing critical care services to this patient.  This does not include time spent for separately billed procedures.      Roc Torres MD  Pulmonary Critical Care Medicine  [facility] - 7th Floor ICU  DOS: 04/25/2025

## 2025-04-25 NOTE — PROGRESS NOTES
Progress Note                                                           Infectious disease   Admit Date: 4/9/2025    SUBJECTIVE:     Follow-up For:  Acute respiratory failure with hypoxia and hypercarbia    HPI/Interval history:  Was asked to re-evaluate patient for ongoing fevers  - T-max 101.7° last night, last WBC 9.8 on 04/20, creatinine normal, .8, ESR 87.   - remains intubated, thick respiratory secretions per RN.  No active wounds.    - PICC line was placed on 04/21, PIV single lumen placed on 4/12?  -NGT placed 4/11  - no diarrhea at this time, tolerating feeds.    Vital Signs Range (Last 24H):  Temp:  [92.8 °F (33.8 °C)-101.7 °F (38.7 °C)]   Pulse:  []   Resp:  [0-27]   BP: ()/(43-81)   SpO2:  [91 %-100 %]     Physical Exam:  Constitutional:  Sedated on the vent   HEENT:  ET tube in-situ, NGT in place  Cardiovascular: regular rate and rhythm, S1, S2  Pulmonary: normal respiratory effort, on the vent  Gastrointestinal: non-distended, bowel sounds normal  Muscular/Skeletal:  Bilateral lower and upper extremity edema   Skin: No rashes    Laboratory:  CBC:   Recent Labs   Lab 04/20/25  0454   WBC 9.82   RBC 3.86*   HGB 9.7*   HCT 30.6*   *   MCV 79.3*   MCH 25.1*   MCHC 31.7*     BMP:   Recent Labs   Lab 04/23/25  2357   *   K 4.1      CO2 23   BUN 29.3*   CREATININE 0.78   CALCIUM 9.7     CMP:   Recent Labs   Lab 04/21/25  0747 04/23/25  2357   CALCIUM 8.7* 9.7   ALBUMIN 2.3*  --    * 133*   K 5.3* 4.1   CO2 24 23    104   BUN 22.4 29.3*   CREATININE 0.73 0.78   ALKPHOS 165*  --    *  --    *  --    BILITOT 0.4  --      Microbiology Results (last 7 days)       Procedure Component Value Units Date/Time    Respiratory Culture [[id number]]     Order Status: Sent Specimen: Respiratory from Endotracheal Aspirate     Blood Culture [[id number]] Collected: 04/24/25 1055    Order Status: Resulted Specimen: Blood from Hand, Right Updated: 04/24/25 1100     Cerebrospinal Fluid Culture [[id number]] Collected: 04/15/25 0850    Order Status: Completed Specimen: CSF (Spinal Fluid) from CSF Tap, Tube 3 Updated: 04/20/25 0740     CULTURE, CSF Final Report: At 5 days. No growth     GRAM STAIN No WBCs, No bacteria seen            Labs: I personally reviewed and interpreted the above lab results.    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       Type 2 diabetes mellitus with stage 2 chronic kidney disease, without long-term current use of insulin [E11.22, N18.2]  Yes      Resolved Hospital Problems    Diagnosis Date Resolved POA    Seizures [R56.9] 04/15/2025 Yes       ASSESSMENT:    Herpes simplex virus 1 encephalitis, severe, no clinical improvement yet  Persistent Daily fever, likely due to his encephalitis?    - last WBCs 4/20 normal  - very elevated  and ESR 8, PCT 2.08  -T-max 101.7° last night  - remains intubated, thick respiratory secretions per RN.  No active wounds.    - PICC line was placed on 04/21, PIV single lumen placed on 4/12?  - NGT placed 4/11  - no diarrhea at this time, tolerating feeds.    3.  Hypoxemic respiratory failure mechanical ventilation as of 4/1 without evidence of pneumonia (normal CT lungs on admission, negative sputum bacterial culture).  -new chest x-ray 4/24 without any new findings  - PCT 2.08    4. Mild transaminitis - probably drug-induced, acyclovir versus Keppra.       PLAN:    Check CBC with diff, CMP today  Complete 21 days of acyclovir (started 4/12 so 2 more weeks remaining) with continued close monitoring of renal function.    Added cefepime presumptively  and will monitor  Repeat MRSA screen  Olguin change, obtain clean-catch urine for urinalysis with reflex to culture  Replace any old lines including PIV  Follow-up on blood cultures in progress  Follow-up on respiratory culture in progress    Discussed with RN, and family at bedside    Prognosis remains guarded.  ID will follow along

## 2025-04-26 NOTE — PROGRESS NOTES
Progress Note                                                           Infectious disease   Admit Date: 4/9/2025    SUBJECTIVE:     Follow-up For:  Acute respiratory failure with hypoxia and hypercarbia    HPI/Interval history:  Was asked to re-evaluate patient for ongoing fevers- 4/24  - fever curve, T-max a 100.1, WBC remains elevated  - blood culture negative so far  -remains intubated  -     Vital Signs Range (Last 24H):  Temp:  [97.7 °F (36.5 °C)-100.1 °F (37.8 °C)]   Pulse:  [66-94]   Resp:  [19-35]   BP: ()/(45-79)   SpO2:  [90 %-100 %]     Physical Exam:  Constitutional:  Sedated on the vent -no change  HEENT:  ET tube in-situ, NGT in place  Cardiovascular: regular rate and rhythm, S1, S2  Pulmonary: normal respiratory effort, on the vent    Laboratory:  CBC:   Recent Labs   Lab 04/26/25  0305   WBC 12.62*   RBC 3.23*   HGB 8.2*   HCT 26.9*   *   MCV 83.3   MCH 25.4*   MCHC 30.5*     BMP:   Recent Labs   Lab 04/23/25  2357   *   K 4.1      CO2 23   BUN 29.3*   CREATININE 0.78   CALCIUM 9.7     CMP:   Recent Labs   Lab 04/21/25  0747 04/23/25  2357   CALCIUM 8.7* 9.7   ALBUMIN 2.3*  --    * 133*   K 5.3* 4.1   CO2 24 23    104   BUN 22.4 29.3*   CREATININE 0.73 0.78   ALKPHOS 165*  --    *  --    *  --    BILITOT 0.4  --      Microbiology Results (last 7 days)       Procedure Component Value Units Date/Time    Urine culture [[id number]] Collected: 04/25/25 1643    Order Status: Completed Specimen: Urine Updated: 04/26/25 0728     Urine Culture No Growth At 24 Hours    Respiratory Culture [[id number]] Collected: 04/25/25 1223    Order Status: Completed Specimen: Respiratory from Endotracheal Aspirate Updated: 04/26/25 0726     Respiratory Culture Normal respiratory gerry     GRAM STAIN Quality 3+      Many Gram positive cocci      Few Gram Positive Rods      Rare Yeast    Blood Culture [[id number]]  (Normal) Collected: 04/24/25 1055    Order Status: Completed  Specimen: Blood from Hand, Right Updated: 04/25/25 1202     Blood Culture No Growth At 24 Hours    Cerebrospinal Fluid Culture [[id number]] Collected: 04/15/25 0850    Order Status: Completed Specimen: CSF (Spinal Fluid) from CSF Tap, Tube 3 Updated: 04/20/25 0740     CULTURE, CSF Final Report: At 5 days. No growth     GRAM STAIN No WBCs, No bacteria seen            Labs: I personally reviewed and interpreted the above lab results.    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       Type 2 diabetes mellitus with stage 2 chronic kidney disease, without long-term current use of insulin [E11.22, N18.2]  Yes      Resolved Hospital Problems    Diagnosis Date Resolved POA    Seizures [R56.9] 04/15/2025 Yes       ASSESSMENT:    Herpes simplex virus 1 encephalitis, severe, no clinical improvement yet  Persistent Daily fever, likely due to his encephalitis?    - last WBCs 4/20 normal, elevated LFTs  - very elevated  and ESR 8, PCT 2.08  -T-max 100.7° last night  - remains intubated, thick respiratory secretions per RN.  No active wounds.    - PICC line was placed on 04/21, PIV single lumen placed on 4/12?  - NGT placed 4/11  - no diarrhea at this time, tolerating feeds.  -repeat MRSA negative, Olguin changed 4/25, urine culture negative at 24 hours  -blood culture 2/24 negative at 48 hours    3.  Hypoxemic respiratory failure mechanical ventilation as of 4/1 without evidence of pneumonia (normal CT lungs on admission, negative sputum bacterial culture).  -new chest x-ray 4/24 without any new findings  - PCT 2.08  - Respiratory culture 4/25 with normal  gerry so far    4. Mild transaminitis - probably drug-induced, acyclovir versus Keppra.       PLAN:    Continue with current plan at the moment.  Discussed with RN, and the intensivist.  Family have decided for comfort measures only.  He will be extubated this afternoon    ID will sign off.  Please call back if any further questions

## 2025-04-26 NOTE — PLAN OF CARE
Problem: Adult Inpatient Plan of Care  Goal: Plan of Care Review  Outcome: Progressing  Goal: Patient-Specific Goal (Individualized)  Outcome: Progressing  Goal: Absence of Hospital-Acquired Illness or Injury  Outcome: Progressing  Goal: Optimal Comfort and Wellbeing  Outcome: Progressing  Goal: Readiness for Transition of Care  Outcome: Progressing     Problem: Diabetes Comorbidity  Goal: Blood Glucose Level Within Targeted Range  Outcome: Progressing     Problem: Wound  Goal: Optimal Coping  Outcome: Progressing  Goal: Optimal Functional Ability  Outcome: Progressing  Goal: Absence of Infection Signs and Symptoms  Outcome: Progressing  Goal: Improved Oral Intake  Outcome: Progressing  Goal: Optimal Pain Control and Function  Outcome: Progressing  Goal: Skin Health and Integrity  Outcome: Progressing  Goal: Optimal Wound Healing  Outcome: Progressing     Problem: Fall Injury Risk  Goal: Absence of Fall and Fall-Related Injury  Outcome: Progressing     Problem: Infection  Goal: Absence of Infection Signs and Symptoms  Outcome: Progressing     Problem: Skin Injury Risk Increased  Goal: Skin Health and Integrity  Outcome: Progressing     Problem: Mechanical Ventilation Invasive  Goal: Effective Communication  Outcome: Progressing  Goal: Optimal Device Function  Outcome: Progressing  Goal: Mechanical Ventilation Liberation  Outcome: Progressing  Goal: Optimal Nutrition Delivery  Outcome: Progressing  Goal: Absence of Device-Related Skin and Tissue Injury  Outcome: Progressing  Goal: Absence of Ventilator-Induced Lung Injury  Outcome: Progressing     Problem: Artificial Airway  Goal: Effective Communication  Outcome: Progressing  Goal: Optimal Device Function  Outcome: Progressing  Goal: Absence of Device-Related Skin or Tissue Injury  Outcome: Progressing     Problem: Pneumonia  Goal: Fluid Balance  Outcome: Progressing  Goal: Resolution of Infection Signs and Symptoms  Outcome: Progressing  Goal: Effective  Oxygenation and Ventilation  Outcome: Progressing     Problem: Coping Ineffective  Goal: Effective Coping  Outcome: Progressing

## 2025-04-26 NOTE — DISCHARGE SUMMARY
[facility]  Pulmonology/Critical Care  Discharge Summary      Patient Name: River Sheehan Jr.  MRN: [id number]  Admission Date: 4/9/2025  Hospital Length of Stay: 16 days  Discharge Date and Time: Patient death pronounced at 1557 on 04/26/2025 by Dr. Jona Nazario    Reason for admission: Acute respiratory failure with hypoxia and hypercarbia    Primary (Principal), Secondary, Final Diagnoses:  1. Orthostatic hypotension    2. Syncope    3. Syncope, unspecified syncope type    4. Fever, unspecified fever cause    5. Lactic acidosis    6. Malaise    7. Chronic anemia    8. Routine check-up    9. Other chest pain    10. Endocarditis    11. Acute respiratory failure with hypoxia and hypercarbia    12. 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)      Procedures performed: * No surgery found *    Care, treatment & services provided:    Orders Placed This Encounter   Procedures    Critical Care    INTUBATION    Gastric Emptying    FAN O2COOL BTTRY PORTABLE 5IN    PACK PUP AUBREE    Compression Device (SCD)    Feeding Pump    Blood culture #1 **CANNOT BE ORDERED STAT**    Blood culture #2 **CANNOT BE ORDERED STAT**    Respiratory Culture    Blood Culture    Blood Culture    Cryptococcal antigen, CSF    Fungal Culture    Geovanna Ink Prep CSF    Cerebrospinal Fluid Culture    Blood Culture    Respiratory Culture    Urine culture    X-Ray Chest AP Portable    CT Chest Without Contrast    CT Abdomen Pelvis With IV Contrast NO Oral Contrast    Fl Modified Barium Swallow Speech    X-Ray Chest 1 View for Line/Tube Placement    CT HEAD FOR CODE STROKE    CTA Head and Neck (xpd)    XR NG/OG tube placement check, non-radiologist performed    MRI Abdomen W WO Contrast    MRI Pelvis W WO Contrast    MRI Brain W WO Contrast    IR LUMBAR PUNCTURE DIAGNOSTIC WITH IMAGING    X-Ray Chest 1 View    XR Gastric tube check, non-radiologist performed    X-Ray Chest 1 View    CT Head Without Contrast    X-Ray  Chest 1 View    X-Ray Chest 1 View    X-Ray Chest 1 View    X-Ray Chest 1 View    CBC auto differential    Comprehensive metabolic panel    COVID/RSV/FLU A&B PCR    Lactic acid, plasma    Troponin I    Urinalysis, Reflex to Urine Culture    TSH    CBC with Differential    Lactic Acid, Plasma    Lipase    Strep Group A by PCR    Reticulocytes    Folate    Vitamin B12    Iron and TIBC    Lactate dehydrogenase    Sickle cell screen    CK    CBC Auto Differential    Magnesium    Comprehensive Metabolic Panel    Respiratory Panel    Blood Gas    CBC with Differential    Hemoglobin A1c if not done in past 3 months    Ferritin    Lactic Acid, Plasma    BNP    SYPHILIS ANTIBODY (WITH REFLEX RPR)    MRSA PCR    VANCOMYCIN, TROUGH    RT Blood Gas    CBC Auto Differential    Comprehensive Metabolic Panel    CBC with Differential    Manual Differential    RT Blood Gas    CBC with Differential    [place] GENERIC ORDERABLE    CBC with Differential    Manual Differential    VANCOMYCIN, TROUGH    CBC with Differential    Protime-INR    PTT Heparin Monitoring    Encephalopathy Autoimmune Evaluation, CSF    ME Panel    Lyme Disease Serology, CSF    VDRL, CSF    Cell Count w/ Diff, CSF    Cell Count, CSF    Herpes Simplex (HSV) by PCR, CSF    Glucose, CSF    Protein, CSF    Nain-Barr Virus (EBV) Antibodies To Early Antigen, IgG    CBC with Differential    Blood Gas    Differential, CSF    CBC with Differential    CBC with Differential    RT Blood Gas    CBC with Differential    Blood Gas    CBC Auto Differential    Comprehensive Metabolic Panel    CBC with Differential    Comprehensive Metabolic Panel    CBC Auto Differential    CBC with Differential    Blood Gas    Comprehensive Metabolic Panel    Basic Metabolic Panel    Blood Gas    Procalcitonin ([facility])    C-Reactive Protein    Sedimentation Rate    CBC Auto Differential    CBC Auto Differential    CBC with Differential    Urinalysis, Reflex to Urine Culture    MRSA PCR    CBC  with Differential    Comprehensive Metabolic Panel    Diet NPO    Orthostatic blood pressure    Orthostatic vital signs    Cardiac Monitoring - Adult    Coley to [place]    Coley Catheter Care every 12 hours    Nurse to discontinue coley when patient no longer meets criteria    Post Coley Catheter Removal Protocol    Nasogastric/Orogastric tube insertion    Nasogastric/Orogastric tube maintenance    If any glucose result is less than 50 or greater than 400:    If 2nd result is less than 50 or greater than 400:    If glucose greater than 400 mg/dL treat per correction scale.  If glucose remains elevated above 400 mg/dL at next scheduled check, notify provider    Do not admin Aspart correction between scheduled prandial Aspart    Recheck Blood Glucose:    Perform Hogan Agitation Sedation Scale (RASS) hourly    Target arousal level - RASS: RASS -3 to -4    Change PICC/Midline dressing every 7 days and PRN    Keep PICC/Midline insertion site clean and dry    Designate one port (PICC) exclusively for the administration of hyperalimentation when applicable    Never clamp the PICC/Midline with a hemostat or device that could puncture the line    Flushing PICC/Midline Protocol    Notify physician    Notify physician if unable to get a blood return from PICC/Midline    Assess for signs of distress Moderate to severe use of accessory muscles, respiratory rate exceeding 25 breaths per minute, gasping, coughing, choking, increased agitation, heart rate or blood pressure greater than 20% above level prior to withdra...    Inpatient consult to Pulmonology    Inpatient consult to Infectious Diseases    Inpatient Consult to Neurology Services (General Neurology)    Inpatient consult to Interventional Radiology    Inpatient consult to Interventional Radiology    Inpatient consult to Palliative Care    Inpatient consult to PICC team (NIAS)    Inpatient consult to Infectious Diseases    Tube Feedings/Formulas 70; 1,400; Peptamen  Intense VHP; OG (start at 30 ml/hr and advance by 20 ml/hr every 4 hours as tolerated); 30; Every 4 hours    POCT ARTERIAL BLOOD GAS Blood Gas    Oxygen Continuous    Mechanical ventilation Continuous    SLP video swallow    Cardiac monitoring strips    Cardiac monitoring strips    Cardiac monitoring strips    Cardiac monitoring strips    Cardiac monitoring strips    Cardiac monitoring strips    Cardiac monitoring strips    Cardiac monitoring strips    Cardiac monitoring strips    Cardiac monitoring strips    Cardiac monitoring strips    Cardiac monitoring strips    Cardiac monitoring strips    EKG and show to ED MD    EKG 12-lead    Echo Saline Bubble? Yes    Direct antiglobulin test    Type & Screen    EEG    Insert Saline lock IV    Place in Observation    Admit to Inpatient    Transfer patient     Death note: Came to bedside to evaluate patient.  On exam patient has no pupillary response, oculocephalic reflex is not present.  No response to pain in all 4 extremities.  No heart sounds on auscultation.  No peripheral pulses felt.    Cause of Death: HSV1 encephalitis with hypoxemic respiratory failure    Discharged Condition:     Disposition:  in medical facility      Jona Nazario MD  Pulmonology

## 2025-04-26 NOTE — PROGRESS NOTES
"[facility] - 7th Floor ICU  Pulmonary Critical Care Note    Patient Name: River Sheehan Jr.  MRN: [id number]  Admission Date: 4/9/2025  Hospital Length of Stay: 16 days  Code Status: No Order  Attending Provider: Roc Torres MD  Primary Care Provider: Chio Villela MD     Subjective:     HPI:   Patient is a 79-year-old male with a past medical history of carotid artery stenosis, CAD, diabetes mellitus, hyperlipidemia, hypertension, and anemia who initially presented to the emergency department after a syncopal episode occurring at home.  Patient was accompanied by his wife at this time who stated that he was very physically active at home but did complain of shortness of breath at times.  She reported that he was having episodes of "black outs" over the last several weeks.  He has been following cardiology on outpatient basis with recent adjustments in his home medications.  Wife also reported fever at home.  Patient was initially admitted to the hospital medicine service for these issues and possible pneumonia present on initial imaging.  He was started on Rocephin and azithromycin for suspected CAP.  However, this morning patient was found to have left-sided deficits including leftward gaze, left hemiparesis, and confusion.  He was not following commands at this time which was a significant change from patient's baseline.  Patient was taken to CT scan STAT to evaluate for stroke.  Patient displayed seizure-like activity in route to CT scanner and he was administered Ativan 2 mg IV.  Patient desaturated after administration of Ativan and ultimately required rapid sequence intubation.  CT and MRI flare displaying right insular chronic stroke.  Patient was admitted to the ICU after this event.      Hospital Course/Significant events:  4/11/2025: Patient admitted to the ICU status post intubation      24 Hour Interval History:  Tmax 100.1F over the last 24 hrs. Mild improvement in " leukocytosis from yesterday. Repeat blood and respiratory culture data largely unremarkable. Neurologic status remains unchanged. ID restarted cefepime pending further workup, repeat MRSA screen negative.       Review of systems unobtainable due to intubation, sedation.      Past Medical History:   Diagnosis Date    Acid reflux     Adenomatous polyp of ascending colon 09/20/2021    Arthritis     Asymptomatic stenosis of left carotid artery     CAD (coronary artery disease)     Carotid artery stenosis     US 3/26/25 less than 50% bilateral    Colon polyps 02/20/2025    Transvers polyp benigne mucosa, Ascending Colon Tubular adenoma, Rectum hyperplastic    COVID-19 07/06/2022    Depression     Diabetes mellitus     Gout     Hearing loss     High cholesterol     HTN (hypertension)     Kidney stone     Macrocytic anemia     Osteoporosis     Seasonal allergies        Past Surgical History:   Procedure Laterality Date    CAROTID ENDARTERECTOMY Left 09/12/2024    Procedure: ENDARTERECTOMY-CAROTID;  Surgeon: Hany Pendleton MD;  Location: [facility];  Service: Peripheral Vascular;  Laterality: Left;    COLONOSCOPY W/ BIOPSIES  09/20/2021    Dr. Chun Smith    COLONOSCOPY W/ BIOPSIES  02/20/2025    CYSTOSCOPY      EXTRACAPSULAR EXTRACTION OF CATARACT      HERNIA REPAIR      LITHOTRIPSY  09/2023    RETROGRADE PYELOGRAPHY      WISDOM TOOTH EXTRACTION         Current Outpatient Medications   Medication Instructions    albuterol (PROAIR HFA) 90 mcg/actuation inhaler 2 puffs, Inhalation, Every 6 hours PRN, Rescue    allopurinoL (ZYLOPRIM) 100 MG tablet TAKE 1 TABLET EVERY DAY    aspirin (ECOTRIN) 81 mg, Daily    atorvastatin (LIPITOR) 20 MG tablet TAKE 1 TABLET AT BEDTIME    blood sugar diagnostic (TRUE METRIX GLUCOSE TEST STRIP) Strp 1 strip, Misc.(Non-Drug; Combo Route), Daily    blood-glucose meter (TRUE METRIX AIR GLUCOSE METER) kit Test daily for DM II E11.9    busPIRone (BUSPAR) 10 MG tablet TAKE 1/2 TO 1 TABLET  THREE TIMES DAILY    cinnamon bark 1,000 mg, Daily    citalopram (CELEXA) 20 mg, Oral    gabapentin (NEURONTIN) 100 MG capsule TAKE 2 CAPSULES (200 MG TOTAL) THREE TIMES DAILY    glucosamine/chondr navarro A sod (OSTEO BI-FLEX ORAL) Daily    KRILL OIL ORAL 500 mg, Daily    losartan (COZAAR) 100 mg, Daily    metFORMIN (GLUCOPHAGE) 500 mg, Oral, 2 times daily with meals    midodrine (PROAMATINE) 2.5 mg, Oral, 2 times daily with meals    montelukast (SINGULAIR) 10 mg, Oral, Nightly    pantoprazole (PROTONIX) 40 MG tablet TAKE 1 TABLET EVERY DAY    pioglitazone (ACTOS) 15 mg, Oral, Daily    tamsulosin (FLOMAX) 0.4 mg, Daily       Review of patient's allergies indicates:  No Known Allergies     Current Inpatient Medications   acyclovir  10 mg/kg (Ideal) Intravenous Q8H    aspirin  81 mg Per OG tube Daily    ceFEPime IV (PEDS and ADULTS)  2 g Intravenous Q8H    enoxaparin  40 mg Subcutaneous Daily    insulin glargine U-100  8 Units Subcutaneous QHS    levETIRAcetam (Keppra) IV (PEDS and ADULTS)  1,500 mg Intravenous BID    losartan  100 mg Per OG tube Daily    montelukast  10 mg Per OG tube QHS    pantoprazole  40 mg Intravenous Daily       Current Intravenous Infusions   fentanyl  0-250 mcg/hr Intravenous Continuous 25 mL/hr at 04/26/25 0534 250 mcg/hr at 04/26/25 0534    midazolam  0-5 mg/hr Intravenous Continuous 5 mL/hr at 04/26/25 0534 5 mg/hr at 04/26/25 0534    NORepinephrine bitartrate-D5W  0-3 mcg/kg/min (Dosing Weight) Intravenous Continuous   Stopped at 04/25/25 1909    propofoL  0-50 mcg/kg/min (Dosing Weight) Intravenous Continuous 25.6 mL/hr at 04/26/25 0803 50 mcg/kg/min at 04/26/25 0803       Objective:       Intake/Output Summary (Last 24 hours) at 4/26/2025 0943  Last data filed at 4/26/2025 0800  Gross per 24 hour   Intake 2571.28 ml   Output 2275 ml   Net 296.28 ml         Vital Signs (Most Recent):  Temp: 100.1 °F (37.8 °C) (04/26/25 0800)  Pulse: 80 (04/26/25 0816)  Resp: (!) 26 (04/26/25 0816)  BP: (!)  109/54 (04/26/25 0816)  SpO2: 98 % (04/26/25 0816)  Body mass index is 31.29 kg/m².  Weight: 85.3 kg (188 lb 0.8 oz) Vital Signs (24h Range):  Temp:  [97.7 °F (36.5 °C)-100.1 °F (37.8 °C)] 100.1 °F (37.8 °C)  Pulse:  [66-94] 80  Resp:  [19-35] 26  SpO2:  [90 %-100 %] 98 %  BP: ()/(43-79) 109/54         Physical exam:  Gen- intubated, sedated, unresponsive  HENT- ATNC, MMM, ETT in place  CV- RRR  Resp- faint bibasilar crackles with mildly reduced right basilar breath sounds; oxygen saturations 94% on 60% FiO2   MSK- WWP, trace edema to the level of the hips  Neuro- sedated, unresponsive to verbal or painful stimuli  Psych- unable to assess due to neurologic status      Lines/Drains/Airways       Peripherally Inserted Central Catheter Line  Duration             PICC Triple Lumen 04/21/25 2100 left basilic 4 days              Drain  Duration                  NG/OG Tube 04/11/25 1530 14 days         Urethral Catheter 04/25/25 1600 <1 day              Airway  Duration                  Airway - Non-Surgical 04/19/25 2135 6 days              Peripheral Intravenous Line  Duration                  Peripheral IV - Single Lumen 18 G Left;Posterior Hand -- days         Peripheral IV - Single Lumen 04/12/25 2250 18 G 2 1/4 in Anterior;Left Forearm 13 days                    Significant Labs:  Lab Results   Component Value Date    WBC 12.62 (H) 04/26/2025    HGB 8.2 (L) 04/26/2025    HCT 26.9 (L) 04/26/2025    MCV 83.3 04/26/2025     (H) 04/26/2025       BMP  Lab Results   Component Value Date     (L) 04/23/2025    K 4.1 04/23/2025    CO2 23 04/23/2025    BUN 29.3 (H) 04/23/2025    CREATININE 0.78 04/23/2025    CALCIUM 9.7 04/23/2025    AGAP 6.0 04/23/2025    EGFRNONAA >60 06/09/2022       ABG  Recent Labs   Lab 04/25/25  0537   PH 7.350   PO2 78.0*   PCO2 46.0*   HCO3 25.4   POCBASEDEF -0.30       Mechanical Ventilation Support:  Vent Mode: A/C (04/26/25 0542)  Ventilator Initiated: Yes (04/11/25 1445)  Set  Rate: 18 BPM (04/26/25 0542)  Vt Set: 450 mL (04/26/25 0542)  PEEP/CPAP: 8 cmH20 (04/26/25 0542)  Oxygen Concentration (%): 30 (04/26/25 0800)  Peak Airway Pressure: 24 cmH20 (04/26/25 0542)  Plateau Pressure: 3 cmH20 (04/25/25 1013)  Total Ve: 10.4 L/m (04/26/25 0542)  F/VT Ratio<105 (RSBI): (!) 52.17 (04/25/25 2218)        Assessment/Plan:     Assessment  Herpes simplex encephalitis.  Acyclovir initiated on April 12th  Hypoxemic respiratory failure status post intubation on 04/11    Plan  Continue acyclovir with end date 05/03/25  Continue mechanical ventilatory support, inappropriate for extubation at this time given poor mental status  Anticonvulsants as per Neurology  Continue sedation vacations daily, reported tachypnea without reliable command following  Infectious disease service following, restarted cefepime yesterday; repeat culture data negative including blood and respiratory, repeat MRSA screen negative   Continue enteral feeds  Palliative care service following, family planning for likely withdrawal of life-sustaining treatment in the near future if significant progress is not documented      DVT Prophylaxis: SCDs  GI Prophylaxis: Protonix       I spent 32 minutes providing critical care services to this patient.  This does not include time spent for separately billed procedures.      Roc Torres MD  Pulmonary Critical Care Medicine  [facility] - 7th Floor ICU  DOS: 04/26/2025

## 2025-04-27 LAB
BACTERIA SPT CULT: NORMAL
GRAM STN SPEC: NORMAL

## 2025-04-28 LAB — BACTERIA UR CULT: ABNORMAL

## 2025-04-29 LAB
BACTERIA BLD CULT: NORMAL
FUNGUS SPEC CULT: NORMAL

## (undated) DEVICE — NDL MAGELLAN SAFETY 18G 1.5IN

## (undated) DEVICE — SUT 2-0 12-18IN SILK

## (undated) DEVICE — BLADE SCALP OPHTL BEVEL STR

## (undated) DEVICE — BAG MEDI-PLAST DECANTER C-FLOW

## (undated) DEVICE — NDL 27G X 1 1/4

## (undated) DEVICE — DRESSING ANTIMICROBIAL 1 INCH

## (undated) DEVICE — COVER PROBE US 5.5X58L NON LTX

## (undated) DEVICE — SUT MONOCYRL 4-0 PS2 UND

## (undated) DEVICE — NDL HEPARIN FLUSHING

## (undated) DEVICE — SOL NORMAL USPCA 0.9%

## (undated) DEVICE — WIPE MERCL POLYVIL ACETL 3X3IN

## (undated) DEVICE — KIT SURGIFLO HEMOSTATIC MATRIX

## (undated) DEVICE — SUT 4-0 12-18IN SILK BLACK

## (undated) DEVICE — COVER STERILE-Z BACK TABLE XL

## (undated) DEVICE — DRAPE INCISE IOBAN 2 13X13IN

## (undated) DEVICE — DISSECTOR SECTO CHERRY 3/8IN

## (undated) DEVICE — LOOP VES MAXI RD1.3X.9MM 18IN

## (undated) DEVICE — ELECTRODE PATIENT RETURN DISP

## (undated) DEVICE — SPONGE SURGIFOAM 100 8.5X12X10

## (undated) DEVICE — SUT VICRYL 3-0 27 SH

## (undated) DEVICE — ADHESIVE DERMABOND ADVANCED

## (undated) DEVICE — SUT 7/0 24IN PROLENE BL MO

## (undated) DEVICE — TUBE SUCTION MEDI-VAC STERILE

## (undated) DEVICE — BOWL STERILE LARGE 32OZ

## (undated) DEVICE — GLOVE PROTEXIS LTX MICRO 6.5

## (undated) DEVICE — SUT 3-0 12-18IN SILK

## (undated) DEVICE — SUT PERMA-HAND SUTUPAK 18IN

## (undated) DEVICE — SUT PROLENE 6-0 BV-1 30IN

## (undated) DEVICE — Device

## (undated) DEVICE — KIT SURGICAL TURNOVER

## (undated) DEVICE — GLOVE PROTEXIS PI SYN SURG 7.5

## (undated) DEVICE — SUT VICRYL CTD 2-0 GI 27 SH